# Patient Record
Sex: MALE | Race: WHITE | NOT HISPANIC OR LATINO | Employment: OTHER | URBAN - METROPOLITAN AREA
[De-identification: names, ages, dates, MRNs, and addresses within clinical notes are randomized per-mention and may not be internally consistent; named-entity substitution may affect disease eponyms.]

---

## 2017-01-11 ENCOUNTER — ALLSCRIPTS OFFICE VISIT (OUTPATIENT)
Dept: OTHER | Facility: OTHER | Age: 67
End: 2017-01-11

## 2017-01-31 ENCOUNTER — ALLSCRIPTS OFFICE VISIT (OUTPATIENT)
Dept: OTHER | Facility: OTHER | Age: 67
End: 2017-01-31

## 2017-01-31 DIAGNOSIS — R07.81 PLEURODYNIA: ICD-10-CM

## 2017-02-20 ENCOUNTER — ALLSCRIPTS OFFICE VISIT (OUTPATIENT)
Dept: OTHER | Facility: OTHER | Age: 67
End: 2017-02-20

## 2017-03-07 ENCOUNTER — ALLSCRIPTS OFFICE VISIT (OUTPATIENT)
Dept: OTHER | Facility: OTHER | Age: 67
End: 2017-03-07

## 2017-07-24 ENCOUNTER — ALLSCRIPTS OFFICE VISIT (OUTPATIENT)
Dept: OTHER | Facility: OTHER | Age: 67
End: 2017-07-24

## 2017-09-14 ENCOUNTER — ALLSCRIPTS OFFICE VISIT (OUTPATIENT)
Dept: OTHER | Facility: OTHER | Age: 67
End: 2017-09-14

## 2017-09-25 ENCOUNTER — GENERIC CONVERSION - ENCOUNTER (OUTPATIENT)
Dept: OTHER | Facility: OTHER | Age: 67
End: 2017-09-25

## 2017-12-06 ENCOUNTER — ALLSCRIPTS OFFICE VISIT (OUTPATIENT)
Dept: OTHER | Facility: OTHER | Age: 67
End: 2017-12-06

## 2017-12-19 ENCOUNTER — ALLSCRIPTS OFFICE VISIT (OUTPATIENT)
Dept: OTHER | Facility: OTHER | Age: 67
End: 2017-12-19

## 2017-12-19 ENCOUNTER — TRANSCRIBE ORDERS (OUTPATIENT)
Dept: ADMINISTRATIVE | Facility: HOSPITAL | Age: 67
End: 2017-12-19

## 2017-12-19 DIAGNOSIS — R52 PAIN: Primary | ICD-10-CM

## 2017-12-22 NOTE — PROGRESS NOTES
Assessment   1  Anterior neck pain (723 1) (M54 2)    Discussion/Summary      Patient is a 64-year-old male anterior neck pain - symptoms appear likely secondary to eustachian tube dysfunction  Continue with supportive care  He was advised that he would benefit greatly from using his steroid nasal spray daily  Start treatment with ibuprofen  Follow up if any symptoms are worsening  He was reassured today symptoms do not appear secondary to carotid artery disease  He was given pamphlet for vascular screening offered at [facility]  Chief Complaint   Pt presents for lump on side of neck/jaw x5ds  Pt c/o fo pain w/ touch to lump and L ear pain as well as dizziness  Pt states he used ice pack and nasal spray w/ little relief  History of Present Illness   HPI: Patient is a 64-year-old male presents today with a CC of fullness in his left ear for the past few days  Symptoms started gradually  He states that he has associated scratchy throat  Denies fevers chills runny nose or congestion  Review of Systems        Constitutional: not feeling poorly-- and-- not feeling tired  ENT: no nosebleeds-- and-- no nasal discharge  Cardiovascular: no chest pain-- and-- no palpitations  Respiratory: no shortness of breath,-- no cough-- and-- no shortness of breath during exertion  Gastrointestinal: no abdominal pain,-- no nausea-- and-- no diarrhea  Genitourinary: no dysuria-- and-- no nocturia  Musculoskeletal: no arthralgias-- and-- no myalgias  Integumentary: no rashes-- and-- no skin lesions  Neurological: no headache,-- no numbness-- and-- no dizziness  Active Problems   1  Acute sinusitis (461 9) (J01 90)   2  Benign essential hypertension (401 1) (I10)   3  Chronic thoracic spine pain (724 1,338 29) (M54 6,G89 29)   4  Combined hyperlipidemia (272 2) (E78 2)   5  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)   6  Greater trochanteric bursitis of left hip (726 5) (M70 62)   7   Left lateral epicondylitis (726 32) (M77 12)   8  Rotator cuff syndrome of right shoulder (726 10) (M75 101)   9  Screening for depression (V79 0) (Z13 89)   10  Thoracic back sprain, sequela (905 7) (S23 9XXS)    Past Medical History   1  History of Abdominal pain, epigastric (789 06) (R10 13)   2  History of Abdominal pain, LLQ (left lower quadrant) (789 04) (R10 32)   3  History of Acute upper respiratory infection (465 9) (J06 9)   4  History of Anxiety (300 00) (F41 9)   5  History of Bursitis of hip, unspecified laterality   6  History of Cellulitis of leg (682 6) (L03 119)   7  History of Chronic thoracic spine pain (724 1,338 29) (M54 6,G89 29)   8  History of Colonoscopy (Fiberoptic) Screening   9  History of Dysfunction of both eustachian tubes (381 81) (H69 83)   10  History of Erectile dysfunction of non-organic origin (302 72) (F52 21)   11  History of Flu vaccine need (V04 81) (Z23)   12  History of Flu vaccine need (V04 81) (Z23)   13  History of acute bacterial sinusitis (V12 69) (Z87 09)   14  History of acute bacterial sinusitis (V12 69) (Z87 09)   15  History of acute bronchitis (V12 69) (Z87 09)   16  History of acute gouty arthritis (V13 4) (Z87 39)   17  History of atopic dermatitis (V13 3) (Z87 2)   18  History of backache (V13 59) (Z87 39)   19  History of backache (V13 59) (Z87 39)   20  History of contact dermatitis (V13 3) (Z87 2)   21  History of diverticulitis of colon (V12 79) (Z87 19)   22  History of sebaceous cyst (V13 3) (Z87 2)   23  History of tinea cruris (V12 09) (Z86 19)   24  History of viral gastroenteritis (V12 09) (Z86 19)   25  History of Leg abrasion (916 0) (S80 819A)   26  History of Limb pain (729 5) (M79 609)   27  History of Need for immunization against influenza (V04 81) (Z23)   28  History of Need for pneumococcal vaccination (V03 82) (Z23)   29  History of No Recent Change In Medical History   30   History of Other acute sinusitis, recurrence not specified (461 8) (J01 80)   31  History of Post-nasal drip (784 91) (R09 82)   32  History of Rash (782 1) (R21)   33  History of Rib pain on left side (786 50) (R07 81)   34  History of Right shoulder pain (719 41) (M25 511)   35  History of Screening for cholesterol level (V77 91) (Z13 220)   36  History of Screening for diabetes mellitus (V77 1) (Z13 1)   37  History of Screening for thyroid disorder (V77 0) (Z13 29)   38  History of Skin lesion (709 9) (L98 9)   39  History of Strain of thoracic region (847 1) (S29 019A)   40  History of Thoracic back pain (724 1) (M54 6)   41  History of URI, acute (465 9) (J06 9)  Active Problems And Past Medical History Reviewed: The active problems and past medical history were reviewed and updated today  Family History   Family History    1  Family history of Diabetes Mellitus (V18 0)   2  Family history of Hypertension (V17 49)  Family History Reviewed: The family history was reviewed and updated today  Social History    · Being A Social Drinker   · Current Some Day Smoker (305 1)  The social history was reviewed and updated today  The social history was reviewed and is unchanged  Surgical History   1  History of Appendectomy   2  History of Complete Colonoscopy   3  History of Oral Surgery   4  History of Small Bowel Resection   5  History of Wrist Surgery  Surgical History Reviewed: The surgical history was reviewed and updated today  Current Meds    1  Allopurinol 300 MG Oral Tablet; take 1 tablet by mouth daily; Therapy: 72SZK9460 to (Arthea Angelucci)  Requested for: 65TGI9050; Last     Rx:24Nov2017 Ordered   2  Fluticasone Propionate 50 MCG/ACT Nasal Suspension; 2 sprays each nostril daily; Therapy: 54QOH7339 to (Last Rx:46Ydu3845)  Requested for: 06Dec2017 Ordered   3  Valsartan-Hydrochlorothiazide 160-12 5 MG Oral Tablet; take 1 tablet once daily;      Therapy: 37ZOK2315 to (Arthea Angelucci)  Requested for: 03HTP5091; Last     Rx:24Nov2017 Ordered     The medication list was reviewed and updated today  Allergies   1  No Known Drug Allergies    Vitals    Recorded: 59LJH2727 02:26PM   Temperature 97 6 F, Tympanic   Heart Rate 97   Pulse Quality Normal   Respiration Quality Normal   Respiration 19   Systolic 947, LUE, Sitting   Diastolic 84, LUE, Sitting   Height 5 ft 11 in   Weight 264 lb 5 oz   BMI Calculated 36 86   BSA Calculated 2 37   O2 Saturation 97, RA   Pain Scale 2     Physical Exam        Constitutional      General appearance: No acute distress, well appearing and well nourished  Eyes      Conjunctiva and lids: No swelling, erythema, or discharge  Pupils and irises: Equal, round and reactive to light  Ears, Nose, Mouth, and Throat      External inspection of ears and nose: Normal        Nasal mucosa, septum, and turbinates: Normal without edema or erythema  Oropharynx: Normal with no erythema, edema, exudate or lesions  Pulmonary      Respiratory effort: No increased work of breathing or signs of respiratory distress  Auscultation of lungs: Clear to auscultation, equal breath sounds bilaterally, no wheezes, no rales, no rhonci  Cardiovascular      Auscultation of heart: Normal rate and rhythm, normal S1 and S2, without murmurs  Examination of extremities for edema and/or varicosities: Normal        Abdomen      Abdomen: Non-tender, no masses  Liver and spleen: No hepatomegaly or splenomegaly  Lymphatic      Palpation of lymph nodes in neck: No lymphadenopathy  Musculoskeletal      Gait and station: Normal        Inspection/palpation of joints, bones, and muscles: Normal        Skin      Skin and subcutaneous tissue: Normal without rashes or lesions  Neurologic      Cranial nerves: Cranial nerves 2-12 intact  Sensation: No sensory loss         Psychiatric      Orientation to person, place and time: Normal        Mood and affect: Normal           Signatures Electronically signed by :  Mirta Hay DO; Dec 21 2017 12:24PM EST                       (Author)

## 2017-12-26 NOTE — PROGRESS NOTES
Assessment   1  Acute sinusitis (461 9) (J01 90)   2  Benign essential hypertension (401 1) (I10)    Plan   Acute sinusitis    · Fluticasone Propionate 50 MCG/ACT Nasal Suspension; 2 sprays each nostril daily    Discussion/Summary      80-year-old male here today for 2 weeks of upper respiratory symptoms  He will complete a 10 day course of Levaquin once daily for suspected acute sinusitis  He will start fluticasone nasal spray for nasal congestion and postnasal drip and recommended Mucinex DM OTC for cough and chest congestion and mucus  I advised that he discontinue any other over-the-counter cold medications that may raise his pressure, as his blood pressure is elevated today but he is compliant on medication  Rest, fluids, saltwater gargles all advised  Warm humidification, Mary advised  He should call or follow up in the office should symptoms persist or worsen despite treatment  Possible side effects of new medications were reviewed with the patient/guardian today  The treatment plan was reviewed with the patient/guardian  The patient/guardian understands and agrees with the treatment plan      Chief Complaint   Pt c/o sore throat, ear pain, cough, and congestion x 2 weeks  History of Present Illness   HPI: 66y/o male here today for URI sxs past 2 weeks  Reports excessive discolored mucous, wheeze, PND and chest congestion  pRODUCTVE  ear pressure and sore throat chills, fatigue  no fevers  tried mucinex  Review of Systems        Constitutional: as noted in HPI       ENT: as noted in HPI  Cardiovascular: no complaints of slow or fast heart rate, no chest pain, no palpitations, no leg claudication or lower extremity edema  Respiratory: as noted in HPI  Active Problems   1  Acute sinusitis (461 9) (J01 90)   2  Benign essential hypertension (401 1) (I10)   3  Chronic thoracic spine pain (724 1,338 29) (M54 6,G89 29)   4  Combined hyperlipidemia (272 2) (E78 2)   5   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)   6  Greater trochanteric bursitis of left hip (726 5) (M70 62)   7  Left lateral epicondylitis (726 32) (M77 12)   8  Rotator cuff syndrome of right shoulder (726 10) (M75 101)   9  Screening for depression (V79 0) (Z13 89)   10  Thoracic back sprain, sequela (905 7) (S23 9XXS)    Past Medical History   1  History of Abdominal pain, epigastric (789 06) (R10 13)   2  History of Abdominal pain, LLQ (left lower quadrant) (789 04) (R10 32)   3  History of Acute upper respiratory infection (465 9) (J06 9)   4  History of Anxiety (300 00) (F41 9)   5  History of Bursitis of hip, unspecified laterality   6  History of Cellulitis of leg (682 6) (L03 119)   7  History of Chronic thoracic spine pain (724 1,338 29) (M54 6,G89 29)   8  History of Colonoscopy (Fiberoptic) Screening   9  History of Dysfunction of both eustachian tubes (381 81) (H69 83)   10  History of Erectile dysfunction of non-organic origin (302 72) (F52 21)   11  History of Flu vaccine need (V04 81) (Z23)   12  History of Flu vaccine need (V04 81) (Z23)   13  History of acute bacterial sinusitis (V12 69) (Z87 09)   14  History of acute bacterial sinusitis (V12 69) (Z87 09)   15  History of acute bronchitis (V12 69) (Z87 09)   16  History of acute gouty arthritis (V13 4) (Z87 39)   17  History of atopic dermatitis (V13 3) (Z87 2)   18  History of backache (V13 59) (Z87 39)   19  History of backache (V13 59) (Z87 39)   20  History of contact dermatitis (V13 3) (Z87 2)   21  History of diverticulitis of colon (V12 79) (Z87 19)   22  History of sebaceous cyst (V13 3) (Z87 2)   23  History of tinea cruris (V12 09) (Z86 19)   24  History of viral gastroenteritis (V12 09) (Z86 19)   25  History of Leg abrasion (916 0) (S80 819A)   26  History of Limb pain (729 5) (M79 9)   27  History of Need for immunization against influenza (V04 81) (Z23)   28  History of Need for pneumococcal vaccination (V03 82) (Z23)   29   History of No Recent Change In Medical History   30  History of Other acute sinusitis, recurrence not specified (461 8) (J01 80)   31  History of Post-nasal drip (784 91) (R09 82)   32  History of Rash (782 1) (R21)   33  History of Rib pain on left side (786 50) (R07 81)   34  History of Right shoulder pain (719 41) (M25 511)   35  History of Screening for cholesterol level (V77 91) (Z13 220)   36  History of Screening for diabetes mellitus (V77 1) (Z13 1)   37  History of Screening for thyroid disorder (V77 0) (Z13 29)   38  History of Skin lesion (709 9) (L98 9)   39  History of Strain of thoracic region (847 1) (S29 019A)   40  History of Thoracic back pain (724 1) (M54 6)   41  History of URI, acute (465 9) (J06 9)    Family History   Family History    1  Family history of Diabetes Mellitus (V18 0)   2  Family history of Hypertension (V17 49)    Social History    · Being A Social Drinker   · Current Some Day Smoker (305 1)  The social history was reviewed and updated today  Surgical History   1  History of Appendectomy   2  History of Complete Colonoscopy   3  History of Oral Surgery   4  History of Small Bowel Resection   5  History of Wrist Surgery    Current Meds    1  Allopurinol 300 MG Oral Tablet; take 1 tablet by mouth daily; Therapy: 66DLH2896 to (Tamiko Palmer)  Requested for: 24RXL3679; Last     Rx:[id number] Ordered   2  Valsartan-Hydrochlorothiazide 160-12 5 MG Oral Tablet; take 1 tablet once daily; Therapy: 20Mar2012 to (Tamiko Palmer)  Requested for: 41QYB5165; Last     Rx:[id number] Ordered     The medication list was reviewed and updated today  Allergies   1   No Known Drug Allergies    Vitals    Recorded: 06QYM8771 01:09PM Recorded: 09YLR7615 12:49PM   Temperature  97 F, Tympanic   Heart Rate  98   Respiration Quality  Normal   Respiration  19   Systolic  127, LUE, Sitting   Diastolic  94, LUE, Sitting   Height 5 ft 11 in    Weight  268 lb    BMI Calculated 37 38 50 64   BSA Calculated 2 39 2 14   O2 Saturation  97, RA   Pain Scale  2     Physical Exam        Constitutional      General appearance: Abnormal   acutely ill,-- overweight,-- appears tired-- and-- appearance reflects stated age  Eyes      Conjunctiva and lids: No swelling, erythema, or discharge  Ears, Nose, Mouth, and Throat      External inspection of ears and nose: Normal        Otoscopic examination: Abnormal  -- B/L clear effusion, mild injection, no evidence of TM infection  Nasal mucosa, septum, and turbinates: Abnormal   There was a purulent discharge from both nares  The bilateral nasal mucosa was boggy,-- edematous-- and-- red  swelling and erythema  Oropharynx: Abnormal  -- PND, mild injection  Pulmonary      Respiratory effort: Abnormal  -- productive cough  Auscultation of lungs: Abnormal  -- mild decreased BS throughout, no wheeze, no consolidation  Cardiovascular      Auscultation of heart: Normal rate and rhythm, normal S1 and S2, without murmurs  Lymphatic      Palpation of lymph nodes in neck: No lymphadenopathy  Psychiatric      Orientation to person, place and time: Normal        Mood and affect: Normal        Additional Exam:  vitals reviewed - afebrile, BP elevated           Signatures    Electronically signed by : Maribel Stoner, [facility]; Dec  6 2017  1:12PM EST                       (Author)     Electronically signed by : Myra Vidales DO; Dec 26 2017  2:36AM EST                       (Co-author)

## 2017-12-29 ENCOUNTER — HOSPITAL ENCOUNTER (OUTPATIENT)
Dept: NON INVASIVE DIAGNOSTICS | Facility: CLINIC | Age: 67
Discharge: HOME | End: 2017-12-29
Payer: COMMERCIAL

## 2017-12-29 DIAGNOSIS — R52 PAIN: ICD-10-CM

## 2017-12-30 ENCOUNTER — GENERIC CONVERSION - ENCOUNTER (OUTPATIENT)
Dept: OTHER | Facility: OTHER | Age: 67
End: 2017-12-30

## 2018-01-02 ENCOUNTER — GENERIC CONVERSION - ENCOUNTER (OUTPATIENT)
Dept: OTHER | Facility: OTHER | Age: 68
End: 2018-01-02

## 2018-01-11 NOTE — RESULT NOTES
Message   Please call patient, recent x-ray of his thoracic spine did not show any abnormalities  Thank you     Verified Results  * XR SPINE THORACIC 3 VIEW [id number] 03:24PM Ambrose Cuadra Order Number: [id number]     Test Name Result Flag Reference   XR SPINE THORACIC 3 VW (Report)     THORACIC SPINE     INDICATION: Back pain  COMPARISON: None     VIEWS: AP, lateral and coned-down projections; 4 images     FINDINGS:     Thoracic vertebrae demonstrate normal stature and alignment  There is no fracture or pathologic bone lesion  There is no displacement of the paraspinal line  The pedicles are intact  IMPRESSION:     Unremarkable thoracic spine  Workstation performed: [id number]     Signed by:   Lenwood Crigler, MD   5/17/16     (Q) CBC (INCLUDES DIFF/PLT) (REFL) [id number] 08:04AM Hilario Walker     Test Name Result Flag Reference   WHITE BLOOD CELL COUNT 6 4 Thousand/uL  3 8-10 8   RED BLOOD CELL COUNT 5 03 Million/uL  4 20-5 80   HEMOGLOBIN 14 9 g/dL  13 2-17 1   HEMATOCRIT 45 8 %  38 5-50 0   MCV 91 2 fL  80 0-100 0   MCH 29 6 pg  27 0-33 0   MCHC 32 5 g/dL  32 0-36 0   RDW 14 1 %  11 0-15 0   PLATELET COUNT 515 Thousand/uL  140-400   MPV 9 1 fL  7 5-11 5   ABSOLUTE NEUTROPHILS 2867 cells/uL  2776-9435   ABSOLUTE LYMPHOCYTES 2394 cells/uL  850-3900   ABSOLUTE MONOCYTES 838 cells/uL  200-950   ABSOLUTE EOSINOPHILS 250 cells/uL     ABSOLUTE BASOPHILS 51 cells/uL  0-200   NEUTROPHILS 44 8 %     LYMPHOCYTES 37 4 %     MONOCYTES 13 1 %     EOSINOPHILS 3 9 %     BASOPHILS 0 8 %       (Q) COMPREHENSIVE METABOLIC PNL W/ADJUSTED CALCIUM [id number] 08:04AM Amanda Brooke     Test Name Result Flag Reference   GLUCOSE 109 mg/dL H 65-99   Fasting reference interval   UREA NITROGEN (BUN) 20 mg/dL  7-25   CREATININE 1 07 mg/dL  0 70-1 25   For patients >52years of age, the reference limit  for Creatinine is approximately 13% higher for people  identified as -American     eGFR NON-AFR  AMERICAN 72 mL/min/1 73m2  > OR = 60   eGFR AFRICAN AMERICAN 84 mL/min/1 73m2  > OR = 60   BUN/CREATININE RATIO   3-80   NOT APPLICABLE (calc)   SODIUM 140 mmol/L  135-146   POTASSIUM 4 1 mmol/L  3 5-5 3   CHLORIDE 101 mmol/L     CARBON DIOXIDE 33 mmol/L H 19-30   CALCIUM 9 1 mg/dL  8 6-10 3   CALCIUM (ADJUSTED FOR$ALBUMIN) 9 2 mg/dL (calc)  8 6-10 2   PROTEIN, TOTAL 6 8 g/dL  6 1-8 1   ALBUMIN 4 3 g/dL  3 6-5 1   GLOBULIN 2 5 g/dL (calc)  1 9-3 7   ALBUMIN/GLOBULIN RATIO 1 7 (calc)  1 0-2 5   BILIRUBIN, TOTAL 0 6 mg/dL  0 2-1 2   ALKALINE PHOSPHATASE 58 U/L     AST 14 U/L  10-35   ALT 13 U/L  9-46     (Q) LIPID PANEL WITH REFLEX TO DIRECT LDL [id number] 08:04AM Amanda Brooke     Test Name Result Flag Reference   CHOLESTEROL, TOTAL 248 mg/dL H 125-200   HDL CHOLESTEROL 54 mg/dL  > OR = 40   TRIGLICERIDES 86 mg/dL  <537   LDL-CHOLESTEROL 177 mg/dL (calc) H <130   Desirable range <100 mg/dL for patients with CHD or  diabetes and <70 mg/dL for diabetic patients with  known heart disease  CHOL/HDLC RATIO 4 6 (calc)  < OR = 5 0   NON HDL CHOLESTEROL 194 mg/dL (calc) H    Target for non-HDL cholesterol is 30 mg/dL higher than   LDL cholesterol target       (Q) TSH, 3RD GENERATION W/REFLEX TO FT4 [id number] 08:04AM Amanda Brooke   REPORT COMMENT:  FASTING:YES     Test Name Result Flag Reference   TSH W/REFLEX TO FT4 2 54 mIU/L  0 40-4 50

## 2018-01-12 VITALS
DIASTOLIC BLOOD PRESSURE: 80 MMHG | HEIGHT: 71 IN | HEART RATE: 86 BPM | RESPIRATION RATE: 16 BRPM | WEIGHT: 265.13 LBS | OXYGEN SATURATION: 95 % | SYSTOLIC BLOOD PRESSURE: 118 MMHG | BODY MASS INDEX: 37.12 KG/M2 | TEMPERATURE: 98.3 F

## 2018-01-12 VITALS
BODY MASS INDEX: 36.62 KG/M2 | SYSTOLIC BLOOD PRESSURE: 150 MMHG | OXYGEN SATURATION: 96 % | HEART RATE: 93 BPM | RESPIRATION RATE: 16 BRPM | TEMPERATURE: 97.9 F | HEIGHT: 71 IN | DIASTOLIC BLOOD PRESSURE: 86 MMHG | WEIGHT: 261.56 LBS

## 2018-01-12 VITALS
HEART RATE: 81 BPM | BODY MASS INDEX: 37 KG/M2 | RESPIRATION RATE: 16 BRPM | SYSTOLIC BLOOD PRESSURE: 138 MMHG | WEIGHT: 264.31 LBS | DIASTOLIC BLOOD PRESSURE: 88 MMHG | HEIGHT: 71 IN | OXYGEN SATURATION: 95 % | TEMPERATURE: 97.2 F

## 2018-01-12 VITALS
RESPIRATION RATE: 16 BRPM | SYSTOLIC BLOOD PRESSURE: 138 MMHG | DIASTOLIC BLOOD PRESSURE: 82 MMHG | WEIGHT: 267.31 LBS | BODY MASS INDEX: 37.42 KG/M2 | OXYGEN SATURATION: 98 % | TEMPERATURE: 96.1 F | HEART RATE: 78 BPM | HEIGHT: 71 IN

## 2018-01-13 VITALS
SYSTOLIC BLOOD PRESSURE: 158 MMHG | WEIGHT: 270.44 LBS | OXYGEN SATURATION: 96 % | HEIGHT: 71 IN | BODY MASS INDEX: 37.86 KG/M2 | HEART RATE: 91 BPM | DIASTOLIC BLOOD PRESSURE: 88 MMHG | TEMPERATURE: 98.7 F | RESPIRATION RATE: 16 BRPM

## 2018-01-14 VITALS
OXYGEN SATURATION: 95 % | SYSTOLIC BLOOD PRESSURE: 154 MMHG | HEIGHT: 71 IN | RESPIRATION RATE: 17 BRPM | BODY MASS INDEX: 36.72 KG/M2 | DIASTOLIC BLOOD PRESSURE: 86 MMHG | WEIGHT: 262.31 LBS | TEMPERATURE: 98.2 F | HEART RATE: 88 BPM

## 2018-01-22 VITALS
HEART RATE: 89 BPM | HEIGHT: 61 IN | RESPIRATION RATE: 20 BRPM | BODY MASS INDEX: 49.1 KG/M2 | OXYGEN SATURATION: 96 % | TEMPERATURE: 97.9 F | DIASTOLIC BLOOD PRESSURE: 90 MMHG | WEIGHT: 260.06 LBS | SYSTOLIC BLOOD PRESSURE: 130 MMHG

## 2018-01-22 VITALS
SYSTOLIC BLOOD PRESSURE: 156 MMHG | WEIGHT: 268 LBS | OXYGEN SATURATION: 97 % | RESPIRATION RATE: 19 BRPM | TEMPERATURE: 97 F | HEIGHT: 71 IN | DIASTOLIC BLOOD PRESSURE: 94 MMHG | BODY MASS INDEX: 37.52 KG/M2 | HEART RATE: 98 BPM

## 2018-01-23 VITALS
HEART RATE: 97 BPM | BODY MASS INDEX: 37 KG/M2 | TEMPERATURE: 97.6 F | DIASTOLIC BLOOD PRESSURE: 84 MMHG | WEIGHT: 264.31 LBS | HEIGHT: 71 IN | OXYGEN SATURATION: 97 % | SYSTOLIC BLOOD PRESSURE: 120 MMHG | RESPIRATION RATE: 19 BRPM

## 2018-01-23 NOTE — RESULT NOTES
Verified Results  VAS SCREENING [id number] 07:50AM Amanda Brooke     Test Name Result Flag Reference   VAS SCREENING (Report)     [facility] REPORT   CLINICAL:   Indications: Vascular screening for Carotid artery stenosis, AAA and PAD  Patient reports a history of Dizziness   Risk Factors   The patient has history of obesity and Hypertension  He has no history of   Hyperlipidemia, Peripheral Arterial Disease or Diabetes  Risk Factors   Clinical   Right Pressure: 162/ mm Hg, Left Pressure: 164/ mm Hg  FINDINGS:      Celiac Artery Expiration     Impression: 1  Normal Aortic diameter     AP Diam (cm): 2 0     PSV: 66   Prox CCA, Right     PSV: 63     EDV: 16   Common Femoral Artery, Right     PSV: 78     Waveform: Triphasic   DorsPedis, Right     VIVIENNE: 1 05     Pressure: 173     Waveform: Triphasic   Mid CCA, Right     PSV: 69     EDV: 27   Dist CCA, Right     PSV: 85     EDV: 27   Post Tibial, Right     VIVIENNE: 1 07     Pressure: 176     Waveform: Triphasic   Prox  ICA, Right     Impression: 1  No significant carotid disease     PSV: 85     EDV: 23     ICA/CCA: 1 24     PPS: 68 72   Dist  ICA, Right     PSV: 102     EDV: 36     ICA/CCA: 1 49     PPS: 68 72   Prox CCA, Left     PSV: 73     EDV: 24   Common Femoral Artery, Left     PSV: 68     Waveform: Triphasic   DorsPedis, Left     VIVIENNE: 1 05     Pressure: 172     Waveform: Triphasic   Mid CCA, Left     PSV: 101     EDV: 30   Dist CCA, Left     PSV: 95     EDV: 36   Post Tibial, Left     VIVIENNE: 0 99     Pressure: 163     Waveform: Triphasic   Prox  ICA, Left     Impression: 1  No significant carotid disease     PSV: 83     EDV: 21     ICA/CCA: 0 83     PPS: 101 16   Dist  ICA, Left     PSV: 61     EDV: 32     ICA/CCA: 0 61     PPS: 101 16            CONCLUSION:      Impression   CAROTID SCREENING:   Evaluation reveals a normal internal carotid artery on the right  Evaluation reveals a normal internal carotid artery on the left     AORTA SCREENING:   The abdominal aorta is patent and normal in caliber with the greatest diameter   measurement of 2 0 cms  PAD SCREENING:   The ankle/brachial index on the right is 1 07, which is within normal limits  The ankle/brachial index on the left is 1 07, which is within normal limits        SIGNATURE:   Electronically Signed by: Radha Castro on 2017-12-29 09:52:00 PM

## 2018-01-24 VITALS
HEIGHT: 71 IN | WEIGHT: 262 LBS | OXYGEN SATURATION: 98 % | DIASTOLIC BLOOD PRESSURE: 84 MMHG | BODY MASS INDEX: 36.68 KG/M2 | HEART RATE: 86 BPM | SYSTOLIC BLOOD PRESSURE: 134 MMHG | TEMPERATURE: 96.9 F

## 2018-03-12 ENCOUNTER — OFFICE VISIT (OUTPATIENT)
Dept: FAMILY MEDICINE CLINIC | Facility: CLINIC | Age: 68
End: 2018-03-12
Payer: COMMERCIAL

## 2018-03-12 VITALS
OXYGEN SATURATION: 98 % | RESPIRATION RATE: 20 BRPM | BODY MASS INDEX: 38.57 KG/M2 | SYSTOLIC BLOOD PRESSURE: 126 MMHG | WEIGHT: 269.44 LBS | HEART RATE: 80 BPM | TEMPERATURE: 97.8 F | HEIGHT: 70 IN | DIASTOLIC BLOOD PRESSURE: 80 MMHG

## 2018-03-12 DIAGNOSIS — M75.101 ROTATOR CUFF SYNDROME OF RIGHT SHOULDER: Primary | ICD-10-CM

## 2018-03-12 PROCEDURE — 99213 OFFICE O/P EST LOW 20 MIN: CPT | Performed by: FAMILY MEDICINE

## 2018-03-12 RX ORDER — LEVOFLOXACIN 500 MG/1
1 TABLET, FILM COATED ORAL DAILY
COMMUNITY
Start: 2018-01-02 | End: 2018-03-12 | Stop reason: HOSPADM

## 2018-03-12 RX ORDER — AMOXICILLIN AND CLAVULANATE POTASSIUM 875; 125 MG/1; MG/1
1 TABLET, FILM COATED ORAL EVERY 12 HOURS
COMMUNITY
Start: 2017-09-14 | End: 2018-03-12 | Stop reason: HOSPADM

## 2018-03-12 RX ORDER — FLUTICASONE PROPIONATE 50 MCG
2 SPRAY, SUSPENSION (ML) NASAL DAILY
COMMUNITY
Start: 2017-12-06 | End: 2018-04-02

## 2018-03-12 RX ORDER — VALSARTAN AND HYDROCHLOROTHIAZIDE 160; 12.5 MG/1; MG/1
1 TABLET, FILM COATED ORAL DAILY
COMMUNITY
Start: 2012-03-20 | End: 2018-07-11 | Stop reason: SDUPTHER

## 2018-03-12 RX ORDER — ALLOPURINOL 300 MG/1
1 TABLET ORAL DAILY
COMMUNITY
Start: 2011-03-21 | End: 2018-05-18 | Stop reason: SDUPTHER

## 2018-03-12 NOTE — PROGRESS NOTES
Assessment/Plan:   1  Rotator cuff syndrome of right shoulder  Symptoms appear persistent  Reviewed his previous testing including x-rays  Patient has been performing his home therapy program regularly at home  He does have periodic exacerbations of his shoulder pain  Due to the persistence of his pain, he was advised he may benefit from evaluation by Orthopedics  He may also benefit from having a MRI  Referral given today  Continue with ibuprofen for symptom relief  He was advised that he may follow up if he would like additional CS injection   - Ambulatory referral to Orthopedic Surgery; Future     There are no diagnoses linked to this encounter  Subjective:  Chief Complaint   Patient presents with    Follow-up     Shoulder Pain( right)         Patient ID: Felix Ulrich is a 80 y o  male  Shoulder Pain    The pain is present in the right shoulder  This is a recurrent problem  The current episode started in the past 7 days  There has been no history of extremity trauma  The problem occurs intermittently  The problem has been unchanged  The quality of the pain is described as aching and dull  The pain is at a severity of 3/10  The pain is mild  Pertinent negatives include no fever, itching or numbness  He has tried NSAIDS for the symptoms  The treatment provided mild relief  Review of Systems   Constitutional: Negative for activity change, chills, fatigue and fever  HENT: Negative for congestion, ear pain, sinus pressure and sore throat  Eyes: Negative for redness, itching and visual disturbance  Respiratory: Negative for cough and shortness of breath  Cardiovascular: Negative for chest pain and palpitations  Gastrointestinal: Negative for abdominal pain, diarrhea and nausea  Endocrine: Negative for cold intolerance and heat intolerance  Genitourinary: Negative for dysuria, flank pain and frequency  Musculoskeletal: Positive for arthralgias   Negative for back pain, gait problem and myalgias  Skin: Negative for color change and itching  Allergic/Immunologic: Negative for environmental allergies  Neurological: Negative for dizziness, numbness and headaches  Psychiatric/Behavioral: Negative for behavioral problems and sleep disturbance  The following portions of the patient's history were reviewed and updated as appropriate : past family history, past medical history, past social history and past surgical history  Objective:  Vitals:    03/12/18 0930   BP: 126/80   BP Location: Left arm   Patient Position: Sitting   Cuff Size: Standard   Pulse: 80   Resp: 20   Temp: 97 8 °F (36 6 °C)   TempSrc: Tympanic   SpO2: 98%   Weight: 122 kg (269 lb 7 oz)   Height: 5' 10" (1 778 m)        Physical Exam   Constitutional: He appears well-developed and well-nourished  HENT:   Head: Normocephalic and atraumatic  Musculoskeletal:        Right shoulder: He exhibits tenderness and pain  He exhibits normal range of motion, no swelling, no effusion, no deformity and no laceration

## 2018-03-22 DIAGNOSIS — G89.29 CHRONIC THORACIC SPINE PAIN: Primary | ICD-10-CM

## 2018-03-22 DIAGNOSIS — M54.6 CHRONIC THORACIC SPINE PAIN: Primary | ICD-10-CM

## 2018-03-22 RX ORDER — MELOXICAM 15 MG/1
TABLET ORAL
Qty: 30 TABLET | Refills: 1 | Status: SHIPPED | OUTPATIENT
Start: 2018-03-22 | End: 2018-04-02

## 2018-03-27 ENCOUNTER — APPOINTMENT (OUTPATIENT)
Dept: RADIOLOGY | Facility: CLINIC | Age: 68
End: 2018-03-27
Payer: COMMERCIAL

## 2018-03-27 ENCOUNTER — APPOINTMENT (OUTPATIENT)
Dept: LAB | Facility: MEDICAL CENTER | Age: 68
End: 2018-03-27
Payer: COMMERCIAL

## 2018-03-27 ENCOUNTER — TRANSCRIBE ORDERS (OUTPATIENT)
Dept: ADMINISTRATIVE | Facility: HOSPITAL | Age: 68
End: 2018-03-27

## 2018-03-27 ENCOUNTER — OFFICE VISIT (OUTPATIENT)
Dept: OBGYN CLINIC | Facility: MEDICAL CENTER | Age: 68
End: 2018-03-27
Payer: COMMERCIAL

## 2018-03-27 VITALS
BODY MASS INDEX: 37.51 KG/M2 | HEIGHT: 70 IN | SYSTOLIC BLOOD PRESSURE: 146 MMHG | DIASTOLIC BLOOD PRESSURE: 93 MMHG | WEIGHT: 262 LBS | HEART RATE: 86 BPM

## 2018-03-27 DIAGNOSIS — Z01.818 PRE-OP TESTING: Primary | ICD-10-CM

## 2018-03-27 DIAGNOSIS — M75.101 ROTATOR CUFF SYNDROME OF RIGHT SHOULDER: ICD-10-CM

## 2018-03-27 DIAGNOSIS — Z01.818 PRE-OP TESTING: ICD-10-CM

## 2018-03-27 DIAGNOSIS — M25.511 RIGHT SHOULDER PAIN, UNSPECIFIED CHRONICITY: Primary | ICD-10-CM

## 2018-03-27 DIAGNOSIS — M25.511 RIGHT SHOULDER PAIN, UNSPECIFIED CHRONICITY: ICD-10-CM

## 2018-03-27 LAB
ANION GAP SERPL CALCULATED.3IONS-SCNC: 5 MMOL/L (ref 4–13)
BUN SERPL-MCNC: 15 MG/DL (ref 5–25)
CALCIUM SERPL-MCNC: 9.4 MG/DL (ref 8.3–10.1)
CHLORIDE SERPL-SCNC: 102 MMOL/L (ref 100–108)
CO2 SERPL-SCNC: 30 MMOL/L (ref 21–32)
CREAT SERPL-MCNC: 1.09 MG/DL (ref 0.6–1.3)
GFR SERPL CREATININE-BSD FRML MDRD: 70 ML/MIN/1.73SQ M
GLUCOSE P FAST SERPL-MCNC: 114 MG/DL (ref 65–99)
POTASSIUM SERPL-SCNC: 4.2 MMOL/L (ref 3.5–5.3)
SODIUM SERPL-SCNC: 137 MMOL/L (ref 136–145)

## 2018-03-27 PROCEDURE — 73030 X-RAY EXAM OF SHOULDER: CPT

## 2018-03-27 PROCEDURE — 99204 OFFICE O/P NEW MOD 45 MIN: CPT | Performed by: ORTHOPAEDIC SURGERY

## 2018-03-27 PROCEDURE — 36415 COLL VENOUS BLD VENIPUNCTURE: CPT

## 2018-03-27 PROCEDURE — 80048 BASIC METABOLIC PNL TOTAL CA: CPT

## 2018-03-27 NOTE — PROGRESS NOTES
Orthopaedic Surgery - Office Note  Silvia Carlson (2 y o  male)   : 1950   MRN: [id number]  Encounter Date: 3/27/2018    Chief Complaint   Patient presents with    Right Shoulder - Pain       Assessment / Plan  Possible R shoulder rotator cuff tear    · MRI of right shoulder  Return for Recheck after MRI  History of Present Illness  Slivia Carlson is a 80 y o  male who presents with chronic R shoulder pain that happened as a work related injury on   Pt states that he was lifting something heavy across his body when he felt pain in his R shoulder  Pt states that he was seen in the [place] office with Xrays with a [employer] rep in  where he has xrays done and was dx with a shoulder strain  Pt states that he has done therapy and ice for weeks with relief  Pt states that he did not miss any work when this happened  Pt is since now retired as of 2016  Pt states that he has had a CSI in the past that gave him partial relief  Pt c/o pain actively elevating his arm and tucking a shirt in, closing a door behind him, night pain that creates a shooting pain that goes down to his elbow  Review of Systems  Pertinent items are noted in HPI  All other systems were reviewed and are negative  Physical Exam  /93   Pulse 86   Ht 5' 10" (1 778 m)   Wt 119 kg (262 lb)   BMI 37 59 kg/m²   Cons: Appears well  No apparent distress  Psych: Alert  Oriented x3  Mood and affect normal   Eyes: PERRLA, EOMI  Resp: Normal effort  No audible wheezing or stridor  CV: Palpable pulse  No discernable arrhythmia  No LE edema  Lymph:  No palpable cervical, axillary, or inguinal lymphadenopathy  Skin: Warm  No palpable masses  No visible lesions  Neuro: Normal muscle tone  Normal and symmetric DTR's  Right Shoulder Exam  Alignment / Posture:  Normal shoulder posture  Inspection:  No swelling  No deformity  Palpation:  moderate tenderness at subacromial bursa  No effusion    ROM:  Shoulder   Shoulder ER R 70, L 50  pain with shoulder extension and ER  patient has an obvious hitch with active forward elevation  Strength:  Supraspinatus 5-/5  Infraspinatus 4/5  Subscapularis 5/5  Stability:  No objective shoulder instability  Tests: (+) Baker  (+) Neer  (+) Painful arc  (-) Belly press  Neurovascular:  Sensation intact in Ax/R/M/U nerve distributions  2+ radial pulse  Brisk capillary refill in all fingertips  Studies Reviewed  XR of right shoulder - shows no acute fractures no degernerative changes  Procedures  No procedures today  Medical, Surgical, Family, and Social History  The patient's medical history, family history, and social history, were reviewed and updated as appropriate  Past Medical History:   Diagnosis Date    Gout     Hypertension        Past Surgical History:   Procedure Laterality Date    APPENDECTOMY      12years old       Family History   Problem Relation Age of Onset    Diabetes Sister        Social History     Occupational History    Not on file       Social History Main Topics    Smoking status: Former Smoker    Smokeless tobacco: Never Used      Comment: smoked for 20 years    Alcohol use Yes    Drug use: No    Sexual activity: Not on file       Allergies   Allergen Reactions    Levaquin [Levofloxacin] Arthralgia         Current Outpatient Prescriptions:     allopurinol (ZYLOPRIM) 300 mg tablet, Take 1 tablet by mouth daily, Disp: , Rfl:     fluticasone (FLONASE) 50 mcg/act nasal spray, 2 sprays into each nostril daily, Disp: , Rfl:     meloxicam (MOBIC) 15 mg tablet, TAKE 1 TABLET BY MOUTH DAILY WITH FOOD, Disp: 30 tablet, Rfl: 1    valsartan-hydrochlorothiazide (DIOVAN-HCT) 160-12 5 MG per tablet, Take 1 tablet by mouth daily, Disp: , Rfl:       Avani Luciano Attestation    I,:   Avani Luciano am acting as a scribe while in the presence of the attending physician :        I,:   Chhaya Majano MD personally performed the services described in this documentation    as scribed in my presence :

## 2018-04-02 RX ORDER — FLUTICASONE PROPIONATE 50 MCG
1 SPRAY, SUSPENSION (ML) NASAL DAILY PRN
COMMUNITY
End: 2019-02-26

## 2018-04-03 ENCOUNTER — ANESTHESIA EVENT (OUTPATIENT)
Dept: RADIOLOGY | Facility: HOSPITAL | Age: 68
End: 2018-04-03

## 2018-04-04 ENCOUNTER — ANESTHESIA (OUTPATIENT)
Dept: RADIOLOGY | Facility: HOSPITAL | Age: 68
End: 2018-04-04

## 2018-04-04 ENCOUNTER — HOSPITAL ENCOUNTER (OUTPATIENT)
Dept: RADIOLOGY | Facility: HOSPITAL | Age: 68
Discharge: HOME | End: 2018-04-04
Payer: COMMERCIAL

## 2018-04-04 VITALS
SYSTOLIC BLOOD PRESSURE: 168 MMHG | HEART RATE: 66 BPM | BODY MASS INDEX: 37.22 KG/M2 | WEIGHT: 260 LBS | HEIGHT: 70 IN | DIASTOLIC BLOOD PRESSURE: 96 MMHG | TEMPERATURE: 98.6 F | RESPIRATION RATE: 16 BRPM | OXYGEN SATURATION: 100 %

## 2018-04-04 DIAGNOSIS — M25.511 RIGHT SHOULDER PAIN, UNSPECIFIED CHRONICITY: ICD-10-CM

## 2018-04-04 PROCEDURE — 73221 MRI JOINT UPR EXTREM W/O DYE: CPT

## 2018-04-04 RX ORDER — LIDOCAINE HYDROCHLORIDE 10 MG/ML
INJECTION, SOLUTION INFILTRATION; PERINEURAL AS NEEDED
Status: DISCONTINUED | OUTPATIENT
Start: 2018-04-04 | End: 2018-04-04 | Stop reason: SURG

## 2018-04-04 RX ORDER — PROPOFOL 10 MG/ML
INJECTION, EMULSION INTRAVENOUS AS NEEDED
Status: DISCONTINUED | OUTPATIENT
Start: 2018-04-04 | End: 2018-04-04 | Stop reason: SURG

## 2018-04-04 RX ORDER — SODIUM CHLORIDE, SODIUM LACTATE, POTASSIUM CHLORIDE, CALCIUM CHLORIDE 600; 310; 30; 20 MG/100ML; MG/100ML; MG/100ML; MG/100ML
20 INJECTION, SOLUTION INTRAVENOUS CONTINUOUS
Status: DISCONTINUED | OUTPATIENT
Start: 2018-04-04 | End: 2018-04-05 | Stop reason: HOSPADM

## 2018-04-04 RX ADMIN — SODIUM CHLORIDE, SODIUM LACTATE, POTASSIUM CHLORIDE, AND CALCIUM CHLORIDE 20 ML/HR: .6; .31; .03; .02 INJECTION, SOLUTION INTRAVENOUS at 12:01

## 2018-04-04 RX ADMIN — LIDOCAINE HYDROCHLORIDE 50 MG: 10 INJECTION, SOLUTION INFILTRATION; PERINEURAL at 13:18

## 2018-04-04 RX ADMIN — PROPOFOL 200 MG: 10 INJECTION, EMULSION INTRAVENOUS at 13:18

## 2018-04-04 NOTE — ANESTHESIA PREPROCEDURE EVALUATION
Review of Systems/Medical History          Cardiovascular  Hyperlipidemia, Hypertension ,    Pulmonary  Smoker ex-smoker  , No recent URI ,        GI/Hepatic    No GERD ,             Endo/Other    Obesity (BMI-38)    GYN       Hematology   Musculoskeletal  Back pain , thoracic pain,        Neurology   Psychology       Comment: Claustrophobia         Lab Results   Component Value Date    ALT 13 05/16/2016    AST 14 05/16/2016    BUN 15 03/27/2018    CALCIUM 9 4 03/27/2018     03/27/2018    CHOL 248 (H) 05/16/2016    CO2 30 03/27/2018    CREATININE 1 09 03/27/2018    HDL 54 05/16/2016    HCT 45 8 05/16/2016    HGB 14 9 05/16/2016    PROT 6 8 05/16/2016     05/16/2016    K 4 2 03/27/2018     03/27/2018    TRIG 86 05/16/2016    WBC 6 4 05/16/2016     Physical Exam    Airway  Comment: Large Neck  Mallampati score: II  TM Distance: >3 FB  Neck ROM: full     Dental   Comment: Upper Front Implant, implants,     Cardiovascular      Pulmonary      Other Findings        Anesthesia Plan  ASA Score- 3     Anesthesia Type- general with ASA Monitors  Additional Monitors:   Airway Plan: LMA  Plan Factors-Patient not instructed to abstain from smoking on day of procedure  Patient did not smoke on day of surgery  Induction- intravenous  Postoperative Plan-     Informed Consent- Anesthetic plan and risks discussed with patient                Lab Results   Component Value Date    ALT 13 05/16/2016    AST 14 05/16/2016    BUN 15 03/27/2018    CALCIUM 9 4 03/27/2018     03/27/2018    CHOL 248 (H) 05/16/2016    CO2 30 03/27/2018    CREATININE 1 09 03/27/2018    HDL 54 05/16/2016    HCT 45 8 05/16/2016    HGB 14 9 05/16/2016    PROT 6 8 05/16/2016     05/16/2016    K 4 2 03/27/2018     03/27/2018    TRIG 86 05/16/2016    WBC 6 4 05/16/2016

## 2018-04-04 NOTE — PROGRESS NOTES
Pt stable and alert and oriented x4  Pt denies the need for anything when asked  Pt denies nausea  Pt states he is comfortable and is notified of plan for transfer to [facility]  I called [facility] and notified Tricia Thompson RN of pt plan for transfer

## 2018-04-04 NOTE — ANESTHESIA POSTPROCEDURE EVALUATION
Post-Op Assessment Note      CV Status:  Stable    Mental Status:  Alert    Hydration Status:  Stable    PONV Controlled:  None    Airway Patency:  Patent    Post Op Vitals Reviewed:  Yes              BP (!) 188/102 (04/04/18 1356)    Temp (!) 96 2 °F (35 7 °C) (04/04/18 1356)    Pulse 76 (04/04/18 1356)   Resp 20 (04/04/18 1356)    SpO2 96 % (04/04/18 1356)

## 2018-04-10 ENCOUNTER — OFFICE VISIT (OUTPATIENT)
Dept: OBGYN CLINIC | Facility: MEDICAL CENTER | Age: 68
End: 2018-04-10
Payer: COMMERCIAL

## 2018-04-10 VITALS
WEIGHT: 262 LBS | HEIGHT: 70 IN | HEART RATE: 82 BPM | SYSTOLIC BLOOD PRESSURE: 192 MMHG | BODY MASS INDEX: 37.51 KG/M2 | DIASTOLIC BLOOD PRESSURE: 92 MMHG

## 2018-04-10 DIAGNOSIS — M75.121 COMPLETE TEAR OF RIGHT ROTATOR CUFF: Primary | ICD-10-CM

## 2018-04-10 PROCEDURE — 99213 OFFICE O/P EST LOW 20 MIN: CPT | Performed by: ORTHOPAEDIC SURGERY

## 2018-04-10 NOTE — PROGRESS NOTES
Orthopaedic Surgery - Office Note  Earnestine Sales (40 y o  male)   : 1950   MRN: [id number]  Encounter Date: 4/10/2018    Chief Complaint   Patient presents with    Right Shoulder - Follow-up       Assessment / Plan  R shoulder rotator cuff supraspinatus tear     · due to the patients minimal discomfort today proceeding with surgery is not reccommended at this point    · Patient states that he is going to continue with his HEP in regards to managing his symptoms   Return if symptoms worsen or fail to improve  History of Present Illness  Earnestine Sales is a 80 y o  male who presents as a follow up to review his MRI of his R shoulder  Pt states that he was lifting something heavy across his body when he felt pain in his R shoulder  Pt states that he has had therapy and a CSI in the past that gave him partial relief  Pt c/o pain actively elevating his arm, closing a door behind him and tucking in a shirt  The pain in his shoulder will wake him up at night and he describes a shooting pain that goes down to his elbow  However this pain has greatly subsided since his last visit and the patient states that he can live with how he has been feeling lately  Review of Systems  Pertinent items are noted in HPI  All other systems were reviewed and are negative  Physical Exam  BP (!) 192/92   Pulse 82   Ht 5' 10" (1 778 m)   Wt 119 kg (262 lb)   BMI 37 59 kg/m²   Cons: Appears well  No apparent distress  Psych: Alert  Oriented x3  Mood and affect normal   Eyes: PERRLA, EOMI  Resp: Normal effort  No audible wheezing or stridor  CV: Palpable pulse  No discernable arrhythmia  No LE edema  Lymph:  No palpable cervical, axillary, or inguinal lymphadenopathy  Skin: Warm  No palpable masses  No visible lesions  Neuro: Normal muscle tone  Normal and symmetric DTR's  Right Shoulder Exam  Alignment / Posture:  Normal shoulder posture  Normal scapular position  Inspection:  No swelling   No deformity  Palpation:  moderate tenderness at subacromial bursa  No effusion  ROM:  Shoulder   Shoulder ER 70  pain with shoulder extension and ER  patient has an obvious hitch with active forward elevation  strength:  Supraspinatus 5/5  Infraspinatus 5/5  Subscapularis 5/5  Stability:  No objective shoulder instability  Tests: (+) Baker  (+) Neer  (+) Painful arc  (-) Belly press  Neurovascular:  Sensation intact in Ax/R/M/U nerve distributions  2+ radial pulse  Brisk capillary refill in all fingertips  Studies Reviewed  MRI of right shoulder - os acromiale, mesoacromial type iwth subacromial spur  full thickness longitudinal interstitial tear of the supraspinatus     Procedures  No procedures today  Medical, Surgical, Family, and Social History  The patient's medical history, family history, and social history, were reviewed and updated as appropriate  Past Medical History:   Diagnosis Date    Gout     Hypertension        Past Surgical History:   Procedure Laterality Date    APPENDECTOMY      12years old    BOWEL RESECTION      COLONOSCOPY         Family History   Problem Relation Age of Onset    Diabetes Sister        Social History     Occupational History    Not on file       Social History Main Topics    Smoking status: Former Smoker     Packs/day: 0 50     Years: 20 00    Smokeless tobacco: Never Used    Alcohol use Yes      Comment: social    Drug use: No    Sexual activity: Not on file       Allergies   Allergen Reactions    Levaquin [Levofloxacin] Arthralgia         Current Outpatient Prescriptions:     allopurinol (ZYLOPRIM) 300 mg tablet, Take 1 tablet by mouth daily, Disp: , Rfl:     fluticasone (FLONASE) 50 mcg/act nasal spray, 1 spray into each nostril daily as needed for rhinitis, Disp: , Rfl:     valsartan-hydrochlorothiazide (DIOVAN-HCT) 160-12 5 MG per tablet, Take 1 tablet by mouth daily, Disp: , Rfl:       Avani Florez Attestation    I,: Avani Martinez am acting as a scribe while in the presence of the attending physician :        I,:   Mayra Fairbanks MD personally performed the services described in this documentation    as scribed in my presence :

## 2018-05-18 DIAGNOSIS — Z87.39 HISTORY OF GOUT: Primary | ICD-10-CM

## 2018-05-18 RX ORDER — ALLOPURINOL 300 MG/1
TABLET ORAL DAILY
Qty: 90 TABLET | Refills: 1 | Status: SHIPPED | OUTPATIENT
Start: 2018-05-18 | End: 2019-01-27 | Stop reason: SDUPTHER

## 2018-05-21 ENCOUNTER — APPOINTMENT (OUTPATIENT)
Dept: RADIOLOGY | Facility: MEDICAL CENTER | Age: 68
End: 2018-05-21
Payer: COMMERCIAL

## 2018-05-21 ENCOUNTER — OFFICE VISIT (OUTPATIENT)
Dept: FAMILY MEDICINE CLINIC | Facility: CLINIC | Age: 68
End: 2018-05-21
Payer: COMMERCIAL

## 2018-05-21 VITALS
TEMPERATURE: 97 F | HEART RATE: 79 BPM | BODY MASS INDEX: 36.99 KG/M2 | OXYGEN SATURATION: 98 % | DIASTOLIC BLOOD PRESSURE: 70 MMHG | RESPIRATION RATE: 16 BRPM | SYSTOLIC BLOOD PRESSURE: 126 MMHG | WEIGHT: 258.38 LBS | HEIGHT: 70 IN

## 2018-05-21 DIAGNOSIS — M54.41 CHRONIC RIGHT-SIDED LOW BACK PAIN WITH RIGHT-SIDED SCIATICA: ICD-10-CM

## 2018-05-21 DIAGNOSIS — G89.29 CHRONIC RIGHT-SIDED LOW BACK PAIN WITH RIGHT-SIDED SCIATICA: ICD-10-CM

## 2018-05-21 DIAGNOSIS — G89.29 CHRONIC RIGHT-SIDED LOW BACK PAIN WITH RIGHT-SIDED SCIATICA: Primary | ICD-10-CM

## 2018-05-21 DIAGNOSIS — M54.41 CHRONIC RIGHT-SIDED LOW BACK PAIN WITH RIGHT-SIDED SCIATICA: Primary | ICD-10-CM

## 2018-05-21 PROCEDURE — 99214 OFFICE O/P EST MOD 30 MIN: CPT | Performed by: FAMILY MEDICINE

## 2018-05-21 PROCEDURE — 72110 X-RAY EXAM L-2 SPINE 4/>VWS: CPT

## 2018-05-21 PROCEDURE — 96372 THER/PROPH/DIAG INJ SC/IM: CPT | Performed by: FAMILY MEDICINE

## 2018-05-21 RX ORDER — DEXAMETHASONE SODIUM PHOSPHATE 4 MG/ML
6 INJECTION, SOLUTION INTRA-ARTICULAR; INTRALESIONAL; INTRAMUSCULAR; INTRAVENOUS; SOFT TISSUE ONCE
Status: COMPLETED | OUTPATIENT
Start: 2018-05-21 | End: 2018-05-21

## 2018-05-21 RX ORDER — CYCLOBENZAPRINE HCL 5 MG
5 TABLET ORAL
Qty: 30 TABLET | Refills: 0 | Status: SHIPPED | OUTPATIENT
Start: 2018-05-21 | End: 2018-12-01 | Stop reason: ALTCHOICE

## 2018-05-21 RX ORDER — MELOXICAM 15 MG/1
15 TABLET ORAL DAILY
Qty: 30 TABLET | Refills: 0 | Status: SHIPPED | OUTPATIENT
Start: 2018-05-21 | End: 2018-12-01 | Stop reason: ALTCHOICE

## 2018-05-21 RX ADMIN — DEXAMETHASONE SODIUM PHOSPHATE 6 MG: 4 INJECTION, SOLUTION INTRA-ARTICULAR; INTRALESIONAL; INTRAMUSCULAR; INTRAVENOUS; SOFT TISSUE at 13:22

## 2018-05-21 NOTE — PROGRESS NOTES
Assessment/Plan:   1  Chronic right-sided low back pain with right-sided sciatica  Reviewed patient's symptoms with him  At this time, he has had these symptoms for the past few years  Due to the persistence of this problem, will check x-rays to rule out gross abnormalities  He was advised on importance of stretching exercises  He may ultimately benefit from seeing physical therapy  Will start treatment with meloxicam 15 mg daily as well as Flexeril 5 mg q h s  He was given an IM injection of Decadron today  If any of his symptoms should worsen, he was advised to follow up   - meloxicam (MOBIC) 15 mg tablet; Take 1 tablet (15 mg total) by mouth daily  Dispense: 30 tablet; Refill: 0  - cyclobenzaprine (FLEXERIL) 5 mg tablet; Take 1 tablet (5 mg total) by mouth daily at bedtime  Dispense: 30 tablet; Refill: 0  - XR spine lumbar minimum 4 views non injury; Future  - dexamethasone (DECADRON) injection 6 mg; Inject 1 5 mL (6 mg total) into the shoulder, thigh, or buttocks once      Diagnoses and all orders for this visit:    Chronic right-sided low back pain with right-sided sciatica          Subjective:    Chief Complaint   Patient presents with    Back Pain     x 1 year, states not getting any better  Does not take anything for pain        Patient ID: Eva Ansari is a 80 y o  male  Back Pain   This is a chronic problem  The current episode started more than 1 year ago  The problem occurs intermittently  The pain is present in the lumbar spine and thoracic spine  The quality of the pain is described as stabbing  Radiates to: to groin  The pain is at a severity of 7/10  The pain is mild  Exacerbated by: position change  Pertinent negatives include no abdominal pain, chest pain, dysuria, fever, headaches or numbness  He has tried chiropractic manipulation, ice and heat for the symptoms  The treatment provided no relief         Review of Systems   Constitutional: Negative for activity change, chills, fatigue and fever  HENT: Negative for congestion, ear pain, sinus pressure and sore throat  Eyes: Negative for redness, itching and visual disturbance  Respiratory: Negative for cough and shortness of breath  Cardiovascular: Negative for chest pain and palpitations  Gastrointestinal: Negative for abdominal pain, diarrhea and nausea  Endocrine: Negative for cold intolerance and heat intolerance  Genitourinary: Negative for dysuria, flank pain and frequency  Musculoskeletal: Positive for back pain  Negative for arthralgias, gait problem and myalgias  Skin: Negative for color change  Allergic/Immunologic: Negative for environmental allergies  Neurological: Negative for dizziness, numbness and headaches  Psychiatric/Behavioral: Negative for behavioral problems and sleep disturbance  The following portions of the patient's history were reviewed and updated as appropriate : past family history, past medical history, past social history and past surgical history  Objective:    Vitals:    05/21/18 1045   BP: 126/70   BP Location: Left arm   Patient Position: Sitting   Cuff Size: Adult   Pulse: 79   Resp: 16   Temp: (!) 97 °F (36 1 °C)   TempSrc: Tympanic   SpO2: 98%   Weight: 117 kg (258 lb 6 oz)   Height: 5' 10" (1 778 m)        Physical Exam   Constitutional: He is oriented to person, place, and time  He appears well-developed and well-nourished  HENT:   Head: Normocephalic and atraumatic  Eyes: Pupils are equal, round, and reactive to light  Right eye exhibits no discharge  Left eye exhibits no discharge  Neck: Normal range of motion  Neck supple  Pulmonary/Chest: Effort normal  No respiratory distress  Musculoskeletal:        Lumbar back: He exhibits tenderness, pain and spasm  He exhibits normal range of motion, no bony tenderness, no swelling and no edema  Back:    Neurological: He is alert and oriented to person, place, and time  No cranial nerve deficit     Skin: Skin is warm and dry  No rash noted  No erythema  No pallor  Psychiatric: He has a normal mood and affect   His behavior is normal  Judgment and thought content normal

## 2018-05-22 ENCOUNTER — TELEPHONE (OUTPATIENT)
Dept: FAMILY MEDICINE CLINIC | Facility: CLINIC | Age: 68
End: 2018-05-22

## 2018-05-22 DIAGNOSIS — M54.41 CHRONIC LOW BACK PAIN WITH RIGHT-SIDED SCIATICA, UNSPECIFIED BACK PAIN LATERALITY: Primary | ICD-10-CM

## 2018-05-22 DIAGNOSIS — G89.29 CHRONIC LOW BACK PAIN WITH RIGHT-SIDED SCIATICA, UNSPECIFIED BACK PAIN LATERALITY: Primary | ICD-10-CM

## 2018-05-22 RX ORDER — TIZANIDINE HYDROCHLORIDE 4 MG/1
4 CAPSULE, GELATIN COATED ORAL 3 TIMES DAILY PRN
Qty: 42 CAPSULE | Refills: 0 | Status: SHIPPED | OUTPATIENT
Start: 2018-05-22 | End: 2018-12-17 | Stop reason: ALTCHOICE

## 2018-07-11 DIAGNOSIS — I10 ESSENTIAL HYPERTENSION: Primary | ICD-10-CM

## 2018-07-11 RX ORDER — VALSARTAN AND HYDROCHLOROTHIAZIDE 160; 12.5 MG/1; MG/1
TABLET, FILM COATED ORAL
Qty: 90 TABLET | Refills: 1 | Status: SHIPPED | OUTPATIENT
Start: 2018-07-11 | End: 2019-01-13 | Stop reason: SDUPTHER

## 2018-07-12 ENCOUNTER — OFFICE VISIT (OUTPATIENT)
Dept: FAMILY MEDICINE CLINIC | Facility: CLINIC | Age: 68
End: 2018-07-12
Payer: COMMERCIAL

## 2018-07-12 ENCOUNTER — EVALUATION (OUTPATIENT)
Dept: PHYSICAL THERAPY | Facility: CLINIC | Age: 68
End: 2018-07-12
Payer: COMMERCIAL

## 2018-07-12 VITALS
OXYGEN SATURATION: 96 % | DIASTOLIC BLOOD PRESSURE: 80 MMHG | HEART RATE: 96 BPM | HEIGHT: 70 IN | BODY MASS INDEX: 38.28 KG/M2 | RESPIRATION RATE: 16 BRPM | WEIGHT: 267.4 LBS | TEMPERATURE: 99.3 F | SYSTOLIC BLOOD PRESSURE: 132 MMHG

## 2018-07-12 DIAGNOSIS — M54.41 CHRONIC RIGHT-SIDED LOW BACK PAIN WITH RIGHT-SIDED SCIATICA: Primary | ICD-10-CM

## 2018-07-12 DIAGNOSIS — G89.29 CHRONIC RIGHT-SIDED LOW BACK PAIN WITH RIGHT-SIDED SCIATICA: Primary | ICD-10-CM

## 2018-07-12 DIAGNOSIS — M54.16 CHRONIC LUMBAR RADICULOPATHY: ICD-10-CM

## 2018-07-12 DIAGNOSIS — M54.16 CHRONIC LUMBAR RADICULOPATHY: Primary | ICD-10-CM

## 2018-07-12 PROCEDURE — 97162 PT EVAL MOD COMPLEX 30 MIN: CPT | Performed by: PHYSICAL THERAPIST

## 2018-07-12 PROCEDURE — G8978 MOBILITY CURRENT STATUS: HCPCS | Performed by: PHYSICAL THERAPIST

## 2018-07-12 PROCEDURE — 99214 OFFICE O/P EST MOD 30 MIN: CPT | Performed by: FAMILY MEDICINE

## 2018-07-12 PROCEDURE — G8979 MOBILITY GOAL STATUS: HCPCS | Performed by: PHYSICAL THERAPIST

## 2018-07-12 PROCEDURE — 96372 THER/PROPH/DIAG INJ SC/IM: CPT | Performed by: FAMILY MEDICINE

## 2018-07-12 PROCEDURE — 97012 MECHANICAL TRACTION THERAPY: CPT | Performed by: PHYSICAL THERAPIST

## 2018-07-12 PROCEDURE — 97140 MANUAL THERAPY 1/> REGIONS: CPT | Performed by: PHYSICAL THERAPIST

## 2018-07-12 RX ORDER — DEXAMETHASONE SODIUM PHOSPHATE 4 MG/ML
6 INJECTION, SOLUTION INTRA-ARTICULAR; INTRALESIONAL; INTRAMUSCULAR; INTRAVENOUS; SOFT TISSUE ONCE
Status: COMPLETED | OUTPATIENT
Start: 2018-07-12 | End: 2018-07-12

## 2018-07-12 RX ADMIN — DEXAMETHASONE SODIUM PHOSPHATE 6 MG: 4 INJECTION, SOLUTION INTRA-ARTICULAR; INTRALESIONAL; INTRAMUSCULAR; INTRAVENOUS; SOFT TISSUE at 12:26

## 2018-07-12 NOTE — PROGRESS NOTES
PT Evaluation     Today's date: 2018  Patient name: Richi Rucker  : 1950  MRN: [id number]  Referring provider: Paolo Alvarez DO  Dx:   Encounter Diagnosis     ICD-10-CM    1  Chronic right-sided low back pain with right-sided sciatica M54 41     G89 29    2  Chronic lumbar radiculopathy M54 16 Ambulatory referral to Physical Therapy                  Assessment/Plan    Subjective     Pt is a 80year old male who presents to PT w/ c/o right sided low back pain  He reports he has been having LBP for years now  He reports there was never a single event that caused the pain, but gradual and worsening pain as time went on  Currently, he has right sided pain that travels down the right gluteal, and the laterally down the thigh to the knee  He denies pain that goes down below the knees  He denies weakness as well  He does note that the pain waxes and wanes, and when it is really bad, he can get sharp twinges of pain that shoots down into the aforementioned pattern  He also has right side groin pain he feels is related to the back pain  Objective     1  The patient reports pain scale as 6-8/10   --Description:  Sharp and achy    --Aggravated:  Rising from sitting and prolonged walking    --Relieved:  Sitting and resting   2  Patient observation reveals accentuated lordosis; flexed trunk posture   3  Palpation for pain was positive over the L3 and L5 SP's; right Gmax, and right adductor muscles   4  Reflex testing revealed the following:    --L3/L4:     2+/2+   --L4/L5:        --L5/S1:     1+/2+  5  Manual muscle testing revealed the following:    --L3/L4:     4-/5 / 4-/5   --L4/L5:     4/5 / 4/5   --L5/S1:     4/5 / 4/5  6  Sensation was intact to light touch  7   Functional testing revealed the patient's Oswestry Low Back Pain Questionnaire to be 25/50   8  Lower Quarter Screen was intact for all myotomes tested    9   Active Range of Motion of the lumbar spine was as follows:    --Flexion: -5" fingers to floor w/ mild stretch w/ OP   --Extension:  -50% w/ mild right sided back pain w/ OP   --Right sidebending:  -50% w/ increased right sided back pain w/ OP   --Left sidebending:  -50% w/ mild stretch pain w/ OP   --Right rotation:  -50% w/ increased right sided back pain w/ OP   --Left rotation:  -50% w/ mild stretch pain w/ OP  10   Special testing revealed the following:    --Slump:  -/-   --Straight Leg Raise:  +/-    Patient's rehabilitation potential is fair-good to achieve the following functional goals by attending physical therapy for 12 visits:  1  The patient will report their pain to be 0-1/10   2  The patient will be able to sit for at least 30 minutes without complaints of increased symptoms  3   The patient will be able to perform all ADL's independently and without fear of re-injury  4   The patient will be able to rise from a seated position one time every 30 minutes without difficulty or increased pain  5   The patient will be able to perform all work requirements without restrictions  6   The patient will be able to lift at least 25 pounds from the floor to waist level one time every 30 minutes without any difficulty or fear of re-injury  7   The patient will be able to sleep at least 6 hours undisturbed each night  8   The patient will be able to return to all reasonable leisure activities without restrictions  9   The patient's will have negative palpation for tenderness  10   The patient's manual muscle test will be within normal limits for all myotomes tested  11  The patient's active range of motion will be within normal limits for all ranges tested  12  The patient's Oswestry Low Back Pain Questionnaire will be 0-5/50  13  The patient will be independent with their home exercise program     Assessment: (List of Impairments that justify the medical necessity of skilled therapy services)    Thank you for the recent referral of Pau Harris    As you know, this patient is a 80 y o  male who desires to return to a pain-free and fully functional lifestyle  Physical examination has found the patient to have Movement Impairment Diagnosis stiffness dysfunction that has resulted in pathoanatomical symptoms of LBP associated with multiple functional limitations  Etiological variables appear to be postural, age-related, and vocational in nature  Rehabilitation goals include decreasing pain, increasing range of motion, increasing strength, improving biomechanics, endurance, posture, and functional tolerances to be within normal limits and consistent with patient's activities of daily living  In addition, the patient will be instructed in proper and safe body mechanics and provided an independent home exercise program to complement their outpatient physical therapy  I sincerely enjoyed participating in the care of this patient  Plan: treatment will include, but not be limited to the following:    Cryotherapy and/or Hot packs, Electrical stimulation and Mechanical traction to control for pain, inflammation, and/or edema  Joint Mobilization Techniques, Soft Tissue Mobilization Techniques and PROM/AAROM/AROM exercises to restore joint mobility/flexibility  Stabilization/strengthening exercises to restore core stability/strength  Instruction and Progression of a home exercise program to address the above impairments  Postural education  Ergonomic/ training  I have fully discussed the above treatment plan and expected outcomes with the patient  She is aware of the diagnosis and prognosis and has voluntarily agreed to participate in physical and/or occupational therapy            Total Time: 60        Daily Treatment Diary     Manual  7/12            CPA L2-L5 10            TPR Gmax and IP 10                                                       Exercise Diary HEP no                Modalities              HP/TENS 15'            Mech Traction 15'

## 2018-07-12 NOTE — PROGRESS NOTES
Assessment/Plan:   1  Chronic lumbar radiculopathy  Reviewed the pathophysiology of this problem with patient today  Reviewed his x-ray imaging as well  He was found to have lower lumbar degenerative changes as well as facet changes  Due to the findings, this is likely causing his neuropathic pain  Will refer patient to Physical therapy for further evaluation  He was given IM Decadron today  He may take his muscle relaxer home  Will hold off on checking further imaging of MRI if symptoms are worsening, follow-up in 3-4 weeks  - Ambulatory referral to Physical Therapy; Future  - dexamethasone (DECADRON) injection 6 mg; Inject 1 5 mL (6 mg total) into a muscle once      Diagnoses and all orders for this visit:    Chronic lumbar radiculopathy  -     Ambulatory referral to Physical Therapy; Future  -     dexamethasone (DECADRON) injection 6 mg; Inject 1 5 mL (6 mg total) into a muscle once           Subjective:    Chief Complaint   Patient presents with    Back Pain     Patient presents for lower back pain in the inside of his legs  Patient C/O back pain ratiating to groin area  Patient ID: Patrizia Crisostomo is a 80 y o  male  Patient is a 62-year-old male presents today for an acute CC of worsening right-sided low back pain  His pain has been worsening  He has been noticing the radiation of the pain and numbness anteriorly as well as laterally over his lower leg  He states he notices sharp pain in his groin area  He denies numbness tingling or weakness in his feet  He has been taking meloxicam as well as his muscle relaxers with minimal relief  He would like to discuss further options today  Back Pain   Pertinent negatives include no abdominal pain, chest pain, dysuria, fever, headaches or numbness  Review of Systems   Constitutional: Negative for activity change, chills, fatigue and fever  HENT: Negative for congestion, ear pain, sinus pressure and sore throat      Eyes: Negative for redness, itching and visual disturbance  Respiratory: Negative for cough and shortness of breath  Cardiovascular: Negative for chest pain and palpitations  Gastrointestinal: Negative for abdominal pain, diarrhea and nausea  Endocrine: Negative for cold intolerance and heat intolerance  Genitourinary: Negative for dysuria, flank pain and frequency  Musculoskeletal: Positive for back pain  Negative for arthralgias, gait problem and myalgias  Skin: Negative for color change  Allergic/Immunologic: Negative for environmental allergies  Neurological: Negative for dizziness, numbness and headaches  Psychiatric/Behavioral: Negative for behavioral problems and sleep disturbance  The following portions of the patient's history were reviewed and updated as appropriate : past family history, past medical history, past social history and past surgical history  Current Outpatient Prescriptions:     allopurinol (ZYLOPRIM) 300 mg tablet, TAKE 1 TABLET BY MOUTH DAILY, Disp: 90 tablet, Rfl: 1    cyclobenzaprine (FLEXERIL) 5 mg tablet, Take 1 tablet (5 mg total) by mouth daily at bedtime, Disp: 30 tablet, Rfl: 0    fluticasone (FLONASE) 50 mcg/act nasal spray, 1 spray into each nostril daily as needed for rhinitis, Disp: , Rfl:     meloxicam (MOBIC) 15 mg tablet, Take 1 tablet (15 mg total) by mouth daily, Disp: 30 tablet, Rfl: 0    valsartan-hydrochlorothiazide (DIOVAN-HCT) 160-12 5 MG per tablet, TAKE 1 TABLET ONCE DAILY  , Disp: 90 tablet, Rfl: 1    TiZANidine (ZANAFLEX) 4 MG capsule, Take 1 capsule (4 mg total) by mouth 3 (three) times a day as needed for muscle spasms for up to 14 days, Disp: 42 capsule, Rfl: 0  No current facility-administered medications for this visit       Objective:    Vitals:    07/12/18 1147   BP: 132/80   BP Location: Right arm   Patient Position: Sitting   Cuff Size: Standard   Pulse: 96   Resp: 16   Temp: 99 3 °F (37 4 °C)   TempSrc: Tympanic   SpO2: 96%   Weight: 121 kg (267 lb 6 4 oz)   Height: 5' 10" (1 778 m)        Physical Exam   Constitutional: He is oriented to person, place, and time  Vital signs are normal  He appears well-developed and well-nourished  HENT:   Head: Normocephalic and atraumatic  Right Ear: Hearing normal    Left Ear: Hearing normal    Nose: Nose normal  No septal deviation  Mouth/Throat: Oropharynx is clear and moist and mucous membranes are normal    Eyes: EOM and lids are normal  Pupils are equal, round, and reactive to light  Neck: Trachea normal and normal range of motion  No thyroid mass and no thyromegaly present  Cardiovascular: Normal rate  Pulmonary/Chest: Effort normal  No respiratory distress  He has no decreased breath sounds  Abdominal: Normal appearance  There is no guarding  Musculoskeletal: Normal range of motion  Neurological: He is alert and oriented to person, place, and time  He has normal strength  No cranial nerve deficit or sensory deficit  Skin: Skin is warm and dry  Psychiatric: He has a normal mood and affect   His speech is normal and behavior is normal  Judgment and thought content normal  Cognition and memory are normal

## 2018-07-16 ENCOUNTER — OFFICE VISIT (OUTPATIENT)
Dept: PHYSICAL THERAPY | Facility: CLINIC | Age: 68
End: 2018-07-16
Payer: COMMERCIAL

## 2018-07-16 DIAGNOSIS — G89.29 CHRONIC RIGHT-SIDED LOW BACK PAIN WITH RIGHT-SIDED SCIATICA: ICD-10-CM

## 2018-07-16 DIAGNOSIS — M54.41 CHRONIC RIGHT-SIDED LOW BACK PAIN WITH RIGHT-SIDED SCIATICA: ICD-10-CM

## 2018-07-16 DIAGNOSIS — M54.16 CHRONIC LUMBAR RADICULOPATHY: Primary | ICD-10-CM

## 2018-07-16 PROCEDURE — 97012 MECHANICAL TRACTION THERAPY: CPT | Performed by: PHYSICAL THERAPIST

## 2018-07-16 PROCEDURE — 97140 MANUAL THERAPY 1/> REGIONS: CPT | Performed by: PHYSICAL THERAPIST

## 2018-07-16 PROCEDURE — 97110 THERAPEUTIC EXERCISES: CPT | Performed by: PHYSICAL THERAPIST

## 2018-07-16 PROCEDURE — 97014 ELECTRIC STIMULATION THERAPY: CPT | Performed by: PHYSICAL THERAPIST

## 2018-07-16 NOTE — PROGRESS NOTES
Daily Note     Today's date: 2018  Patient name: Shirlene Mullen  : 1950  MRN: [id number]  Referring provider: Mk Henry DO  Dx:   Encounter Diagnosis     ICD-10-CM    1  Chronic lumbar radiculopathy M54 16    2  Chronic right-sided low back pain with right-sided sciatica M54 41     G89 29                 Visit 2    Subjective: Pt reports he felt about the same after IE and the pain was not much different  Objective: See treatment diary below  The patient performed the following as per flow sheet:   --Therapeutic Exercises for 15 minutes  --Manual Therapy for 20 minutes  --Moist Heat and TENS for 15 minutes  --Mechanical Traction for 15 minutes  Assessment: Pt progress is good  We did some TE today to address the other issues he is having  He felt the injections did not do anything for his pain  He feels this may take some time to get better, so he is patient with regards to his results  Plan: Continue per plan of care       Manual                     CPA L2-L5 10  10                   TPR Gmax and IP 10  10                                                                                                 Exercise Diary                         Fall out    2x15                    Bridge w/ band    15x:05                    PF str    3x:30                    Rot on SB    3'                                                                                                                                                                                                                                                                                                                                                                                           HEP no                           Modalities                        HP/TENS 13' 15                   Mech Traction 15  15

## 2018-07-19 ENCOUNTER — OFFICE VISIT (OUTPATIENT)
Dept: PHYSICAL THERAPY | Facility: CLINIC | Age: 68
End: 2018-07-19
Payer: COMMERCIAL

## 2018-07-19 DIAGNOSIS — M54.16 CHRONIC LUMBAR RADICULOPATHY: Primary | ICD-10-CM

## 2018-07-19 DIAGNOSIS — G89.29 CHRONIC RIGHT-SIDED LOW BACK PAIN WITH RIGHT-SIDED SCIATICA: ICD-10-CM

## 2018-07-19 DIAGNOSIS — M54.41 CHRONIC RIGHT-SIDED LOW BACK PAIN WITH RIGHT-SIDED SCIATICA: ICD-10-CM

## 2018-07-19 PROCEDURE — 97014 ELECTRIC STIMULATION THERAPY: CPT | Performed by: PHYSICAL THERAPIST

## 2018-07-19 PROCEDURE — 97012 MECHANICAL TRACTION THERAPY: CPT | Performed by: PHYSICAL THERAPIST

## 2018-07-19 PROCEDURE — 97110 THERAPEUTIC EXERCISES: CPT | Performed by: PHYSICAL THERAPIST

## 2018-07-19 PROCEDURE — 97140 MANUAL THERAPY 1/> REGIONS: CPT | Performed by: PHYSICAL THERAPIST

## 2018-07-19 NOTE — PROGRESS NOTES
Daily Note     Today's date: 2018  Patient name: Earnestine Sales  : 1950  MRN: [id number]  Referring provider: Jason Aguirre DO  Dx:   Encounter Diagnosis     ICD-10-CM    1  Chronic lumbar radiculopathy M54 16    2  Chronic right-sided low back pain with right-sided sciatica M54 41     G89 29                 Visit 3    Subjective: Pt reports the right anterior thigh is mor problematic than the right sided LBP  Objective: See treatment diary below  The patient performed the following as per flow sheet:   --Therapeutic Exercises for 20 minutes  --Manual Therapy for 20 minutes  --Moist Heat and TENS for 15 minutes  --Mechanical Traction for 15 minutes  Assessment: Tolerated treatment well  Patient demonstrated fatigue post treatment, exhibited good technique with therapeutic exercises and would benefit from continued PT      Plan: Continue per plan of care       Manual                   CPA L2-L5 10  10  10                 TPR Gmax and IP 10  10  10                                                                                               Exercise Diary                         Fall out    2x15  30x                  Bridge w/ band    15x:05  15x                  PF str    3x:30  3x                  Rot on SB    3'  3                  Hip flex str      3x:30                                                                                                                                                                                                                                                                                                                                                                 HEP no                           Modalities                        HP/TENS 15' 15  15                 Mech Traction 15'  15  15

## 2018-07-23 ENCOUNTER — OFFICE VISIT (OUTPATIENT)
Dept: PHYSICAL THERAPY | Facility: CLINIC | Age: 68
End: 2018-07-23
Payer: COMMERCIAL

## 2018-07-23 DIAGNOSIS — M54.41 CHRONIC RIGHT-SIDED LOW BACK PAIN WITH RIGHT-SIDED SCIATICA: ICD-10-CM

## 2018-07-23 DIAGNOSIS — G89.29 CHRONIC RIGHT-SIDED LOW BACK PAIN WITH RIGHT-SIDED SCIATICA: ICD-10-CM

## 2018-07-23 DIAGNOSIS — M54.16 CHRONIC LUMBAR RADICULOPATHY: Primary | ICD-10-CM

## 2018-07-23 PROCEDURE — 97110 THERAPEUTIC EXERCISES: CPT | Performed by: PHYSICAL THERAPIST

## 2018-07-23 PROCEDURE — 97012 MECHANICAL TRACTION THERAPY: CPT | Performed by: PHYSICAL THERAPIST

## 2018-07-23 PROCEDURE — 97140 MANUAL THERAPY 1/> REGIONS: CPT | Performed by: PHYSICAL THERAPIST

## 2018-07-23 PROCEDURE — 97014 ELECTRIC STIMULATION THERAPY: CPT | Performed by: PHYSICAL THERAPIST

## 2018-07-23 NOTE — PROGRESS NOTES
Daily Note     Today's date: 2018  Patient name: Ezra Marley  : 1950  MRN: [id number]  Referring provider: Yaz Kwok DO  Dx:   Encounter Diagnosis     ICD-10-CM    1  Chronic lumbar radiculopathy M54 16    2  Chronic right-sided low back pain with right-sided sciatica M54 41     G89 29                 Visit 4    Subjective: Pt reports he is feeling better and the groin and right sided L/S pain is not as intense as when he started  Objective: See treatment diary below  The patient performed the following as per flow sheet:   --Therapeutic Exercises for 24 minutes  --Manual Therapy for 15 minutes  --Moist Heat and TENS for 15 minutes  --Mechanical Traction for 15 minutes  Assessment: Pt progress is good  It seems the OMT and traction are helping to decrease his pain  He was also given a heel lift in the right shoe to accommodate for the right LLD  Plan: Continue per plan of care       Manual                 CPA L2-L5 10  10  10  10               TPR Gmax and IP 10  10  10  10                                                                                             Exercise Diary                         Fall out    2x15  30x  30x                Bridge w/ band    15x:05  15x  15x                PF str    3x:30  3x  3x                Rot on SB    3'  3  3                Hip flex str      3x:30  3x               Hip add w/ TB        20x                                                                                                                                                                                                                                                                                                                                       HEP no                           Modalities                        HP/TENS 15' 15  15  15               Mech Traction 15'  15  15  15

## 2018-07-27 ENCOUNTER — OFFICE VISIT (OUTPATIENT)
Dept: PHYSICAL THERAPY | Facility: CLINIC | Age: 68
End: 2018-07-27
Payer: COMMERCIAL

## 2018-07-27 DIAGNOSIS — M54.16 CHRONIC LUMBAR RADICULOPATHY: Primary | ICD-10-CM

## 2018-07-27 DIAGNOSIS — M54.41 CHRONIC RIGHT-SIDED LOW BACK PAIN WITH RIGHT-SIDED SCIATICA: ICD-10-CM

## 2018-07-27 DIAGNOSIS — G89.29 CHRONIC RIGHT-SIDED LOW BACK PAIN WITH RIGHT-SIDED SCIATICA: ICD-10-CM

## 2018-07-27 PROCEDURE — 97012 MECHANICAL TRACTION THERAPY: CPT | Performed by: PHYSICAL THERAPIST

## 2018-07-27 PROCEDURE — 97014 ELECTRIC STIMULATION THERAPY: CPT | Performed by: PHYSICAL THERAPIST

## 2018-07-27 PROCEDURE — 97140 MANUAL THERAPY 1/> REGIONS: CPT | Performed by: PHYSICAL THERAPIST

## 2018-07-27 PROCEDURE — 97110 THERAPEUTIC EXERCISES: CPT | Performed by: PHYSICAL THERAPIST

## 2018-07-27 NOTE — PROGRESS NOTES
Daily Note     Today's date: 2018  Patient name: Annalee Corbett  : 1950  MRN: [id number]  Referring provider: Shanda Thayer DO  Dx:   Encounter Diagnosis     ICD-10-CM    1  Chronic lumbar radiculopathy M54 16    2  Chronic right-sided low back pain with right-sided sciatica M54 41     G89 29                 Visit 5    Subjective: Pt reports he is using the heel lift and he feels like this might be helping somewhat but he is not sure  Objective: See treatment diary below  The patient performed the following as per flow sheet:   --Therapeutic Exercises for 24 minutes  --Manual Therapy for 20 minutes  --Moist Heat and TENS for 15 minutes  --Mechanical Traction for 15 minutes  Assessment: Tolerated treatment well  Patient demonstrated fatigue post treatment, exhibited good technique with therapeutic exercises and would benefit from continued PT      Plan: Continue per plan of care       Manual               CPA L2-L5 10  10  10  10  10             TPR Gmax and IP 10  10  10  10  10                                                                                           Exercise Diary                         Fall out    2x15  30x  30x  30x              Bridge w/ band    15x:05  15x  15x  15x              PF str    3x:30  3x  3x  3x              Rot on SB    3'  3  3  3              Hip flex str      3x:30  3x  3x             Hip add w/ TB        20x  20x                                                                                                                                                                                                                                                                                                                                     HEP no                           Modalities                        HP/TENS 15' 15  15  15  15             Mech Traction 15'  15  15  15  12

## 2018-08-01 ENCOUNTER — OFFICE VISIT (OUTPATIENT)
Dept: PHYSICAL THERAPY | Facility: CLINIC | Age: 68
End: 2018-08-01
Payer: COMMERCIAL

## 2018-08-01 DIAGNOSIS — M54.16 CHRONIC LUMBAR RADICULOPATHY: Primary | ICD-10-CM

## 2018-08-01 DIAGNOSIS — G89.29 CHRONIC RIGHT-SIDED LOW BACK PAIN WITH RIGHT-SIDED SCIATICA: ICD-10-CM

## 2018-08-01 DIAGNOSIS — M54.41 CHRONIC RIGHT-SIDED LOW BACK PAIN WITH RIGHT-SIDED SCIATICA: ICD-10-CM

## 2018-08-01 PROCEDURE — 97140 MANUAL THERAPY 1/> REGIONS: CPT | Performed by: PHYSICAL THERAPIST

## 2018-08-01 PROCEDURE — 97110 THERAPEUTIC EXERCISES: CPT | Performed by: PHYSICAL THERAPIST

## 2018-08-01 PROCEDURE — 97014 ELECTRIC STIMULATION THERAPY: CPT | Performed by: PHYSICAL THERAPIST

## 2018-08-01 PROCEDURE — 97012 MECHANICAL TRACTION THERAPY: CPT | Performed by: PHYSICAL THERAPIST

## 2018-08-01 NOTE — PROGRESS NOTES
Daily Note     Today's date: 2018  Patient name: Reba Fletcher  : 1950  MRN: [id number]  Referring provider: Marie Khan DO  Dx:   Encounter Diagnosis     ICD-10-CM    1  Chronic lumbar radiculopathy M54 16    2  Chronic right-sided low back pain with right-sided sciatica M54 41     G89 29                 Visit 6    Subjective: Pt reports he feels much better  The pain along the front of the thigh is nearly absent, and the right sided back pain is much different  Objective: See treatment diary below  The patient performed the following as per flow sheet:   --Therapeutic Exercises for 27 minutes  --Manual Therapy for 20 minutes  --Moist Heat and TENS for 15 minutes  --Mechanical Traction for 15 minutes  Assessment: Pt progress is good  It is quite amazing he is doing this well because he espinal snot do the exercises sat home, and he is not wearing the heel lift as suggested, and he only comes in once per week  But, we will continue as much as he participates  Plan: Continue per plan of care       Manual             CPA L2-L5 10  10  10  10  10  10           TPR Gmax and IP 10  10  10  10  10  10                                                                                         Exercise Diary                         Fall out    2x15  30x  30x  30x  30x            Bridge w/ band    15x:05  15x  15x  15x  15x            PF str    3x:30  3x  3x  3x  3x            Rot on SB    3'  3  3  3  3            Hip flex str      3x:30  3x  3x  3x           Hip add w/ TB        20x  20x  30x           Ball squeeze            20x                                                                                                                                                                                                                                                                                                           HEP no                         Modalities                        HP/TENS 15' 15  15  15  15  15          Mech Traction 15'  15  15  15  15  15

## 2018-08-09 ENCOUNTER — OFFICE VISIT (OUTPATIENT)
Dept: PHYSICAL THERAPY | Facility: CLINIC | Age: 68
End: 2018-08-09
Payer: COMMERCIAL

## 2018-08-09 DIAGNOSIS — M54.41 CHRONIC RIGHT-SIDED LOW BACK PAIN WITH RIGHT-SIDED SCIATICA: ICD-10-CM

## 2018-08-09 DIAGNOSIS — G89.29 CHRONIC RIGHT-SIDED LOW BACK PAIN WITH RIGHT-SIDED SCIATICA: ICD-10-CM

## 2018-08-09 DIAGNOSIS — M54.16 CHRONIC LUMBAR RADICULOPATHY: Primary | ICD-10-CM

## 2018-08-09 PROCEDURE — 97012 MECHANICAL TRACTION THERAPY: CPT | Performed by: PHYSICAL THERAPIST

## 2018-08-09 PROCEDURE — 97110 THERAPEUTIC EXERCISES: CPT | Performed by: PHYSICAL THERAPIST

## 2018-08-09 PROCEDURE — 97140 MANUAL THERAPY 1/> REGIONS: CPT | Performed by: PHYSICAL THERAPIST

## 2018-08-09 PROCEDURE — 97014 ELECTRIC STIMULATION THERAPY: CPT | Performed by: PHYSICAL THERAPIST

## 2018-08-13 ENCOUNTER — OFFICE VISIT (OUTPATIENT)
Dept: PHYSICAL THERAPY | Facility: CLINIC | Age: 68
End: 2018-08-13
Payer: COMMERCIAL

## 2018-08-13 DIAGNOSIS — M54.41 CHRONIC RIGHT-SIDED LOW BACK PAIN WITH RIGHT-SIDED SCIATICA: Primary | ICD-10-CM

## 2018-08-13 DIAGNOSIS — G89.29 CHRONIC RIGHT-SIDED LOW BACK PAIN WITH RIGHT-SIDED SCIATICA: Primary | ICD-10-CM

## 2018-08-13 PROCEDURE — G8978 MOBILITY CURRENT STATUS: HCPCS | Performed by: PHYSICAL THERAPIST

## 2018-08-13 PROCEDURE — 97012 MECHANICAL TRACTION THERAPY: CPT | Performed by: PHYSICAL THERAPIST

## 2018-08-13 PROCEDURE — G8979 MOBILITY GOAL STATUS: HCPCS | Performed by: PHYSICAL THERAPIST

## 2018-08-13 PROCEDURE — 97110 THERAPEUTIC EXERCISES: CPT | Performed by: PHYSICAL THERAPIST

## 2018-08-13 PROCEDURE — 97140 MANUAL THERAPY 1/> REGIONS: CPT | Performed by: PHYSICAL THERAPIST

## 2018-08-13 PROCEDURE — 97014 ELECTRIC STIMULATION THERAPY: CPT | Performed by: PHYSICAL THERAPIST

## 2018-08-13 NOTE — PROGRESS NOTES
Daily Note     Today's date: 2018  Patient name: Shefali Melendez  : 1950  MRN: [id number]  Referring provider: Randee Cockayne, DO  Dx:   Encounter Diagnosis     ICD-10-CM    1  Chronic lumbar radiculopathy M54 16    2  Chronic right-sided low back pain with right-sided sciatica M54 41     G89 29                 Visit 8    Subjective: Pt reports he is feeling much better and his back is much less painful along with the right groin  Objective: See treatment diary below  The patient performed the following as per flow sheet:   --Therapeutic Exercises for 30 minutes  --Manual Therapy for 20 minutes  --Moist Heat and TENS for 15 minutes  --Mechanical Traction for 15 minutes  1   The patient reports pain scale as 1-3/10              --Description:  Sharp and achy               --Aggravated:  Rising from sitting and prolonged walking               --Relieved:  Sitting and resting   2  Patient observation reveals accentuated lordosis; flexed trunk posture   3  Palpation for pain was positive over the L3 and L5 SP's; right Gmax, and right adductor muscles   4  Reflex testing revealed the following:               --L3/L4:     2+/2+              --L4/L5:                   --L5/S1:     1+/2+  5  Manual muscle testing revealed the following:               --L3/L4:     4/5 / 4/5              --L4/L5:     4/5 / 4/5              --L5/S1:     4/5 / 4/5  6  Sensation was intact to light touch  7   Functional testing revealed the patient's Oswestry Low Back Pain Questionnaire to be 3/50  8  Lower Quarter Screen was intact for all myotomes tested    9   Active Range of Motion of the lumbar spine was as follows:               --Flexion:  -5" fingers to floor w/ mild stretch w/ OP              --Extension:  -25% w/ mild right sided back pain w/ OP              --Right sidebending:  -50% w/ increased right sided back pain w/ OP              --Left sidebending:  -50% w/ mild stretch pain w/ OP --Right rotation:  -25% w/ increased right sided back pain w/ OP              --Left rotation:  -25% w/ mild stretch pain w/ OP  10   Special testing revealed the following:               --Slump:  -/-              --Straight Leg Raise:  +/-     Patient's rehabilitation potential is fair-good to achieve the following functional goals by attending physical therapy for 4-6 visits:  1  The patient will report their pain to be 0-1/10   2  The patient will be able to sit for at least 30 minutes without complaints of increased symptoms  3   The patient will be able to perform all ADL's independently and without fear of re-injury  4   The patient will be able to rise from a seated position one time every 30 minutes without difficulty or increased pain  5   The patient will be able to perform all work requirements without restrictions  6   The patient will be able to lift at least 25 pounds from the floor to waist level one time every 30 minutes without any difficulty or fear of re-injury  7   The patient will be able to sleep at least 6 hours undisturbed each night  8   The patient will be able to return to all reasonable leisure activities without restrictions  9   The patient's will have negative palpation for tenderness  10   The patient's manual muscle test will be within normal limits for all myotomes tested  11  The patient's active range of motion will be within normal limits for all ranges tested  12  The patient's Oswestry Low Back Pain Questionnaire will be 0-5/50  13  The patient will be independent with their home exercise program        Assessment: Pt progress is good  He feels he is much better, and his FOTO score today was markedly better than his intake score    He is still having some mild right sided back pain but this is getting better and less frequent as time goes on ,    Plan: Continue per plan of care         Manual  7/12 7/16 7/19 7/23 7/27 8/1 8/8 8/13       CPA L2-L5 10  10  10  10  10  10  10  10       TPR Gmax and IP 10  10  10  10  10  10  10  10                                                                                     Exercise Diary                         Fall out    2x15  30x  30x  30x  30x  30x  30x        Bridge w/ band    15x:05  15x  15x  15x  15x  15x  15x        PF str    3x:30  3x  3x  3x  3x  3x  3x        Rot on SB    3'  3  3  3  3  3  3        Hip flex str      3x:30  3x  3x  3x  3x  3x       Hip add w/ TB        20x  20x  30x  30x  30x       Ball squeeze            20x  20x  20x                                                                                                                                                                                                                                                                                                       HEP no                           Modalities                        HP/TENS 15' 15  15  15  15  15  15  12       Mech Traction 15'  15  15  15  15  12  12  12

## 2018-08-16 ENCOUNTER — OFFICE VISIT (OUTPATIENT)
Dept: PHYSICAL THERAPY | Facility: CLINIC | Age: 68
End: 2018-08-16
Payer: COMMERCIAL

## 2018-08-16 DIAGNOSIS — M54.41 CHRONIC RIGHT-SIDED LOW BACK PAIN WITH RIGHT-SIDED SCIATICA: Primary | ICD-10-CM

## 2018-08-16 DIAGNOSIS — G89.29 CHRONIC RIGHT-SIDED LOW BACK PAIN WITH RIGHT-SIDED SCIATICA: Primary | ICD-10-CM

## 2018-08-16 PROCEDURE — 97012 MECHANICAL TRACTION THERAPY: CPT | Performed by: PHYSICAL THERAPIST

## 2018-08-16 PROCEDURE — 97140 MANUAL THERAPY 1/> REGIONS: CPT | Performed by: PHYSICAL THERAPIST

## 2018-08-16 PROCEDURE — 97014 ELECTRIC STIMULATION THERAPY: CPT | Performed by: PHYSICAL THERAPIST

## 2018-08-16 PROCEDURE — 97110 THERAPEUTIC EXERCISES: CPT | Performed by: PHYSICAL THERAPIST

## 2018-08-16 NOTE — PROGRESS NOTES
Daily Note     Today's date: 2018  Patient name: Shirlene Mullen  : 1950  MRN: [id number]  Referring provider: Mk Henry DO  Dx:   Encounter Diagnosis     ICD-10-CM    1  Chronic right-sided low back pain with right-sided sciatica M54 41     G89 29                 Visit 9    Subjective: Pt reports he feels pretty good today and he espinal snot have any increased pain  He notes very mild right lateral thigh pain today  Objective: See treatment diary below  The patient performed the following as per flow sheet:   --Therapeutic Exercises for 30 minutes  --Manual Therapy for 20 minutes  --Moist Heat and TENS for 15 minutes  --Mechanical Traction for 15 minutes  Assessment: Pt progress is good  He is doing much better now that he is able to make it twice a week, and he even noted minimal pain during his golf game yesterday as well  Plan: Continue per plan of care       Manual       CPA L2-L5 10  10  10  10  10  10  10  10  10     TPR Gmax and IP 10  10  10  10  10  10  10  10  10                                                                                   Exercise Diary                         Fall out    2x15  30x  30x  30x  30x  30x  30x  30x      Bridge w/ band    15x:05  15x  15x  15x  15x  15x  15x  15x      PF str    3x:30  3x  3x  3x  3x  3x  3x  3x      Rot on SB    3'  3  3  3  3  3  3  3      Hip flex str      3x:30  3x  3x  3x  3x  3x  3x     Hip add w/ TB        20x  20x  30x  30x  30x  30x     Ball squeeze            20x  20x  20x  20x                                                                                                                                                                                                                                                                                                     HEP no                           Modalities                        HP/TENS 15' 15  15  15  15  15  15  15  15     [facility] Traction 15'  15  15  15  15  15  15  15  15

## 2018-08-17 ENCOUNTER — APPOINTMENT (OUTPATIENT)
Dept: PHYSICAL THERAPY | Facility: CLINIC | Age: 68
End: 2018-08-17
Payer: COMMERCIAL

## 2018-08-20 ENCOUNTER — APPOINTMENT (OUTPATIENT)
Dept: PHYSICAL THERAPY | Facility: CLINIC | Age: 68
End: 2018-08-20
Payer: COMMERCIAL

## 2018-08-23 ENCOUNTER — OFFICE VISIT (OUTPATIENT)
Dept: PHYSICAL THERAPY | Facility: CLINIC | Age: 68
End: 2018-08-23
Payer: COMMERCIAL

## 2018-08-23 DIAGNOSIS — M54.41 CHRONIC RIGHT-SIDED LOW BACK PAIN WITH RIGHT-SIDED SCIATICA: Primary | ICD-10-CM

## 2018-08-23 DIAGNOSIS — G89.29 CHRONIC RIGHT-SIDED LOW BACK PAIN WITH RIGHT-SIDED SCIATICA: Primary | ICD-10-CM

## 2018-08-23 PROCEDURE — 97014 ELECTRIC STIMULATION THERAPY: CPT | Performed by: PHYSICAL THERAPIST

## 2018-08-23 PROCEDURE — 97110 THERAPEUTIC EXERCISES: CPT | Performed by: PHYSICAL THERAPIST

## 2018-08-23 PROCEDURE — 97140 MANUAL THERAPY 1/> REGIONS: CPT | Performed by: PHYSICAL THERAPIST

## 2018-08-23 NOTE — PROGRESS NOTES
Daily Note     Today's date: 2018  Patient name: Shorty Shaw  : 1950  MRN: [id number]  Referring provider: Atif Neri DO  Dx:   Encounter Diagnosis     ICD-10-CM    1  Chronic right-sided low back pain with right-sided sciatica M54 41     G89 29                 Visit 10    Subjective: Pt reports he had a lot of pain over this past week  He feels the groin pain is much better  Objective: See treatment diary below  The patient performed the following as per flow sheet:   --Therapeutic Exercises for 30 minutes  --Manual Therapy for 20 minutes  --Moist Heat and TENS for 15 minutes  --Mechanical Traction for refused minutes  Assessment: Pt progress is not good  He complains about everything and how everything aggravates his pain, and yet, he only comes in once a week, does not wear the heel lift, and espinal snot do any HEP  Very frustrating to treat  Today, he thinks the traction is causing all of his pain  He even c/o neck pain today from the traction  Plan: Continue per plan of care       Manual     CPA L2-L5 10  10  10  10  10  10  10  10  10  10   TPR Gmax and IP 10  10  10  10  10  10  10  10  10  10                                                                                 Exercise Diary                         Fall out    2x15  30x  30x  30x  30x  30x  30x  30x  30x    Bridge w/ band    15x:05  15x  15x  15x  15x  15x  15x  15x  15x    PF str    3x:30  3x  3x  3x  3x  3x  3x  3x  3x    Rot on SB    3'  3  3  3  3  3  3  3  3    Hip flex str      3x:30  3x  3x  3x  3x  3x  3x  3x   Hip add w/ TB        20x  20x  30x  30x  30x  30x  30x   Ball squeeze            20x  20x  20x  20x  20x                                                                                                                                                                                                                                                                                                   HEP no                           Modalities                        HP/TENS 15' 15  15  15  15  15 Jeramy High  15  15  15   Mech Traction 15'  15  15  15  15  15  15 Jeramy High  15

## 2018-11-06 ENCOUNTER — OFFICE VISIT (OUTPATIENT)
Dept: FAMILY MEDICINE CLINIC | Facility: CLINIC | Age: 68
End: 2018-11-06
Payer: COMMERCIAL

## 2018-11-06 VITALS
TEMPERATURE: 97.4 F | RESPIRATION RATE: 17 BRPM | HEIGHT: 70 IN | WEIGHT: 272.5 LBS | DIASTOLIC BLOOD PRESSURE: 90 MMHG | HEART RATE: 81 BPM | BODY MASS INDEX: 39.01 KG/M2 | OXYGEN SATURATION: 97 % | SYSTOLIC BLOOD PRESSURE: 158 MMHG

## 2018-11-06 DIAGNOSIS — B35.6 TINEA CRURIS: Primary | ICD-10-CM

## 2018-11-06 PROCEDURE — 3008F BODY MASS INDEX DOCD: CPT | Performed by: FAMILY MEDICINE

## 2018-11-06 PROCEDURE — 99214 OFFICE O/P EST MOD 30 MIN: CPT | Performed by: FAMILY MEDICINE

## 2018-11-06 NOTE — PROGRESS NOTES
Assessment/Plan:   1  Tinea cruris  Symptoms appear likely secondary to tinea corporis at this time, will start treatment with nystatin/triamcinolone cream b i d  for the next 3-4 weeks  He was advised he may benefit from using a barrier cream such as triple paste  He was advised to call in 3-4 weeks if any symptoms are not improving  Will consider further evaluation with Dermatology  - nystatin-triamcinolone (MYCOLOG-II) cream; Apply topically 2 (two) times a day  Dispense: 60 g; Refill: 1     There are no diagnoses linked to this encounter  Subjective:    No chief complaint on file  Patient ID: Akiko Covarrubias is a 80 y o  male  Patient is a 32-year-old male presents today with a CC of dot erythematous pruritic rash located over scrotum  He states he has had this for the past few months  Symptoms started gradually  He states that he has been applying over-the-counter remedies which have not been effective for him at all  Review of Systems   Constitutional: Negative for activity change, chills, fatigue and fever  HENT: Negative for congestion, ear pain, sinus pressure and sore throat  Eyes: Negative for redness, itching and visual disturbance  Respiratory: Negative for cough and shortness of breath  Cardiovascular: Negative for chest pain and palpitations  Gastrointestinal: Negative for abdominal pain, diarrhea and nausea  Endocrine: Negative for cold intolerance and heat intolerance  Genitourinary: Negative for dysuria, flank pain and frequency  Musculoskeletal: Negative for arthralgias, back pain, gait problem and myalgias  Skin: Negative for color change  Allergic/Immunologic: Negative for environmental allergies  Neurological: Negative for dizziness, numbness and headaches  Psychiatric/Behavioral: Negative for behavioral problems and sleep disturbance           The following portions of the patient's history were reviewed and updated as appropriate : past family history, past medical history, past social history and past surgical history  Current Outpatient Prescriptions:     allopurinol (ZYLOPRIM) 300 mg tablet, TAKE 1 TABLET BY MOUTH DAILY, Disp: 90 tablet, Rfl: 1    cyclobenzaprine (FLEXERIL) 5 mg tablet, Take 1 tablet (5 mg total) by mouth daily at bedtime, Disp: 30 tablet, Rfl: 0    fluticasone (FLONASE) 50 mcg/act nasal spray, 1 spray into each nostril daily as needed for rhinitis, Disp: , Rfl:     meloxicam (MOBIC) 15 mg tablet, Take 1 tablet (15 mg total) by mouth daily, Disp: 30 tablet, Rfl: 0    valsartan-hydrochlorothiazide (DIOVAN-HCT) 160-12 5 MG per tablet, TAKE 1 TABLET ONCE DAILY  , Disp: 90 tablet, Rfl: 1    TiZANidine (ZANAFLEX) 4 MG capsule, Take 1 capsule (4 mg total) by mouth 3 (three) times a day as needed for muscle spasms for up to 14 days, Disp: 42 capsule, Rfl: 0    Objective:    Vitals:    11/06/18 1546   BP: 158/90   BP Location: Left arm   Patient Position: Sitting   Cuff Size: Adult   Pulse: 81   Resp: 17   Temp: (!) 97 4 °F (36 3 °C)   TempSrc: Tympanic   SpO2: 97%   Weight: 124 kg (272 lb 8 oz)   Height: 5' 10" (1 778 m)        Physical Exam   Constitutional: He is oriented to person, place, and time  Vital signs are normal  He appears well-developed and well-nourished  HENT:   Head: Normocephalic and atraumatic  Right Ear: Hearing normal    Left Ear: Hearing normal    Nose: Nose normal  No septal deviation  Mouth/Throat: Oropharynx is clear and moist and mucous membranes are normal    Eyes: Pupils are equal, round, and reactive to light  EOM and lids are normal    Neck: Trachea normal and normal range of motion  No thyroid mass and no thyromegaly present  Cardiovascular: Normal rate  Pulmonary/Chest: Effort normal  No respiratory distress  He has no decreased breath sounds  Abdominal: Normal appearance  There is no guarding  Musculoskeletal: Normal range of motion     Neurological: He is alert and oriented to person, place, and time  He has normal strength  No cranial nerve deficit or sensory deficit  Skin: Skin is warm and dry  Psychiatric: He has a normal mood and affect   His speech is normal and behavior is normal  Judgment and thought content normal  Cognition and memory are normal

## 2018-11-30 ENCOUNTER — OFFICE VISIT (OUTPATIENT)
Dept: FAMILY MEDICINE CLINIC | Facility: CLINIC | Age: 68
End: 2018-11-30
Payer: COMMERCIAL

## 2018-11-30 VITALS
DIASTOLIC BLOOD PRESSURE: 82 MMHG | RESPIRATION RATE: 16 BRPM | WEIGHT: 271.3 LBS | SYSTOLIC BLOOD PRESSURE: 138 MMHG | HEART RATE: 91 BPM | TEMPERATURE: 98 F | OXYGEN SATURATION: 96 % | BODY MASS INDEX: 38.84 KG/M2 | HEIGHT: 70 IN

## 2018-11-30 DIAGNOSIS — I10 HYPERTENSION, UNSPECIFIED TYPE: ICD-10-CM

## 2018-11-30 DIAGNOSIS — Z13.29 SCREENING FOR THYROID DISORDER: ICD-10-CM

## 2018-11-30 DIAGNOSIS — J01.90 ACUTE NON-RECURRENT SINUSITIS, UNSPECIFIED LOCATION: Primary | ICD-10-CM

## 2018-11-30 DIAGNOSIS — J40 BRONCHITIS: ICD-10-CM

## 2018-11-30 DIAGNOSIS — E78.5 HYPERLIPIDEMIA, UNSPECIFIED HYPERLIPIDEMIA TYPE: ICD-10-CM

## 2018-11-30 DIAGNOSIS — Z12.5 SCREENING FOR PROSTATE CANCER: ICD-10-CM

## 2018-11-30 PROCEDURE — 3008F BODY MASS INDEX DOCD: CPT | Performed by: NURSE PRACTITIONER

## 2018-11-30 PROCEDURE — 99213 OFFICE O/P EST LOW 20 MIN: CPT | Performed by: NURSE PRACTITIONER

## 2018-11-30 RX ORDER — AMOXICILLIN AND CLAVULANATE POTASSIUM 875; 125 MG/1; MG/1
1 TABLET, FILM COATED ORAL EVERY 12 HOURS SCHEDULED
Qty: 14 TABLET | Refills: 0 | Status: SHIPPED | OUTPATIENT
Start: 2018-11-30 | End: 2018-12-07

## 2018-11-30 RX ORDER — ALBUTEROL SULFATE 90 UG/1
2 AEROSOL, METERED RESPIRATORY (INHALATION) EVERY 6 HOURS PRN
Qty: 6.7 G | Refills: 0 | Status: SHIPPED | OUTPATIENT
Start: 2018-11-30 | End: 2019-02-26

## 2018-12-02 NOTE — PROGRESS NOTES
[facility]    ASSESSMENT AND PLAN     1  Acute non-recurrent sinusitis, unspecified location  79 yo male presents today with s/s suggestive of evolving acute sinusitis/Bronchitis  RX given for 7 day course of Amoxicillin  Symptom management reviewed: increase fluids, increase rest, may take OTC antitussive - avoiding those containing pseudoephedrine  He is to return to the office if his symptoms persist or worsen  - amoxicillin-clavulanate (AUGMENTIN) 875-125 mg per tablet; Take 1 tablet by mouth every 12 (twelve) hours for 7 days  Dispense: 14 tablet; Refill: 0    2  Bronchitis  - albuterol (PROVENTIL HFA) 90 mcg/act inhaler; Inhale 2 puffs every 6 (six) hours as needed for wheezing  Dispense: 6 7 g; Refill: 0    3  Hypertension, unspecified type  Appears  managed on Diovan-HCT as BP today was 138/82, however, he has not had blood work since 5/2016  Discussed this with patient and encouraged he get fasting BW completed and return for a wellness physical  He states he will do so after returning from an upcoming hunting trip  - CBC and differential; Future  - Comprehensive metabolic panel; Future    4  Screening for thyroid disorder  - TSH, 3rd generation with Free T4 reflex; Future    5  Screening for prostate cancer  - PSA, total and free; Future    6  Hyperlipidemia, unspecified hyperlipidemia type  - Lipid panel; Future        SUBJECTIVE       Patient ID: David Perera is a 80 y o  male  Chief Complaint   Patient presents with    Cold Like Symptoms     pt complains of cough/congestion/ears hurt/sore throat/post nasal drip started on Wednesday       HISTORY OF PRESENT ILLNESS    Patient presents today with sinus pain/pressure, sore throat with postnasal drip, ear discomfort, and congestion with a productive cough  He denies fever and/or GI symptoms  He states he was away on a hunting trip last week and several of the men he was hunting with were sick    He is leaving for a another hunting trip in a day or two and is hoping to [facility] this before it gets bad  The following portions of the patient's history were reviewed and updated as appropriate: allergies, current medications, past medical history, past social history and problem list     REVIEW OF SYSTEMS  Review of Systems   Constitutional: Negative  HENT: Positive for congestion, ear pain, postnasal drip, rhinorrhea, sinus pain, sinus pressure and sore throat  Negative for ear discharge and sneezing  Eyes: Negative  Respiratory: Positive for cough (Productive)  Negative for chest tightness, shortness of breath and wheezing  Cardiovascular: Negative  Gastrointestinal: Negative  Genitourinary: Negative  Musculoskeletal: Negative  Neurological: Negative  Psychiatric/Behavioral: Negative  OBJECTIVE      VITAL SIGNS  /82 (BP Location: Left arm, Patient Position: Sitting, Cuff Size: Adult)   Pulse 91   Temp 98 °F (36 7 °C) (Tympanic)   Resp 16   Ht 5' 10" (1 778 m)   Wt 123 kg (271 lb 4 8 oz)   SpO2 96%   BMI 38 93 kg/m²       PHYSICAL EXAMINATION   Physical Exam   Constitutional: He is oriented to person, place, and time  He appears well-developed and well-nourished  HENT:   Head: Normocephalic  Right Ear: Hearing, tympanic membrane, external ear and ear canal normal  No middle ear effusion  Left Ear: Hearing, tympanic membrane, external ear and ear canal normal   No middle ear effusion  Nose: Mucosal edema and sinus tenderness present  No epistaxis  Right sinus exhibits maxillary sinus tenderness  Right sinus exhibits no frontal sinus tenderness  Left sinus exhibits maxillary sinus tenderness  Left sinus exhibits no frontal sinus tenderness  Mouth/Throat: Mucous membranes are normal  Oropharyngeal exudate and posterior oropharyngeal erythema present  Eyes: Conjunctivae are normal  Right eye exhibits no discharge  Left eye exhibits no discharge     Cardiovascular: Normal rate and regular rhythm  Pulmonary/Chest: Effort normal and breath sounds normal  No respiratory distress  He has no wheezes  Musculoskeletal: Normal range of motion  Lymphadenopathy:        Head (right side): No submental and no submandibular adenopathy present  Head (left side): No submental and no submandibular adenopathy present  He has no cervical adenopathy  Neurological: He is alert and oriented to person, place, and time  Skin: Skin is warm, dry and intact  Psychiatric: He has a normal mood and affect  Judgment and thought content normal  Cognition and memory are normal    Nursing note and vitals reviewed

## 2018-12-17 ENCOUNTER — OFFICE VISIT (OUTPATIENT)
Dept: FAMILY MEDICINE CLINIC | Facility: CLINIC | Age: 68
End: 2018-12-17
Payer: COMMERCIAL

## 2018-12-17 VITALS
WEIGHT: 272.1 LBS | HEART RATE: 63 BPM | RESPIRATION RATE: 15 BRPM | HEIGHT: 70 IN | OXYGEN SATURATION: 97 % | TEMPERATURE: 97.4 F | BODY MASS INDEX: 38.95 KG/M2 | DIASTOLIC BLOOD PRESSURE: 84 MMHG | SYSTOLIC BLOOD PRESSURE: 128 MMHG

## 2018-12-17 DIAGNOSIS — M54.2 CERVICALGIA: Primary | ICD-10-CM

## 2018-12-17 PROCEDURE — 99214 OFFICE O/P EST MOD 30 MIN: CPT | Performed by: FAMILY MEDICINE

## 2018-12-17 PROCEDURE — 1036F TOBACCO NON-USER: CPT | Performed by: FAMILY MEDICINE

## 2018-12-17 PROCEDURE — 1160F RVW MEDS BY RX/DR IN RCRD: CPT | Performed by: FAMILY MEDICINE

## 2018-12-17 PROCEDURE — 3008F BODY MASS INDEX DOCD: CPT | Performed by: FAMILY MEDICINE

## 2018-12-17 PROCEDURE — 96372 THER/PROPH/DIAG INJ SC/IM: CPT | Performed by: FAMILY MEDICINE

## 2018-12-17 RX ORDER — NAPROXEN 500 MG/1
500 TABLET ORAL 2 TIMES DAILY WITH MEALS
Qty: 28 TABLET | Refills: 0 | Status: SHIPPED | OUTPATIENT
Start: 2018-12-17 | End: 2019-07-15 | Stop reason: ALTCHOICE

## 2018-12-17 RX ORDER — DEXAMETHASONE SODIUM PHOSPHATE 4 MG/ML
8 INJECTION, SOLUTION INTRA-ARTICULAR; INTRALESIONAL; INTRAMUSCULAR; INTRAVENOUS; SOFT TISSUE ONCE
Status: COMPLETED | OUTPATIENT
Start: 2018-12-17 | End: 2018-12-17

## 2018-12-17 RX ORDER — LATANOPROST 50 UG/ML
SOLUTION/ DROPS OPHTHALMIC
Refills: 6 | COMMUNITY
Start: 2018-12-12 | End: 2019-07-15 | Stop reason: ALTCHOICE

## 2018-12-17 RX ADMIN — DEXAMETHASONE SODIUM PHOSPHATE 8 MG: 4 INJECTION, SOLUTION INTRA-ARTICULAR; INTRALESIONAL; INTRAMUSCULAR; INTRAVENOUS; SOFT TISSUE at 10:43

## 2018-12-17 NOTE — PROGRESS NOTES
Assessment/Plan:   1  Cervicalgia  Reviewed patient's symptoms today  At this time, he was given IM Decadron injection  May take naproxen for further symptom relief  He states that he does have muscle relaxers at home  He may take this b i d  For symptom relief  Follow-up if any symptoms are persisting or worsening   - naproxen (EC NAPROSYN) 500 MG EC tablet; Take 1 tablet (500 mg total) by mouth 2 (two) times a day with meals for 14 days  Dispense: 28 tablet; Refill: 0  - dexamethasone (DECADRON) injection 8 mg; Inject 2 mL (8 mg total) into a muscle once      There are no diagnoses linked to this encounter  Subjective:    Chief Complaint   Patient presents with    Back Pain     feels like pinched nerve for the past week pain getting worse     Neck Pain        Patient ID: Nohemy Almeida is a 80 y o  male  Patient is a 50-year-old male presents today with CC of left neck/upper back pain  He has had this pain for the past few weeks  Symptoms started gradually  Denies any trauma to this area  He states that he has been having limitations in his range of motion dot denies any headaches numbness tingling in his upper extremity dot he has been taking Advil with minimal relief  Review of Systems   Constitutional: Negative for activity change, chills, fatigue and fever  HENT: Negative for congestion, ear pain, sinus pressure and sore throat  Eyes: Negative for redness, itching and visual disturbance  Respiratory: Negative for cough and shortness of breath  Cardiovascular: Negative for chest pain and palpitations  Gastrointestinal: Negative for abdominal pain, diarrhea and nausea  Endocrine: Negative for cold intolerance and heat intolerance  Genitourinary: Negative for dysuria, flank pain and frequency  Musculoskeletal: Positive for arthralgias, back pain and neck pain  Negative for gait problem and myalgias  Skin: Negative for color change     Allergic/Immunologic: Negative for environmental allergies  Neurological: Negative for dizziness, numbness and headaches  Psychiatric/Behavioral: Negative for behavioral problems and sleep disturbance  The following portions of the patient's history were reviewed and updated as appropriate : past family history, past medical history, past social history and past surgical history  Current Outpatient Prescriptions:     allopurinol (ZYLOPRIM) 300 mg tablet, TAKE 1 TABLET BY MOUTH DAILY, Disp: 90 tablet, Rfl: 1    valsartan-hydrochlorothiazide (DIOVAN-HCT) 160-12 5 MG per tablet, TAKE 1 TABLET ONCE DAILY  , Disp: 90 tablet, Rfl: 1    albuterol (PROVENTIL HFA) 90 mcg/act inhaler, Inhale 2 puffs every 6 (six) hours as needed for wheezing (Patient not taking: Reported on 12/17/2018 ), Disp: 6 7 g, Rfl: 0    fluticasone (FLONASE) 50 mcg/act nasal spray, 1 spray into each nostril daily as needed for rhinitis, Disp: , Rfl:     latanoprost (XALATAN) 0 005 % ophthalmic solution, INSTILL 1 DROP INTO BOTH EYES IN THE EVENING, Disp: , Rfl: 6    nystatin-triamcinolone (MYCOLOG-II) cream, Apply topically 2 (two) times a day (Patient not taking: Reported on 12/17/2018 ), Disp: 60 g, Rfl: 1    Objective:    Vitals:    12/17/18 1008   BP: 128/84   BP Location: Right arm   Patient Position: Sitting   Cuff Size: Large   Pulse: 63   Resp: 15   Temp: (!) 97 4 °F (36 3 °C)   TempSrc: Tympanic   SpO2: 97%   Weight: 123 kg (272 lb 1 6 oz)   Height: 5' 10" (1 778 m)        Physical Exam   Constitutional: He is oriented to person, place, and time  Vital signs are normal  He appears well-developed and well-nourished  HENT:   Head: Normocephalic and atraumatic  Right Ear: Hearing normal    Left Ear: Hearing normal    Nose: Nose normal  No septal deviation  Mouth/Throat: Oropharynx is clear and moist and mucous membranes are normal    Eyes: Pupils are equal, round, and reactive to light   EOM and lids are normal    Neck: Trachea normal and normal range of motion  No thyroid mass and no thyromegaly present  Cardiovascular: Normal rate  Pulmonary/Chest: Effort normal  No respiratory distress  He has no decreased breath sounds  Abdominal: Normal appearance  There is no guarding  Musculoskeletal:        Cervical back: He exhibits decreased range of motion, tenderness, pain and spasm  Back:    Neurological: He is alert and oriented to person, place, and time  He has normal strength  No cranial nerve deficit or sensory deficit  Skin: Skin is warm and dry  Psychiatric: He has a normal mood and affect   His speech is normal and behavior is normal  Judgment and thought content normal  Cognition and memory are normal

## 2018-12-20 ENCOUNTER — APPOINTMENT (OUTPATIENT)
Dept: LAB | Facility: MEDICAL CENTER | Age: 68
End: 2018-12-20
Payer: COMMERCIAL

## 2018-12-20 ENCOUNTER — TELEPHONE (OUTPATIENT)
Dept: FAMILY MEDICINE CLINIC | Facility: CLINIC | Age: 68
End: 2018-12-20

## 2018-12-20 DIAGNOSIS — Z13.29 SCREENING FOR THYROID DISORDER: ICD-10-CM

## 2018-12-20 DIAGNOSIS — I10 HYPERTENSION, UNSPECIFIED TYPE: ICD-10-CM

## 2018-12-20 DIAGNOSIS — E78.5 HYPERLIPIDEMIA, UNSPECIFIED HYPERLIPIDEMIA TYPE: ICD-10-CM

## 2018-12-20 DIAGNOSIS — Z12.5 SCREENING FOR PROSTATE CANCER: ICD-10-CM

## 2018-12-20 LAB
ALBUMIN SERPL BCP-MCNC: 3.6 G/DL (ref 3.5–5)
ALP SERPL-CCNC: 67 U/L (ref 46–116)
ALT SERPL W P-5'-P-CCNC: 27 U/L (ref 12–78)
ANION GAP SERPL CALCULATED.3IONS-SCNC: 6 MMOL/L (ref 4–13)
AST SERPL W P-5'-P-CCNC: 15 U/L (ref 5–45)
BASOPHILS # BLD AUTO: 0.07 THOUSANDS/ΜL (ref 0–0.1)
BASOPHILS NFR BLD AUTO: 1 % (ref 0–1)
BILIRUB SERPL-MCNC: 0.69 MG/DL (ref 0.2–1)
BUN SERPL-MCNC: 25 MG/DL (ref 5–25)
CALCIUM SERPL-MCNC: 8.5 MG/DL (ref 8.3–10.1)
CHLORIDE SERPL-SCNC: 104 MMOL/L (ref 100–108)
CHOLEST SERPL-MCNC: 241 MG/DL (ref 50–200)
CO2 SERPL-SCNC: 29 MMOL/L (ref 21–32)
CREAT SERPL-MCNC: 1.15 MG/DL (ref 0.6–1.3)
EOSINOPHIL # BLD AUTO: 0.36 THOUSAND/ΜL (ref 0–0.61)
EOSINOPHIL NFR BLD AUTO: 3 % (ref 0–6)
ERYTHROCYTE [DISTWIDTH] IN BLOOD BY AUTOMATED COUNT: 13.2 % (ref 11.6–15.1)
GFR SERPL CREATININE-BSD FRML MDRD: 65 ML/MIN/1.73SQ M
GLUCOSE P FAST SERPL-MCNC: 115 MG/DL (ref 65–99)
HCT VFR BLD AUTO: 47.9 % (ref 36.5–49.3)
HDLC SERPL-MCNC: 48 MG/DL (ref 40–60)
HGB BLD-MCNC: 15.9 G/DL (ref 12–17)
IMM GRANULOCYTES # BLD AUTO: 0.03 THOUSAND/UL (ref 0–0.2)
IMM GRANULOCYTES NFR BLD AUTO: 0 % (ref 0–2)
LDLC SERPL CALC-MCNC: 160 MG/DL (ref 0–100)
LYMPHOCYTES # BLD AUTO: 3.71 THOUSANDS/ΜL (ref 0.6–4.47)
LYMPHOCYTES NFR BLD AUTO: 34 % (ref 14–44)
MCH RBC QN AUTO: 30.3 PG (ref 26.8–34.3)
MCHC RBC AUTO-ENTMCNC: 33.2 G/DL (ref 31.4–37.4)
MCV RBC AUTO: 91 FL (ref 82–98)
MONOCYTES # BLD AUTO: 0.94 THOUSAND/ΜL (ref 0.17–1.22)
MONOCYTES NFR BLD AUTO: 9 % (ref 4–12)
NEUTROPHILS # BLD AUTO: 5.95 THOUSANDS/ΜL (ref 1.85–7.62)
NEUTS SEG NFR BLD AUTO: 53 % (ref 43–75)
NONHDLC SERPL-MCNC: 193 MG/DL
NRBC BLD AUTO-RTO: 0 /100 WBCS
PLATELET # BLD AUTO: 272 THOUSANDS/UL (ref 149–390)
PMV BLD AUTO: 10.1 FL (ref 8.9–12.7)
POTASSIUM SERPL-SCNC: 3.6 MMOL/L (ref 3.5–5.3)
PROT SERPL-MCNC: 7.2 G/DL (ref 6.4–8.2)
RBC # BLD AUTO: 5.25 MILLION/UL (ref 3.88–5.62)
SODIUM SERPL-SCNC: 139 MMOL/L (ref 136–145)
T4 FREE SERPL-MCNC: 1.19 NG/DL (ref 0.76–1.46)
TRIGL SERPL-MCNC: 167 MG/DL
TSH SERPL DL<=0.05 MIU/L-ACNC: 5.08 UIU/ML (ref 0.36–3.74)
WBC # BLD AUTO: 11.06 THOUSAND/UL (ref 4.31–10.16)

## 2018-12-20 PROCEDURE — 84154 ASSAY OF PSA FREE: CPT

## 2018-12-20 PROCEDURE — 84443 ASSAY THYROID STIM HORMONE: CPT

## 2018-12-20 PROCEDURE — 80053 COMPREHEN METABOLIC PANEL: CPT

## 2018-12-20 PROCEDURE — 84439 ASSAY OF FREE THYROXINE: CPT

## 2018-12-20 PROCEDURE — 80061 LIPID PANEL: CPT

## 2018-12-20 PROCEDURE — 84153 ASSAY OF PSA TOTAL: CPT

## 2018-12-20 PROCEDURE — 36415 COLL VENOUS BLD VENIPUNCTURE: CPT

## 2018-12-20 PROCEDURE — 85025 COMPLETE CBC W/AUTO DIFF WBC: CPT

## 2018-12-21 LAB
PSA FREE MFR SERPL: 18.5 %
PSA FREE SERPL-MCNC: 0.48 NG/ML
PSA SERPL-MCNC: 2.6 NG/ML (ref 0–4)

## 2018-12-24 ENCOUNTER — EVALUATION (OUTPATIENT)
Dept: PHYSICAL THERAPY | Facility: CLINIC | Age: 68
End: 2018-12-24
Payer: COMMERCIAL

## 2018-12-24 DIAGNOSIS — M25.562 CHRONIC PAIN OF LEFT KNEE: Primary | ICD-10-CM

## 2018-12-24 DIAGNOSIS — M54.12 CERVICAL RADICULOPATHY: ICD-10-CM

## 2018-12-24 DIAGNOSIS — G89.29 CHRONIC PAIN OF LEFT KNEE: Primary | ICD-10-CM

## 2018-12-24 PROCEDURE — 97110 THERAPEUTIC EXERCISES: CPT | Performed by: PHYSICAL THERAPIST

## 2018-12-24 PROCEDURE — G8982 BODY POS GOAL STATUS: HCPCS | Performed by: PHYSICAL THERAPIST

## 2018-12-24 PROCEDURE — G8981 BODY POS CURRENT STATUS: HCPCS | Performed by: PHYSICAL THERAPIST

## 2018-12-24 PROCEDURE — 97012 MECHANICAL TRACTION THERAPY: CPT | Performed by: PHYSICAL THERAPIST

## 2018-12-24 PROCEDURE — 97162 PT EVAL MOD COMPLEX 30 MIN: CPT | Performed by: PHYSICAL THERAPIST

## 2018-12-24 NOTE — PROGRESS NOTES
PT Evaluation     Today's date: 2018  Patient name: Susi العلي  : 1950  MRN: [id number]  Referring provider: Charlee Zaragoza DO  Dx:   Encounter Diagnosis     ICD-10-CM    1  Chronic pain of left knee M25 562     G89 29    2  Cervical radiculopathy M54 12                   Assessment/Plan    Subjective     Pt is a 80year old male who presents to PT w/ c/o of left sided neck pain and left arm pain  He reports he has been having this pain for about 2 weeks after a hunting trip  He feels this sharp pain along the left lateral neck and down into the left arm  He also feels some scapular pain as well  This pain is worse with using the left UE as well as laying on the affected side  Certain head motions lie turning left will increase his arm symptoms as well  He was giving antiinflammatories that did not help relieve his pain at all  Objective     1  The patient reports pain scale as 6-8/10   --Description:  Sharp and tingling    --Aggravated:  Turning left and sleeping   --Relieved:  Resting   2  Patient observation reveals decreased lordosis and difficulty turning his head right or left  3  Palpation for pain was positive over the left LC and MS muscles   4  Reflex testing revealed the following:    --C5:  2+/1+   --C6:  2+/1+   --C7:  2+/2+  5  Manual muscle testing revealed the following:    --C5:  4/5 / 4/5   --C6:  4/5 / 4/5   --C7:  4-/5 / 4-/5  6  Sensation was intact to light touch  7   Functional testing revealed the patient's Neck Disability Index to be 35/50   8  Upper Quarter Screen was intact for all myotomes tested    9   Active Range of Motion of the cervical spine was as follows:    --Flexion:  -2" chin to chest w/ mild stretch pain w/ OP   --Extension:  -75% w/ increased neck and left arm pain w/ OP   --Right sidebending:  -75% w/ stiffness w/ OP   --Left sidebending:  -75% w/ increased neck and left arm pain w/ OP   --Right rotation:  -50% w/ increased stiffness w/ OP   --Left rotation:  -75% w/ increased neck and left arm pain w/ OP  10   Special testing revealed the following:    --Acute Radiculopathy Screen:    1  Spurling's:  +/+    2  Distraction:  +/+    3  ULTT:  -/-   --Shoulder Screen:  -/-    Patient's rehabilitation potential is fair to achieve the following functional goals by attending physical therapy for 4-6 visits:  1  The patient will report their pain to be 0-1/10   2  The patient will be able to sit for at least 30 minutes without complaints of increased symptoms  3   The patient will be able to perform all ADL's independently and without fear of re-injury  4   The patient will be able to rise from a seated position one time every 30 minutes without difficulty or increased pain  5   The patient will be able to perform all work requirements without restrictions  6   The patient will be able to lift at least 25 pounds from the floor to waist level one time every 30 minutes without any difficulty or fear of re-injury  7   The patient will be able to sleep at least 6 hours undisturbed each night  8   The patient will be able to return to all reasonable leisure activities without restrictions  9   The patient's will have negative palpation for tenderness  10   The patient's manual muscle test will be within normal limits for all myotomes tested  11  The patient's active range of motion will be within normal limits for all ranges tested  12  The patient's Neck Disability Index will be 0-5/50  13  The patient will be independent with their home exercise program     Assessment:     Thank you for the recent referral of Aziza Izaguirre  As you know, this patient is a 80 y o  male who desires to return to a pain-free and fully functional lifestyle    Physical examination has found the patient to have Movement Impairment Diagnosis stiffness  dysfunction that has resulted in pathoanatomical symptoms of neck pain associated with multiple functional limitations  Etiological variables appear to be postural, age-related, and vocational in nature  Rehabilitation goals include decreasing pain, increasing range of motion, increasing strength, improving biomechanics, endurance, posture, and functional tolerances to be within normal limits and consistent with patient's activities of daily living  In addition, the patient will be instructed in proper and safe body mechanics and provided an independent home exercise program to complement their outpatient physical therapy  I sincerely enjoyed participating in the care of this patient  Plan: treatment will include, but not be limited to the following:    Cryotherapy and/or Hot packs, Electrical stimulation and Mechanical traction to control for pain, inflammation, and/or edema  Joint Mobilization Techniques, Soft Tissue Mobilization Techniques and PROM/AAROM/AROM exercises to restore joint mobility/flexibility  Stabilization/strengthening exercises to restore core stability/strength  Instruction and Progression of a home exercise program to address the above impairments  Postural education  Ergonomic/ training  I have fully discussed the above treatment plan and expected outcomes with the patient  She is aware of the diagnosis and prognosis and has voluntarily agreed to participate in physical and/or occupational therapy            Total Time: 60      Treatment Diary     Manual  12/24            Neck OMT 30'                                                                    Exercise Diary              SOC 15x:10            DNF 10x:05            R NG 30x            Rot R and L 10x:05                                                                                                                                                                                                               HEP yes                Modalities              MH/TENS             Mech traction  15'

## 2018-12-27 ENCOUNTER — OFFICE VISIT (OUTPATIENT)
Dept: PHYSICAL THERAPY | Facility: CLINIC | Age: 68
End: 2018-12-27
Payer: COMMERCIAL

## 2018-12-27 DIAGNOSIS — M54.12 CERVICAL RADICULOPATHY: Primary | ICD-10-CM

## 2018-12-27 PROCEDURE — 97110 THERAPEUTIC EXERCISES: CPT | Performed by: PHYSICAL THERAPIST

## 2018-12-27 PROCEDURE — 97140 MANUAL THERAPY 1/> REGIONS: CPT | Performed by: PHYSICAL THERAPIST

## 2018-12-27 PROCEDURE — 97012 MECHANICAL TRACTION THERAPY: CPT | Performed by: PHYSICAL THERAPIST

## 2018-12-27 NOTE — PROGRESS NOTES
Daily Note     Today's date: 2018  Patient name: Deyanira Dias  : 1950  MRN: [id number]  Referring provider: Lazarus Em, DO  Dx:   Encounter Diagnosis     ICD-10-CM    1  Cervical radiculopathy M54 12                 Visit 2    Subjective: Pt reports he is in a lot of pain today but he was digging holes before he came to PT  Objective: See treatment diary below  The patient performed the following as per flow sheet:   --Therapeutic Exercises for 12 minutes  --Manual Therapy for 30 minutes  --Moist Heat and TENS for 15 minutes  --Mechanical Traction for 15 minutes  Assessment: Tolerated treatment poor  Patient demonstrated fatigue post treatment, exhibited good technique with therapeutic exercises and would benefit from continued PT      Plan: Continue per plan of care         Manual                     Neck OMT 30'  30                                                                                                                         Exercise Diary                        SOC 15x:10  15x                   DNF 10x:05  10x                   R NG 30x  30x                   Rot R and L 10x:05  10x                                                                                                                                                                                                                                                                                                                                                                                           HEP yes                           Modalities                        MH/TENS                       Mech traction  15'  15

## 2019-01-03 ENCOUNTER — OFFICE VISIT (OUTPATIENT)
Dept: PHYSICAL THERAPY | Facility: CLINIC | Age: 69
End: 2019-01-03
Payer: COMMERCIAL

## 2019-01-03 DIAGNOSIS — M54.12 CERVICAL RADICULOPATHY: Primary | ICD-10-CM

## 2019-01-03 PROCEDURE — 97140 MANUAL THERAPY 1/> REGIONS: CPT | Performed by: PHYSICAL THERAPIST

## 2019-01-03 PROCEDURE — 97012 MECHANICAL TRACTION THERAPY: CPT | Performed by: PHYSICAL THERAPIST

## 2019-01-03 PROCEDURE — 97110 THERAPEUTIC EXERCISES: CPT | Performed by: PHYSICAL THERAPIST

## 2019-01-03 NOTE — PROGRESS NOTES
Daily Note     Today's date: 1/3/2019  Patient name: Jennifer Lee  : 1950  MRN: [id number]  Referring provider: Martínez Davis DO  Dx:   Encounter Diagnosis     ICD-10-CM    1  Cervical radiculopathy M54 12                 Visit 3    Subjective: Pt reports he is feeling better but not much better  He had a very painful day yesterday  Objective: See treatment diary below  The patient performed the following as per flow sheet:   --Therapeutic Exercises for 12 minutes  --Manual Therapy for 30 minutes  --Mechanical Traction for 15 minutes  Assessment: Tolerated treatment well  Patient demonstrated fatigue post treatment, exhibited good technique with therapeutic exercises and would benefit from continued PT      Plan: Continue per plan of care         Manual  12/24  12/27  1/3                 Neck OMT 30'  30  30                                                                                                                       Exercise Diary                        SOC 15x:10  15x  15x                 DNF 10x:05  10x  10x                 R NG 30x  30x  30x                 Rot R and L 10x:05  10x  10x                                                                                                                                                                                                                                                                                                                                                                                         HEP yes                           Modalities                        MH/TENS                       Mech traction  15'  15  15

## 2019-01-07 ENCOUNTER — OFFICE VISIT (OUTPATIENT)
Dept: FAMILY MEDICINE CLINIC | Facility: CLINIC | Age: 69
End: 2019-01-07
Payer: COMMERCIAL

## 2019-01-07 ENCOUNTER — APPOINTMENT (OUTPATIENT)
Dept: RADIOLOGY | Facility: MEDICAL CENTER | Age: 69
End: 2019-01-07
Payer: COMMERCIAL

## 2019-01-07 VITALS
RESPIRATION RATE: 16 BRPM | HEART RATE: 97 BPM | OXYGEN SATURATION: 96 % | SYSTOLIC BLOOD PRESSURE: 170 MMHG | BODY MASS INDEX: 37.98 KG/M2 | WEIGHT: 265.3 LBS | HEIGHT: 70 IN | TEMPERATURE: 97.8 F | DIASTOLIC BLOOD PRESSURE: 90 MMHG

## 2019-01-07 DIAGNOSIS — M54.12 CERVICAL RADICULOPATHY: Primary | ICD-10-CM

## 2019-01-07 DIAGNOSIS — M54.12 CERVICAL RADICULOPATHY: ICD-10-CM

## 2019-01-07 PROCEDURE — 99214 OFFICE O/P EST MOD 30 MIN: CPT | Performed by: FAMILY MEDICINE

## 2019-01-07 PROCEDURE — 72050 X-RAY EXAM NECK SPINE 4/5VWS: CPT

## 2019-01-07 RX ORDER — TIZANIDINE HYDROCHLORIDE 6 MG/1
6 CAPSULE, GELATIN COATED ORAL
Qty: 30 CAPSULE | Refills: 0 | Status: SHIPPED | OUTPATIENT
Start: 2019-01-07 | End: 2019-03-25 | Stop reason: ALTCHOICE

## 2019-01-07 RX ORDER — PREDNISONE 20 MG/1
60 TABLET ORAL DAILY
Qty: 15 TABLET | Refills: 0 | Status: SHIPPED | OUTPATIENT
Start: 2019-01-07 | End: 2019-01-24

## 2019-01-07 NOTE — PROGRESS NOTES
Assessment/Plan:   1  Cervical radiculopathy  Reviewed patient's symptoms today  At this time, will check x-ray of cervical plans to rule out gross abnormalities  Will start a home therapy program however he may benefit ultimately from seeing formal physical therapy dot he was given a prescription for dot take p r n  For symptom relief  Will start prednisone burst   Follow up if any symptoms worsen  - XR spine cervical complete 4 or 5 vw non injury; Future  - predniSONE 20 mg tablet; Take 3 tablets (60 mg total) by mouth daily  Dispense: 15 tablet; Refill: 0  - TiZANidine (ZANAFLEX) 6 MG capsule; Take 1 capsule (6 mg total) by mouth daily at bedtime as needed for muscle spasms  Dispense: 30 capsule; Refill: 0     Diagnoses and all orders for this visit:    Cervical radiculopathy          Subjective:    Chief Complaint   Patient presents with    Follow-up     pt here for a f/u to his pinched nerve        Patient ID: Yoana Tatum is a 80 y o  male  Patient is a 64-year-old male presents today for follow-up on his chronic cervical neck pain  He has been having this pain for the past few months  He denies trauma however has been having limitations in his range of motion  He has been taking anti-inflammatories which have been providing only mild relief dot        Review of Systems   Constitutional: Negative for activity change, chills, fatigue and fever  HENT: Negative for congestion, ear pain, sinus pressure and sore throat  Eyes: Negative for redness, itching and visual disturbance  Respiratory: Negative for cough and shortness of breath  Cardiovascular: Negative for chest pain and palpitations  Gastrointestinal: Negative for abdominal pain, diarrhea and nausea  Endocrine: Negative for cold intolerance and heat intolerance  Genitourinary: Negative for dysuria, flank pain and frequency  Musculoskeletal: Negative for arthralgias, back pain, gait problem and myalgias     Skin: Negative for color change  Allergic/Immunologic: Negative for environmental allergies  Neurological: Negative for dizziness, numbness and headaches  Psychiatric/Behavioral: Negative for behavioral problems and sleep disturbance  The following portions of the patient's history were reviewed and updated as appropriate : past family history, past medical history, past social history and past surgical history  Current Outpatient Prescriptions:     allopurinol (ZYLOPRIM) 300 mg tablet, TAKE 1 TABLET BY MOUTH DAILY, Disp: 90 tablet, Rfl: 1    fluticasone (FLONASE) 50 mcg/act nasal spray, 1 spray into each nostril daily as needed for rhinitis, Disp: , Rfl:     latanoprost (XALATAN) 0 005 % ophthalmic solution, INSTILL 1 DROP INTO BOTH EYES IN THE EVENING, Disp: , Rfl: 6    valsartan-hydrochlorothiazide (DIOVAN-HCT) 160-12 5 MG per tablet, TAKE 1 TABLET ONCE DAILY  , Disp: 90 tablet, Rfl: 1    albuterol (PROVENTIL HFA) 90 mcg/act inhaler, Inhale 2 puffs every 6 (six) hours as needed for wheezing (Patient not taking: Reported on 12/17/2018 ), Disp: 6 7 g, Rfl: 0    naproxen (EC NAPROSYN) 500 MG EC tablet, Take 1 tablet (500 mg total) by mouth 2 (two) times a day with meals for 14 days, Disp: 28 tablet, Rfl: 0    nystatin-triamcinolone (MYCOLOG-II) cream, Apply topically 2 (two) times a day (Patient not taking: Reported on 12/17/2018 ), Disp: 60 g, Rfl: 1    Objective:    Vitals:    01/07/19 1306   BP: 170/90   BP Location: Left arm   Patient Position: Sitting   Cuff Size: Adult   Pulse: 97   Resp: 16   Temp: 97 8 °F (36 6 °C)   TempSrc: Tympanic   SpO2: 96%   Weight: 120 kg (265 lb 4 8 oz)   Height: 5' 10" (1 778 m)        Physical Exam   Constitutional: He is oriented to person, place, and time  He appears well-developed and well-nourished  HENT:   Head: Normocephalic and atraumatic  Nose: Nose normal    Mouth/Throat: No oropharyngeal exudate  Eyes: Pupils are equal, round, and reactive to light   Right eye exhibits no discharge  Left eye exhibits no discharge  Neck: Normal range of motion  Neck supple  No tracheal deviation present  Cardiovascular: Normal rate, regular rhythm and intact distal pulses  Exam reveals no gallop and no friction rub  No murmur heard  Pulses:       Dorsalis pedis pulses are 2+ on the right side, and 2+ on the left side  Posterior tibial pulses are 2+ on the right side, and 2+ on the left side  Pulmonary/Chest: Effort normal and breath sounds normal  No respiratory distress  He has no wheezes  He has no rales  Abdominal: Soft  Bowel sounds are normal  He exhibits no distension  There is no tenderness  There is no rebound and no guarding  Musculoskeletal: He exhibits no edema  Cervical back: He exhibits decreased range of motion, tenderness, pain and spasm  Lymphadenopathy:        Head (right side): No submental and no submandibular adenopathy present  Head (left side): No submental and no submandibular adenopathy present  He has no cervical adenopathy  Right cervical: No superficial cervical, no deep cervical and no posterior cervical adenopathy present  Left cervical: No superficial cervical, no deep cervical and no posterior cervical adenopathy present  Neurological: He is alert and oriented to person, place, and time  No cranial nerve deficit or sensory deficit  Skin: Skin is warm, dry and intact  Psychiatric: His speech is normal and behavior is normal  Judgment normal  His mood appears not anxious  Cognition and memory are normal  He does not exhibit a depressed mood  Vitals reviewed

## 2019-01-10 ENCOUNTER — OFFICE VISIT (OUTPATIENT)
Dept: PHYSICAL THERAPY | Facility: CLINIC | Age: 69
End: 2019-01-10
Payer: COMMERCIAL

## 2019-01-10 DIAGNOSIS — M54.12 CERVICAL RADICULOPATHY: Primary | ICD-10-CM

## 2019-01-10 PROCEDURE — 97012 MECHANICAL TRACTION THERAPY: CPT | Performed by: PHYSICAL THERAPIST

## 2019-01-10 PROCEDURE — 97140 MANUAL THERAPY 1/> REGIONS: CPT | Performed by: PHYSICAL THERAPIST

## 2019-01-10 PROCEDURE — 97110 THERAPEUTIC EXERCISES: CPT | Performed by: PHYSICAL THERAPIST

## 2019-01-10 NOTE — PROGRESS NOTES
Daily Note     Today's date: 1/10/2019  Patient name: Felix Ulrich  : 1950  MRN: [id number]  Referring provider: Sheela Ferrer DO  Dx:   Encounter Diagnosis     ICD-10-CM    1  Cervical radiculopathy M54 12                 Visit 4    Subjective: Pt reports he does have some decreased pain, specifically when sleeping on the affected side  Objective: See treatment diary below  The patient performed the following as per flow sheet:              --Therapeutic Exercises for 12 minutes  --Manual Therapy for 30 minutes  --Mechanical Traction for 15 minutes                    Assessment: Tolerated treatment well   Patient demonstrated fatigue post treatment, exhibited good technique with therapeutic exercises and would benefit from continued PT        Plan: Continue per plan of care          Manual  12/24  12/27  1/3  1/10               Neck OMT 30'  30  30  30                                                                                                                     Exercise Diary                        SOC 15x:10  15x  15x  15x               DNF 10x:05  10x  10x  10x               R NG 30x  30x  30x  30x               Rot R and L 10x:05  10x  10x  30x                                                                                                                                                                                                                                                                                                                                                                                       HEP yes                           Modalities                        MH/TENS                       Mech traction  15'  15  15  15

## 2019-01-13 DIAGNOSIS — I10 ESSENTIAL HYPERTENSION: ICD-10-CM

## 2019-01-14 ENCOUNTER — OFFICE VISIT (OUTPATIENT)
Dept: PHYSICAL THERAPY | Facility: CLINIC | Age: 69
End: 2019-01-14
Payer: COMMERCIAL

## 2019-01-14 DIAGNOSIS — M54.12 CERVICAL RADICULOPATHY: Primary | ICD-10-CM

## 2019-01-14 PROCEDURE — 97110 THERAPEUTIC EXERCISES: CPT | Performed by: PHYSICAL THERAPIST

## 2019-01-14 PROCEDURE — 97012 MECHANICAL TRACTION THERAPY: CPT | Performed by: PHYSICAL THERAPIST

## 2019-01-14 PROCEDURE — 97140 MANUAL THERAPY 1/> REGIONS: CPT | Performed by: PHYSICAL THERAPIST

## 2019-01-14 RX ORDER — VALSARTAN AND HYDROCHLOROTHIAZIDE 160; 12.5 MG/1; MG/1
TABLET, FILM COATED ORAL
Qty: 90 TABLET | Refills: 1 | Status: SHIPPED | OUTPATIENT
Start: 2019-01-14 | End: 2019-08-09 | Stop reason: SDUPTHER

## 2019-01-14 NOTE — PROGRESS NOTES
Daily Note     Today's date: 2019  Patient name: Susi العلي  : 1950  MRN: [id number]  Referring provider: Charlee Zaragoza DO  Dx:   Encounter Diagnosis     ICD-10-CM    1  Cervical radiculopathy M54 12                 Visit 5    Subjective: Pt reports he is feeling a little better every week  Objective: See treatment diary below  The patient performed the following as per flow sheet:              --Therapeutic Exercises for 12 minutes               --Manual Therapy for 30 minutes               --Mechanical Traction for 15 minutes         Assessment: Tolerated treatment well   Patient demonstrated fatigue post treatment, exhibited good technique with therapeutic exercises and would benefit from continued PT        Plan: Continue per plan of care          Manual  12/24  12/27  1/3  1/14               Neck OMT 30'  30  30  30                                                                                                                     Exercise Diary                        SOC 15x:10  15x  15x  15x               DNF 10x:05  10x  10x  10x               R NG 30x  30x  30x  30x               Rot R and L 10x:05  10x  10x  30x                                                                                                                                                                                                                                                                                                                                                                                       HEP yes                           Modalities                        MH/TENS                       Mech traction  15'  15  15  15

## 2019-01-17 ENCOUNTER — OFFICE VISIT (OUTPATIENT)
Dept: PHYSICAL THERAPY | Facility: CLINIC | Age: 69
End: 2019-01-17
Payer: COMMERCIAL

## 2019-01-17 DIAGNOSIS — M54.12 CERVICAL RADICULOPATHY: Primary | ICD-10-CM

## 2019-01-17 PROCEDURE — 97012 MECHANICAL TRACTION THERAPY: CPT | Performed by: PHYSICAL THERAPIST

## 2019-01-17 PROCEDURE — 97140 MANUAL THERAPY 1/> REGIONS: CPT | Performed by: PHYSICAL THERAPIST

## 2019-01-17 PROCEDURE — 97110 THERAPEUTIC EXERCISES: CPT | Performed by: PHYSICAL THERAPIST

## 2019-01-17 NOTE — PROGRESS NOTES
Daily Note     Today's date: 2019  Patient name: Leobardo Muniz  : 1950  MRN: [id number]  Referring provider: Stefany Baltazar DO  Dx:   Encounter Diagnosis     ICD-10-CM    1  Cervical radiculopathy M54 12                 Visit 6    Subjective: Pt reports getting up in the AM is better but everything else is the same  Objective: See treatment diary below  The patient performed the following as per flow sheet:              --Therapeutic Exercises for 12 minutes               --Manual Therapy for 30 minutes               --Mechanical Traction for 15 minutes         Assessment: Tolerated treatment well   Patient demonstrated fatigue post treatment, exhibited good technique with therapeutic exercises and would benefit from continued PT        Plan: Continue per plan of care          Manual  12/24  12/27  1/3  1/17               Neck OMT 30'  30  30  30                                                                                                                     Exercise Diary                        SOC 15x:10  15x  15x  15x               DNF 10x:05  10x  10x  10x               R NG 30x  30x  30x  30x               Rot R and L 10x:05  10x  10x  30x                                                                                                                                                                                                                                                                                                                                                                                       HEP yes                           Modalities                        MH/TENS                       Mech traction  15'  15  15  15

## 2019-01-21 ENCOUNTER — APPOINTMENT (OUTPATIENT)
Dept: PHYSICAL THERAPY | Facility: CLINIC | Age: 69
End: 2019-01-21
Payer: COMMERCIAL

## 2019-01-23 ENCOUNTER — APPOINTMENT (OUTPATIENT)
Dept: PHYSICAL THERAPY | Facility: CLINIC | Age: 69
End: 2019-01-23
Payer: COMMERCIAL

## 2019-01-24 ENCOUNTER — OFFICE VISIT (OUTPATIENT)
Dept: FAMILY MEDICINE CLINIC | Facility: CLINIC | Age: 69
End: 2019-01-24
Payer: COMMERCIAL

## 2019-01-24 VITALS
OXYGEN SATURATION: 96 % | TEMPERATURE: 97.9 F | RESPIRATION RATE: 16 BRPM | HEIGHT: 70 IN | HEART RATE: 81 BPM | DIASTOLIC BLOOD PRESSURE: 76 MMHG | SYSTOLIC BLOOD PRESSURE: 134 MMHG | BODY MASS INDEX: 38.48 KG/M2 | WEIGHT: 268.8 LBS

## 2019-01-24 DIAGNOSIS — H25.9 AGE-RELATED CATARACT OF BOTH EYES, UNSPECIFIED AGE-RELATED CATARACT TYPE: ICD-10-CM

## 2019-01-24 DIAGNOSIS — Z01.810 PREOP CARDIOVASCULAR EXAM: Primary | ICD-10-CM

## 2019-01-24 DIAGNOSIS — M54.12 CERVICAL RADICULOPATHY: ICD-10-CM

## 2019-01-24 PROCEDURE — 99214 OFFICE O/P EST MOD 30 MIN: CPT | Performed by: FAMILY MEDICINE

## 2019-01-24 NOTE — PROGRESS NOTES
Assessment/Plan:   1  Preop cardiovascular exam/Age-related cataract of both eyes, unspecified age-related cataract type  Patient appears well today  He has a good functional capacity and no active cardiac problems  At this time, this type of procedure is considered low risk  Patient is cleared with low perioperative cardiovascular risk  No further testing is needed today  2  Cervical radiculopathy  Patient has been having persistent cervical radiculopathy  He has been noticing more progressive paresthesias in his left upper extremity with flexion as well as rotation of his cervical spine  He still denies any weakness  He has been following up with PT regularly  He has not noticed any benefit from this treatment modality  Will check MRI of cervical spine to further rule out gross abnormalities  There are no diagnoses linked to this encounter  Subjective:    Chief Complaint   Patient presents with    Pre-op Exam     Pt presents for a Pre-Op Physical  Pt is having cataract surgery on left eye 2/07 and the right eye 2/27  Patient ID: Marlene Arana is a 77 y o  male  Marlene Arana  77 y o   male    SURGEON:  Dr Suma Guerrero: B/L Cataract    DATE OF SURGERY: 2/2 and 2/27    PRIOR ANESTHESIA:yes    COMPLICATION: no    BLEEDING PROBLEM: no    PERTINENT PMH: no    EXERCISE CAPACITY:   CAN WALK 4 BLOCKS AND OR CLIMB 2 FLIGHTS: Yes    HOME LIVING SITUATION SAFE AND SECURE: Yes      TOBACCO: no     ETOH: yes     ILLEGAL DRUGS: no          Review of Systems   Constitutional: Negative for activity change, chills, fatigue and fever  HENT: Negative for congestion, ear pain, sinus pressure and sore throat  Eyes: Negative for redness, itching and visual disturbance  Respiratory: Negative for cough and shortness of breath  Cardiovascular: Negative for chest pain and palpitations  Gastrointestinal: Negative for abdominal pain, diarrhea and nausea     Endocrine: Negative for cold intolerance and heat intolerance  Genitourinary: Negative for dysuria, flank pain and frequency  Musculoskeletal: Negative for arthralgias, back pain, gait problem and myalgias  Skin: Negative for color change  Allergic/Immunologic: Negative for environmental allergies  Neurological: Negative for dizziness, numbness and headaches  Psychiatric/Behavioral: Negative for behavioral problems and sleep disturbance  The following portions of the patient's history were reviewed and updated as appropriate : past family history, past medical history, past social history and past surgical history  Current Outpatient Prescriptions:     albuterol (PROVENTIL HFA) 90 mcg/act inhaler, Inhale 2 puffs every 6 (six) hours as needed for wheezing, Disp: 6 7 g, Rfl: 0    allopurinol (ZYLOPRIM) 300 mg tablet, TAKE 1 TABLET BY MOUTH DAILY, Disp: 90 tablet, Rfl: 1    fluticasone (FLONASE) 50 mcg/act nasal spray, 1 spray into each nostril daily as needed for rhinitis, Disp: , Rfl:     latanoprost (XALATAN) 0 005 % ophthalmic solution, INSTILL 1 DROP INTO BOTH EYES IN THE EVENING, Disp: , Rfl: 6    nystatin-triamcinolone (MYCOLOG-II) cream, Apply topically 2 (two) times a day, Disp: 60 g, Rfl: 1    TiZANidine (ZANAFLEX) 6 MG capsule, Take 1 capsule (6 mg total) by mouth daily at bedtime as needed for muscle spasms, Disp: 30 capsule, Rfl: 0    valsartan-hydrochlorothiazide (DIOVAN-HCT) 160-12 5 MG per tablet, TAKE 1 TABLET ONCE DAILY  , Disp: 90 tablet, Rfl: 1    naproxen (EC NAPROSYN) 500 MG EC tablet, Take 1 tablet (500 mg total) by mouth 2 (two) times a day with meals for 14 days, Disp: 28 tablet, Rfl: 0    predniSONE 20 mg tablet, Take 3 tablets (60 mg total) by mouth daily (Patient not taking: Reported on 1/24/2019 ), Disp: 15 tablet, Rfl: 0    Objective:    Vitals:    01/24/19 1234   BP: 134/76   BP Location: Left arm   Patient Position: Sitting   Cuff Size: Large   Pulse: 81   Resp: 16   Temp: 97 9 °F (36 6 °C)   TempSrc: Tympanic   SpO2: 96%   Weight: 122 kg (268 lb 12 8 oz)   Height: 5' 10" (1 778 m)        Physical Exam   Constitutional: He is oriented to person, place, and time  He appears well-developed and well-nourished  HENT:   Head: Normocephalic and atraumatic  Nose: Nose normal    Mouth/Throat: No oropharyngeal exudate  Eyes: Pupils are equal, round, and reactive to light  Right eye exhibits no discharge  Left eye exhibits no discharge  Neck: Normal range of motion  Neck supple  No tracheal deviation present  Cardiovascular: Normal rate, regular rhythm and intact distal pulses  Exam reveals no gallop and no friction rub  No murmur heard  Pulses:       Dorsalis pedis pulses are 2+ on the right side, and 2+ on the left side  Posterior tibial pulses are 2+ on the right side, and 2+ on the left side  Pulmonary/Chest: Effort normal and breath sounds normal  No respiratory distress  He has no wheezes  He has no rales  Abdominal: Soft  Bowel sounds are normal  He exhibits no distension  There is no tenderness  There is no rebound and no guarding  Musculoskeletal: He exhibits no edema  Cervical back: He exhibits decreased range of motion, tenderness, pain and spasm  Left arm paresthesias with flexion and rotation of cervical spine   Lymphadenopathy:        Head (right side): No submental and no submandibular adenopathy present  Head (left side): No submental and no submandibular adenopathy present  He has no cervical adenopathy  Right cervical: No superficial cervical, no deep cervical and no posterior cervical adenopathy present  Left cervical: No superficial cervical, no deep cervical and no posterior cervical adenopathy present  Neurological: He is alert and oriented to person, place, and time  No cranial nerve deficit or sensory deficit  Skin: Skin is warm, dry and intact     Psychiatric: His speech is normal and behavior is normal  Judgment normal  His mood appears not anxious  Cognition and memory are normal  He does not exhibit a depressed mood  Vitals reviewed

## 2019-01-27 DIAGNOSIS — Z87.39 HISTORY OF GOUT: ICD-10-CM

## 2019-01-28 RX ORDER — ALLOPURINOL 300 MG/1
TABLET ORAL DAILY
Qty: 90 TABLET | Refills: 1 | Status: SHIPPED | OUTPATIENT
Start: 2019-01-28 | End: 2019-02-26

## 2019-01-29 ENCOUNTER — DOCUMENTATION (OUTPATIENT)
Dept: FAMILY MEDICINE CLINIC | Facility: CLINIC | Age: 69
End: 2019-01-29

## 2019-01-30 ENCOUNTER — OFFICE VISIT (OUTPATIENT)
Dept: PHYSICAL THERAPY | Facility: CLINIC | Age: 69
End: 2019-01-30
Payer: COMMERCIAL

## 2019-01-30 DIAGNOSIS — M54.12 CERVICAL RADICULOPATHY: Primary | ICD-10-CM

## 2019-01-30 PROCEDURE — 97140 MANUAL THERAPY 1/> REGIONS: CPT

## 2019-01-30 PROCEDURE — 97110 THERAPEUTIC EXERCISES: CPT

## 2019-01-30 NOTE — PROGRESS NOTES
Daily Note     Today's date: 2019  Patient name: Nohemy Almeida  : 1950  MRN: [id number]  Referring provider: Cira Earl DO  Dx:   Encounter Diagnosis     ICD-10-CM    1  Cervical radiculopathy M54 12        Start Time:   Stop Time: 1800  Total time in clinic (min): 45 minutes    Subjective: Patient wants to see a spine specialist prior to getting the MRI  Patient is a bit claustrophobic  Patient demonstrated high levels of pain and irritations (numbness and tingling) during today's session  Objective: See treatment diary below  Arm, thumb, index and middle finger numbness/tingling        Assessment: Tolerated treatment well  Patient demonstrated fatigue post treatment, exhibited good technique with therapeutic exercises and would benefit from continued PT  Patient was assigned a new exercise program as per PT POC   Patient had an increase in numbness and tingling secondary to positional movements        Plan: Continue per plan of care       Manual  12/24  12/27  1/3  1/17  1/30             Neck OMT 30'  30  30  30               TPR rhombood          MW              PA T mob          MW                                                                 Exercise Diary                        SOC 15x:10  15x  15x  15x               DNF 10x:05  10x  10x  10x               R NG 30x  30x  30x  30x               Rot R and L 10x:05  10x  10x  30x               Supine cervical flexion         5"x10              Pec stretch         15"x3  1/2 foam             Scap Retrac          3"x15             Serratus punch          x10             Median n  glide          10x                                                                                                                                                                                                                                                             HEP yes                         Modalities                        MH/TENS                       Mech traction  15'  15  15  15 dc

## 2019-02-01 ENCOUNTER — TELEPHONE (OUTPATIENT)
Dept: FAMILY MEDICINE CLINIC | Facility: CLINIC | Age: 69
End: 2019-02-01

## 2019-02-01 DIAGNOSIS — M54.12 CERVICAL RADICULOPATHY: Primary | ICD-10-CM

## 2019-02-01 DIAGNOSIS — M54.16 CHRONIC LUMBAR RADICULOPATHY: ICD-10-CM

## 2019-02-01 NOTE — TELEPHONE ENCOUNTER
PT CALLED AND SAID HE HAS HIS MRI SCHEDULED AT [facility] ON 2/6/19  HE IS HAVING A LOT OF BACK PAIN AND HE WAS WONDERING IF HE CAN BE REFERRED TO A SPINE SPECIALIST

## 2019-02-06 ENCOUNTER — HOSPITAL ENCOUNTER (OUTPATIENT)
Dept: RADIOLOGY | Facility: HOSPITAL | Age: 69
Discharge: HOME | End: 2019-02-06
Payer: COMMERCIAL

## 2019-02-06 DIAGNOSIS — M54.12 CERVICAL RADICULOPATHY: ICD-10-CM

## 2019-02-06 PROCEDURE — 72141 MRI NECK SPINE W/O DYE: CPT

## 2019-02-26 ENCOUNTER — TELEPHONE (OUTPATIENT)
Dept: PAIN MEDICINE | Facility: MEDICAL CENTER | Age: 69
End: 2019-02-26

## 2019-02-26 ENCOUNTER — OFFICE VISIT (OUTPATIENT)
Dept: PAIN MEDICINE | Facility: MEDICAL CENTER | Age: 69
End: 2019-02-26
Payer: COMMERCIAL

## 2019-02-26 VITALS
SYSTOLIC BLOOD PRESSURE: 123 MMHG | HEART RATE: 97 BPM | DIASTOLIC BLOOD PRESSURE: 77 MMHG | BODY MASS INDEX: 38.74 KG/M2 | WEIGHT: 270.6 LBS | HEIGHT: 70 IN

## 2019-02-26 DIAGNOSIS — M54.12 CERVICAL RADICULOPATHY: ICD-10-CM

## 2019-02-26 PROCEDURE — 99204 OFFICE O/P NEW MOD 45 MIN: CPT | Performed by: PHYSICAL MEDICINE & REHABILITATION

## 2019-02-26 RX ORDER — KETOROLAC TROMETHAMINE 4 MG/ML
1 SOLUTION/ DROPS OPHTHALMIC 4 TIMES DAILY
COMMUNITY
End: 2019-07-15 | Stop reason: ALTCHOICE

## 2019-02-26 RX ORDER — OFLOXACIN 3 MG/ML
1 SOLUTION/ DROPS OPHTHALMIC 4 TIMES DAILY
COMMUNITY
End: 2019-05-10

## 2019-02-26 RX ORDER — ALBUTEROL SULFATE 90 UG/1
2 AEROSOL, METERED RESPIRATORY (INHALATION) AS NEEDED
COMMUNITY
End: 2019-03-25 | Stop reason: ALTCHOICE

## 2019-02-26 RX ORDER — FLUTICASONE PROPIONATE 50 MCG
1 SPRAY, SUSPENSION (ML) NASAL AS NEEDED
COMMUNITY
End: 2019-03-25 | Stop reason: ALTCHOICE

## 2019-02-26 RX ORDER — GABAPENTIN 300 MG/1
300 CAPSULE ORAL 3 TIMES DAILY
Qty: 90 CAPSULE | Refills: 1 | Status: SHIPPED | OUTPATIENT
Start: 2019-02-26 | End: 2019-04-20 | Stop reason: SDUPTHER

## 2019-02-26 RX ORDER — ALLOPURINOL 300 MG/1
300 TABLET ORAL
COMMUNITY
End: 2019-08-12 | Stop reason: SDUPTHER

## 2019-02-26 NOTE — PROGRESS NOTES
Assessment:  1  Cervical radiculopathy - Left        Plan:  1  Initiate gabapentin 300 mg q h s  And titrate to t i d  Dosing, titration calendar given to patient  2  We will schedule the patient for a left C7-T1 interlaminar epidural steroid injection to help with the inflammatory component of his pain  Complete risks and benefits including bleeding, infection, tissue reaction, allergic reaction were discussed  Verbal consent obtained  3   Continue with physical therapy home exercise program, he was experiencing worsening of symptoms with therapy visits  My impressions and treatment recommendations were discussed in detail with the patient who verbalized understanding and had no further questions  Discharge instructions were provided  I personally saw and examined the patient and I agree with the above discussed plan of care  Orders Placed This Encounter   Procedures    FL spine and pain procedure     Standing Status:   Future     Standing Expiration Date:   2020     Order Specific Question:   Reason for Exam:     Answer:   (L) COLLEEN     Order Specific Question:   Anticoagulant hold needed?      Answer:   no     New Medications Ordered This Visit   Medications    allopurinol (ZYLOPRIM) 300 mg tablet     Sig: Take 300 mg by mouth daily    fluticasone (FLONASE) 50 mcg/act nasal spray     Si spray into each nostril as needed for rhinitis    ofloxacin (OCUFLOX) 0 3 % ophthalmic solution     Si drop 4 (four) times a day    ketorolac (ACULAR) 0 4 % SOLN     Si drop 4 (four) times a day    albuterol (PROVENTIL HFA,VENTOLIN HFA) 90 mcg/act inhaler     Sig: Inhale 2 puffs as needed for wheezing    dextromethorphan 15 MG/5ML syrup     Sig: Take 10 mL by mouth 4 (four) times a day as needed for cough    Dextromethorphan-Guaifenesin (GUAIFENESIN-DM PO)     Sig: Take by mouth as needed    gabapentin (NEURONTIN) 300 mg capsule     Sig: Take 1 capsule (300 mg total) by mouth 3 (three) times a day for 30 days     Dispense:  90 capsule     Refill:  1       History of Present Illness:    Felix Ulrich is a 77 y o  male sent for consultation from Dr Airam Luevano regarding neck and radiating left arm pain consistent with cervical radiculitis  He has been experiencing symptoms for about 4 months without any inciting event or trauma and describes the pain as moderate to severe intensity  He states the pain is nearly constant and worse in the afternoon characterized as sharp, shooting, numbness, pins and needle sensation  He localizes the pain to the left side of his neck with radiation down the entire arm into the 1st 2 digits  He notes significant pain and left triceps region  Aggravating factors include bending, sitting, coughing, sneezing, and bowel movements  Alleviating factors include relaxation  He has participated appropriately with physical therapy but noted worsening of symptoms  This prompted a MRI  This demonstrates C5-6 left neural foraminal disc protrusion with moderate left neural foraminal narrowing  He has also tried heat or ice application without relief  He did have intramuscular steroids and NSAID trials without relief  He has not tried gabapentin or Lyrica  Currently using ibuprofen 200 mg 2 tablets every 6 hours with only mild improvement  I have personally reviewed and/or updated the patient's past medical history, past surgical history, family history, social history, current medications, allergies, and vital signs today  Review of Systems:    Review of Systems   All other systems reviewed and are negative        Patient Active Problem List   Diagnosis    Rotator cuff syndrome of right shoulder    Exogenous obesity    Combined hyperlipidemia    Benign essential hypertension    Chronic thoracic spine pain    History of gout    Chronic lumbar radiculopathy    Cervical radiculopathy       Past Medical History:   Diagnosis Date    Acute gouty arthritis last assessed 6/1/13     Anxiety     last assessed 7/11/14     Chronic thoracic spine pain     last assessed 5/13/16     Dysfunction of both eustachian tubes     last assessed 8/16/13    Erectile dysfunction of non-organic origin     last assessed 10/8/13     Family history reviewed with no changes     medical history     Gout     Hypertension     Sebaceous cyst     last assessed 12/16/13     Skin lesion     last assessed 1/2/14        Past Surgical History:   Procedure Laterality Date    APPENDECTOMY      12years old    BOWEL RESECTION      COLONOSCOPY      MOUTH SURGERY      WRIST SURGERY         Family History   Problem Relation Age of Onset    Diabetes Sister     Diabetes Family     Hypertension Family     No Known Problems Mother     No Known Problems Father        Social History     Occupational History    Not on file   Tobacco Use    Smoking status: Former Smoker     Packs/day: 0 50     Years: 20 00     Pack years: 10 00    Smokeless tobacco: Never Used    Tobacco comment: current some day smoker per allscript    Substance and Sexual Activity    Alcohol use: Yes     Comment: social    Drug use: No    Sexual activity: Not on file       Current Outpatient Medications on File Prior to Visit   Medication Sig    albuterol (PROVENTIL HFA,VENTOLIN HFA) 90 mcg/act inhaler Inhale 2 puffs as needed for wheezing    allopurinol (ZYLOPRIM) 300 mg tablet Take 300 mg by mouth daily    dextromethorphan 15 MG/5ML syrup Take 10 mL by mouth 4 (four) times a day as needed for cough    Dextromethorphan-Guaifenesin (GUAIFENESIN-DM PO) Take by mouth as needed    fluticasone (FLONASE) 50 mcg/act nasal spray 1 spray into each nostril as needed for rhinitis    ketorolac (ACULAR) 0 4 % SOLN 1 drop 4 (four) times a day    latanoprost (XALATAN) 0 005 % ophthalmic solution INSTILL 1 DROP INTO BOTH EYES IN THE EVENING    ofloxacin (OCUFLOX) 0 3 % ophthalmic solution 1 drop 4 (four) times a day    valsartan-hydrochlorothiazide (DIOVAN-HCT) 160-12 5 MG per tablet TAKE 1 TABLET ONCE DAILY   naproxen (EC NAPROSYN) 500 MG EC tablet Take 1 tablet (500 mg total) by mouth 2 (two) times a day with meals for 14 days    nystatin-triamcinolone (MYCOLOG-II) cream Apply topically 2 (two) times a day (Patient not taking: Reported on 2/26/2019)    TiZANidine (ZANAFLEX) 6 MG capsule Take 1 capsule (6 mg total) by mouth daily at bedtime as needed for muscle spasms (Patient not taking: Reported on 2/26/2019)    [DISCONTINUED] albuterol (PROVENTIL HFA) 90 mcg/act inhaler Inhale 2 puffs every 6 (six) hours as needed for wheezing (Patient not taking: Reported on 2/26/2019)    [DISCONTINUED] allopurinol (ZYLOPRIM) 300 mg tablet TAKE 1 TABLET BY MOUTH DAILY (Patient not taking: Reported on 2/26/2019)    [DISCONTINUED] fluticasone (FLONASE) 50 mcg/act nasal spray 1 spray into each nostril daily as needed for rhinitis     No current facility-administered medications on file prior to visit  Allergies   Allergen Reactions    Levaquin [Levofloxacin] Arthralgia       Physical Exam:    /77   Pulse 97   Ht 5' 10" (1 778 m)   Wt 123 kg (270 lb 9 6 oz)   BMI 38 83 kg/m²     CERVICAL  General: Well-developed, well-nourished individual in no acute distress  Mental: Appropriate mood and affect  Grossly oriented with coherent speech and thought processing   Neuro:  Cranial nerves: Cranial nerve function is grossly intact bilaterally   Strength: Bilateral upper extremity strength is normal and symmetric   No atrophy or tone abnormalities noted   Reflexes: Bilateral upper extremity muscle stretch reflexes are physiologic and symmetric   No Martin sign   Sensation: No loss of sensation is noted   Foraminal Compression Maneuvers:  Spurling sign is equivocal on the left        Gait:  Gait/gross motor: Gait is normal  Station is normal      Musculoskeletal:  Palpation: Inspection and palpation of the spine and extremities are unremarkable   Spine: Normal pain-free range of motion except for end range cervical extension which is limited and reproduces some of his pain  No gross axial skeletal deformities   Skin: Skin inspection grossly negative for erythema, breakdown, or concerning lesions in affected area   Lymph: No lymphadenopathy is appreciated in the involved extremity   Vessels: No lower extremity edema   Lungs: Breathing is comfortable and regular  No dyspnea noted during examination   Eyes: Visual field grossly intact to confrontation  No redness appreciated  ENT: No craniofacial deformities or asymmetry  No neck masses appreciated  Imaging  MRI CERVICAL SPINE WITHOUT CONTRAST     INDICATION: M54 12: Radiculopathy, cervical region      COMPARISON:  1/7/2019     TECHNIQUE:  Sagittal T1, sagittal T2, sagittal inversion recovery, axial T2, axial  2D merge     IMAGE QUALITY:  Diagnostic     FINDINGS:     ALIGNMENT:  There is nonspecific straightening of the cervical lordosis without subluxation      MARROW SIGNAL:  Scattered degenerative endplate changes  No focally suspicious marrow lesions  No bone marrow edema or compression abnormality      CERVICAL AND VISUALIZED THORACIC CORD:  Normal signal within the visualized cord      PREVERTEBRAL AND PARASPINAL SOFT TISSUES:  Scattered sphenoidal and bilateral maxillary sinus mucosal thickening is mild to moderate in severity, incompletely imaged      VISUALIZED POSTERIOR FOSSA:  The visualized posterior fossa demonstrates no abnormal signal      CERVICAL DISC SPACES:     C2-C3:  Normal      C3-C4:  There is a small left paracentral disc protrusion  No significant central canal or neural foraminal narrowing      C4-C5:  Normal      C5-C6:  There is a disc osteophyte complex with a superimposed left neural foraminal disc protrusion  Moderate left neural foraminal narrowing    Mild central canal and right neural foraminal narrowing      C6-C7: There is a disc osteophyte complex with a superimposed right neural foraminal disc protrusion  Moderate right neural foraminal narrowing  Mild central canal and left neural foraminal narrowing      C7-T1:  There is a diffuse disk bulge  No significant central canal or neural foraminal narrowing      UPPER THORACIC DISC SPACES:  Normal      IMPRESSION:     1  There is nonspecific straightening of the cervical lordosis without subluxation  2   Scattered spondylotic changes without cord compression or cord signal abnormality  3  Mild to moderate sphenoidal and bilateral maxillary sinus disease incompletely imaged

## 2019-02-26 NOTE — LETTER
2019     Zuhair Webb DO  [address]  [address] [facility]    Patient: Kamlesh Mahoney   YOB: 1950   Date of Visit: 2019       Dear Dr Arash Lorenzo:    Thank you for referring Oval Mixer to me for evaluation  Below are my notes for this consultation  If you have questions, please do not hesitate to call me  I look forward to following your patient along with you  Sincerely,        Lilliana Avalos DO        CC: No Recipients  Lilliana Avalos DO  2019  7:41 AM  Sign at close encounter  Assessment:  1  Cervical radiculopathy - Left        Plan:  1  Initiate gabapentin 300 mg q h s  And titrate to t i d  Dosing, titration calendar given to patient  2  We will schedule the patient for a left C7-T1 interlaminar epidural steroid injection to help with the inflammatory component of his pain  Complete risks and benefits including bleeding, infection, tissue reaction, allergic reaction were discussed  Verbal consent obtained  3   Continue with physical therapy home exercise program, he was experiencing worsening of symptoms with therapy visits  My impressions and treatment recommendations were discussed in detail with the patient who verbalized understanding and had no further questions  Discharge instructions were provided  I personally saw and examined the patient and I agree with the above discussed plan of care  Orders Placed This Encounter   Procedures    FL spine and pain procedure     Standing Status:   Future     Standing Expiration Date:   2020     Order Specific Question:   Reason for Exam:     Answer:   (L) COLLEEN     Order Specific Question:   Anticoagulant hold needed?      Answer:   no     New Medications Ordered This Visit   Medications    allopurinol (ZYLOPRIM) 300 mg tablet     Sig: Take 300 mg by mouth daily    fluticasone (FLONASE) 50 mcg/act nasal spray     Si spray into each nostril as needed for rhinitis    ofloxacin (OCUFLOX) 0 3 % ophthalmic solution     Si drop 4 (four) times a day    ketorolac (ACULAR) 0 4 % SOLN     Si drop 4 (four) times a day    albuterol (PROVENTIL HFA,VENTOLIN HFA) 90 mcg/act inhaler     Sig: Inhale 2 puffs as needed for wheezing    dextromethorphan 15 MG/5ML syrup     Sig: Take 10 mL by mouth 4 (four) times a day as needed for cough    Dextromethorphan-Guaifenesin (GUAIFENESIN-DM PO)     Sig: Take by mouth as needed    gabapentin (NEURONTIN) 300 mg capsule     Sig: Take 1 capsule (300 mg total) by mouth 3 (three) times a day for 30 days     Dispense:  90 capsule     Refill:  1       History of Present Illness:    David Perera is a 77 y o  male sent for consultation from Dr Nacho Robison regarding neck and radiating left arm pain consistent with cervical radiculitis  He has been experiencing symptoms for about 4 months without any inciting event or trauma and describes the pain as moderate to severe intensity  He states the pain is nearly constant and worse in the afternoon characterized as sharp, shooting, numbness, pins and needle sensation  He localizes the pain to the left side of his neck with radiation down the entire arm into the 1st 2 digits  He notes significant pain and left triceps region  Aggravating factors include bending, sitting, coughing, sneezing, and bowel movements  Alleviating factors include relaxation  He has participated appropriately with physical therapy but noted worsening of symptoms  This prompted a MRI  This demonstrates C5-6 left neural foraminal disc protrusion with moderate left neural foraminal narrowing  He has also tried heat or ice application without relief  He did have intramuscular steroids and NSAID trials without relief  He has not tried gabapentin or Lyrica  Currently using ibuprofen 200 mg 2 tablets every 6 hours with only mild improvement          I have personally reviewed and/or updated the patient's past medical history, past surgical history, family history, social history, current medications, allergies, and vital signs today  Review of Systems:    Review of Systems   All other systems reviewed and are negative        Patient Active Problem List   Diagnosis    Rotator cuff syndrome of right shoulder    Exogenous obesity    Combined hyperlipidemia    Benign essential hypertension    Chronic thoracic spine pain    History of gout    Chronic lumbar radiculopathy    Cervical radiculopathy       Past Medical History:   Diagnosis Date    Acute gouty arthritis     last assessed 6/1/13     Anxiety     last assessed 7/11/14     Chronic thoracic spine pain     last assessed 5/13/16     Dysfunction of both eustachian tubes     last assessed 8/16/13    Erectile dysfunction of non-organic origin     last assessed 10/8/13     Family history reviewed with no changes     medical history     Gout     Hypertension     Sebaceous cyst     last assessed 12/16/13     Skin lesion     last assessed 1/2/14        Past Surgical History:   Procedure Laterality Date    APPENDECTOMY      12years old    BOWEL RESECTION      COLONOSCOPY      MOUTH SURGERY      WRIST SURGERY         Family History   Problem Relation Age of Onset    Diabetes Sister     Diabetes Family     Hypertension Family     No Known Problems Mother     No Known Problems Father        Social History     Occupational History    Not on file   Tobacco Use    Smoking status: Former Smoker     Packs/day: 0 50     Years: 20 00     Pack years: 10 00    Smokeless tobacco: Never Used    Tobacco comment: current some day smoker per allscript    Substance and Sexual Activity    Alcohol use: Yes     Comment: social    Drug use: No    Sexual activity: Not on file       Current Outpatient Medications on File Prior to Visit   Medication Sig    albuterol (PROVENTIL HFA,VENTOLIN HFA) 90 mcg/act inhaler Inhale 2 puffs as needed for wheezing    allopurinol (ZYLOPRIM) 300 mg tablet Take 300 mg by mouth daily    dextromethorphan 15 MG/5ML syrup Take 10 mL by mouth 4 (four) times a day as needed for cough    Dextromethorphan-Guaifenesin (GUAIFENESIN-DM PO) Take by mouth as needed    fluticasone (FLONASE) 50 mcg/act nasal spray 1 spray into each nostril as needed for rhinitis    ketorolac (ACULAR) 0 4 % SOLN 1 drop 4 (four) times a day    latanoprost (XALATAN) 0 005 % ophthalmic solution INSTILL 1 DROP INTO BOTH EYES IN THE EVENING    ofloxacin (OCUFLOX) 0 3 % ophthalmic solution 1 drop 4 (four) times a day    valsartan-hydrochlorothiazide (DIOVAN-HCT) 160-12 5 MG per tablet TAKE 1 TABLET ONCE DAILY   naproxen (EC NAPROSYN) 500 MG EC tablet Take 1 tablet (500 mg total) by mouth 2 (two) times a day with meals for 14 days    nystatin-triamcinolone (MYCOLOG-II) cream Apply topically 2 (two) times a day (Patient not taking: Reported on 2/26/2019)    TiZANidine (ZANAFLEX) 6 MG capsule Take 1 capsule (6 mg total) by mouth daily at bedtime as needed for muscle spasms (Patient not taking: Reported on 2/26/2019)    [DISCONTINUED] albuterol (PROVENTIL HFA) 90 mcg/act inhaler Inhale 2 puffs every 6 (six) hours as needed for wheezing (Patient not taking: Reported on 2/26/2019)    [DISCONTINUED] allopurinol (ZYLOPRIM) 300 mg tablet TAKE 1 TABLET BY MOUTH DAILY (Patient not taking: Reported on 2/26/2019)    [DISCONTINUED] fluticasone (FLONASE) 50 mcg/act nasal spray 1 spray into each nostril daily as needed for rhinitis     No current facility-administered medications on file prior to visit  Allergies   Allergen Reactions    Levaquin [Levofloxacin] Arthralgia       Physical Exam:    /77   Pulse 97   Ht 5' 10" (1 778 m)   Wt 123 kg (270 lb 9 6 oz)   BMI 38 83 kg/m²      CERVICAL  General: Well-developed, well-nourished individual in no acute distress  Mental: Appropriate mood and affect  Grossly oriented with coherent speech and thought processing       Neuro:  Cranial nerves: Cranial nerve function is grossly intact bilaterally   Strength: Bilateral upper extremity strength is normal and symmetric   No atrophy or tone abnormalities noted   Reflexes: Bilateral upper extremity muscle stretch reflexes are physiologic and symmetric   No Martin sign   Sensation: No loss of sensation is noted   Foraminal Compression Maneuvers:  Spurling sign is equivocal on the left    Gait:  Gait/gross motor: Gait is normal  Station is normal      Musculoskeletal:  Palpation: Inspection and palpation of the spine and extremities are unremarkable   Spine: Normal pain-free range of motion except for end range cervical extension which is limited and reproduces some of his pain  No gross axial skeletal deformities   Skin: Skin inspection grossly negative for erythema, breakdown, or concerning lesions in affected area   Lymph: No lymphadenopathy is appreciated in the involved extremity   Vessels: No lower extremity edema   Lungs: Breathing is comfortable and regular  No dyspnea noted during examination   Eyes: Visual field grossly intact to confrontation  No redness appreciated  ENT: No craniofacial deformities or asymmetry  No neck masses appreciated  Imaging  MRI CERVICAL SPINE WITHOUT CONTRAST     INDICATION: M54 12: Radiculopathy, cervical region      COMPARISON:  1/7/2019     TECHNIQUE:  Sagittal T1, sagittal T2, sagittal inversion recovery, axial T2, axial  2D merge     IMAGE QUALITY:  Diagnostic     FINDINGS:     ALIGNMENT:  There is nonspecific straightening of the cervical lordosis without subluxation      MARROW SIGNAL:  Scattered degenerative endplate changes  No focally suspicious marrow lesions    No bone marrow edema or compression abnormality      CERVICAL AND VISUALIZED THORACIC CORD:  Normal signal within the visualized cord      PREVERTEBRAL AND PARASPINAL SOFT TISSUES:  Scattered sphenoidal and bilateral maxillary sinus mucosal thickening is mild to moderate in severity, incompletely imaged      VISUALIZED POSTERIOR FOSSA:  The visualized posterior fossa demonstrates no abnormal signal      CERVICAL DISC SPACES:     C2-C3:  Normal      C3-C4:  There is a small left paracentral disc protrusion  No significant central canal or neural foraminal narrowing      C4-C5:  Normal      C5-C6:  There is a disc osteophyte complex with a superimposed left neural foraminal disc protrusion  Moderate left neural foraminal narrowing  Mild central canal and right neural foraminal narrowing      C6-C7:  There is a disc osteophyte complex with a superimposed right neural foraminal disc protrusion  Moderate right neural foraminal narrowing  Mild central canal and left neural foraminal narrowing      C7-T1:  There is a diffuse disk bulge  No significant central canal or neural foraminal narrowing      UPPER THORACIC DISC SPACES:  Normal      IMPRESSION:     1  There is nonspecific straightening of the cervical lordosis without subluxation  2   Scattered spondylotic changes without cord compression or cord signal abnormality  3  Mild to moderate sphenoidal and bilateral maxillary sinus disease incompletely imaged

## 2019-02-26 NOTE — TELEPHONE ENCOUNTER
Patient  [phone]  Dr Chito Vega    Patient is requesting a call back asap  He is having eye sx tomorrow 2/27/19  Dr Chito Vega told him to take gabapentin (NEURONTIN) 300 mg capsule  He thinks that it may affect something's  He thinks that he should hold off on taking this medication due to him reading the side effects

## 2019-02-26 NOTE — TELEPHONE ENCOUNTER
RN s/w pt regarding previous  Pt was nervous about starting gabapentin prior to having eye surgery tomorrow after reading the side effects  RN advised that he can wait to start the gabapentin if that would make him feel better, and he can discuss it with his eye surgeon as well  Then pt stated, that he thinks he would like to hold off on the gabapentin all together  Per pt the pain he has is not constant and he can wait until he has the epidural steroid injection to help with the pain that he does have  RN advised that it does take up to two weeks before the pt may notice a difference in his pain with the gabapentin, and that Erin Ybarra would not want him to take something that he feels that he does not need  RN advised that if the pt does change his mind that he should call us back to discuss same  RN advised that she would send this to Erin Ybarra and c/b with any further suggestions or recs if needed  Pt appreciative of same  --please advise thank you-  Agree with RN advice?

## 2019-03-21 ENCOUNTER — OFFICE VISIT (OUTPATIENT)
Dept: FAMILY MEDICINE CLINIC | Facility: CLINIC | Age: 69
End: 2019-03-21
Payer: COMMERCIAL

## 2019-03-21 VITALS
DIASTOLIC BLOOD PRESSURE: 80 MMHG | BODY MASS INDEX: 38.27 KG/M2 | HEIGHT: 70 IN | OXYGEN SATURATION: 98 % | RESPIRATION RATE: 18 BRPM | WEIGHT: 267.3 LBS | TEMPERATURE: 98.2 F | SYSTOLIC BLOOD PRESSURE: 128 MMHG | HEART RATE: 88 BPM

## 2019-03-21 DIAGNOSIS — L30.9 ECZEMA, UNSPECIFIED TYPE: ICD-10-CM

## 2019-03-21 DIAGNOSIS — E78.2 COMBINED HYPERLIPIDEMIA: ICD-10-CM

## 2019-03-21 DIAGNOSIS — Z87.39 HISTORY OF GOUT: ICD-10-CM

## 2019-03-21 DIAGNOSIS — M72.2 PLANTAR FASCIITIS OF RIGHT FOOT: Primary | ICD-10-CM

## 2019-03-21 DIAGNOSIS — R73.9 ELEVATED BLOOD SUGAR: ICD-10-CM

## 2019-03-21 DIAGNOSIS — E03.8 SUBCLINICAL HYPOTHYROIDISM: ICD-10-CM

## 2019-03-21 LAB — SL AMB POCT HEMOGLOBIN AIC: 6.2 (ref ?–6.5)

## 2019-03-21 PROCEDURE — 1036F TOBACCO NON-USER: CPT | Performed by: FAMILY MEDICINE

## 2019-03-21 PROCEDURE — 99214 OFFICE O/P EST MOD 30 MIN: CPT | Performed by: FAMILY MEDICINE

## 2019-03-21 PROCEDURE — 3008F BODY MASS INDEX DOCD: CPT | Performed by: FAMILY MEDICINE

## 2019-03-21 PROCEDURE — 83036 HEMOGLOBIN GLYCOSYLATED A1C: CPT | Performed by: FAMILY MEDICINE

## 2019-03-21 PROCEDURE — 1160F RVW MEDS BY RX/DR IN RCRD: CPT | Performed by: FAMILY MEDICINE

## 2019-03-21 NOTE — ASSESSMENT & PLAN NOTE
Cholesterol from December 20th was 241/160  Will repeat after April 20th    In start statin if needed

## 2019-03-21 NOTE — PROGRESS NOTES
[facility]      NAME: Huber Payne  AGE: 77 y o  SEX: male  : 1950   MRN: [id number]    DATE: 3/21/2019  TIME: 9:57 AM    Assessment and Plan     Problem List Items Addressed This Visit     Combined hyperlipidemia     Cholesterol from  was 241/160  Will repeat after   In start statin if needed         Relevant Orders    Lipid Panel with Direct LDL reflex    History of gout     Doing well on allopurinol 300 mg daily  Will repeat uric acid level next month         Relevant Orders    Uric acid    Plantar fasciitis of right foot - Primary     Most likely plantar fasciitis right heel  Will sent for x-rays  Possible referral to physical therapy         Relevant Orders    XR foot 3+ vw right    Subclinical hypothyroidism     TSH from  was 5 08  Will repeat labs in near future         Relevant Orders    TSH, 3rd generation with Free T4 reflex    Elevated blood sugar     Rapid A1c 6 2% today  Discussed reduced carb diet and exercise  Will continue to monitor         Relevant Orders    Comprehensive metabolic panel    Eczema     Most likely due to dry weather  Encourage patient to use moisturizing cream or lotion                   Return to office in:  Will call with x-ray results    Fasting blood work at [facility] in 1 month followed by appointment    Chief Complaint     Chief Complaint   Patient presents with    Foot Pain     R heel x1 month     Itching     wide spread        History of Present Illness     Patient presents with right heel pain for the past few weeks  Pain is worse 1st thing in the morning and eases up as the day goes on  He also would like to get checked for diabetes    He is concerned because he has been having itchy skin      The following portions of the patient's history were reviewed and updated as appropriate: allergies, current medications, past family history, past medical history, past social history, past surgical history and problem list     Review of Systems   Review of Systems   Respiratory: Negative  Cardiovascular: Negative  Gastrointestinal: Negative  Genitourinary: Negative  Musculoskeletal:        Right heel pain   Skin: Positive for rash  Active Problem List     Patient Active Problem List   Diagnosis    Rotator cuff syndrome of right shoulder    Exogenous obesity    Combined hyperlipidemia    Benign essential hypertension    Chronic thoracic spine pain    History of gout    Chronic lumbar radiculopathy    Cervical radiculopathy    Plantar fasciitis of right foot    Subclinical hypothyroidism    Elevated blood sugar    Eczema       Objective   /80 (BP Location: Left arm, Patient Position: Sitting, Cuff Size: Adult)   Pulse 88   Temp 98 2 °F (36 8 °C) (Tympanic)   Resp 18   Ht 5' 10" (1 778 m)   Wt 121 kg (267 lb 4 8 oz)   SpO2 98%   BMI 38 35 kg/m²     Physical Exam   Cardiovascular: Normal rate, regular rhythm, normal heart sounds and intact distal pulses  Carotids: no bruits  Ext: no edema   Pulmonary/Chest: Effort normal  No respiratory distress  He has no wheezes  He has no rales  Musculoskeletal:   Point tenderness right plantar fascia   Psychiatric: He has a normal mood and affect   His behavior is normal  Thought content normal        Pertinent Laboratory/Diagnostic Studies:  Lab results from December 20th    Current Medications     Current Outpatient Medications:     albuterol (PROVENTIL HFA,VENTOLIN HFA) 90 mcg/act inhaler, Inhale 2 puffs as needed for wheezing, Disp: , Rfl:     allopurinol (ZYLOPRIM) 300 mg tablet, Take 300 mg by mouth daily, Disp: , Rfl:     aspirin 81 MG tablet, , Disp: , Rfl:     fluticasone (FLONASE) 50 mcg/act nasal spray, 1 spray into each nostril as needed for rhinitis, Disp: , Rfl:     ketorolac (ACULAR) 0 4 % SOLN, 1 drop 4 (four) times a day, Disp: , Rfl:     latanoprost (XALATAN) 0 005 % ophthalmic solution, INSTILL 1 DROP INTO BOTH EYES IN THE EVENING, Disp: , Rfl: 6    nystatin-triamcinolone (MYCOLOG-II) cream, Apply topically 2 (two) times a day, Disp: 60 g, Rfl: 1    ofloxacin (OCUFLOX) 0 3 % ophthalmic solution, 1 drop 4 (four) times a day, Disp: , Rfl:     TiZANidine (ZANAFLEX) 6 MG capsule, Take 1 capsule (6 mg total) by mouth daily at bedtime as needed for muscle spasms, Disp: 30 capsule, Rfl: 0    valsartan-hydrochlorothiazide (DIOVAN-HCT) 160-12 5 MG per tablet, TAKE 1 TABLET ONCE DAILY  , Disp: 90 tablet, Rfl: 1    dextromethorphan 15 MG/5ML syrup, Take 10 mL by mouth 4 (four) times a day as needed for cough, Disp: , Rfl:     Dextromethorphan-Guaifenesin (GUAIFENESIN-DM PO), Take by mouth as needed, Disp: , Rfl:     gabapentin (NEURONTIN) 300 mg capsule, Take 1 capsule (300 mg total) by mouth 3 (three) times a day for 30 days, Disp: 90 capsule, Rfl: 1    naproxen (EC NAPROSYN) 500 MG EC tablet, Take 1 tablet (500 mg total) by mouth 2 (two) times a day with meals for 14 days, Disp: 28 tablet, Rfl: 0    Health Maintenance     Health Maintenance   Topic Date Due    Hepatitis C Screening  1950    Medicare Annual Wellness Visit (AWV)  1950    BMI: Followup Plan  01/11/1969    DTaP,Tdap,and Td Vaccines (1 - Tdap) 01/11/1972    Pneumococcal PPSV23/PCV13 65+ Years / Low and Medium Risk (1 of 2 - PCV13) 01/11/2016    PT PLAN OF CARE  01/23/2019    Fall Risk  07/12/2019    Depression Screening PHQ  07/12/2019    BMI: Adult  02/26/2020    CRC Screening: Colonoscopy  02/14/2028    INFLUENZA VACCINE  Completed    HEPATITIS B VACCINES  Aged Out     Immunization History   Administered Date(s) Administered    INFLUENZA 11/01/2015, 11/02/2017, 09/23/2018    Influenza Quadrivalent Preservative Free 3 years and older IM 11/02/2017    Influenza Quadrivalent, 6-35 Months IM 11/01/2015       Shaquille Desai DO  [address] [place] [address]

## 2019-03-21 NOTE — ASSESSMENT & PLAN NOTE
Most likely plantar fasciitis right heel  Will sent for x-rays    Possible referral to physical therapy

## 2019-03-25 ENCOUNTER — HOSPITAL ENCOUNTER (OUTPATIENT)
Dept: RADIOLOGY | Facility: MEDICAL CENTER | Age: 69
Discharge: HOME | End: 2019-03-25
Attending: PHYSICAL MEDICINE & REHABILITATION | Admitting: PHYSICAL MEDICINE & REHABILITATION
Payer: COMMERCIAL

## 2019-03-25 VITALS
TEMPERATURE: 97.7 F | RESPIRATION RATE: 20 BRPM | OXYGEN SATURATION: 97 % | HEART RATE: 78 BPM | DIASTOLIC BLOOD PRESSURE: 82 MMHG | SYSTOLIC BLOOD PRESSURE: 132 MMHG

## 2019-03-25 DIAGNOSIS — M54.12 CERVICAL RADICULOPATHY: ICD-10-CM

## 2019-03-25 PROCEDURE — 62321 NJX INTERLAMINAR CRV/THRC: CPT | Performed by: PHYSICAL MEDICINE & REHABILITATION

## 2019-03-25 RX ORDER — PAPAVERINE HCL 150 MG
10 CAPSULE, EXTENDED RELEASE ORAL ONCE
Status: COMPLETED | OUTPATIENT
Start: 2019-03-25 | End: 2019-03-25

## 2019-03-25 RX ORDER — LIDOCAINE HYDROCHLORIDE 10 MG/ML
5 INJECTION, SOLUTION EPIDURAL; INFILTRATION; INTRACAUDAL; PERINEURAL ONCE
Status: COMPLETED | OUTPATIENT
Start: 2019-03-25 | End: 2019-03-25

## 2019-03-25 RX ADMIN — Medication 10 MG: at 09:49

## 2019-03-25 RX ADMIN — IOHEXOL 1 ML: 300 INJECTION, SOLUTION INTRAVENOUS at 09:49

## 2019-03-25 RX ADMIN — LIDOCAINE HYDROCHLORIDE 2 ML: 10 INJECTION, SOLUTION EPIDURAL; INFILTRATION; INTRACAUDAL; PERINEURAL at 09:47

## 2019-03-25 NOTE — DISCHARGE INSTRUCTIONS
Epidural Steroid Injection   WHAT YOU NEED TO KNOW:   An epidural steroid injection (ASIF) is a procedure to inject steroid medicine into the epidural space  The epidural space is between your spinal cord and vertebrae  Steroids reduce inflammation and fluid buildup in your spine that may be causing pain  You may be given pain medicine along with the steroids  ACTIVITY  · Do not drive or operate machinery today  · No strenuous activity today - bending, lifting, etc   · You may resume normal activites starting tomorrow - start slowly and as tolerated  · You may shower today, but no tub baths or hot tubs  · You may have numbness for several hours from the local anesthetic  Please use caution and common sense, especially with weight-bearing activities  CARE OF THE INJECTION SITE  · If you have soreness or pain, apply ice to the area today (20 minutes on/20 minutes off)  · Starting tomorrow, you may use warm, moist heat or ice if needed  · You may have an increase or change in your discomfort for 36-48 hours after your treatment  · Apply ice and continue with any pain medication you have been prescribed  · [facility]ify [facility] if you have any of the following: redness, drainage, swelling, headache, stiff neck or fever above 100°F     SPECIAL INSTRUCTIONS  · Our office will contact you in approximately 7 days for a progress report  MEDICATIONS  · Continue to take all routine medications  You may restart your aspirin and naproxyn Theron Watts) tomorrow 3/26  · Our office may have instructed you to hold some medications  If you have a problem specifically related to your procedure, please call our office at [phone]  Problems not related to your procedure should be directed to your primary care physician

## 2019-03-25 NOTE — H&P
History of Present Illness:  The patient is a 77 y o  male who presents with complaints of neck and arm pain    Patient Active Problem List   Diagnosis    Rotator cuff syndrome of right shoulder    Exogenous obesity    Combined hyperlipidemia    Benign essential hypertension    Chronic thoracic spine pain    History of gout    Chronic lumbar radiculopathy    Cervical radiculopathy    Plantar fasciitis of right foot    Subclinical hypothyroidism    Elevated blood sugar    Eczema       Past Medical History:   Diagnosis Date    Acute gouty arthritis     last assessed 6/1/13     Anxiety     last assessed 7/11/14     Chronic thoracic spine pain     last assessed 5/13/16     Dysfunction of both eustachian tubes     last assessed 8/16/13    Erectile dysfunction of non-organic origin     last assessed 10/8/13     Family history reviewed with no changes     medical history     Gout     Hypertension     Sebaceous cyst     last assessed 12/16/13     Skin lesion     last assessed 1/2/14        Past Surgical History:   Procedure Laterality Date    APPENDECTOMY      12years old    BOWEL RESECTION      COLONOSCOPY      MOUTH SURGERY      WRIST SURGERY           Current Outpatient Medications:     allopurinol (ZYLOPRIM) 300 mg tablet, Take 300 mg by mouth daily, Disp: , Rfl:     aspirin 81 MG tablet, , Disp: , Rfl:     gabapentin (NEURONTIN) 300 mg capsule, Take 1 capsule (300 mg total) by mouth 3 (three) times a day for 30 days, Disp: 90 capsule, Rfl: 1    ketorolac (ACULAR) 0 4 % SOLN, 1 drop 4 (four) times a day, Disp: , Rfl:     latanoprost (XALATAN) 0 005 % ophthalmic solution, INSTILL 1 DROP INTO BOTH EYES IN THE EVENING, Disp: , Rfl: 6    nystatin-triamcinolone (MYCOLOG-II) cream, Apply topically 2 (two) times a day, Disp: 60 g, Rfl: 1    ofloxacin (OCUFLOX) 0 3 % ophthalmic solution, 1 drop 4 (four) times a day, Disp: , Rfl:     valsartan-hydrochlorothiazide (DIOVAN-HCT) 160-12 5 MG per tablet, TAKE 1 TABLET ONCE DAILY  , Disp: 90 tablet, Rfl: 1    naproxen (EC NAPROSYN) 500 MG EC tablet, Take 1 tablet (500 mg total) by mouth 2 (two) times a day with meals for 14 days, Disp: 28 tablet, Rfl: 0    Current Facility-Administered Medications:     dexamethasone (PF) (DECADRON) injection 10 mg, 10 mg, Epidural, Once, Bonnita Pass, DO    iohexol (OMNIPAQUE) 300 mg/mL injection 50 mL, 50 mL, Epidural, Once, Bonnita Pass, DO    lidocaine (PF) (XYLOCAINE-MPF) 1 % injection 5 mL, 5 mL, Infiltration, Once, Bonnita Pass, DO    Allergies   Allergen Reactions    Levaquin [Levofloxacin] Arthralgia       Physical Exam:   Vitals:    03/25/19 0935   BP: 147/88   Pulse: 74   Resp: 20   Temp: 97 7 °F (36 5 °C)   SpO2: 97%     General: Awake, Alert, Oriented x 3, Mood and affect appropriate  Respiratory: Respirations even and unlabored  Cardiovascular: Peripheral pulses intact; no edema  Musculoskeletal Exam:  Neck and arm pain    ASA Score:  3  Patient/Chart Verification  Patient ID Verified: Verbal  ID Band Applied: No  Consents Confirmed: Procedural  H&P( within 30 days) Verified: To be obtained in the Pre-Procedure area  Interval H&P(within 24 hr) Complete (required for Outpatients and Surgery Admit only): To be obtained in the Pre-Procedure area  Allergies Reviewed: Yes  Anticoag/NSAID held?: Yes(3/18LD of ASA and 2/20 LD of Naproxyn)  Currently on antibiotics?: No    Assessment:   1   Cervical radiculopathy - Left        Plan: (L) COLLEEN

## 2019-03-26 NOTE — PROGRESS NOTES
PT Discharge    Patient is D/C from PT 2* further testing and other treatments for pain control  Patient will continue HEP

## 2019-03-27 DIAGNOSIS — R73.9 ELEVATED BLOOD SUGAR: ICD-10-CM

## 2019-03-27 DIAGNOSIS — Z87.39 HISTORY OF GOUT: ICD-10-CM

## 2019-03-27 DIAGNOSIS — E03.8 SUBCLINICAL HYPOTHYROIDISM: ICD-10-CM

## 2019-03-27 DIAGNOSIS — E78.2 COMBINED HYPERLIPIDEMIA: ICD-10-CM

## 2019-04-01 ENCOUNTER — TELEPHONE (OUTPATIENT)
Dept: PAIN MEDICINE | Facility: CLINIC | Age: 69
End: 2019-04-01

## 2019-04-02 ENCOUNTER — TELEPHONE (OUTPATIENT)
Dept: FAMILY MEDICINE CLINIC | Facility: CLINIC | Age: 69
End: 2019-04-02

## 2019-04-20 DIAGNOSIS — M54.12 CERVICAL RADICULOPATHY: ICD-10-CM

## 2019-04-23 RX ORDER — GABAPENTIN 300 MG/1
300 CAPSULE ORAL 3 TIMES DAILY
Qty: 90 CAPSULE | Refills: 0 | Status: SHIPPED | OUTPATIENT
Start: 2019-04-23 | End: 2019-05-10

## 2019-04-26 ENCOUNTER — OFFICE VISIT (OUTPATIENT)
Dept: PAIN MEDICINE | Facility: MEDICAL CENTER | Age: 69
End: 2019-04-26
Payer: COMMERCIAL

## 2019-04-26 VITALS
BODY MASS INDEX: 38.94 KG/M2 | WEIGHT: 272 LBS | SYSTOLIC BLOOD PRESSURE: 151 MMHG | HEART RATE: 80 BPM | HEIGHT: 70 IN | DIASTOLIC BLOOD PRESSURE: 93 MMHG

## 2019-04-26 DIAGNOSIS — M54.12 CERVICAL RADICULOPATHY: Primary | ICD-10-CM

## 2019-04-26 PROCEDURE — 99213 OFFICE O/P EST LOW 20 MIN: CPT | Performed by: NURSE PRACTITIONER

## 2019-04-26 RX ORDER — METHYLPREDNISOLONE 4 MG/1
TABLET ORAL
Qty: 1 EACH | Refills: 0 | Status: SHIPPED | OUTPATIENT
Start: 2019-04-26 | End: 2019-05-10

## 2019-05-07 ENCOUNTER — APPOINTMENT (OUTPATIENT)
Dept: RADIOLOGY | Facility: MEDICAL CENTER | Age: 69
End: 2019-05-07
Payer: COMMERCIAL

## 2019-05-07 DIAGNOSIS — M72.2 PLANTAR FASCIITIS OF RIGHT FOOT: ICD-10-CM

## 2019-05-07 PROCEDURE — 73630 X-RAY EXAM OF FOOT: CPT

## 2019-05-09 DIAGNOSIS — M79.671 RIGHT FOOT PAIN: Primary | ICD-10-CM

## 2019-05-10 ENCOUNTER — DOCUMENTATION (OUTPATIENT)
Dept: FAMILY MEDICINE CLINIC | Facility: CLINIC | Age: 69
End: 2019-05-10

## 2019-05-10 ENCOUNTER — OFFICE VISIT (OUTPATIENT)
Dept: FAMILY MEDICINE CLINIC | Facility: CLINIC | Age: 69
End: 2019-05-10
Payer: COMMERCIAL

## 2019-05-10 VITALS
RESPIRATION RATE: 17 BRPM | SYSTOLIC BLOOD PRESSURE: 134 MMHG | HEIGHT: 71 IN | OXYGEN SATURATION: 96 % | DIASTOLIC BLOOD PRESSURE: 76 MMHG | HEART RATE: 90 BPM | WEIGHT: 265 LBS | TEMPERATURE: 96.8 F | BODY MASS INDEX: 37.1 KG/M2

## 2019-05-10 DIAGNOSIS — M72.2 PLANTAR FASCIITIS OF RIGHT FOOT: ICD-10-CM

## 2019-05-10 DIAGNOSIS — R53.83 FATIGUE, UNSPECIFIED TYPE: ICD-10-CM

## 2019-05-10 DIAGNOSIS — E78.2 COMBINED HYPERLIPIDEMIA: Primary | ICD-10-CM

## 2019-05-10 DIAGNOSIS — E03.8 SUBCLINICAL HYPOTHYROIDISM: ICD-10-CM

## 2019-05-10 DIAGNOSIS — R06.83 SNORING: ICD-10-CM

## 2019-05-10 DIAGNOSIS — Z12.5 SCREENING FOR PROSTATE CANCER: ICD-10-CM

## 2019-05-10 DIAGNOSIS — Z87.39 HISTORY OF GOUT: ICD-10-CM

## 2019-05-10 DIAGNOSIS — R73.9 ELEVATED BLOOD SUGAR: ICD-10-CM

## 2019-05-10 PROCEDURE — 99214 OFFICE O/P EST MOD 30 MIN: CPT | Performed by: FAMILY MEDICINE

## 2019-05-14 ENCOUNTER — TELEPHONE (OUTPATIENT)
Dept: SLEEP CENTER | Facility: CLINIC | Age: 69
End: 2019-05-14

## 2019-05-20 ENCOUNTER — OFFICE VISIT (OUTPATIENT)
Dept: FAMILY MEDICINE CLINIC | Facility: CLINIC | Age: 69
End: 2019-05-20
Payer: COMMERCIAL

## 2019-05-20 VITALS
WEIGHT: 273.8 LBS | BODY MASS INDEX: 38.33 KG/M2 | RESPIRATION RATE: 18 BRPM | OXYGEN SATURATION: 97 % | HEIGHT: 71 IN | HEART RATE: 85 BPM | DIASTOLIC BLOOD PRESSURE: 90 MMHG | SYSTOLIC BLOOD PRESSURE: 150 MMHG | TEMPERATURE: 97.5 F

## 2019-05-20 DIAGNOSIS — M76.62 ACHILLES TENDINITIS OF LEFT LOWER EXTREMITY: Primary | ICD-10-CM
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
## 2019-05-20 PROCEDURE — 99214 OFFICE O/P EST MOD 30 MIN: CPT | Performed by: FAMILY MEDICINE

## 2019-06-13 ENCOUNTER — HOSPITAL ENCOUNTER (OUTPATIENT)
Dept: SLEEP CENTER | Facility: CLINIC | Age: 69
Discharge: HOME | End: 2019-06-13
Payer: COMMERCIAL

## 2019-06-13 ENCOUNTER — OFFICE VISIT (OUTPATIENT)
Dept: FAMILY MEDICINE CLINIC | Facility: CLINIC | Age: 69
End: 2019-06-13
Payer: COMMERCIAL

## 2019-06-13 VITALS
SYSTOLIC BLOOD PRESSURE: 138 MMHG | BODY MASS INDEX: 37.41 KG/M2 | RESPIRATION RATE: 17 BRPM | HEART RATE: 87 BPM | TEMPERATURE: 96.5 F | OXYGEN SATURATION: 98 % | HEIGHT: 71 IN | WEIGHT: 267.2 LBS | DIASTOLIC BLOOD PRESSURE: 80 MMHG

## 2019-06-13 DIAGNOSIS — M76.62 ACHILLES TENDINITIS OF LEFT LOWER EXTREMITY: Primary | ICD-10-CM

## 2019-06-13 DIAGNOSIS — R53.83 FATIGUE, UNSPECIFIED TYPE: ICD-10-CM

## 2019-06-13 DIAGNOSIS — R06.83 SNORING: ICD-10-CM

## 2019-06-13 PROCEDURE — G0399 HOME SLEEP TEST/TYPE 3 PORTA: HCPCS

## 2019-06-13 PROCEDURE — 99213 OFFICE O/P EST LOW 20 MIN: CPT | Performed by: FAMILY MEDICINE

## 2019-06-17 ENCOUNTER — TELEPHONE (OUTPATIENT)
Dept: SLEEP CENTER | Facility: CLINIC | Age: 69
End: 2019-06-17

## 2019-06-24 ENCOUNTER — TELEPHONE (OUTPATIENT)
Dept: FAMILY MEDICINE CLINIC | Facility: CLINIC | Age: 69
End: 2019-06-24

## 2019-06-24 ENCOUNTER — TELEPHONE (OUTPATIENT)
Dept: PAIN MEDICINE | Facility: MEDICAL CENTER | Age: 69
End: 2019-06-24

## 2019-06-24 DIAGNOSIS — M54.12 CERVICAL RADICULOPATHY: Primary | ICD-10-CM

## 2019-06-24 DIAGNOSIS — M54.2 NECK PAIN: Primary | ICD-10-CM

## 2019-06-24 RX ORDER — METHYLPREDNISOLONE 4 MG/1
TABLET ORAL
Qty: 1 EACH | Refills: 0 | Status: SHIPPED | OUTPATIENT
Start: 2019-06-24 | End: 2019-07-15 | Stop reason: ALTCHOICE

## 2019-06-27 ENCOUNTER — TELEPHONE (OUTPATIENT)
Dept: FAMILY MEDICINE CLINIC | Facility: CLINIC | Age: 69
End: 2019-06-27

## 2019-06-28 ENCOUNTER — OFFICE VISIT (OUTPATIENT)
Dept: SLEEP CENTER | Facility: CLINIC | Age: 69
End: 2019-06-28
Payer: COMMERCIAL

## 2019-06-28 VITALS
HEIGHT: 71 IN | DIASTOLIC BLOOD PRESSURE: 74 MMHG | SYSTOLIC BLOOD PRESSURE: 122 MMHG | WEIGHT: 266 LBS | BODY MASS INDEX: 37.24 KG/M2 | HEART RATE: 80 BPM

## 2019-06-28 DIAGNOSIS — I10 BENIGN ESSENTIAL HYPERTENSION: ICD-10-CM

## 2019-06-28 DIAGNOSIS — R53.83 OTHER FATIGUE: ICD-10-CM

## 2019-06-28 DIAGNOSIS — G47.00 INSOMNIA, UNSPECIFIED TYPE: ICD-10-CM

## 2019-06-28 DIAGNOSIS — G47.33 OBSTRUCTIVE SLEEP APNEA: Primary | ICD-10-CM

## 2019-06-28 DIAGNOSIS — M54.12 CERVICAL RADICULOPATHY: ICD-10-CM

## 2019-06-28 DIAGNOSIS — G47.19 EXCESSIVE DAYTIME SLEEPINESS: ICD-10-CM

## 2019-06-28 PROCEDURE — 99204 OFFICE O/P NEW MOD 45 MIN: CPT | Performed by: INTERNAL MEDICINE

## 2019-06-28 RX ORDER — DULOXETIN HYDROCHLORIDE 30 MG/1
30 CAPSULE, DELAYED RELEASE ORAL DAILY
Qty: 30 CAPSULE | Refills: 1 | Status: SHIPPED | OUTPATIENT
Start: 2019-06-28 | End: 2019-07-30

## 2019-07-15 ENCOUNTER — HOSPITAL ENCOUNTER (OUTPATIENT)
Dept: RADIOLOGY | Facility: MEDICAL CENTER | Age: 69
Discharge: HOME | End: 2019-07-15
Admitting: PHYSICAL MEDICINE & REHABILITATION
Payer: COMMERCIAL

## 2019-07-15 VITALS
RESPIRATION RATE: 20 BRPM | DIASTOLIC BLOOD PRESSURE: 88 MMHG | TEMPERATURE: 98.3 F | SYSTOLIC BLOOD PRESSURE: 146 MMHG | HEART RATE: 76 BPM | OXYGEN SATURATION: 96 %

## 2019-07-15 DIAGNOSIS — M54.12 CERVICAL RADICULITIS: ICD-10-CM

## 2019-07-15 PROCEDURE — 62321 NJX INTERLAMINAR CRV/THRC: CPT | Performed by: PHYSICAL MEDICINE & REHABILITATION

## 2019-07-15 RX ORDER — LIDOCAINE HYDROCHLORIDE 10 MG/ML
5 INJECTION, SOLUTION EPIDURAL; INFILTRATION; INTRACAUDAL; PERINEURAL ONCE
Status: COMPLETED | OUTPATIENT
Start: 2019-07-15 | End: 2019-07-15

## 2019-07-15 RX ORDER — METHYLPREDNISOLONE ACETATE 80 MG/ML
80 INJECTION, SUSPENSION INTRA-ARTICULAR; INTRALESIONAL; INTRAMUSCULAR; PARENTERAL; SOFT TISSUE ONCE
Status: COMPLETED | OUTPATIENT
Start: 2019-07-15 | End: 2019-07-15

## 2019-07-15 RX ADMIN — LIDOCAINE HYDROCHLORIDE 2 ML: 10 INJECTION, SOLUTION EPIDURAL; INFILTRATION; INTRACAUDAL; PERINEURAL at 15:40

## 2019-07-15 RX ADMIN — METHYLPREDNISOLONE ACETATE 80 MG: 80 INJECTION, SUSPENSION INTRA-ARTICULAR; INTRALESIONAL; INTRAMUSCULAR; PARENTERAL; SOFT TISSUE at 15:42

## 2019-07-15 RX ADMIN — IOHEXOL 1 ML: 300 INJECTION, SOLUTION INTRAVENOUS at 15:42

## 2019-07-15 NOTE — H&P
History of Present Illness: The patient is a 77 y o  male who presents with complaints of left neck and arm pain    Patient Active Problem List   Diagnosis    Rotator cuff syndrome of right shoulder    Exogenous obesity    Combined hyperlipidemia    Benign essential hypertension    Chronic thoracic spine pain    History of gout    Chronic lumbar radiculopathy    Cervical radiculopathy    Plantar fasciitis of right foot    Subclinical hypothyroidism    Elevated blood sugar    Eczema    Fatigue    Obstructive sleep apnea    Snoring       Past Medical History:   Diagnosis Date    Acute gouty arthritis     last assessed 6/1/13     Anxiety     last assessed 7/11/14     Chronic thoracic spine pain     last assessed 5/13/16     Dysfunction of both eustachian tubes     last assessed 8/16/13    Erectile dysfunction of non-organic origin     last assessed 10/8/13     Family history reviewed with no changes     medical history     Gout     Hypertension     Sebaceous cyst     last assessed 12/16/13     Skin lesion     last assessed 1/2/14        Past Surgical History:   Procedure Laterality Date    APPENDECTOMY      12years old    BOWEL RESECTION      COLONOSCOPY      MOUTH SURGERY      WRIST SURGERY           Current Outpatient Medications:     allopurinol (ZYLOPRIM) 300 mg tablet, Take 300 mg by mouth daily, Disp: , Rfl:     DULoxetine (CYMBALTA) 30 mg delayed release capsule, Take 1 capsule (30 mg total) by mouth daily, Disp: 30 capsule, Rfl: 1    valsartan-hydrochlorothiazide (DIOVAN-HCT) 160-12 5 MG per tablet, TAKE 1 TABLET ONCE DAILY  , Disp: 90 tablet, Rfl: 1    Current Facility-Administered Medications:     iohexol (OMNIPAQUE) 300 mg/mL injection 50 mL, 50 mL, Epidural, Once, Duran Lerma, DO    lidocaine (PF) (XYLOCAINE-MPF) 1 % injection 5 mL, 5 mL, Infiltration, Once, Duran Lerma, DO    methylPREDNISolone acetate (DEPO-MEDROL) injection 80 mg, 80 mg, Epidural, Once, Jessica Wu Magi Warner, DO    Allergies   Allergen Reactions    Levaquin [Levofloxacin] Arthralgia       Physical Exam:   Vitals:    07/15/19 1524   BP: 144/91   Pulse: 78   Resp: 20   Temp: 98 3 °F (36 8 °C)   SpO2: 95%     General: Awake, Alert, Oriented x 3, Mood and affect appropriate  Respiratory: Respirations even and unlabored  Cardiovascular: Peripheral pulses intact; no edema  Musculoskeletal Exam:  Left neck and arm pain    ASA Score:  2  Patient/Chart Verification  Patient ID Verified: Verbal  ID Band Applied: No  Consents Confirmed: Procedural  H&P( within 30 days) Verified: To be obtained in the Pre-Procedure area  Interval H&P(within 24 hr) Complete (required for Outpatients and Surgery Admit only): To be obtained in the Pre-Procedure area  Allergies Reviewed: Yes  Anticoag/NSAID held?: NA(Per pt he does not take any nsaids or aspirin )  Currently on antibiotics?: No    Assessment:   1   Cervical radiculitis        Plan: REPEAT LT COLLEEN

## 2019-07-15 NOTE — DISCHARGE INSTRUCTIONS
Epidural Steroid Injection   WHAT YOU NEED TO KNOW:   An epidural steroid injection (ASIF) is a procedure to inject steroid medicine into the epidural space  The epidural space is between your spinal cord and vertebrae  Steroids reduce inflammation and fluid buildup in your spine that may be causing pain  You may be given pain medicine along with the steroids  ACTIVITY  · Do not drive or operate machinery today  · No strenuous activity today - bending, lifting, etc   · You may resume normal activites starting tomorrow - start slowly and as tolerated  · You may shower today, but no tub baths or hot tubs  · You may have numbness for several hours from the local anesthetic  Please use caution and common sense, especially with weight-bearing activities  CARE OF THE INJECTION SITE  · If you have soreness or pain, apply ice to the area today (20 minutes on/20 minutes off)  · Starting tomorrow, you may use warm, moist heat or ice if needed  · You may have an increase or change in your discomfort for 36-48 hours after your treatment  · Apply ice and continue with any pain medication you have been prescribed  · [facility]ify [facility] if you have any of the following: redness, drainage, swelling, headache, stiff neck or fever above 100°F     SPECIAL INSTRUCTIONS  · Our office will contact you in approximately 7 days for a progress report  MEDICATIONS  · Continue to take all routine medications  · Our office may have instructed you to hold some medications  If you have a problem specifically related to your procedure, please call our office at [phone]  Problems not related to your procedure should be directed to your primary care physician

## 2019-07-22 ENCOUNTER — TELEPHONE (OUTPATIENT)
Dept: PAIN MEDICINE | Facility: CLINIC | Age: 69
End: 2019-07-22

## 2019-07-22 ENCOUNTER — OFFICE VISIT (OUTPATIENT)
Dept: OBGYN CLINIC | Facility: HOSPITAL | Age: 69
End: 2019-07-22
Payer: COMMERCIAL

## 2019-07-22 ENCOUNTER — APPOINTMENT (OUTPATIENT)
Dept: RADIOLOGY | Facility: MEDICAL CENTER | Age: 69
End: 2019-07-22
Payer: COMMERCIAL

## 2019-07-22 ENCOUNTER — PREP FOR PROCEDURE (OUTPATIENT)
Dept: OBGYN CLINIC | Facility: HOSPITAL | Age: 69
End: 2019-07-22

## 2019-07-22 ENCOUNTER — APPOINTMENT (OUTPATIENT)
Dept: LAB | Facility: MEDICAL CENTER | Age: 69
End: 2019-07-22
Payer: COMMERCIAL

## 2019-07-22 VITALS
HEART RATE: 76 BPM | BODY MASS INDEX: 36.4 KG/M2 | DIASTOLIC BLOOD PRESSURE: 78 MMHG | SYSTOLIC BLOOD PRESSURE: 141 MMHG | HEIGHT: 71 IN | WEIGHT: 260 LBS

## 2019-07-22 DIAGNOSIS — M54.12 CERVICAL RADICULITIS: ICD-10-CM

## 2019-07-22 DIAGNOSIS — M54.12 CERVICAL RADICULITIS: Primary | ICD-10-CM

## 2019-07-22 DIAGNOSIS — Z12.5 SCREENING FOR PROSTATE CANCER: ICD-10-CM

## 2019-07-22 LAB
ABO GROUP BLD: NORMAL
ALBUMIN SERPL BCP-MCNC: 4 G/DL (ref 3.5–5)
ALP SERPL-CCNC: 80 U/L (ref 46–116)
ALT SERPL W P-5'-P-CCNC: 51 U/L (ref 12–78)
ANION GAP SERPL CALCULATED.3IONS-SCNC: 4 MMOL/L (ref 4–13)
APTT PPP: 36 SECONDS (ref 23–37)
AST SERPL W P-5'-P-CCNC: 21 U/L (ref 5–45)
BASOPHILS # BLD AUTO: 0.04 THOUSANDS/ΜL (ref 0–0.1)
BASOPHILS NFR BLD AUTO: 0 % (ref 0–1)
BILIRUB SERPL-MCNC: 0.52 MG/DL (ref 0.2–1)
BILIRUB UR QL STRIP: NEGATIVE
BLD GP AB SCN SERPL QL: NEGATIVE
BUN SERPL-MCNC: 22 MG/DL (ref 5–25)
CALCIUM SERPL-MCNC: 9.2 MG/DL (ref 8.3–10.1)
CHLORIDE SERPL-SCNC: 104 MMOL/L (ref 100–108)
CLARITY UR: CLEAR
CO2 SERPL-SCNC: 33 MMOL/L (ref 21–32)
COLOR UR: YELLOW
CREAT SERPL-MCNC: 0.98 MG/DL (ref 0.6–1.3)
EOSINOPHIL # BLD AUTO: 0.15 THOUSAND/ΜL (ref 0–0.61)
EOSINOPHIL NFR BLD AUTO: 2 % (ref 0–6)
ERYTHROCYTE [DISTWIDTH] IN BLOOD BY AUTOMATED COUNT: 13.2 % (ref 11.6–15.1)
EST. AVERAGE GLUCOSE BLD GHB EST-MCNC: 137 MG/DL
GFR SERPL CREATININE-BSD FRML MDRD: 79 ML/MIN/1.73SQ M
GLUCOSE P FAST SERPL-MCNC: 90 MG/DL (ref 65–99)
GLUCOSE UR STRIP-MCNC: NEGATIVE MG/DL
HBA1C MFR BLD: 6.4 % (ref 4.2–6.3)
HCT VFR BLD AUTO: 47.6 % (ref 36.5–49.3)
HGB BLD-MCNC: 15.6 G/DL (ref 12–17)
HGB UR QL STRIP.AUTO: NEGATIVE
IMM GRANULOCYTES # BLD AUTO: 0.04 THOUSAND/UL (ref 0–0.2)
IMM GRANULOCYTES NFR BLD AUTO: 0 % (ref 0–2)
INR PPP: 0.89 (ref 0.84–1.19)
KETONES UR STRIP-MCNC: NEGATIVE MG/DL
LEUKOCYTE ESTERASE UR QL STRIP: NEGATIVE
LYMPHOCYTES # BLD AUTO: 2.56 THOUSANDS/ΜL (ref 0.6–4.47)
LYMPHOCYTES NFR BLD AUTO: 25 % (ref 14–44)
MCH RBC QN AUTO: 29.8 PG (ref 26.8–34.3)
MCHC RBC AUTO-ENTMCNC: 32.8 G/DL (ref 31.4–37.4)
MCV RBC AUTO: 91 FL (ref 82–98)
MONOCYTES # BLD AUTO: 1.16 THOUSAND/ΜL (ref 0.17–1.22)
MONOCYTES NFR BLD AUTO: 12 % (ref 4–12)
NEUTROPHILS # BLD AUTO: 6.11 THOUSANDS/ΜL (ref 1.85–7.62)
NEUTS SEG NFR BLD AUTO: 61 % (ref 43–75)
NITRITE UR QL STRIP: NEGATIVE
NRBC BLD AUTO-RTO: 0 /100 WBCS
PH UR STRIP.AUTO: 6.5 [PH]
PLATELET # BLD AUTO: 295 THOUSANDS/UL (ref 149–390)
PMV BLD AUTO: 10.3 FL (ref 8.9–12.7)
POTASSIUM SERPL-SCNC: 4.2 MMOL/L (ref 3.5–5.3)
PROT SERPL-MCNC: 7.6 G/DL (ref 6.4–8.2)
PROT UR STRIP-MCNC: NEGATIVE MG/DL
PROTHROMBIN TIME: 11.7 SECONDS (ref 11.6–14.5)
PSA SERPL-MCNC: 3.8 NG/ML (ref 0–4)
RBC # BLD AUTO: 5.23 MILLION/UL (ref 3.88–5.62)
RH BLD: POSITIVE
SODIUM SERPL-SCNC: 141 MMOL/L (ref 136–145)
SP GR UR STRIP.AUTO: 1.02 (ref 1–1.03)
SPECIMEN EXPIRATION DATE: NORMAL
UROBILINOGEN UR QL STRIP.AUTO: 0.2 E.U./DL
WBC # BLD AUTO: 10.06 THOUSAND/UL (ref 4.31–10.16)

## 2019-07-22 PROCEDURE — 71046 X-RAY EXAM CHEST 2 VIEWS: CPT

## 2019-07-22 PROCEDURE — 80053 COMPREHEN METABOLIC PANEL: CPT

## 2019-07-22 PROCEDURE — 81003 URINALYSIS AUTO W/O SCOPE: CPT | Performed by: ORTHOPAEDIC SURGERY

## 2019-07-22 PROCEDURE — 86900 BLOOD TYPING SEROLOGIC ABO: CPT

## 2019-07-22 PROCEDURE — 85730 THROMBOPLASTIN TIME PARTIAL: CPT

## 2019-07-22 PROCEDURE — 86901 BLOOD TYPING SEROLOGIC RH(D): CPT

## 2019-07-22 PROCEDURE — 86850 RBC ANTIBODY SCREEN: CPT

## 2019-07-22 PROCEDURE — 36415 COLL VENOUS BLD VENIPUNCTURE: CPT

## 2019-07-22 PROCEDURE — G0103 PSA SCREENING: HCPCS

## 2019-07-22 PROCEDURE — 85610 PROTHROMBIN TIME: CPT

## 2019-07-22 PROCEDURE — 83036 HEMOGLOBIN GLYCOSYLATED A1C: CPT

## 2019-07-22 PROCEDURE — 99214 OFFICE O/P EST MOD 30 MIN: CPT | Performed by: ORTHOPAEDIC SURGERY

## 2019-07-22 PROCEDURE — 85025 COMPLETE CBC W/AUTO DIFF WBC: CPT

## 2019-07-22 RX ORDER — CEFAZOLIN SODIUM 2 G/50ML
2000 SOLUTION INTRAVENOUS ONCE
Status: CANCELLED | OUTPATIENT
Start: 2019-07-22 | End: 2019-07-22

## 2019-07-22 RX ORDER — GABAPENTIN 300 MG/1
300 CAPSULE ORAL ONCE
Status: CANCELLED | OUTPATIENT
Start: 2019-07-22 | End: 2019-07-22

## 2019-07-22 RX ORDER — ACETAMINOPHEN 325 MG/1
975 TABLET ORAL ONCE
Status: CANCELLED | OUTPATIENT
Start: 2019-07-22 | End: 2019-07-22

## 2019-07-22 RX ORDER — CHLORHEXIDINE GLUCONATE 0.12 MG/ML
15 RINSE ORAL ONCE
Status: CANCELLED | OUTPATIENT
Start: 2019-07-22 | End: 2019-07-22

## 2019-07-22 NOTE — PROGRESS NOTES
69 y o male presents for evaluation of neck pain  Patient states that his neck pain has been so for years in duration and his sought multiple modalities of treatment including physical therapy and repeat cervical injections  Patient states that his pain primarily radiates down the left arm, is worse with certain motions about the neck and improves only somewhat with injections  Patient states there are times where he is debilitated from neck pain and he is unable to participate and activities he formally enjoys such as golf  He denies any difficulty with ambulation or loss of bowel/bladder function  Patient has no other complaints this time  Review of Systems  Review of systems negative unless otherwise specified in HPI    Past Medical History  Past Medical History:   Diagnosis Date    Acute gouty arthritis     last assessed 6/1/13     Anxiety     last assessed 7/11/14     Chronic thoracic spine pain     last assessed 5/13/16     Dysfunction of both eustachian tubes     last assessed 8/16/13    Erectile dysfunction of non-organic origin     last assessed 10/8/13     Family history reviewed with no changes     medical history     Gout     Hypertension     Sebaceous cyst     last assessed 12/16/13     Skin lesion     last assessed 1/2/14        Past Surgical History  Past Surgical History:   Procedure Laterality Date    APPENDECTOMY      12years old    BOWEL RESECTION      COLONOSCOPY      MOUTH SURGERY      WRIST SURGERY         Current Medications  Current Outpatient Medications on File Prior to Visit   Medication Sig Dispense Refill    allopurinol (ZYLOPRIM) 300 mg tablet Take 300 mg by mouth daily      DULoxetine (CYMBALTA) 30 mg delayed release capsule Take 1 capsule (30 mg total) by mouth daily 30 capsule 1    valsartan-hydrochlorothiazide (DIOVAN-HCT) 160-12 5 MG per tablet TAKE 1 TABLET ONCE DAILY  90 tablet 1     No current facility-administered medications on file prior to visit  Recent Labs [facility] GOMEZ)  0   Lab Value Date/Time    HCT 47 9 12/20/2018 0947    HCT 45 8 05/16/2016 0804    HGB 15 9 12/20/2018 0947    HGB 14 9 05/16/2016 0804    WBC 11 06 (H) 12/20/2018 0947    WBC 6 4 05/16/2016 0804    HGBA1C 6 2 03/21/2019 1135         Physical exam  · General: Awake, Alert, Oriented  · Eyes: Pupils equal, round and reactive to light  · Heart: regular rate and rhythm  · Lungs: No audible wheezing  · Abdomen: soft  Cervical spine  · Skin intact  · Limited range of motion secondary to pain  · Positive Spurling's to the left  · Negative Evans  · 2+ brachioradialis and biceps tendon reflexes  · 5/5 strength C5-T1  · Blaine C5-T1  · Pulse equal bilaterally    Imaging  The MRI of the cervical spine was reviewed by myself and Dr Heber Zaragoza  Reveals significant stenosis at the level of C5-6 on the left with compression of the nerve root was some compression of the right nerve root as well  Loss of cervical lordosis is appreciated  67-year-old male with cervical radiculopathy which has failed conservative treatment  · Patient is indicated for ACDF at C5-6 for persistent cervical radiculopathy which has failed conservative treatment  Patient was educated on the risks and benefits of the procedure and patient wished to proceed with surgical intervention  Surgery will be scheduled as per above    Consent was obtained during clinic today  · Preoperative laboratory analysis and workup by his primary care physician will be obtained prior to surgery  · The patient understands and agrees with plan above

## 2019-07-24 ENCOUNTER — PREP FOR PROCEDURE (OUTPATIENT)
Dept: OBGYN CLINIC | Facility: HOSPITAL | Age: 69
End: 2019-07-24

## 2019-07-30 ENCOUNTER — ANESTHESIA EVENT (OUTPATIENT)
Dept: PERIOP | Facility: HOSPITAL | Age: 69
DRG: 473 | End: 2019-07-30
Payer: COMMERCIAL

## 2019-07-30 RX ORDER — LANOLIN ALCOHOL/MO/W.PET/CERES
CREAM (GRAM) TOPICAL DAILY
COMMUNITY
End: 2019-12-30

## 2019-07-30 NOTE — PRE-PROCEDURE INSTRUCTIONS
Pre-Surgery Instructions:   Medication Instructions    allopurinol (ZYLOPRIM) 300 mg tablet Instructed patient per Anesthesia Guidelines   cyanocobalamin (VITAMIN B-12) 1,000 mcg tablet Instructed patient per Anesthesia Guidelines   valsartan-hydrochlorothiazide (DIOVAN-HCT) 160-12 5 MG per tablet Instructed patient per Anesthesia Guidelines  Continue this medication up to the evening before surgery/procedure, but do not take the morning of the day of surgery  Anti-gout Med Class     Continue to take this medication on your normal schedule  If this is an oral medication and you take it in the morning, then you may take this medicine with a sip of water

## 2019-07-30 NOTE — PRE-PROCEDURE INSTRUCTIONS
Pre-Surgery Instructions:   Medication Instructions    allopurinol (ZYLOPRIM) 300 mg tablet Instructed patient per Anesthesia Guidelines   cyanocobalamin (VITAMIN B-12) 1,000 mcg tablet Instructed patient per Anesthesia Guidelines   valsartan-hydrochlorothiazide (DIOVAN-HCT) 160-12 5 MG per tablet Instructed patient per Anesthesia Guidelines

## 2019-07-31 ENCOUNTER — OFFICE VISIT (OUTPATIENT)
Dept: FAMILY MEDICINE CLINIC | Facility: CLINIC | Age: 69
End: 2019-07-31
Payer: COMMERCIAL

## 2019-07-31 VITALS
RESPIRATION RATE: 17 BRPM | BODY MASS INDEX: 36.46 KG/M2 | DIASTOLIC BLOOD PRESSURE: 84 MMHG | WEIGHT: 260.4 LBS | TEMPERATURE: 97.3 F | HEART RATE: 82 BPM | OXYGEN SATURATION: 93 % | HEIGHT: 71 IN | SYSTOLIC BLOOD PRESSURE: 122 MMHG

## 2019-07-31 DIAGNOSIS — I10 BENIGN ESSENTIAL HYPERTENSION: ICD-10-CM

## 2019-07-31 DIAGNOSIS — R97.20 ELEVATED PSA: ICD-10-CM

## 2019-07-31 DIAGNOSIS — E78.2 COMBINED HYPERLIPIDEMIA: ICD-10-CM

## 2019-07-31 DIAGNOSIS — M54.12 CERVICAL RADICULITIS: ICD-10-CM

## 2019-07-31 DIAGNOSIS — Z01.810 PREOP CARDIOVASCULAR EXAM: Primary | ICD-10-CM

## 2019-07-31 DIAGNOSIS — M76.62 ACHILLES TENDINITIS OF LEFT LOWER EXTREMITY: ICD-10-CM

## 2019-07-31 DIAGNOSIS — R73.03 PREDIABETES: ICD-10-CM

## 2019-07-31 PROCEDURE — 3008F BODY MASS INDEX DOCD: CPT | Performed by: FAMILY MEDICINE

## 2019-07-31 PROCEDURE — 3074F SYST BP LT 130 MM HG: CPT | Performed by: FAMILY MEDICINE

## 2019-07-31 PROCEDURE — 99213 OFFICE O/P EST LOW 20 MIN: CPT | Performed by: FAMILY MEDICINE

## 2019-07-31 NOTE — LETTER
July 31, 2019     Greta Orozco, [address] [facility]    Patient: Cal Núñez   YOB: 1950   Date of Visit: 7/31/2019       Dear Dr Sia Dinero: Thank you for referring Brittney Aldrich to me for evaluation  Below are my notes for this consultation  If you have questions, please do not hesitate to call me  I look forward to following your patient along with you  Sincerely,        Junior Rader DO        CC: No Recipients  Amanda Brooke DO  7/31/2019 10:16 PM  Sign at close encounter  Assessment/Plan:   1  Preop cardiovascular exam/Cervical radiculitis  Patient appears clinically and hemodynamically stable today  He has a stable functional capacity and no active cardiac problems  Reviewed his preop testing including his blood work as well as his EKG  All this testing appeared stable  This type of procedure is considered intermediate risk  Patient is cleared with intermediate perioperative cardiovascular risk  No further testing is needed today  Diagnoses and all orders for this visit:    Cervical radiculitis  -     Ambulatory referral to Internal Medicine          Subjective:    Chief Complaint   Patient presents with    Pre-op Exam     8/15 Anterior cervical discectomy w fusion C5-6, with allograft and neuromonitoring (N/A Spine Cervical)        Patient ID: Cal Núñez is a 77 y o  male  Cal Núñez  77 y o   male    SURGEON:Dr Amanuel Infante: Ant Cervical Discectomy of C5-C6 with fusion    DATE OF SURGERY: 8/15    PRIOR ANESTHESIA:yes    COMPLICATION: no    BLEEDING PROBLEM: no    PERTINENT PMH: no    EXERCISE CAPACITY:   CAN WALK 4 BLOCKS AND OR CLIMB 2 FLIGHTS: Yes    HOME LIVING SITUATION SAFE AND SECURE: Yes      TOBACCO: no     ETOH: no     ILLEGAL DRUGS: no        Review of Systems   Constitutional: Negative for activity change, chills, fatigue and fever     HENT: Negative for congestion, ear pain, sinus pressure and sore throat  Eyes: Negative for redness, itching and visual disturbance  Respiratory: Negative for cough and shortness of breath  Cardiovascular: Negative for chest pain and palpitations  Gastrointestinal: Negative for abdominal pain, diarrhea and nausea  Endocrine: Negative for cold intolerance and heat intolerance  Genitourinary: Negative for dysuria, flank pain and frequency  Musculoskeletal: Negative for arthralgias, back pain, gait problem and myalgias  Skin: Negative for color change  Allergic/Immunologic: Negative for environmental allergies  Neurological: Negative for dizziness, numbness and headaches  Psychiatric/Behavioral: Negative for behavioral problems and sleep disturbance  The following portions of the patient's history were reviewed and updated as appropriate : past family history, past medical history, past social history and past surgical history  Current Outpatient Medications:     allopurinol (ZYLOPRIM) 300 mg tablet, Take 300 mg by mouth daily at bedtime , Disp: , Rfl:     cyanocobalamin (VITAMIN B-12) 1,000 mcg tablet, Take by mouth daily, Disp: , Rfl:     valsartan-hydrochlorothiazide (DIOVAN-HCT) 160-12 5 MG per tablet, TAKE 1 TABLET ONCE DAILY  (Patient taking differently: daily at bedtime ), Disp: 90 tablet, Rfl: 1    Objective:    Vitals:    07/31/19 0943   BP: 122/84   BP Location: Left arm   Patient Position: Sitting   Cuff Size: Large   Pulse: 82   Resp: 17   Temp: (!) 97 3 °F (36 3 °C)   TempSrc: Tympanic   SpO2: 93%   Weight: 118 kg (260 lb 6 4 oz)   Height: 5' 11" (1 803 m)        Physical Exam   Constitutional: He is oriented to person, place, and time  He appears well-developed and well-nourished  HENT:   Head: Normocephalic and atraumatic  Nose: Nose normal    Mouth/Throat: No oropharyngeal exudate  Eyes: Pupils are equal, round, and reactive to light  Right eye exhibits no discharge  Left eye exhibits no discharge     Neck: Normal range of motion  Neck supple  No tracheal deviation present  Cardiovascular: Normal rate, regular rhythm and intact distal pulses  Exam reveals no gallop and no friction rub  No murmur heard  Pulses:       Dorsalis pedis pulses are 2+ on the right side, and 2+ on the left side  Posterior tibial pulses are 2+ on the right side, and 2+ on the left side  Pulmonary/Chest: Effort normal and breath sounds normal  No respiratory distress  He has no wheezes  He has no rales  Abdominal: Soft  Bowel sounds are normal  He exhibits no distension  There is no tenderness  There is no rebound and no guarding  Musculoskeletal: Normal range of motion  He exhibits no edema  Lymphadenopathy:        Head (right side): No submental and no submandibular adenopathy present  Head (left side): No submental and no submandibular adenopathy present  He has no cervical adenopathy  Right cervical: No superficial cervical, no deep cervical and no posterior cervical adenopathy present  Left cervical: No superficial cervical, no deep cervical and no posterior cervical adenopathy present  Neurological: He is alert and oriented to person, place, and time  No cranial nerve deficit or sensory deficit  Skin: Skin is warm, dry and intact  Psychiatric: His speech is normal and behavior is normal  Judgment normal  His mood appears not anxious  Cognition and memory are normal  He does not exhibit a depressed mood  Vitals reviewed

## 2019-07-31 NOTE — LETTER
July 31, 2019     Katharine Velasquez, [address] [facility]    Patient: Doroteo Riddle   YOB: 1950   Date of Visit: 7/31/2019       Dear Dr Gisela Sims Recipients: Thank you for referring Virginie Ramires to me for evaluation  Below are my notes for this consultation  If you have questions, please do not hesitate to call me  I look forward to following your patient along with you  Sincerely,        Amanda Brooke DO        CC: No Recipients  Amanda Brooke DO  7/31/2019 10:15 PM  Incomplete  Assessment/Plan:   1  Preop cardiovascular exam/Cervical radiculitis  Patient appears clinically and hemodynamically stable today  He has a stable functional capacity and no active cardiac problems  Reviewed his preop testing including his blood work as well as his EKG  All this testing appeared stable  This type of procedure is considered intermediate risk  Patient is cleared with intermediate perioperative cardiovascular risk  No further testing is needed today  Diagnoses and all orders for this visit:    Cervical radiculitis  -     Ambulatory referral to Internal Medicine          Subjective:    Chief Complaint   Patient presents with    Pre-op Exam     8/15 Anterior cervical discectomy w fusion C5-6, with allograft and neuromonitoring (N/A Spine Cervical)        Patient ID: Doroteo Riddle is a 77 y o  male  Doroteo Riddle  77 y o   male    SURGEON:Dr Eder Lowry: Ant Cervical Discectomy of C5-C6 with fusion    DATE OF SURGERY: 8/15    PRIOR ANESTHESIA:yes    COMPLICATION: no    BLEEDING PROBLEM: no    PERTINENT PMH: no    EXERCISE CAPACITY:   CAN WALK 4 BLOCKS AND OR CLIMB 2 FLIGHTS: Yes    HOME LIVING SITUATION SAFE AND SECURE: Yes      TOBACCO: no     ETOH: no     ILLEGAL DRUGS: no        Review of Systems   Constitutional: Negative for activity change, chills, fatigue and fever  HENT: Negative for congestion, ear pain, sinus pressure and sore throat  Eyes: Negative for redness, itching and visual disturbance  Respiratory: Negative for cough and shortness of breath  Cardiovascular: Negative for chest pain and palpitations  Gastrointestinal: Negative for abdominal pain, diarrhea and nausea  Endocrine: Negative for cold intolerance and heat intolerance  Genitourinary: Negative for dysuria, flank pain and frequency  Musculoskeletal: Negative for arthralgias, back pain, gait problem and myalgias  Skin: Negative for color change  Allergic/Immunologic: Negative for environmental allergies  Neurological: Negative for dizziness, numbness and headaches  Psychiatric/Behavioral: Negative for behavioral problems and sleep disturbance  The following portions of the patient's history were reviewed and updated as appropriate : past family history, past medical history, past social history and past surgical history  Current Outpatient Medications:     allopurinol (ZYLOPRIM) 300 mg tablet, Take 300 mg by mouth daily at bedtime , Disp: , Rfl:     cyanocobalamin (VITAMIN B-12) 1,000 mcg tablet, Take by mouth daily, Disp: , Rfl:     valsartan-hydrochlorothiazide (DIOVAN-HCT) 160-12 5 MG per tablet, TAKE 1 TABLET ONCE DAILY  (Patient taking differently: daily at bedtime ), Disp: 90 tablet, Rfl: 1    Objective:    Vitals:    07/31/19 0943   BP: 122/84   BP Location: Left arm   Patient Position: Sitting   Cuff Size: Large   Pulse: 82   Resp: 17   Temp: (!) 97 3 °F (36 3 °C)   TempSrc: Tympanic   SpO2: 93%   Weight: 118 kg (260 lb 6 4 oz)   Height: 5' 11" (1 803 m)        Physical Exam   Constitutional: He is oriented to person, place, and time  He appears well-developed and well-nourished  HENT:   Head: Normocephalic and atraumatic  Nose: Nose normal    Mouth/Throat: No oropharyngeal exudate  Eyes: Pupils are equal, round, and reactive to light  Right eye exhibits no discharge  Left eye exhibits no discharge  Neck: Normal range of motion  Neck supple  No tracheal deviation present  Cardiovascular: Normal rate, regular rhythm and intact distal pulses  Exam reveals no gallop and no friction rub  No murmur heard  Pulses:       Dorsalis pedis pulses are 2+ on the right side, and 2+ on the left side  Posterior tibial pulses are 2+ on the right side, and 2+ on the left side  Pulmonary/Chest: Effort normal and breath sounds normal  No respiratory distress  He has no wheezes  He has no rales  Abdominal: Soft  Bowel sounds are normal  He exhibits no distension  There is no tenderness  There is no rebound and no guarding  Musculoskeletal: Normal range of motion  He exhibits no edema  Lymphadenopathy:        Head (right side): No submental and no submandibular adenopathy present  Head (left side): No submental and no submandibular adenopathy present  He has no cervical adenopathy  Right cervical: No superficial cervical, no deep cervical and no posterior cervical adenopathy present  Left cervical: No superficial cervical, no deep cervical and no posterior cervical adenopathy present  Neurological: He is alert and oriented to person, place, and time  No cranial nerve deficit or sensory deficit  Skin: Skin is warm, dry and intact  Psychiatric: His speech is normal and behavior is normal  Judgment normal  His mood appears not anxious  Cognition and memory are normal  He does not exhibit a depressed mood  Vitals reviewed  Amanda Brooke DO  7/31/2019 10:19 AM  Incomplete  Assessment/Plan:   1  Preop cardiovascular exam/Cervical radiculitis  ***       Diagnoses and all orders for this visit:    Cervical radiculitis  -     Ambulatory referral to Internal Medicine          Subjective:    Chief Complaint   Patient presents with    Pre-op Exam     8/15 Anterior cervical discectomy w fusion C5-6, with allograft and neuromonitoring (N/A Spine Cervical)        Patient ID: Dave Mccall is a 77 y o  male     Fadia Robbins  77 y o   male    SURGEON:Dr Annmarie Martinez: Ant Cervical Discectomy of C5-C6 with fusion    DATE OF SURGERY: 8/15    PRIOR ANESTHESIA:yes    COMPLICATION: no    BLEEDING PROBLEM: no    PERTINENT PMH: no    EXERCISE CAPACITY:   CAN WALK 4 BLOCKS AND OR CLIMB 2 FLIGHTS: Yes    HOME LIVING SITUATION SAFE AND SECURE: Yes      TOBACCO: no     ETOH: no     ILLEGAL DRUGS: no        Review of Systems   Constitutional: Negative for activity change, chills, fatigue and fever  HENT: Negative for congestion, ear pain, sinus pressure and sore throat  Eyes: Negative for redness, itching and visual disturbance  Respiratory: Negative for cough and shortness of breath  Cardiovascular: Negative for chest pain and palpitations  Gastrointestinal: Negative for abdominal pain, diarrhea and nausea  Endocrine: Negative for cold intolerance and heat intolerance  Genitourinary: Negative for dysuria, flank pain and frequency  Musculoskeletal: Negative for arthralgias, back pain, gait problem and myalgias  Skin: Negative for color change  Allergic/Immunologic: Negative for environmental allergies  Neurological: Negative for dizziness, numbness and headaches  Psychiatric/Behavioral: Negative for behavioral problems and sleep disturbance  The following portions of the patient's history were reviewed and updated as appropriate : past family history, past medical history, past social history and past surgical history  Current Outpatient Medications:     allopurinol (ZYLOPRIM) 300 mg tablet, Take 300 mg by mouth daily at bedtime , Disp: , Rfl:     cyanocobalamin (VITAMIN B-12) 1,000 mcg tablet, Take by mouth daily, Disp: , Rfl:     valsartan-hydrochlorothiazide (DIOVAN-HCT) 160-12 5 MG per tablet, TAKE 1 TABLET ONCE DAILY   (Patient taking differently: daily at bedtime ), Disp: 90 tablet, Rfl: 1    Objective:    Vitals:    07/31/19 0943   BP: 122/84   BP Location: Left arm   Patient Position: Sitting   Cuff Size: Large   Pulse: 82   Resp: 17   Temp: (!) 97 3 °F (36 3 °C)   TempSrc: Tympanic   SpO2: 93%   Weight: 118 kg (260 lb 6 4 oz)   Height: 5' 11" (1 803 m)        Physical Exam   Constitutional: He is oriented to person, place, and time  He appears well-developed and well-nourished  HENT:   Head: Normocephalic and atraumatic  Nose: Nose normal    Mouth/Throat: No oropharyngeal exudate  Eyes: Pupils are equal, round, and reactive to light  Right eye exhibits no discharge  Left eye exhibits no discharge  Neck: Normal range of motion  Neck supple  No tracheal deviation present  Cardiovascular: Normal rate, regular rhythm and intact distal pulses  Exam reveals no gallop and no friction rub  No murmur heard  Pulses:       Dorsalis pedis pulses are 2+ on the right side, and 2+ on the left side  Posterior tibial pulses are 2+ on the right side, and 2+ on the left side  Pulmonary/Chest: Effort normal and breath sounds normal  No respiratory distress  He has no wheezes  He has no rales  Abdominal: Soft  Bowel sounds are normal  He exhibits no distension  There is no tenderness  There is no rebound and no guarding  Musculoskeletal: Normal range of motion  He exhibits no edema  Lymphadenopathy:        Head (right side): No submental and no submandibular adenopathy present  Head (left side): No submental and no submandibular adenopathy present  He has no cervical adenopathy  Right cervical: No superficial cervical, no deep cervical and no posterior cervical adenopathy present  Left cervical: No superficial cervical, no deep cervical and no posterior cervical adenopathy present  Neurological: He is alert and oriented to person, place, and time  No cranial nerve deficit or sensory deficit  Skin: Skin is warm, dry and intact  Psychiatric: His speech is normal and behavior is normal  Judgment normal  His mood appears not anxious  Cognition and memory are normal  He does not exhibit a depressed mood  Vitals reviewed

## 2019-07-31 NOTE — H&P (VIEW-ONLY)
Assessment/Plan:   1  Preop cardiovascular exam/Cervical radiculitis  Patient appears clinically and hemodynamically stable today  He has a stable functional capacity and no active cardiac problems  Reviewed his preop testing including his blood work as well as his EKG  All this testing appeared stable  This type of procedure is considered intermediate risk  Patient is cleared with intermediate perioperative cardiovascular risk  No further testing is needed today  Diagnoses and all orders for this visit:    Cervical radiculitis  -     Ambulatory referral to Internal Medicine          Subjective:    Chief Complaint   Patient presents with    Pre-op Exam     8/15 Anterior cervical discectomy w fusion C5-6, with allograft and neuromonitoring (N/A Spine Cervical)        Patient ID: Karla Gonzalez is a 77 y o  male  Karla Gonzalez  77 y o   male    SURGEON:Dr Sudheer Blanco: Ant Cervical Discectomy of C5-C6 with fusion    DATE OF SURGERY: 8/15    PRIOR ANESTHESIA:yes    COMPLICATION: no    BLEEDING PROBLEM: no    PERTINENT PMH: no    EXERCISE CAPACITY:   CAN WALK 4 BLOCKS AND OR CLIMB 2 FLIGHTS: Yes    HOME LIVING SITUATION SAFE AND SECURE: Yes      TOBACCO: no     ETOH: no     ILLEGAL DRUGS: no        Review of Systems   Constitutional: Negative for activity change, chills, fatigue and fever  HENT: Negative for congestion, ear pain, sinus pressure and sore throat  Eyes: Negative for redness, itching and visual disturbance  Respiratory: Negative for cough and shortness of breath  Cardiovascular: Negative for chest pain and palpitations  Gastrointestinal: Negative for abdominal pain, diarrhea and nausea  Endocrine: Negative for cold intolerance and heat intolerance  Genitourinary: Negative for dysuria, flank pain and frequency  Musculoskeletal: Negative for arthralgias, back pain, gait problem and myalgias  Skin: Negative for color change     Allergic/Immunologic: Negative for environmental allergies  Neurological: Negative for dizziness, numbness and headaches  Psychiatric/Behavioral: Negative for behavioral problems and sleep disturbance  The following portions of the patient's history were reviewed and updated as appropriate : past family history, past medical history, past social history and past surgical history  Current Outpatient Medications:     allopurinol (ZYLOPRIM) 300 mg tablet, Take 300 mg by mouth daily at bedtime , Disp: , Rfl:     cyanocobalamin (VITAMIN B-12) 1,000 mcg tablet, Take by mouth daily, Disp: , Rfl:     valsartan-hydrochlorothiazide (DIOVAN-HCT) 160-12 5 MG per tablet, TAKE 1 TABLET ONCE DAILY  (Patient taking differently: daily at bedtime ), Disp: 90 tablet, Rfl: 1    Objective:    Vitals:    07/31/19 0943   BP: 122/84   BP Location: Left arm   Patient Position: Sitting   Cuff Size: Large   Pulse: 82   Resp: 17   Temp: (!) 97 3 °F (36 3 °C)   TempSrc: Tympanic   SpO2: 93%   Weight: 118 kg (260 lb 6 4 oz)   Height: 5' 11" (1 803 m)        Physical Exam   Constitutional: He is oriented to person, place, and time  He appears well-developed and well-nourished  HENT:   Head: Normocephalic and atraumatic  Nose: Nose normal    Mouth/Throat: No oropharyngeal exudate  Eyes: Pupils are equal, round, and reactive to light  Right eye exhibits no discharge  Left eye exhibits no discharge  Neck: Normal range of motion  Neck supple  No tracheal deviation present  Cardiovascular: Normal rate, regular rhythm and intact distal pulses  Exam reveals no gallop and no friction rub  No murmur heard  Pulses:       Dorsalis pedis pulses are 2+ on the right side, and 2+ on the left side  Posterior tibial pulses are 2+ on the right side, and 2+ on the left side  Pulmonary/Chest: Effort normal and breath sounds normal  No respiratory distress  He has no wheezes  He has no rales  Abdominal: Soft   Bowel sounds are normal  He exhibits no distension  There is no tenderness  There is no rebound and no guarding  Musculoskeletal: Normal range of motion  He exhibits no edema  Lymphadenopathy:        Head (right side): No submental and no submandibular adenopathy present  Head (left side): No submental and no submandibular adenopathy present  He has no cervical adenopathy  Right cervical: No superficial cervical, no deep cervical and no posterior cervical adenopathy present  Left cervical: No superficial cervical, no deep cervical and no posterior cervical adenopathy present  Neurological: He is alert and oriented to person, place, and time  No cranial nerve deficit or sensory deficit  Skin: Skin is warm, dry and intact  Psychiatric: His speech is normal and behavior is normal  Judgment normal  His mood appears not anxious  Cognition and memory are normal  He does not exhibit a depressed mood  Vitals reviewed

## 2019-07-31 NOTE — PROGRESS NOTES
Assessment/Plan:   1  Preop cardiovascular exam/Cervical radiculitis  Patient appears clinically and hemodynamically stable today  He has a stable functional capacity and no active cardiac problems  Reviewed his preop testing including his blood work as well as his EKG  All this testing appeared stable  This type of procedure is considered intermediate risk  Patient is cleared with intermediate perioperative cardiovascular risk  No further testing is needed today  Diagnoses and all orders for this visit:    Cervical radiculitis  -     Ambulatory referral to Internal Medicine          Subjective:    Chief Complaint   Patient presents with    Pre-op Exam     8/15 Anterior cervical discectomy w fusion C5-6, with allograft and neuromonitoring (N/A Spine Cervical)        Patient ID: Shahram Barraza is a 77 y o  male  Shahram Barraza  77 y o   male    SURGEON:Dr Mcdowell Left: Ant Cervical Discectomy of C5-C6 with fusion    DATE OF SURGERY: 8/15    PRIOR ANESTHESIA:yes    COMPLICATION: no    BLEEDING PROBLEM: no    PERTINENT PMH: no    EXERCISE CAPACITY:   CAN WALK 4 BLOCKS AND OR CLIMB 2 FLIGHTS: Yes    HOME LIVING SITUATION SAFE AND SECURE: Yes      TOBACCO: no     ETOH: no     ILLEGAL DRUGS: no        Review of Systems   Constitutional: Negative for activity change, chills, fatigue and fever  HENT: Negative for congestion, ear pain, sinus pressure and sore throat  Eyes: Negative for redness, itching and visual disturbance  Respiratory: Negative for cough and shortness of breath  Cardiovascular: Negative for chest pain and palpitations  Gastrointestinal: Negative for abdominal pain, diarrhea and nausea  Endocrine: Negative for cold intolerance and heat intolerance  Genitourinary: Negative for dysuria, flank pain and frequency  Musculoskeletal: Negative for arthralgias, back pain, gait problem and myalgias  Skin: Negative for color change     Allergic/Immunologic: Negative for environmental allergies  Neurological: Negative for dizziness, numbness and headaches  Psychiatric/Behavioral: Negative for behavioral problems and sleep disturbance  The following portions of the patient's history were reviewed and updated as appropriate : past family history, past medical history, past social history and past surgical history  Current Outpatient Medications:     allopurinol (ZYLOPRIM) 300 mg tablet, Take 300 mg by mouth daily at bedtime , Disp: , Rfl:     cyanocobalamin (VITAMIN B-12) 1,000 mcg tablet, Take by mouth daily, Disp: , Rfl:     valsartan-hydrochlorothiazide (DIOVAN-HCT) 160-12 5 MG per tablet, TAKE 1 TABLET ONCE DAILY  (Patient taking differently: daily at bedtime ), Disp: 90 tablet, Rfl: 1    Objective:    Vitals:    07/31/19 0943   BP: 122/84   BP Location: Left arm   Patient Position: Sitting   Cuff Size: Large   Pulse: 82   Resp: 17   Temp: (!) 97 3 °F (36 3 °C)   TempSrc: Tympanic   SpO2: 93%   Weight: 118 kg (260 lb 6 4 oz)   Height: 5' 11" (1 803 m)        Physical Exam   Constitutional: He is oriented to person, place, and time  He appears well-developed and well-nourished  HENT:   Head: Normocephalic and atraumatic  Nose: Nose normal    Mouth/Throat: No oropharyngeal exudate  Eyes: Pupils are equal, round, and reactive to light  Right eye exhibits no discharge  Left eye exhibits no discharge  Neck: Normal range of motion  Neck supple  No tracheal deviation present  Cardiovascular: Normal rate, regular rhythm and intact distal pulses  Exam reveals no gallop and no friction rub  No murmur heard  Pulses:       Dorsalis pedis pulses are 2+ on the right side, and 2+ on the left side  Posterior tibial pulses are 2+ on the right side, and 2+ on the left side  Pulmonary/Chest: Effort normal and breath sounds normal  No respiratory distress  He has no wheezes  He has no rales  Abdominal: Soft   Bowel sounds are normal  He exhibits no distension  There is no tenderness  There is no rebound and no guarding  Musculoskeletal: Normal range of motion  He exhibits no edema  Lymphadenopathy:        Head (right side): No submental and no submandibular adenopathy present  Head (left side): No submental and no submandibular adenopathy present  He has no cervical adenopathy  Right cervical: No superficial cervical, no deep cervical and no posterior cervical adenopathy present  Left cervical: No superficial cervical, no deep cervical and no posterior cervical adenopathy present  Neurological: He is alert and oriented to person, place, and time  No cranial nerve deficit or sensory deficit  Skin: Skin is warm, dry and intact  Psychiatric: His speech is normal and behavior is normal  Judgment normal  His mood appears not anxious  Cognition and memory are normal  He does not exhibit a depressed mood  Vitals reviewed

## 2019-08-02 NOTE — ANESTHESIA PREPROCEDURE EVALUATION
Review of Systems/Medical History  Patient summary reviewed  Chart reviewed  No history of anesthetic complications     Cardiovascular  Exercise tolerance (METS): >4,  Hyperlipidemia, Hypertension ,    Pulmonary  Smoker ex-smoker  , Sleep apnea ,        GI/Hepatic            Endo/Other  History of thyroid disease , hypothyroidism,   Obesity    GYN       Hematology   Musculoskeletal    Arthritis     Neurology      Comment: Cervical radiculopathy Psychology   Anxiety,            Lab Results   Component Value Date    WBC 10 06 07/22/2019    HGB 15 6 07/22/2019    HCT 47 6 07/22/2019    MCV 91 07/22/2019     07/22/2019     Lab Results   Component Value Date     05/16/2016    K 4 2 07/22/2019    CO2 33 (H) 07/22/2019     07/22/2019    BUN 22 07/22/2019    CREATININE 0 98 07/22/2019     Lab Results   Component Value Date    INR 0 89 07/22/2019    PROTIME 11 7 07/22/2019     Lab Results   Component Value Date    PTT 36 07/22/2019       No results found for: GLUCOSE    Lab Results   Component Value Date    HGBA1C 6 4 (H) 07/22/2019       Type and Screen:  O           Anesthesia Plan  ASA Score- 2     Anesthesia Type- general with ASA Monitors  Additional Monitors: arterial line  Airway Plan: ETT  Plan Factors-    Induction- intravenous  Postoperative Plan- Plan for postoperative opioid use  Informed Consent- Anesthetic plan and risks discussed with patient  I personally reviewed this patient with the CRNA  Discussed and agreed on the Anesthesia Plan with the CRNA  Janessa Yoon

## 2019-08-06 ENCOUNTER — EVALUATION (OUTPATIENT)
Dept: PHYSICAL THERAPY | Facility: CLINIC | Age: 69
End: 2019-08-06
Payer: COMMERCIAL

## 2019-08-06 DIAGNOSIS — M76.62 ACHILLES TENDINITIS OF LEFT LOWER EXTREMITY: Primary | ICD-10-CM

## 2019-08-06 PROCEDURE — 97110 THERAPEUTIC EXERCISES: CPT | Performed by: PHYSICAL THERAPIST

## 2019-08-06 PROCEDURE — 97162 PT EVAL MOD COMPLEX 30 MIN: CPT | Performed by: PHYSICAL THERAPIST

## 2019-08-06 NOTE — PROGRESS NOTES
PT Evaluation     Today's date: 2019  Patient name: Eva Ansari  : 1950  MRN: [id number]  Referring provider: Beverlie Olszewski, DO  Dx:   Encounter Diagnosis     ICD-10-CM    1  Achilles tendinitis of left lower extremity M[age] Ambulatory referral to Physical Therapy                  Assessment  Assessment details: Pt is a 77 y o  Male presenting to PT after a 3 5 month history of left ankle pain  Pt signs and symptoms and clinical exam findings are most consistent with those of complete or partial [place]'s tendon rupture  Pt demonstrates no AROM against gravity and minimal ROM in a gravity minimized position  A palpable mass was detected approximately 3-4 above [place]'s tendon insertion with a drop off and no palpable presence of distinguishable heel cord  Moderate left gastrocnemius atrophy noted  A lengthy discussion was had with the patient regarding possible courses of action given the suspected dx, including pros and cons of operative and non-operative management  Pt impairments include reduced ROM, strength, and pain  Pt functional limitations include difficulty walking, completing transfers, and navigating stairs  Pt would benefit from skilled PT services to address the above listed impairments and functional limitations to encourage return to PLOF  Impairments: abnormal gait, abnormal muscle firing, abnormal or restricted ROM, abnormal movement, impaired physical strength and pain with function  Functional limitations: Difficulty walking, navigating stairs, rising on toes, completing transfers, standing for long periods of time  Symptom irritability: lowUnderstanding of Dx/Px/POC: good   Prognosis: good    Goals  STG: Pt will increase L ankle eversion ROM from 10 to 20 degrees in 2 weeks  STG: Pt will increase L ankle PF from 20 degrees to 30 degrees in 2 weeks  STG: Pt will be I with HEP in 2 weeks     LTG: Pt will be able to walk with NGP in clinic in 12 weeks   LTG: Pt will be able to able to complete 10 seated heel raises in 12 weeks  LTG: Pt will be I with HEP at discharge  Plan  Patient would benefit from: PT eval  Planned modality interventions: cryotherapy, H-Wave, manual electrical stimulation, microcurrent electrical stimulation, TENS, thermotherapy: hydrocollator packs, ultrasound and unattended electrical stimulation  Planned therapy interventions: balance/weight bearing training, balance, flexibility, functional ROM exercises, graded activity, gait training, graded exercise, home exercise program, transfer training, therapeutic training, therapeutic exercise, therapeutic activities, stretching, strengthening, neuromuscular re-education, massage, manual therapy, joint mobilization and motor coordination training  Frequency: 1x week  Duration in visits: 16  Duration in weeks: 16  Treatment plan discussed with: patient        Subjective Evaluation    History of Present Illness  Date of onset: 2019  Mechanism of injury: Pt reports taking a step off of a 7" decline while at a party  He immediately reported right calf and plantar foot numbness and weakness  States that numbness has subsided, but still has significant weakness  He states that 4 weeks after the injury he continued ice and anti-inflammatories per recommendation of his physician  Uses a cane to ambulate at home and in the community  States that his goals are to return to normal function     Pain  Current pain ratin  At best pain ratin  At worst pain ratin  Quality: dull ache and discomfort  Aggravating factors: walking and stair climbing  Progression: no change    Patient Goals  Patient goals for therapy: decreased edema, increased strength, decreased pain, increased motion and return to sport/leisure activities          Objective     Observations     Additional Observation Details  Moderate left gastrocnemius atrophy    Palpation     Additional Palpation Details  Palpable mass 3-4cm above left calcaneus Active Range of Motion   Left Ankle/Foot   Dorsiflexion (ke): 0 degrees   Dorsiflexion (kf): 10 degrees   Plantar flexion: 20 degrees   Inversion: 10 degrees   Eversion: 10 degrees     Right Ankle/Foot   Normal active range of motion    Strength/Myotome Testing     Left Ankle/Foot   Dorsiflexion: 5  Plantar flexion: 2-  Inversion: 5  Eversion: 5    Right Ankle/Foot   Normal strength    Tests   Left Ankle/Foot   Positive for Patel  Right Ankle/Foot   Negative for Patel  Additional Tests Details  Left patel displayed extremely small degree of motion, significantly less than right patel    Swelling   Left Ankle/Foot   Figure 8: 55 5 cm  Malleoli: 28 cm    Right Ankle/Foot   Figure 8: 54 5 cm  Malleoli: 27 cm    Ambulation     Observational Gait   Decreased left step length       Additional Observational Gait Details  Lacks push off of left foot due to PF weakness, demonstrates circumduction and right lateral trunk lean to clear left foot             Precautions: HTN      Manual  8/6                                                                                 Exercise Diary  8/6            Ankle ABC 1 UC/LC            Toe curls/ext x30            Towel scrunch  x30                                                                                                                                                                                                                                             Modalities

## 2019-08-09 DIAGNOSIS — I10 ESSENTIAL HYPERTENSION: ICD-10-CM

## 2019-08-12 ENCOUNTER — TELEPHONE (OUTPATIENT)
Dept: FAMILY MEDICINE CLINIC | Facility: CLINIC | Age: 69
End: 2019-08-12

## 2019-08-12 ENCOUNTER — OFFICE VISIT (OUTPATIENT)
Dept: PHYSICAL THERAPY | Facility: CLINIC | Age: 69
End: 2019-08-12
Payer: COMMERCIAL

## 2019-08-12 DIAGNOSIS — Z87.39 HISTORY OF GOUT: Primary | ICD-10-CM

## 2019-08-12 DIAGNOSIS — M76.62 ACHILLES TENDINITIS OF LEFT LOWER EXTREMITY: Primary | ICD-10-CM

## 2019-08-12 DIAGNOSIS — M72.2 PLANTAR FASCIITIS OF RIGHT FOOT: ICD-10-CM

## 2019-08-12 PROCEDURE — 97110 THERAPEUTIC EXERCISES: CPT | Performed by: PHYSICAL THERAPIST

## 2019-08-12 RX ORDER — VALSARTAN AND HYDROCHLOROTHIAZIDE 160; 12.5 MG/1; MG/1
TABLET, FILM COATED ORAL
Qty: 90 TABLET | Refills: 1 | Status: SHIPPED | OUTPATIENT
Start: 2019-08-12 | End: 2020-02-05

## 2019-08-12 NOTE — PROGRESS NOTES
Daily Note     Today's date: 2019  Patient name: Fernandez Levi  : 1950  MRN: [id number]  Referring provider: Jerson Burton DO  Dx:   Encounter Diagnosis     ICD-10-CM    1  Achilles tendinitis of left lower extremity M[age]                   Subjective: Pt reports feeling about the same  Scheduled ortho appointment for 19  Objective: See treatment diary below      Assessment: Exercises added to challenge proximal musculature  Pt educated on not moving ankle past 0 degrees of DF to allow for healing of tendon  Pt demonstrated moderate fatigue in proximal muscles during exercise  Pt would benefit from continued therapy to reduce pain and increase level of function  Plan: Continue per plan of care        Precautions: HTN      Manual             TC post glide  Grades I-III                                                                   Exercise Diary             Ankle ABC 1 UC/LC 1 UC/LC           Toe curls/ext x30 x30           Towel scrunch  x30 x30           SLR flex/abd  3x15 flex, 3x8 abd           Ankle TB Inv/EV, not exceeding 0 DF  2x10           Knee flexion AROM  supine, 2x10                                                                                                                                                                                                     Modalities

## 2019-08-13 ENCOUNTER — TELEPHONE (OUTPATIENT)
Dept: OBGYN CLINIC | Facility: HOSPITAL | Age: 69
End: 2019-08-13

## 2019-08-13 RX ORDER — ALLOPURINOL 300 MG/1
300 TABLET ORAL
Qty: 30 TABLET | Refills: 5 | Status: SHIPPED | OUTPATIENT
Start: 2019-08-13 | End: 2019-10-09 | Stop reason: SDUPTHER

## 2019-08-13 NOTE — TELEPHONE ENCOUNTER
Unlikey to continue with surgery this week to be on the safe side  Likely do it next week  Thanks,  GS

## 2019-08-13 NOTE — TELEPHONE ENCOUNTER
Patient schedule on 8/15/19 for an ACDF C5-6  He called stating he has a "slight cough" and post nasal drip  Using Flonase to relieve symptoms  No fever or other issue  Please advise

## 2019-08-15 ENCOUNTER — ANESTHESIA (OUTPATIENT)
Dept: PERIOP | Facility: HOSPITAL | Age: 69
DRG: 473 | End: 2019-08-15
Payer: COMMERCIAL

## 2019-08-16 ENCOUNTER — TELEPHONE (OUTPATIENT)
Dept: OBGYN CLINIC | Facility: HOSPITAL | Age: 69
End: 2019-08-16

## 2019-08-16 NOTE — TELEPHONE ENCOUNTER
I left a msg on patient's voicemail that Dr Alicea Pole him to observe his cervical pain for now  Surgery is still on the schedule for 8/27  I advised he should call on Monday with how he is doing and we will report to Dr Marino Berry

## 2019-08-16 NOTE — TELEPHONE ENCOUNTER
Patient is calling wondering since he is not having pain since having the shots with pain management if he should still have the surgery  He got a couple shots he says with Dr Namrata Eddy and he is wondering if he should still get the surgery since right now he's not in pain  He will get the surgery if you think it will be an issue down the line he doesn't want more pain

## 2019-08-16 NOTE — TELEPHONE ENCOUNTER
Yes please have patient call us next week to let us know how he is doing and further discussion about the surgery with Dr Marino Berry  Thank you

## 2019-08-16 NOTE — TELEPHONE ENCOUNTER
Relayed the message to the patient  He would prefer to wait and see  He will be sure to call back if there are further issues

## 2019-08-19 ENCOUNTER — APPOINTMENT (OUTPATIENT)
Dept: RADIOLOGY | Facility: MEDICAL CENTER | Age: 69
End: 2019-08-19
Payer: COMMERCIAL

## 2019-08-19 ENCOUNTER — TELEPHONE (OUTPATIENT)
Dept: UROLOGY | Facility: MEDICAL CENTER | Age: 69
End: 2019-08-19

## 2019-08-19 ENCOUNTER — CONSULT (OUTPATIENT)
Dept: OBGYN CLINIC | Facility: MEDICAL CENTER | Age: 69
End: 2019-08-19
Payer: COMMERCIAL

## 2019-08-19 VITALS
DIASTOLIC BLOOD PRESSURE: 82 MMHG | BODY MASS INDEX: 36.79 KG/M2 | WEIGHT: 257 LBS | HEIGHT: 70 IN | SYSTOLIC BLOOD PRESSURE: 138 MMHG | HEART RATE: 84 BPM

## 2019-08-19 DIAGNOSIS — S86.012A ACHILLES RUPTURE, LEFT, INITIAL ENCOUNTER: Primary | ICD-10-CM

## 2019-08-19 DIAGNOSIS — S86.012A ACHILLES RUPTURE, LEFT, INITIAL ENCOUNTER: ICD-10-CM

## 2019-08-19 DIAGNOSIS — M76.62 ACHILLES TENDINITIS OF LEFT LOWER EXTREMITY: ICD-10-CM

## 2019-08-19 PROCEDURE — 73610 X-RAY EXAM OF ANKLE: CPT

## 2019-08-19 PROCEDURE — 99213 OFFICE O/P EST LOW 20 MIN: CPT | Performed by: ORTHOPAEDIC SURGERY

## 2019-08-19 NOTE — TELEPHONE ENCOUNTER
Spoke to daughter ,informed her there are many reasons for a jump in the PSA  Evaluation by a Urologist would be reccommended to follow pt  and determine course of treatment

## 2019-08-19 NOTE — PROGRESS NOTES
Ortho Sports Medicine New Patient Ankle Visit    Assesment:  77year old Male possible achilles rupture, DOI May 18, 2019    Plan:    1  MRI of the Left ankle ordered to r/o achilles rupture  2  Depending on results of MRI may consider referral to [facility] given >3 months since injury  3  Ice and analgesics as needed    Follow up: Return for 1 week to go over MRI results   No chief complaint on file  History of Present Illness: The patient is a 77 y o  male whose occupation is retired referred to me by their primary care physician, seen in clinic for consultation of left ankle pain  Pain is located posterior  The patient rates the pain as a 8/10  The pain has been present for 3 months  The patient sustained an injury on May 18, 2019  The mechanism of injury was a fall  He was going up the stairs, missed a step, and all his weight came down onto the left forefoot  He immediately had sharp stabbing pain from the posterior aspect of the knee all the way down to the toes  Then he experienced numbness along this same distribution  He stated he went home iced, elevated, and took ibuprofen  He tried at home exercise stretches with thera-bands that created more pain  He went to his PCP who advised formal PT, he attended 2 sessions and the therapist advised him to stop PT  At the visit today, he complains of a posterior lump and defect over the distal achilles  He is unable to plantar flex the ankle  He walks with a limp stating he can't push off with the toes  The pain is characterized as sharp, stabbing  The pain is present at all times  Pain is improved by rest   Pain is aggravated by stairs, weight bearing and walking  Symptoms include swelling  The patient has tried rest, ice, NSAIDS and physical therapy            Ankle Surgical History:  None      Past Medical, Social and Family History:  Past Medical History:   Diagnosis Date    Acute gouty arthritis     last assessed 6/1/13     Anxiety     last assessed 7/11/14     Chronic thoracic spine pain     last assessed 5/13/16     Dysfunction of both eustachian tubes     last assessed 8/16/13    Erectile dysfunction of non-organic origin     last assessed 10/8/13     Family history reviewed with no changes     medical history     Gout     Hypertension     Sebaceous cyst     last assessed 12/16/13     Skin lesion     last assessed 1/2/14      Past Surgical History:   Procedure Laterality Date    APPENDECTOMY      12years old    BOWEL RESECTION      CATARACT EXTRACTION      COLONOSCOPY      MOUTH SURGERY      WRIST SURGERY       Allergies   Allergen Reactions    Levaquin [Levofloxacin] Arthralgia     Current Outpatient Medications on File Prior to Visit   Medication Sig Dispense Refill    allopurinol (ZYLOPRIM) 300 mg tablet Take 1 tablet (300 mg total) by mouth daily at bedtime 30 tablet 5    cyanocobalamin (VITAMIN B-12) 1,000 mcg tablet Take by mouth daily      valsartan-hydrochlorothiazide (DIOVAN-HCT) 160-12 5 MG per tablet TAKE 1 TABLET ONCE DAILY  90 tablet 1     No current facility-administered medications on file prior to visit        Social History     Socioeconomic History    Marital status: /Civil Union     Spouse name: Not on file    Number of children: Not on file    Years of education: Not on file    Highest education level: Not on file   Occupational History    Not on file   Social Needs    Financial resource strain: Not on file    Food insecurity:     Worry: Not on file     Inability: Not on file    Transportation needs:     Medical: Not on file     Non-medical: Not on file   Tobacco Use    Smoking status: Former Smoker     Packs/day: 0 50     Years: 20 00     Pack years: 10 00    Smokeless tobacco: Former User   Substance and Sexual Activity    Alcohol use: Yes     Frequency: Monthly or less     Drinks per session: 1 or 2     Binge frequency: Never    Drug use: No    Sexual activity: Not on file   Lifestyle    Physical activity:     Days per week: Not on file     Minutes per session: Not on file    Stress: Not on file   Relationships    Social connections:     Talks on phone: Not on file     Gets together: Not on file     Attends Mosque service: Not on file     Active member of club or organization: Not on file     Attends meetings of clubs or organizations: Not on file     Relationship status: Not on file    Intimate partner violence:     Fear of current or ex partner: Not on file     Emotionally abused: Not on file     Physically abused: Not on file     Forced sexual activity: Not on file   Other Topics Concern    Not on file   Social History Narrative    Not on file         I have reviewed the past medical, surgical, social and family history, medications and allergies as documented in the EMR  Review of systems: ROS is negative other than that noted in the HPI  Constitutional: Negative for fatigue and fever  HENT: Negative for sore throat  Respiratory: Negative for shortness of breath  Cardiovascular: Negative for chest pain  Gastrointestinal: Negative for abdominal pain  Endocrine: Negative for cold intolerance and heat intolerance  Genitourinary: Negative for flank pain  Musculoskeletal: Negative for back pain  Skin: Negative for rash  Allergic/Immunologic: Negative for immunocompromised state  Neurological: Negative for dizziness  Psychiatric/Behavioral: Negative for agitation  Physical Exam:    There were no vitals taken for this visit      General/Constitutional: NAD, well developed, well nourished  HENT: Normocephalic, atraumatic  CV: Intact distal pulses, regular rate  Resp: No respiratory distress or labored breathing  Lymphatic: No lymphadenopathy palpated  Neuro: Alert and Oriented x 3, no focal deficits  Psych: Normal mood, normal affect, normal judgement, normal behavior  Skin: Warm, dry, no rashes, no erythema       Ankle Examination (focused): RIGHT LEFT   ROM:  Full Unable to actively plantar flex   Palpation:  Non tender TTP distal achilles, with palpable defect    Anterior Drawer negative negative   Calcaneal inversion negative negative   Patel Test negative positive     Limp upon gait exam    No subluxation of the peroneal tendons or tenderness to palpation along the peroneal tendons     No pain with palpation or range of motion of midfoot and forefoot bilaterally    Calf  Soft and compressible  bilaterally    LE NV Exam: +2 DP/PT pulses bilaterally  Sensation intact to light touch L2-S1 bilaterally        Ankle Imaging:    X-rays of the left foot/ankle were reviewed, which demonstrate no bony abnormalities or significant degenerative changes  I have reviewed the radiology report and do not currently have a radiology reading from [facility], but will check the result once the reading is performed

## 2019-08-19 NOTE — TELEPHONE ENCOUNTER
Patient not seen by our office  Daughter asking to speak with a clinical staff member for more information regarding urology and elevated PSA  Would be seen in [place] office  Patient PSA jen from 2 6 to 3 8 in 7 months      She can be reached at [phone]

## 2019-08-20 NOTE — TELEPHONE ENCOUNTER
Spoke to patient, he stated she left a message with [facility] to confirm surgery for next Tuesday as scheduled  Stated he is still feeling okay but is worried with will come back if he does not have the procedure

## 2019-08-27 ENCOUNTER — HOSPITAL ENCOUNTER (INPATIENT)
Facility: HOSPITAL | Age: 69
LOS: 1 days | Discharge: HOME | DRG: 473 | End: 2019-08-28
Attending: ORTHOPAEDIC SURGERY | Admitting: ORTHOPAEDIC SURGERY
Payer: COMMERCIAL

## 2019-08-27 ENCOUNTER — APPOINTMENT (OUTPATIENT)
Dept: RADIOLOGY | Facility: HOSPITAL | Age: 69
DRG: 473 | End: 2019-08-27
Payer: COMMERCIAL

## 2019-08-27 DIAGNOSIS — Z98.1 S/P CERVICAL SPINAL FUSION: Primary | ICD-10-CM

## 2019-08-27 LAB
ABO GROUP BLD: NORMAL
BLD GP AB SCN SERPL QL: NEGATIVE
RH BLD: POSITIVE
SPECIMEN EXPIRATION DATE: NORMAL

## 2019-08-27 PROCEDURE — 72040 X-RAY EXAM NECK SPINE 2-3 VW: CPT

## 2019-08-27 PROCEDURE — 20931 SP BONE ALGRFT STRUCT ADD-ON: CPT | Performed by: ORTHOPAEDIC SURGERY

## 2019-08-27 PROCEDURE — 01N10ZZ RELEASE CERVICAL NERVE, OPEN APPROACH: ICD-10-PCS | Performed by: ORTHOPAEDIC SURGERY

## 2019-08-27 PROCEDURE — C1713 ANCHOR/SCREW BN/BN,TIS/BN: HCPCS | Performed by: ORTHOPAEDIC SURGERY

## 2019-08-27 PROCEDURE — 22845 INSERT SPINE FIXATION DEVICE: CPT | Performed by: ORTHOPAEDIC SURGERY

## 2019-08-27 PROCEDURE — G0379 DIRECT REFER HOSPITAL OBSERV: HCPCS

## 2019-08-27 PROCEDURE — 22551 ARTHRD ANT NTRBDY CERVICAL: CPT | Performed by: ORTHOPAEDIC SURGERY

## 2019-08-27 PROCEDURE — 4A11X4G MONITORING OF PERIPHERAL NERVOUS ELECTRICAL ACTIVITY, INTRAOPERATIVE, EXTERNAL APPROACH: ICD-10-PCS | Performed by: ORTHOPAEDIC SURGERY

## 2019-08-27 PROCEDURE — 0RG10K0 FUSION OF CERVICAL VERTEBRAL JOINT WITH NONAUTOLOGOUS TISSUE SUBSTITUTE, ANTERIOR APPROACH, ANTERIOR COLUMN, OPEN APPROACH: ICD-10-PCS | Performed by: ORTHOPAEDIC SURGERY

## 2019-08-27 PROCEDURE — 86900 BLOOD TYPING SEROLOGIC ABO: CPT | Performed by: ORTHOPAEDIC SURGERY

## 2019-08-27 PROCEDURE — 86901 BLOOD TYPING SEROLOGIC RH(D): CPT | Performed by: ORTHOPAEDIC SURGERY

## 2019-08-27 PROCEDURE — 86850 RBC ANTIBODY SCREEN: CPT | Performed by: ORTHOPAEDIC SURGERY

## 2019-08-27 PROCEDURE — 0RT30ZZ RESECTION OF CERVICAL VERTEBRAL DISC, OPEN APPROACH: ICD-10-PCS | Performed by: ORTHOPAEDIC SURGERY

## 2019-08-27 DEVICE — VARIABLE, SELF-DRILLING SCREW
Type: IMPLANTABLE DEVICE | Site: SPINE CERVICAL | Status: FUNCTIONAL
Brand: REFLEX HYBRID

## 2019-08-27 DEVICE — ONE-LEVEL PLATE
Type: IMPLANTABLE DEVICE | Site: SPINE CERVICAL | Status: FUNCTIONAL
Brand: REFLEX HYBRID

## 2019-08-27 DEVICE — IMPLANTABLE DEVICE: Type: IMPLANTABLE DEVICE | Site: SPINE CERVICAL | Status: FUNCTIONAL

## 2019-08-27 RX ORDER — ONDANSETRON 2 MG/ML
4 INJECTION INTRAMUSCULAR; INTRAVENOUS EVERY 8 HOURS PRN
Status: DISCONTINUED | OUTPATIENT
Start: 2019-08-27 | End: 2019-08-27 | Stop reason: HOSPADM

## 2019-08-27 RX ORDER — METHOCARBAMOL 750 MG/1
750 TABLET, FILM COATED ORAL EVERY 6 HOURS SCHEDULED
Status: DISCONTINUED | OUTPATIENT
Start: 2019-08-27 | End: 2019-08-28 | Stop reason: HOSPADM

## 2019-08-27 RX ORDER — SUCCINYLCHOLINE/SOD CL,ISO/PF 100 MG/5ML
SYRINGE (ML) INTRAVENOUS AS NEEDED
Status: DISCONTINUED | OUTPATIENT
Start: 2019-08-27 | End: 2019-08-27 | Stop reason: SURG

## 2019-08-27 RX ORDER — PROPOFOL 10 MG/ML
INJECTION, EMULSION INTRAVENOUS AS NEEDED
Status: DISCONTINUED | OUTPATIENT
Start: 2019-08-27 | End: 2019-08-27 | Stop reason: SURG

## 2019-08-27 RX ORDER — MIDAZOLAM HYDROCHLORIDE 1 MG/ML
INJECTION INTRAMUSCULAR; INTRAVENOUS AS NEEDED
Status: DISCONTINUED | OUTPATIENT
Start: 2019-08-27 | End: 2019-08-27 | Stop reason: SURG

## 2019-08-27 RX ORDER — METOCLOPRAMIDE HYDROCHLORIDE 5 MG/ML
10 INJECTION INTRAMUSCULAR; INTRAVENOUS EVERY 4 HOURS PRN
Status: DISCONTINUED | OUTPATIENT
Start: 2019-08-27 | End: 2019-08-27

## 2019-08-27 RX ORDER — OXYCODONE HYDROCHLORIDE 10 MG/1
10 TABLET ORAL EVERY 4 HOURS PRN
Status: DISCONTINUED | OUTPATIENT
Start: 2019-08-27 | End: 2019-08-28 | Stop reason: HOSPADM

## 2019-08-27 RX ORDER — DEXAMETHASONE SODIUM PHOSPHATE 10 MG/ML
10 INJECTION, SOLUTION INTRAMUSCULAR; INTRAVENOUS EVERY 12 HOURS SCHEDULED
Status: COMPLETED | OUTPATIENT
Start: 2019-08-27 | End: 2019-08-28

## 2019-08-27 RX ORDER — GABAPENTIN 300 MG/1
300 CAPSULE ORAL ONCE
Status: COMPLETED | OUTPATIENT
Start: 2019-08-27 | End: 2019-08-27

## 2019-08-27 RX ORDER — FENTANYL CITRATE/PF 50 MCG/ML
25 SYRINGE (ML) INJECTION
Status: DISCONTINUED | OUTPATIENT
Start: 2019-08-27 | End: 2019-08-27

## 2019-08-27 RX ORDER — CEFAZOLIN SODIUM 2 G/50ML
2000 SOLUTION INTRAVENOUS ONCE
Status: COMPLETED | OUTPATIENT
Start: 2019-08-27 | End: 2019-08-27

## 2019-08-27 RX ORDER — VALSARTAN AND HYDROCHLOROTHIAZIDE 160; 12.5 MG/1; MG/1
1 TABLET, FILM COATED ORAL DAILY
Status: DISCONTINUED | OUTPATIENT
Start: 2019-08-28 | End: 2019-08-27 | Stop reason: CLARIF

## 2019-08-27 RX ORDER — SODIUM CHLORIDE 9 MG/ML
INJECTION, SOLUTION INTRAVENOUS CONTINUOUS PRN
Status: DISCONTINUED | OUTPATIENT
Start: 2019-08-27 | End: 2019-08-27 | Stop reason: SURG

## 2019-08-27 RX ORDER — SODIUM CHLORIDE, SODIUM LACTATE, POTASSIUM CHLORIDE, CALCIUM CHLORIDE 600; 310; 30; 20 MG/100ML; MG/100ML; MG/100ML; MG/100ML
100 INJECTION, SOLUTION INTRAVENOUS CONTINUOUS
Status: DISPENSED | OUTPATIENT
Start: 2019-08-27 | End: 2019-08-28

## 2019-08-27 RX ORDER — SODIUM CHLORIDE, SODIUM LACTATE, POTASSIUM CHLORIDE, CALCIUM CHLORIDE 600; 310; 30; 20 MG/100ML; MG/100ML; MG/100ML; MG/100ML
100 INJECTION, SOLUTION INTRAVENOUS CONTINUOUS
Status: DISCONTINUED | OUTPATIENT
Start: 2019-08-27 | End: 2019-08-28 | Stop reason: HOSPADM

## 2019-08-27 RX ORDER — ACETAMINOPHEN 325 MG/1
975 TABLET ORAL EVERY 6 HOURS PRN
Status: DISCONTINUED | OUTPATIENT
Start: 2019-08-27 | End: 2019-08-28 | Stop reason: HOSPADM

## 2019-08-27 RX ORDER — TRAMADOL HYDROCHLORIDE 50 MG/1
50 TABLET ORAL EVERY 6 HOURS PRN
Status: DISCONTINUED | OUTPATIENT
Start: 2019-08-27 | End: 2019-08-28 | Stop reason: HOSPADM

## 2019-08-27 RX ORDER — SULFAMETHOXAZOLE AND TRIMETHOPRIM 800; 160 MG/1; MG/1
1 TABLET ORAL 2 TIMES DAILY
Qty: 10 TABLET | Refills: 0 | Status: SHIPPED | OUTPATIENT
Start: 2019-08-27 | End: 2019-09-01

## 2019-08-27 RX ORDER — OXYCODONE HYDROCHLORIDE 5 MG/1
5 TABLET ORAL EVERY 6 HOURS PRN
Qty: 20 TABLET | Refills: 0 | Status: SHIPPED | OUTPATIENT
Start: 2019-08-27 | End: 2019-11-11

## 2019-08-27 RX ORDER — CHLORHEXIDINE GLUCONATE 0.12 MG/ML
15 RINSE ORAL ONCE
Status: COMPLETED | OUTPATIENT
Start: 2019-08-27 | End: 2019-08-27

## 2019-08-27 RX ORDER — CEFAZOLIN SODIUM 2 G/50ML
2000 SOLUTION INTRAVENOUS EVERY 8 HOURS
Status: COMPLETED | OUTPATIENT
Start: 2019-08-27 | End: 2019-08-28

## 2019-08-27 RX ORDER — ONDANSETRON 2 MG/ML
4 INJECTION INTRAMUSCULAR; INTRAVENOUS EVERY 4 HOURS PRN
Status: DISCONTINUED | OUTPATIENT
Start: 2019-08-27 | End: 2019-08-27

## 2019-08-27 RX ORDER — KETAMINE HYDROCHLORIDE 50 MG/ML
INJECTION, SOLUTION, CONCENTRATE INTRAMUSCULAR; INTRAVENOUS AS NEEDED
Status: DISCONTINUED | OUTPATIENT
Start: 2019-08-27 | End: 2019-08-27 | Stop reason: SURG

## 2019-08-27 RX ORDER — SENNOSIDES 8.6 MG
1 TABLET ORAL DAILY
Status: DISCONTINUED | OUTPATIENT
Start: 2019-08-28 | End: 2019-08-28 | Stop reason: HOSPADM

## 2019-08-27 RX ORDER — HYDROMORPHONE HCL/PF 1 MG/ML
0.2 SYRINGE (ML) INJECTION
Status: DISCONTINUED | OUTPATIENT
Start: 2019-08-27 | End: 2019-08-27

## 2019-08-27 RX ORDER — HYDROMORPHONE HCL/PF 1 MG/ML
0.4 SYRINGE (ML) INJECTION
Status: DISCONTINUED | OUTPATIENT
Start: 2019-08-27 | End: 2019-08-27

## 2019-08-27 RX ORDER — FENTANYL CITRATE 50 UG/ML
INJECTION, SOLUTION INTRAMUSCULAR; INTRAVENOUS AS NEEDED
Status: DISCONTINUED | OUTPATIENT
Start: 2019-08-27 | End: 2019-08-27 | Stop reason: SURG

## 2019-08-27 RX ORDER — LOSARTAN POTASSIUM 50 MG/1
100 TABLET ORAL DAILY
Status: DISCONTINUED | OUTPATIENT
Start: 2019-08-28 | End: 2019-08-28 | Stop reason: HOSPADM

## 2019-08-27 RX ORDER — PROPOFOL 10 MG/ML
INJECTION, EMULSION INTRAVENOUS CONTINUOUS PRN
Status: DISCONTINUED | OUTPATIENT
Start: 2019-08-27 | End: 2019-08-27 | Stop reason: SURG

## 2019-08-27 RX ORDER — OXYCODONE HYDROCHLORIDE 5 MG/1
5 TABLET ORAL EVERY 4 HOURS PRN
Status: DISCONTINUED | OUTPATIENT
Start: 2019-08-27 | End: 2019-08-27

## 2019-08-27 RX ORDER — ALLOPURINOL 300 MG/1
300 TABLET ORAL
Status: DISCONTINUED | OUTPATIENT
Start: 2019-08-27 | End: 2019-08-28 | Stop reason: HOSPADM

## 2019-08-27 RX ORDER — MORPHINE SULFATE 10 MG/ML
2 INJECTION, SOLUTION INTRAMUSCULAR; INTRAVENOUS EVERY 2 HOUR PRN
Status: DISCONTINUED | OUTPATIENT
Start: 2019-08-27 | End: 2019-08-27 | Stop reason: CLARIF

## 2019-08-27 RX ORDER — HYDROCHLOROTHIAZIDE 12.5 MG/1
12.5 TABLET ORAL DAILY
Status: DISCONTINUED | OUTPATIENT
Start: 2019-08-28 | End: 2019-08-28 | Stop reason: HOSPADM

## 2019-08-27 RX ORDER — METHOCARBAMOL 500 MG/1
500 TABLET, FILM COATED ORAL 4 TIMES DAILY PRN
Qty: 20 TABLET | Refills: 0 | Status: SHIPPED | OUTPATIENT
Start: 2019-08-27 | End: 2019-11-11

## 2019-08-27 RX ORDER — ACETAMINOPHEN 325 MG/1
975 TABLET ORAL ONCE
Status: COMPLETED | OUTPATIENT
Start: 2019-08-27 | End: 2019-08-27

## 2019-08-27 RX ORDER — SODIUM CHLORIDE, SODIUM LACTATE, POTASSIUM CHLORIDE, CALCIUM CHLORIDE 600; 310; 30; 20 MG/100ML; MG/100ML; MG/100ML; MG/100ML
INJECTION, SOLUTION INTRAVENOUS CONTINUOUS PRN
Status: DISCONTINUED | OUTPATIENT
Start: 2019-08-27 | End: 2019-08-27 | Stop reason: SURG

## 2019-08-27 RX ORDER — DOCUSATE SODIUM 100 MG/1
100 CAPSULE, LIQUID FILLED ORAL 2 TIMES DAILY
Status: DISCONTINUED | OUTPATIENT
Start: 2019-08-27 | End: 2019-08-28 | Stop reason: HOSPADM

## 2019-08-27 RX ORDER — ONDANSETRON 2 MG/ML
4 INJECTION INTRAMUSCULAR; INTRAVENOUS EVERY 6 HOURS PRN
Status: DISCONTINUED | OUTPATIENT
Start: 2019-08-27 | End: 2019-08-28 | Stop reason: HOSPADM

## 2019-08-27 RX ORDER — GABAPENTIN 300 MG/1
300 CAPSULE ORAL 2 TIMES DAILY
Qty: 60 CAPSULE | Refills: 0 | Status: SHIPPED | OUTPATIENT
Start: 2019-08-27 | End: 2019-11-11

## 2019-08-27 RX ORDER — ONDANSETRON 2 MG/ML
INJECTION INTRAMUSCULAR; INTRAVENOUS AS NEEDED
Status: DISCONTINUED | OUTPATIENT
Start: 2019-08-27 | End: 2019-08-27 | Stop reason: SURG

## 2019-08-27 RX ADMIN — SODIUM CHLORIDE: 0.9 INJECTION, SOLUTION INTRAVENOUS at 07:55

## 2019-08-27 RX ADMIN — KETAMINE HYDROCHLORIDE 10 MG: 50 INJECTION, SOLUTION INTRAMUSCULAR; INTRAVENOUS at 10:14

## 2019-08-27 RX ADMIN — SODIUM CHLORIDE, SODIUM LACTATE, POTASSIUM CHLORIDE, AND CALCIUM CHLORIDE 100 ML/HR: .6; .31; .03; .02 INJECTION, SOLUTION INTRAVENOUS at 11:02

## 2019-08-27 RX ADMIN — ALLOPURINOL 300 MG: 300 TABLET ORAL at 21:58

## 2019-08-27 RX ADMIN — ONDANSETRON 4 MG: 2 INJECTION INTRAMUSCULAR; INTRAVENOUS at 18:41

## 2019-08-27 RX ADMIN — CEFAZOLIN SODIUM 2000 MG: 2 SOLUTION INTRAVENOUS at 08:06

## 2019-08-27 RX ADMIN — PHENYLEPHRINE HYDROCHLORIDE 200 MCG: 10 INJECTION INTRAVENOUS at 07:58

## 2019-08-27 RX ADMIN — PROPOFOL 50 MG: 10 INJECTION, EMULSION INTRAVENOUS at 08:14

## 2019-08-27 RX ADMIN — TRAMADOL HYDROCHLORIDE 50 MG: 50 TABLET, FILM COATED ORAL at 13:45

## 2019-08-27 RX ADMIN — PHENYLEPHRINE HYDROCHLORIDE 100 MCG: 10 INJECTION INTRAVENOUS at 07:55

## 2019-08-27 RX ADMIN — CYANOCOBALAMIN TAB 500 MCG 1000 MCG: 500 TAB at 16:53

## 2019-08-27 RX ADMIN — KETAMINE HYDROCHLORIDE 20 MG: 50 INJECTION, SOLUTION INTRAMUSCULAR; INTRAVENOUS at 08:05

## 2019-08-27 RX ADMIN — FENTANYL CITRATE 50 MCG: 50 INJECTION, SOLUTION INTRAMUSCULAR; INTRAVENOUS at 08:53

## 2019-08-27 RX ADMIN — PROPOFOL 50 MG: 10 INJECTION, EMULSION INTRAVENOUS at 07:49

## 2019-08-27 RX ADMIN — MORPHINE SULFATE 2 MG: 10 INJECTION, SOLUTION INTRAMUSCULAR; INTRAVENOUS at 14:50

## 2019-08-27 RX ADMIN — PHENYLEPHRINE HYDROCHLORIDE 80 MCG/MIN: 10 INJECTION INTRAVENOUS at 08:00

## 2019-08-27 RX ADMIN — HYDROMORPHONE HYDROCHLORIDE 0.2 MG: 1 INJECTION, SOLUTION INTRAMUSCULAR; INTRAVENOUS; SUBCUTANEOUS at 11:25

## 2019-08-27 RX ADMIN — MIDAZOLAM 2 MG: 1 INJECTION INTRAMUSCULAR; INTRAVENOUS at 07:33

## 2019-08-27 RX ADMIN — METHOCARBAMOL 750 MG: 750 TABLET, FILM COATED ORAL at 15:24

## 2019-08-27 RX ADMIN — FENTANYL CITRATE 25 MCG: 50 INJECTION, SOLUTION INTRAMUSCULAR; INTRAVENOUS at 11:10

## 2019-08-27 RX ADMIN — ACETAMINOPHEN 975 MG: 325 TABLET ORAL at 06:34

## 2019-08-27 RX ADMIN — DEXAMETHASONE SODIUM PHOSPHATE 10 MG: 10 INJECTION, SOLUTION INTRAMUSCULAR; INTRAVENOUS at 21:58

## 2019-08-27 RX ADMIN — SODIUM CHLORIDE, SODIUM LACTATE, POTASSIUM CHLORIDE, AND CALCIUM CHLORIDE: .6; .31; .03; .02 INJECTION, SOLUTION INTRAVENOUS at 07:33

## 2019-08-27 RX ADMIN — SODIUM CHLORIDE, SODIUM LACTATE, POTASSIUM CHLORIDE, AND CALCIUM CHLORIDE 100 ML/HR: .6; .31; .03; .02 INJECTION, SOLUTION INTRAVENOUS at 23:31

## 2019-08-27 RX ADMIN — FENTANYL CITRATE 50 MCG: 50 INJECTION, SOLUTION INTRAMUSCULAR; INTRAVENOUS at 07:40

## 2019-08-27 RX ADMIN — PHENYLEPHRINE HYDROCHLORIDE 100 MCG: 10 INJECTION INTRAVENOUS at 09:02

## 2019-08-27 RX ADMIN — PHENYLEPHRINE HYDROCHLORIDE 50 MCG: 10 INJECTION INTRAVENOUS at 10:08

## 2019-08-27 RX ADMIN — OXYCODONE HYDROCHLORIDE 10 MG: 10 TABLET ORAL at 16:53

## 2019-08-27 RX ADMIN — PROPOFOL 100 MCG/KG/MIN: 10 INJECTION, EMULSION INTRAVENOUS at 08:04

## 2019-08-27 RX ADMIN — PROPOFOL 180 MG: 10 INJECTION, EMULSION INTRAVENOUS at 07:40

## 2019-08-27 RX ADMIN — ONDANSETRON 4 MG: 2 INJECTION INTRAMUSCULAR; INTRAVENOUS at 10:05

## 2019-08-27 RX ADMIN — METHOCARBAMOL 750 MG: 750 TABLET, FILM COATED ORAL at 23:31

## 2019-08-27 RX ADMIN — CEFAZOLIN SODIUM 2000 MG: 2 SOLUTION INTRAVENOUS at 17:30

## 2019-08-27 RX ADMIN — SODIUM CHLORIDE, SODIUM LACTATE, POTASSIUM CHLORIDE, AND CALCIUM CHLORIDE 100 ML/HR: .6; .31; .03; .02 INJECTION, SOLUTION INTRAVENOUS at 12:28

## 2019-08-27 RX ADMIN — DEXMEDETOMIDINE HYDROCHLORIDE 0.1 MCG/KG/HR: 100 INJECTION, SOLUTION INTRAVENOUS at 08:25

## 2019-08-27 RX ADMIN — CHLORHEXIDINE GLUCONATE 0.12% ORAL RINSE 15 ML: 1.2 LIQUID ORAL at 06:34

## 2019-08-27 RX ADMIN — FENTANYL CITRATE 25 MCG: 50 INJECTION, SOLUTION INTRAMUSCULAR; INTRAVENOUS at 11:00

## 2019-08-27 RX ADMIN — Medication 150 MG: at 07:40

## 2019-08-27 RX ADMIN — KETAMINE HYDROCHLORIDE 10 MG: 50 INJECTION, SOLUTION INTRAMUSCULAR; INTRAVENOUS at 09:25

## 2019-08-27 RX ADMIN — SODIUM CHLORIDE, SODIUM LACTATE, POTASSIUM CHLORIDE, AND CALCIUM CHLORIDE: .6; .31; .03; .02 INJECTION, SOLUTION INTRAVENOUS at 09:09

## 2019-08-27 RX ADMIN — GABAPENTIN 300 MG: 300 CAPSULE ORAL at 06:35

## 2019-08-27 RX ADMIN — REMIFENTANIL HYDROCHLORIDE 0.05 MCG/KG/MIN: 1 INJECTION, POWDER, LYOPHILIZED, FOR SOLUTION INTRAVENOUS at 08:24

## 2019-08-27 RX ADMIN — KETAMINE HYDROCHLORIDE 10 MG: 50 INJECTION, SOLUTION INTRAMUSCULAR; INTRAVENOUS at 09:57

## 2019-08-27 NOTE — INTERVAL H&P NOTE
H&P reviewed  After examining the patient I find no changes in the patients condition since the H&P had been written  Vitals:    08/27/19 0628   BP: 134/83   Pulse: 77   Resp: 16   Temp: 98 1 °F (36 7 °C)   SpO2: 95%     Assessment and Plan:  Patient is seen and examined  I agree with the above noted plan of care by resident  Patient reports debilitating right upper extremity pain with associated numbness and tingling  He has been diagnosed multilevel cervical DDD as well as significant leftt-sided stenosis at C5-6  Symptoms are refractory to conservative management including physical therapy and epidural steroid injections  He is interested in more definitive intervention      Thorough discussion is had with the patient regarding the risk and benefits of anterior cervical diskectomy and fusion at C5-6 possible additional levels    He understands risks to include but not limited to dysphagia, bleeding, infection, hardware complications, possible persistent symptoms, possible need for reoperation  He will need medical clearance

## 2019-08-27 NOTE — PLAN OF CARE
Problem: Potential for Falls  Goal: Patient will remain free of falls  Description  INTERVENTIONS:  - Assess patient frequently for physical needs  -  Identify cognitive and physical deficits and behaviors that affect risk of falls    -  [place] fall precautions as indicated by assessment   - Educate patient/family on patient safety including physical limitations  - Instruct patient to call for assistance with activity based on assessment  - Modify environment to reduce risk of injury  - Consider OT/PT consult to assist with strengthening/mobility  Outcome: Progressing     Problem: PAIN - ADULT  Goal: Verbalizes/displays adequate comfort level or baseline comfort level  Description  Interventions:  - Encourage patient to monitor pain and request assistance  - Assess pain using appropriate pain scale  - Administer analgesics based on type and severity of pain and evaluate response  - Implement non-pharmacological measures as appropriate and evaluate response  - Consider cultural and social influences on pain and pain management  - Notify physician/advanced practitioner if interventions unsuccessful or patient reports new pain  Outcome: Progressing     Problem: INFECTION - ADULT  Goal: Absence or prevention of progression during hospitalization  Description  INTERVENTIONS:  - Assess and monitor for signs and symptoms of infection  - Monitor lab/diagnostic results  - Monitor all insertion sites, i e  indwelling lines, tubes, and drains  - Monitor endotracheal if appropriate and nasal secretions for changes in amount and color  - [place] appropriate cooling/warming therapies per order  - Administer medications as ordered  - Instruct and encourage patient and family to use good hand hygiene technique  - Identify and instruct in appropriate isolation precautions for identified infection/condition  Outcome: Progressing  Goal: Absence of fever/infection during neutropenic period  Description  INTERVENTIONS:  - Monitor WBC    Outcome: Progressing     Problem: SAFETY ADULT  Goal: Maintain or return to baseline ADL function  Description  INTERVENTIONS:  -  Assess patient's ability to carry out ADLs; assess patient's baseline for ADL function and identify physical deficits which impact ability to perform ADLs (bathing, care of mouth/teeth, toileting, grooming, dressing, etc )  - Assess/evaluate cause of self-care deficits   - Assess range of motion  - Assess patient's mobility; develop plan if impaired  - Assess patient's need for assistive devices and provide as appropriate  - Encourage maximum independence but intervene and supervise when necessary  - Involve family in performance of ADLs  - Assess for home care needs following discharge   - Consider OT consult to assist with ADL evaluation and planning for discharge  - Provide patient education as appropriate  Outcome: Progressing  Goal: Maintain or return mobility status to optimal level  Description  INTERVENTIONS:  - Assess patient's baseline mobility status (ambulation, transfers, stairs, etc )    - Identify cognitive and physical deficits and behaviors that affect mobility  - Identify mobility aids required to assist with transfers and/or ambulation (gait belt, sit-to-stand, lift, walker, cane, etc )  - [place] fall precautions as indicated by assessment  - Record patient progress and toleration of activity level on Mobility SBAR; progress patient to next Phase/Stage  - Instruct patient to call for assistance with activity based on assessment  - Consider rehabilitation consult to assist with strengthening/weightbearing, etc   Outcome: Progressing

## 2019-08-27 NOTE — DISCHARGE INSTRUCTIONS
Discharge Instructions - Orthopedics  Tian Saunders 77 y o  male MRN: [id number]  Unit/Bed#: [facility] 16    Weight Bearing Status:                                           Full weight bearing to both legs  Light use of arms  Cervical Collar:  Must wear cervical collar at all times  OK to remove cervical collar at night and replace with soft collar, ONLY if single level fusion (Ex  ACDF C5-6)  DVT prophylaxis:  DO NOT take blood thinners until cleared by surgeon    Pain:  Continue analgesics as directed    Showering Instructions:   Do not shower until 2-3 days after the procedure  Dressing Instructions:   Keep surgical incision clean and dry at all times  A dry atmosphere helps the incision heal   No soaking or scrubbing of wound at any time  No ointment or soap on the incision  May change dressing in 2-3 days, apply dry bandage  OK to change the dressing if saturated  Driving Instructions:  No driving until cleared by Orthopaedic Surgery  PT/OT:  No PT/OT required until directed by surgeon at followup appointment    Appt Instructions: If you do not have your appointment, please call the clinic at [phone]  Otherwise followup as scheduled below: Follow-up with Dr Yamilka Lim in the office in 2 weeks  Contact the office sooner if you experience any increased numbness/tingling in the extremities  Miscellaneous:  No lifting greater than 5 lbs  No strenuous exercise  No repetitive lifting or bending    Maintain a soft diet (i e  Pudding, jello, soup, etc )

## 2019-08-27 NOTE — OP NOTE
OPERATIVE REPORT  PATIENT NAME: Mi Healy    :  1950  MRN: [id number]  Pt Location: [facility] OR ROOM 18    SURGERY DATE: 2019    Surgeon(s) and Role:     * Karen Bowling MD - Primary     * Demetrius Wallace MD - [address] [place] [address] - Assisting    Preop Diagnosis:  Cervical radiculitis [M54 12]  Cervical foraminal stenosis left C5-6    Post-Op Diagnosis Codes:     * Cervical radiculitis [M54 12]  Cervical foraminal stenosis left C5-6    Procedure(s) (LRB): Anterior cervical discectomy w fusion C5-6, with allograft and neuromonitoring (N/A)    Specimen(s):  * No specimens in log *    Estimated Blood Loss:   20 mL    Drains:  NG/OG/Enteral Tube Orogastric 18 Fr Center mouth (Active)   Number of days: 0       Anesthesia Type:   General    Operative Indications:  Cervical radiculitis [M54 12]  Cervical foraminal stenosis left C5-6    Operative Findings:  Cervical foraminal stenosis left C5-6  Large body habitus    Complications:   None    Procedure and Technique:    Anterior cervical diskectomy with instrumented fusion C5-6 with allograft and neural monitoring  Patient was correctly identified by both the anesthesia department and myself  The left neck was marked  He was taken to the operating room where general anesthesia was induced in the supine position  SCDs were attached was legs  Neuro monitoring leads had been placed  IV antibiotics were administered preoperatively  He was positioned supine onto radiolucent table with the neck in a horseshoe in minimal extension  The shoulders were taped down to facilitate radiographic exposure  AP and lateral imaging demonstrated with the incision would be made overlying C5-6  The lower cervical segments are poorly visualized secondary to body habitus  The neck was prepped and draped in the usual sterile fashion  Time-out was performed  A 2 cm curvilinear incision was made over the left neck with a 15 blade    Dissection was carried through the subcutaneous layer and platysma LEs  Flaps were raised  The anterior border of the sternocleidomastoid muscle was identified  The interval between this and the medial strap muscles was developed  The omohyoid muscle was identified and divided within its mid belly  The anterior bony spine was palpated  Eloy Glass retractors used to tease the fascial off the vertebral bodies anteriorly  Disc space was identified  A marker was placed lateral image confirmed the appropriate level of C5-6  While protecting the carotid sheath laterally and esophagus and larynx medially subperiosteal dissection was performed elevating the longus colli muscles off the vertebral bodies at C5 and C6 respectively  The disc space was clearly delineated  Self-retaining Shadow Line retractors were placed underneath the longus colli muscles  Annulotomy was created  Disc material was removed with pituitary rongeur  Endplates were prepared with the use of a high-speed bur  The posterior annulus was teased off with a pituitary  The posterior longitudinal ligament identified and divided with a micro hook  Complete central decompression was performed with 1  Followed by 2  Kerrison rongeur  Foraminotomy was performed on the left taking down the posterior aspect of the ankles with the use of a 2  Kerrison  Following adequate central and foraminal decompression irrigation was performed to the wound  Hemostasis was achieved  Trial sizing ensued  We trial size up to a 10  A size 10 cortical cancellous bone plug was then selected and malleted into place  A 16 mm Jeanerette reflex hybrid plate was then selected and secured to the C5 and C6 vertebral bodies with 4 0x16 mm screws  AP and lateral imaging confirmed appropriate location of the instrumentation  Final tightening was performed  The wound was once again copiously irrigated  The wound bed was found to be dry    The strap muscle fascial layer was reapproximated with 3 O Vicryl suture  The subcutaneous layer was closed with 2 Vicryl suture  The skin layer was closed in a running subcuticular fashion with 3 0 Monocryl  Suture  Sterile dressings were applied to the wound  Cervical collar was applied  Patient was extubated taking out the operating room in stable condition           I was present for the entire procedure    Patient Disposition:  PACU      Implants: [place] Reflex Hybrid plate [id number]    SIGNATURE: Marguerite Curtis MD  DATE: August 27, 2019  TIME: 10:12 AM

## 2019-08-27 NOTE — ANESTHESIA PROCEDURE NOTES
Arterial Line Insertion  Performed by: Vicente Glasgow MD  Authorized by: Vicente Glasgow MD   Consent: Verbal consent obtained  Written consent obtained  Patient consent: the patient's understanding of the procedure matches consent given  Procedure consent: procedure consent matches procedure scheduled  Relevant documents: relevant documents present and verified  Test results: test results available and properly labeled  Site marked: the operative site was marked  Radiology Images: Radiology Images displayed and confirmed  If images not available, report reviewed  Required items: required blood products, implants, devices, and special equipment available  Patient identity confirmed: verbally with patient  Time out: Immediately prior to procedure a "time out" was called to verify the correct patient, procedure, equipment, support staff and site/side marked as required  Preparation: Patient was prepped and draped in the usual sterile fashion    Indications: hemodynamic monitoring  Orientation:  Right  Location: radial artery  Sedation:  Patient sedated: no    Procedure Details:  Brock's test normal: yes  Needle gauge: 20  Number of attempts: 1    Post-procedure:  Post-procedure: dressing applied  Waveform: good waveform  Post-procedure CNS: unchanged  Patient tolerance: Patient tolerated the procedure well with no immediate complications

## 2019-08-27 NOTE — ANESTHESIA POSTPROCEDURE EVALUATION
Post-Op Assessment Note    CV Status:  Stable    Pain management: adequate     Mental Status:  Alert and awake   Hydration Status:  Euvolemic   PONV Controlled:  Controlled   Airway Patency:  Patent   Post Op Vitals Reviewed: Yes      Staff: Anesthesiologist

## 2019-08-28 VITALS
HEIGHT: 70 IN | BODY MASS INDEX: 36.79 KG/M2 | DIASTOLIC BLOOD PRESSURE: 77 MMHG | WEIGHT: 257 LBS | TEMPERATURE: 98.2 F | RESPIRATION RATE: 18 BRPM | OXYGEN SATURATION: 93 % | HEART RATE: 94 BPM | SYSTOLIC BLOOD PRESSURE: 132 MMHG

## 2019-08-28 PROBLEM — Z98.1 STATUS POST CERVICAL SPINAL FUSION: Status: ACTIVE | Noted: 2019-08-28

## 2019-08-28 LAB
ANION GAP SERPL CALCULATED.3IONS-SCNC: 6 MMOL/L (ref 4–13)
BASOPHILS # BLD AUTO: 0.02 THOUSANDS/ΜL (ref 0–0.1)
BASOPHILS NFR BLD AUTO: 0 % (ref 0–1)
BUN SERPL-MCNC: 11 MG/DL (ref 5–25)
CALCIUM SERPL-MCNC: 9.5 MG/DL (ref 8.3–10.1)
CHLORIDE SERPL-SCNC: 104 MMOL/L (ref 100–108)
CO2 SERPL-SCNC: 29 MMOL/L (ref 21–32)
CREAT SERPL-MCNC: 0.89 MG/DL (ref 0.6–1.3)
EOSINOPHIL # BLD AUTO: 0 THOUSAND/ΜL (ref 0–0.61)
EOSINOPHIL NFR BLD AUTO: 0 % (ref 0–6)
ERYTHROCYTE [DISTWIDTH] IN BLOOD BY AUTOMATED COUNT: 13.2 % (ref 11.6–15.1)
GFR SERPL CREATININE-BSD FRML MDRD: 88 ML/MIN/1.73SQ M
GLUCOSE SERPL-MCNC: 170 MG/DL (ref 65–140)
HCT VFR BLD AUTO: 42.1 % (ref 36.5–49.3)
HGB BLD-MCNC: 14.1 G/DL (ref 12–17)
IMM GRANULOCYTES # BLD AUTO: 0.05 THOUSAND/UL (ref 0–0.2)
IMM GRANULOCYTES NFR BLD AUTO: 1 % (ref 0–2)
LYMPHOCYTES # BLD AUTO: 0.6 THOUSANDS/ΜL (ref 0.6–4.47)
LYMPHOCYTES NFR BLD AUTO: 6 % (ref 14–44)
MCH RBC QN AUTO: 30.3 PG (ref 26.8–34.3)
MCHC RBC AUTO-ENTMCNC: 33.5 G/DL (ref 31.4–37.4)
MCV RBC AUTO: 91 FL (ref 82–98)
MONOCYTES # BLD AUTO: 0.24 THOUSAND/ΜL (ref 0.17–1.22)
MONOCYTES NFR BLD AUTO: 2 % (ref 4–12)
NEUTROPHILS # BLD AUTO: 9.62 THOUSANDS/ΜL (ref 1.85–7.62)
NEUTS SEG NFR BLD AUTO: 91 % (ref 43–75)
NRBC BLD AUTO-RTO: 0 /100 WBCS
PLATELET # BLD AUTO: 257 THOUSANDS/UL (ref 149–390)
PMV BLD AUTO: 10.1 FL (ref 8.9–12.7)
POTASSIUM SERPL-SCNC: 4 MMOL/L (ref 3.5–5.3)
RBC # BLD AUTO: 4.65 MILLION/UL (ref 3.88–5.62)
SODIUM SERPL-SCNC: 139 MMOL/L (ref 136–145)
WBC # BLD AUTO: 10.53 THOUSAND/UL (ref 4.31–10.16)

## 2019-08-28 PROCEDURE — G8987 SELF CARE CURRENT STATUS: HCPCS

## 2019-08-28 PROCEDURE — G8978 MOBILITY CURRENT STATUS: HCPCS

## 2019-08-28 PROCEDURE — G8989 SELF CARE D/C STATUS: HCPCS

## 2019-08-28 PROCEDURE — 97166 OT EVAL MOD COMPLEX 45 MIN: CPT

## 2019-08-28 PROCEDURE — G8979 MOBILITY GOAL STATUS: HCPCS

## 2019-08-28 PROCEDURE — 85025 COMPLETE CBC W/AUTO DIFF WBC: CPT | Performed by: PHYSICIAN ASSISTANT

## 2019-08-28 PROCEDURE — 97163 PT EVAL HIGH COMPLEX 45 MIN: CPT

## 2019-08-28 PROCEDURE — NC001 PR NO CHARGE: Performed by: ORTHOPAEDIC SURGERY

## 2019-08-28 PROCEDURE — NS001 PR NO SIGNATURE OR ATTESTATION: Performed by: ORTHOPAEDIC SURGERY

## 2019-08-28 PROCEDURE — 80048 BASIC METABOLIC PNL TOTAL CA: CPT | Performed by: PHYSICIAN ASSISTANT

## 2019-08-28 PROCEDURE — G8988 SELF CARE GOAL STATUS: HCPCS

## 2019-08-28 RX ADMIN — LOSARTAN POTASSIUM 100 MG: 50 TABLET ORAL at 08:29

## 2019-08-28 RX ADMIN — METHOCARBAMOL 750 MG: 750 TABLET, FILM COATED ORAL at 05:21

## 2019-08-28 RX ADMIN — HYDROCHLOROTHIAZIDE 12.5 MG: 12.5 TABLET ORAL at 08:29

## 2019-08-28 RX ADMIN — SENNOSIDES 8.6 MG: 8.6 TABLET, FILM COATED ORAL at 08:29

## 2019-08-28 RX ADMIN — DEXAMETHASONE SODIUM PHOSPHATE 10 MG: 10 INJECTION, SOLUTION INTRAMUSCULAR; INTRAVENOUS at 08:30

## 2019-08-28 RX ADMIN — CEFAZOLIN SODIUM 2000 MG: 2 SOLUTION INTRAVENOUS at 01:50

## 2019-08-28 RX ADMIN — CYANOCOBALAMIN TAB 500 MCG 1000 MCG: 500 TAB at 08:29

## 2019-08-28 RX ADMIN — DOCUSATE SODIUM 100 MG: 100 CAPSULE, LIQUID FILLED ORAL at 08:29

## 2019-08-28 RX ADMIN — METHOCARBAMOL 750 MG: 750 TABLET, FILM COATED ORAL at 11:23

## 2019-08-28 NOTE — SOCIAL WORK
CM met with pt to discuss CM role in D/C planning  Pt's wife at bedside  Pt reported the following:    Emergency Contact: Wife, Kira Palmer ([phone])  POA/LW: None  Level of assist with ADL's PTA: IND  House or Apt: House  PAULA to enter: 1 PAULA to enter; 15 PAULA to 2nd floor  BATH on 1st floor: 1/2 Bath  DME: Straight cane, shower seat and access to RW  VNA: [employer] per patient  SNF/Rehab: None    Transportation: Drives self  Help at home: Spouse    Pharmacy/Rx Coverage: [facility]  Name of PCP: Dr Fox Comes tx for MH/SA: None    Employment/Income: Retired    Anticipated D/C Plan: Pt anticipates return home with spouse to transport  Pt plans to attend  [facility] for OP therapy after follow up ortho appointment  CM reviewed d/c planning process including the following: identifying help at home, patient preference for d/c planning needs, Discharge Lounge, [facility] Meds to Bed program, availability of treatment team to discuss questions or concerns patient and/or family may have regarding understanding medications and recognizing signs and symptoms once discharged  CM also encouraged patient to follow up with all recommended appointments after discharge  Patient advised of importance for patient and family to participate in managing patients medical well being

## 2019-08-28 NOTE — PHYSICAL THERAPY NOTE
PHYSICAL THERAPY EVALUATION  NAME:  Negrita Smith  DATE: 08/28/19    AGE:   77 y o  Mrn:   [id number]  ADMIT DX:  Cervical radiculitis [M54 12]    Past Medical History:   Diagnosis Date    Acute gouty arthritis     last assessed 6/1/13     Anxiety     last assessed 7/11/14     Chronic thoracic spine pain     last assessed 5/13/16     Dysfunction of both eustachian tubes     last assessed 8/16/13    Erectile dysfunction of non-organic origin     last assessed 10/8/13     Family history reviewed with no changes     medical history     Gout     Hypertension     Sebaceous cyst     last assessed 12/16/13     Skin lesion     last assessed 1/2/14        Past Surgical History:   Procedure Laterality Date    APPENDECTOMY      12years old    BOWEL RESECTION      CATARACT EXTRACTION      CERVICAL FUSION N/A 8/27/2019    Procedure: Anterior cervical discectomy w fusion C5-6, with allograft and neuromonitoring;  Surgeon: Carlos Boothe MD;  [facility]: [facility];  Service: Orthopedics    COLONOSCOPY      MOUTH SURGERY      WRIST SURGERY         Length Of Stay: 1    PHYSICAL THERAPY EVALUATION:        08/28/19 1036   Note Type   Note type Eval only   Pain Assessment   Pain Assessment No/denies pain   Home Living   Type of [facility] Two level; Able to live on main level with bedroom/bathroom   Home Equipment Cane   Additional Comments Pt reports living with spouse who is able to assist pt if needed    Prior Function   Level of [place] Independent with ADLs and functional mobility   Lives With Spouse   Receives Help From Family   ADL Assistance Independent   Falls in the last 6 months 1 to 4  (1 as per pt )   Comments Pt reports the use of a SPC for ambulation PTA    Restrictions/Precautions   Weight Bearing Precautions Per Order No   Braces or Orthoses C/S Collar   Other Precautions Spinal precautions; Fall Risk   General   Additional Pertinent History Pt with hx of L achillies tendon injury however reports ambulating with a SPC since injury    Cognition   Overall Cognitive Status WFL   Arousal/Participation Alert   Attention Within functional limits   Orientation Level Oriented X4   Memory Within functional limits   Following Commands Follows one step commands without difficulty   RUE Assessment   RUE Assessment WFL   LUE Assessment   LUE Assessment WFL   RLE Assessment   RLE Assessment WFL   Strength RLE   RLE Overall Strength 4/5   LLE Assessment   LLE Assessment X  (decreased ankle PF due to achillies injury )   Strength LLE   LLE Overall Strength 4-/5  (at hip and knee )   Bed Mobility   Additional Comments NA, Pt seated OOB at time of PT eval    Transfers   Sit to Stand 5  Supervision   Additional items Increased time required   Stand to Sit 5  Supervision   Additional items Increased time required   Additional Comments VC and TC needed for hand placement and safety    Ambulation/Elevation   Gait pattern Excessively slow; Short stride; Foward flexed; Inconsistent martha   Gait Assistance 5  Supervision   Assistive Device Rolling walker   Distance 75ft x 2    Stair Management Assistance 4  Minimal assist   Additional items Assist x 1   Stair Management Technique Two rails   Number of Stairs 5   Balance   Static Sitting Good   Static Standing Fair   Ambulatory Fair   Endurance Deficit   Endurance Deficit No   Activity Tolerance   Activity Tolerance Patient tolerated treatment well   Medical Staff Made Aware OT [facility]   Nurse Made Aware Pt appropriate to be seen and mobilize per nsg    Assessment   Prognosis Good   Problem List Decreased strength;Decreased range of motion;Decreased endurance; Impaired balance;Decreased mobility;Pain;Orthopedic restrictions   Assessment Pt is 77 y o  male seen for PT evaluation s/p admit to [facility] on 8/27/2019  Two pt identifiers were used to confirm   Pt presented s/p Anterior cervical discectomy w fusion C5-6, with allograft and neuromonitoring (N/A) which was performed on 8/27/2019   Pt was admitted with a primary dx of: s/p cervical spine fusion  PT now consulted for assessment of mobility and d/c needs  Pt with Activity as tolerated orders  Pts current co morbidities effecting treatment include: anxiety, HTN, and personal factors including PAULA home and steps ot manage at home  Pts current clinical presentation is Unstable/ Unpredictable (high complexity) due to Ongoing medical management for primary dx, Increased reliance on more restrictive AD compared to baseline, Decreased activity tolerance compared to baseline, Fall risk, Increased assistance needed from caregiver at current time, Spinal precautions at current time, Continuous pulse oximetry monitoring     Prior to admission, pt was I with ambulation with the use of a SPC as per pt  Upon evaluation, pt currently is requiring ; S for transfers and S for ambulation w/ RW   Pt denies any lightheadedness or dizziness with ambulation  Pt presents at PT eval functioning below baseline and currently w/ overall mobility deficits 2* to: BLE weakness, decreased ROM, impaired balance, decreased endurance, gait deviations, decreased activity tolerance compared to baseline, fall risk, orthopedic restrictions  Pt currently at a fall risk 2* to impairments listed above  Based on the aforementioned PT evaluation, pt will continue to benefit from skilled Acute PT interventions to address stated impairments; to maximize functional mobility; for ongoing pt/ family training; and DME needs  At conclusion of PT session pt returned back in chair with phone and call bell within reach  Pt denies any further questions at this time  PT is currently recommending home with family support, OPPT when appropriate  Pt/ family agreeable to plan and goals as stated on evaluation  PT will continue to follow during hospital stay     Barriers to Discharge None   Barriers to Discharge Comments Pt denies any mobility or safety concerns about returning home    Goals   Patient Goals " to go home"   [facility] Expiration Date 09/07/19   Short Term Goal #1 In 10 days pt will complete: 1) Bed mobility skills with mod I to increase safety and independence as well as decrease caregiver burden  2) Functional transfers with mod I to promote increased independence, safety, and QOL in the home environment  3) Ambulate 200' using least restrictive AD with mod I without LOB and stable vitals so that pt can negotiate home environment safely and promote independence with functional mobility and return to PLOF  4) Stair training up/ down 10 step/s using rail/s with mod I so that pt can enter/negotiate home environment safely and decrease fall risk  5) Improve balance grades to Good to increase safety with all mobility and decrease fall risk  6) Improve BLE strength by 1/2 grade to help increase overall functional mobility and decrease fall risk  7) PT for ongoing pt and family education; DME needs and D/C planning to promote highest level of function in least restrictive environment  Plan   Treatment/Interventions Functional transfer training;LE strengthening/ROM; Elevations; Therapeutic exercise;Cognitive reorientation;Patient/family training;Equipment eval/education; Bed mobility;Gait training;Spoke to nursing;OT;Spoke to case management   PT Frequency Other (Comment)  (3-6x a week )   Recommendation   Recommendation Home with family support; Outpatient PT  (OPPT when appropriate )   PT - OK to Discharge Yes  (when medically cleared )   Additional Comments Pt denies any mobility or safety concerns about returning home    Modified Victoria Scale   Modified Victoria Scale 3   Barthel Index   Feeding 10   Bathing 0   Grooming Score 5   Dressing Score 5   Bladder Score 10   Bowels Score 10   Toilet Use Score 10   Transfers (Bed/Chair) Score 10   Mobility (Level Surface) Score 10   Stairs Score 5   Barthel Index Score 75   Katherin Caruso PT

## 2019-08-28 NOTE — OCCUPATIONAL THERAPY NOTE
[address]  Evaluation     Patient Name: Richie Jimenez  [id number] Date: 8/28/2019  Problem List  Patient Active Problem List   Diagnosis    Rotator cuff syndrome of right shoulder    Exogenous obesity    Combined hyperlipidemia    Benign essential hypertension    Chronic thoracic spine pain    History of gout    Chronic lumbar radiculopathy    Cervical radiculitis    Plantar fasciitis of right foot    Subclinical hypothyroidism    Elevated blood sugar    Eczema    Fatigue    Obstructive sleep apnea    Snoring     Past Medical History  Past Medical History:   Diagnosis Date    Acute gouty arthritis     last assessed 6/1/13     Anxiety     last assessed 7/11/14     Chronic thoracic spine pain     last assessed 5/13/16     Dysfunction of both eustachian tubes     last assessed 8/16/13    Erectile dysfunction of non-organic origin     last assessed 10/8/13     Family history reviewed with no changes     medical history     Gout     Hypertension     Sebaceous cyst     last assessed 12/16/13     Skin lesion     last assessed 1/2/14      Past Surgical History  Past Surgical History:   Procedure Laterality Date    APPENDECTOMY      12years old    BOWEL RESECTION      CATARACT EXTRACTION      COLONOSCOPY      MOUTH SURGERY      WRIST SURGERY           08/28/19 1035   Note Type   Note type Eval only   Restrictions/Precautions   Weight Bearing Precautions Per Order No   Braces or Orthoses C/S Collar   Other Precautions Spinal precautions   Pain Assessment   Pain Assessment No/denies pain   Pain Score No Pain   Home Living   Type of Home House   Home Layout Two level; Able to live on main level with bedroom/bathroom   Bathroom Shower/Tub Tub/shower unit   [address] [place] [address] Dr palma   [address]   Prior Function   Level of [place] Independent with ADLs and functional mobility   Lives With Yasmani Help From Family   ADL Assistance Independent   IADLs Independent   Falls in the last 6 months 1 to 4  (1 recent fall in May-->rupture achilles tendon)   Vocational Retired   Lifestyle   Autonomy pta pt reports I in ADLs/IADLs/functional mobility   Reciprocal Relationships supportive spouse   Service to Others retired   Intrinsic Gratification enjoys watching tv   Psychosocial   Psychosocial (WDL) WDL   Subjective   Subjective "My collar feels a little loose"   ADL   Where Assessed Chair   Eating Assistance 5  Supervision/Setup   Grooming Assistance 5  Supervision/Setup   UB Bathing Assistance 5  Supervision/Setup   LB Bathing Assistance 4  Minimal Assistance   UB Dressing Assistance 5  Supervision/Setup   LB Dressing Assistance 4  [address]   Additional items Increased time required   Stand to Sit 5  Supervision   Additional items Increased time required   Functional Mobility   Functional Mobility 5  Supervision   Additional items Rolling walker   Balance   Static Sitting Good   Dynamic Sitting Fair +   Static Standing Fair   Dynamic Standing [id number]   Activity Tolerance   Activity Tolerance Patient tolerated treatment well   Medical Staff Made Aware PT alexa   Nurse Made Aware RN updated   RUE Assessment   RUE Assessment WFL   LUE Assessment   LUE Assessment WFL   Hand Function   Gross Motor Coordination Functional   Fine Motor Coordination Functional   Cognition   Overall Cognitive Status WFL   Arousal/Participation Arousable; Cooperative   Attention Within functional limits   Orientation Level Oriented X4   Memory Within functional limits   Following Commands Follows all commands and directions without difficulty   Comments pt pleasant and cooperative, educated on spinal precautions w/ G carryover t/o eval   Assessment   Limitation Decreased ADL status; Decreased endurance;Decreased high-level ADLs   Prognosis Good   Assessment Pt is a 77 y o  YO  male admitted to [facility] on 8/27/2019 w/ cervical radiculitis "S/P Anterior cervical discectomy w fusion C5-6, with allograft and neuromonitoring" on 8/27/19  Comorbidities include a h/o recent L achilles tendon tear per patient, gout, HTN, obesity, MANDY, and lumbar radiculopathy   Pt with active OT orders and OOB w/ brace orders  WBAT w/ cervical brace and cervical precautions (reviewed w/ patient & educated on shower collar)   Pt resides in a [facility] with first floor setup  Pt was I w/  ADLS and IADLS, (+) drove, & required use of SPC for tendon tear  Currently pt is supervision for UB ADLs and functional mobility and Min A for LB ADLs  Pt reports support at home from spouse  Pt is limited at this time 2*: endurance, activity tolerance, functional mobility, decreased I w/ ADLS/IADLS and decreased safety awareness  The following Occupational Performance Areas to address include: bathing/shower, toilet hygiene, dressing, functional mobility and household maintenance  Pt scored overall  75/100 on the Barthel Index  Based on the aforementioned OT evaluation, functional performance deficits, and assessments, pt has been identified as a moderate complexity evaluation  From OT standpoint, anticipate d/c home with family support  Recommend continued participation in ADLs and functional mobility w/ staff  No further acute OT needs, d/c OT      Goals   Patient Goals go home   Recommendation   OT Discharge Recommendation Home with family support   OT - OK to Discharge Yes   Barthel Index   Feeding 10   Bathing 0   Grooming Score 5   Dressing Score 5   Bladder Score 10   Bowels Score 10   Toilet Use Score 10   Transfers (Bed/Chair) Score 10   Mobility (Level Surface) Score 10   Stairs Score 5   Barthel Index Score 75   Modified [place] Scale   Modified [place] Scale 3     Enedina Parker MS, OTR/L

## 2019-08-28 NOTE — PLAN OF CARE
Problem: Potential for Falls  Goal: Patient will remain free of falls  Description  INTERVENTIONS:  - Assess patient frequently for physical needs  -  Identify cognitive and physical deficits and behaviors that affect risk of falls    -  [place] fall precautions as indicated by assessment   - Educate patient/family on patient safety including physical limitations  - Instruct patient to call for assistance with activity based on assessment  - Modify environment to reduce risk of injury  - Consider OT/PT consult to assist with strengthening/mobility  Outcome: Adequate for Discharge     Problem: PAIN - ADULT  Goal: Verbalizes/displays adequate comfort level or baseline comfort level  Description  Interventions:  - Encourage patient to monitor pain and request assistance  - Assess pain using appropriate pain scale  - Administer analgesics based on type and severity of pain and evaluate response  - Implement non-pharmacological measures as appropriate and evaluate response  - Consider cultural and social influences on pain and pain management  - Notify physician/advanced practitioner if interventions unsuccessful or patient reports new pain  Outcome: Adequate for Discharge     Problem: INFECTION - ADULT  Goal: Absence or prevention of progression during hospitalization  Description  INTERVENTIONS:  - Assess and monitor for signs and symptoms of infection  - Monitor lab/diagnostic results  - Monitor all insertion sites, i e  indwelling lines, tubes, and drains  - Monitor endotracheal if appropriate and nasal secretions for changes in amount and color  - [place] appropriate cooling/warming therapies per order  - Administer medications as ordered  - Instruct and encourage patient and family to use good hand hygiene technique  - Identify and instruct in appropriate isolation precautions for identified infection/condition  Outcome: Adequate for Discharge  Goal: Absence of fever/infection during neutropenic period  Description  INTERVENTIONS:  - Monitor WBC    Outcome: Adequate for Discharge     Problem: SAFETY ADULT  Goal: Maintain or return to baseline ADL function  Description  INTERVENTIONS:  -  Assess patient's ability to carry out ADLs; assess patient's baseline for ADL function and identify physical deficits which impact ability to perform ADLs (bathing, care of mouth/teeth, toileting, grooming, dressing, etc )  - Assess/evaluate cause of self-care deficits   - Assess range of motion  - Assess patient's mobility; develop plan if impaired  - Assess patient's need for assistive devices and provide as appropriate  - Encourage maximum independence but intervene and supervise when necessary  - Involve family in performance of ADLs  - Assess for home care needs following discharge   - Consider OT consult to assist with ADL evaluation and planning for discharge  - Provide patient education as appropriate  Outcome: Adequate for Discharge  Goal: Maintain or return mobility status to optimal level  Description  INTERVENTIONS:  - Assess patient's baseline mobility status (ambulation, transfers, stairs, etc )    - Identify cognitive and physical deficits and behaviors that affect mobility  - Identify mobility aids required to assist with transfers and/or ambulation (gait belt, sit-to-stand, lift, walker, cane, etc )  - [place] fall precautions as indicated by assessment  - Record patient progress and toleration of activity level on Mobility SBAR; progress patient to next Phase/Stage  - Instruct patient to call for assistance with activity based on assessment  - Consider rehabilitation consult to assist with strengthening/weightbearing, etc   Outcome: Adequate for Discharge

## 2019-08-28 NOTE — PROGRESS NOTES
Subjective: No acute events overnight  No acute distress  Objective:  A 10 point ROS was performed; negative except as noted above       Lab Results   Component Value Date/Time    WBC 10 53 (H) 08/28/2019 05:43 AM    WBC 6 4 05/16/2016 08:04 AM    HGB 14 1 08/28/2019 05:43 AM    HGB 14 9 05/16/2016 08:04 AM       Vitals:    08/28/19 0720   BP: 132/75   Pulse: 95   Resp: 18   Temp: 99 1 °F (37 3 °C)   SpO2: 93%     Cervical spine exam  C-collar in place  Dressing C/D/I   5/5 motor to C5-T1 nerve distributions bilaterally  SILT to C5-T1 nerve distributions bilaterally  negative Martin bilateral  Digits warm and well perfused with brisk capillary refill     Assessment: 77 y o  male post op day #1   from C6-C7 anterior cervical decompression and fusion    Plan:  WBAT B/L UE in c-collar at all times  Pain control  DVT ppx: mechanical   PT/OT  Patient noted to have acute blood loss anemia due to a drop in Hbg of > 2 0g from preop levels, will monitor vital signs and resuscitate with IV fluids as needed  Dispo: Laura Holt for discharge from ortho perspective

## 2019-08-28 NOTE — DISCHARGE SUMMARY
ORTHOPEDICS DISCHARGE SUMMARY  Angelita Wilkinson 77 y o  male MRN: [id number]  Unit/Bed#: MS 23484    Attending Physician: Marquis Engle    Admitting diagnosis: Cervical radiculitis [M54 12]    Discharge diagnosis: Cervical radiculitis [M54 12]    Date of admission: 8/27/2019    Date of discharge: 08/28/19    Procedure: Anterior cervical discectomy w fusion C5-6, with allograft and neuromonitoring (N/A)    HPI:  This is a 77y o  year old male that presented to the office with signs and symptoms of persistent cervical radiculopathy and stenosis   They tried and failed conservative treatment measures and wished to proceed with surgical intervention  The risks, benefits, and complications of the procedure were discussed with the patient and informed consent was obtained  Hospital Course: The patient was admitted to the hospital on 8/27/2019 and underwent an uncomplicated Anterior cervical discectomy w fusion C5-6, with allograft and neuromonitoring (N/A)  They were transferred to the floor after a brief stay in the post-anesthesia care unit  Their pain was well managed with IV and oral pain medications  They began therapy on post operative day #1  Daily discussion was had with the patient, nursing staff, orthopaedic team, and family members if present  All questions were answered to the patients satisfaction  0   Lab Value Date/Time    HGB 14 1 08/28/2019 0543    HGB 15 6 07/22/2019 1356    HGB 15 9 12/20/2018 0947    HGB 14 9 05/16/2016 0804       Vital signs remained stable and pt was resuscitated with IVF as needed   Body mass index is 37 41 kg/m²  moderately obese  Recommend behavior modifications, nutrition and physical activity  Discharge Instructions: The patient was discharged weight bearing as tolerated to all extremities  Take pain medications as instructed  Patient must keep Cervical brace at all times  Discharge Medications:   For the complete list of discharge medications, please refer to the patient's medication reconciliation

## 2019-08-28 NOTE — PLAN OF CARE
Problem: PHYSICAL THERAPY ADULT  Goal: Performs mobility at highest level of function for planned discharge setting  See evaluation for individualized goals  Description  Treatment/Interventions: Functional transfer training, LE strengthening/ROM, Elevations, Therapeutic exercise, Cognitive reorientation, Patient/family training, Equipment eval/education, Bed mobility, Gait training, Spoke to nursing, OT, Spoke to case management          See flowsheet documentation for full assessment, interventions and recommendations  Note:   Prognosis: Good  Problem List: Decreased strength, Decreased range of motion, Decreased endurance, Impaired balance, Decreased mobility, Pain, Orthopedic restrictions  Assessment: Pt is 77 y o  male seen for PT evaluation s/p admit to [facility] on 8/27/2019  Two pt identifiers were used to confirm  Pt presented s/p Anterior cervical discectomy w fusion C5-6, with allograft and neuromonitoring (N/A) which was performed on 8/27/2019   Pt was admitted with a primary dx of: s/p cervical spine fusion  PT now consulted for assessment of mobility and d/c needs  Pt with Activity as tolerated orders  Pts current co morbidities effecting treatment include: anxiety, HTN, and personal factors including PAULA home and steps ot manage at home  Pts current clinical presentation is Unstable/ Unpredictable (high complexity) due to Ongoing medical management for primary dx, Increased reliance on more restrictive AD compared to baseline, Decreased activity tolerance compared to baseline, Fall risk, Increased assistance needed from caregiver at current time, Spinal precautions at current time, Continuous pulse oximetry monitoring     Prior to admission, pt was I with ambulation with the use of a SPC as per pt  Upon evaluation, pt currently is requiring ; S for transfers and S for ambulation w/ RW   Pt denies any lightheadedness or dizziness with ambulation   Pt presents at PT eval functioning below baseline and currently w/ overall mobility deficits 2* to: BLE weakness, decreased ROM, impaired balance, decreased endurance, gait deviations, decreased activity tolerance compared to baseline, fall risk, orthopedic restrictions  Pt currently at a fall risk 2* to impairments listed above  Based on the aforementioned PT evaluation, pt will continue to benefit from skilled Acute PT interventions to address stated impairments; to maximize functional mobility; for ongoing pt/ family training; and DME needs  At conclusion of PT session pt returned back in chair with phone and call bell within reach  Pt denies any further questions at this time  PT is currently recommending home with family support, OPPT when appropriate  Pt/ family agreeable to plan and goals as stated on evaluation  PT will continue to follow during hospital stay  Barriers to Discharge: None  Barriers to Discharge Comments: Pt denies any mobility or safety concerns about returning home   Recommendation: Home with family support, Outpatient PT(OPPT when appropriate )     PT - OK to Discharge: Yes(when medically cleared )    See flowsheet documentation for full assessment

## 2019-08-28 NOTE — UTILIZATION REVIEW
Initial Clinical Review    Elective Inpatient surgical procedure    Age/Sex: 77 y o  male     Surgery Date: 8/27     Procedure: S/P Anterior cervical discectomy w fusion C5-6, with allograft and neuromonitoring (N/A)    Anesthesia: General    Admission Orders: Date/Time/Statement: Inpatient Admission Orders (From admission, onward)     Ordered        08/27/19 1316  Inpatient Admission  Once                    Inpatient Admission     Standing Status:   Standing     Number of Occurrences:   1     Order Specific Question:   Admitting Physician     Answer:   David Reynaldo [[id number]]     Order Specific Question:   Level of Care     Answer:   Med Surg [[id number]]     Order Specific Question:   Estimated length of stay     Answer:   More than 2 Midnights     Order Specific Question:   Certification     Answer:   I certify that inpatient services are medically necessary for this patient for a duration of greater than two midnights  See H&P and MD Progress Notes for additional information about the patient's course of treatment       Vital Signs: /75   Pulse 95   Temp 99 1 °F (37 3 °C)   Resp 18   Ht 5' 9 5" (1 765 m)   Wt 117 kg (257 lb)   SpO2 93%   BMI 37 41 kg/m²      Diet: Regular  Mobility: OOB with Brace  DVT Prophylaxis: Sequential compression device    Medications/Pain Control:   Current Facility-Administered Medications:  acetaminophen 975 mg Oral Q6H PRN    allopurinol 300 mg Oral HS    vitamin B-12 1,000 mcg Oral Daily    docusate sodium 100 mg Oral BID    losartan 100 mg Oral Daily    And       hydrochlorothiazide 12 5 mg Oral Daily    lactated ringers 100 mL/hr Intravenous Continuous Last Rate: Stopped (08/28/19 0525)   methocarbamol 750 mg Oral Q6H [facility]    morphine injection 2 mg Intravenous Q2H PRN    ondansetron 4 mg Intravenous Q6H PRN 8/27 x1   oxyCODONE 10 mg Oral Q4H PRN 8/27 x1   senna 1 tablet Oral Daily    traMADol 50 mg Oral Q6H PRN      Network Utilization Review Department  Phone: [phone]; Fax [phone]  [email or web address]  ATTENTION: Please call with any questions or concerns to [phone]  and carefully listen to the prompts so that you are directed to the right person  Send all requests for admission clinical reviews, approved or denied determinations and any other requests to fax [phone]   All voicemails are confidential

## 2019-08-29 ENCOUNTER — TELEPHONE (OUTPATIENT)
Dept: OBGYN CLINIC | Facility: HOSPITAL | Age: 69
End: 2019-08-29

## 2019-08-29 NOTE — TELEPHONE ENCOUNTER
I spoke to patient for discharge follow up phone call for [facility]   He was sent home with gabapentin RX and he did not realize that it was part of the meds his family member picked up  He cannot tolerate the side-effects of sleepiness  He asks that this not be prescribed for him again  Thanks

## 2019-08-30 ENCOUNTER — HOSPITAL ENCOUNTER (OUTPATIENT)
Dept: RADIOLOGY | Facility: HOSPITAL | Age: 69
Discharge: HOME | End: 2019-08-30
Payer: COMMERCIAL

## 2019-08-30 DIAGNOSIS — S86.012A ACHILLES RUPTURE, LEFT, INITIAL ENCOUNTER: ICD-10-CM

## 2019-08-30 PROCEDURE — 73721 MRI JNT OF LWR EXTRE W/O DYE: CPT

## 2019-08-30 NOTE — UTILIZATION REVIEW
Notification of Discharge  This is a Notification of Discharge from our facility [address]  Please be advised that this patient has been discharge from our facility  Below you will find the admission and discharge date and time including the patients disposition  PRESENTATION DATE: 8/27/2019  5:38 AM  OBS ADMISSION DATE:   IP ADMISSION DATE: 8/27/19 1316   DISCHARGE DATE: 8/28/2019  1:05 PM  DISPOSITION: Home/Self Care Home/Self Care   Admission Orders listed below:  Admission Orders (From admission, onward)     Ordered        08/27/19 1316  Inpatient Admission  Once         08/27/19 1109  Place in Observation  Once                   Please contact the UR Department if additional information is required to close this patient's authorization/case  [address]  Utilization Review Department  Phone: [phone]; Fax [phone]  [email or web address]  ATTENTION: Please call with any questions or concerns to [phone]  and carefully listen to the prompts so that you are directed to the right person  Send all requests for admission clinical reviews, approved or denied determinations and any other requests to fax [phone]   All voicemails are confidential

## 2019-09-05 ENCOUNTER — OFFICE VISIT (OUTPATIENT)
Dept: OBGYN CLINIC | Facility: MEDICAL CENTER | Age: 69
End: 2019-09-05
Payer: COMMERCIAL

## 2019-09-05 VITALS — BODY MASS INDEX: 36.88 KG/M2 | SYSTOLIC BLOOD PRESSURE: 142 MMHG | DIASTOLIC BLOOD PRESSURE: 70 MMHG | HEIGHT: 70 IN

## 2019-09-05 DIAGNOSIS — M76.62 ACHILLES TENDINITIS OF LEFT LOWER EXTREMITY: Primary | ICD-10-CM

## 2019-09-05 PROCEDURE — 99213 OFFICE O/P EST LOW 20 MIN: CPT | Performed by: ORTHOPAEDIC SURGERY

## 2019-09-05 NOTE — PROGRESS NOTES
Ortho Sports Medicine New Patient Ankle Visit    Assesment:  77year old Male with chronic left achilles rupture, DOI May 18, 2019    Plan:    1  Would recommend referral to [facility] given >3 months since injury to consider any possible primary repair versus tendon transfer  2  Offered a Cam boot today but the patient declined  3  Ice and analgesics as needed    Follow up: No follow-ups on file  No chief complaint on file  History of Present Illness: The patient is a 77 y o  male who is here for follow-up of his left ankle  He still notes the same amount of pain and has not had any change in symptoms  He is here for his MRI review of the left ankle which shows an Achilles tendon rupture  He does not have any numbness or tingling of this ankle at this time  His pain is only located in the Achilles tendon and he does no weakness with plantar flexion and walking  Ankle Surgical History:  None    I have reviewed the past medical, surgical, social and family history, medications and allergies as documented in the EMR  Review of systems: ROS is negative other than that noted in the HPI  Physical Exam:    Blood pressure 142/70, height 5' 10" (1 778 m)  General/Constitutional: NAD, well developed, well nourished       Ankle Examination (focused):     RIGHT LEFT   ROM:  Full Unable to actively plantar flex   Palpation:  Non tender TTP distal achilles, with palpable defect    Anterior Drawer negative negative   Calcaneal inversion negative negative   Patel Test negative positive     Limp upon gait exam    No subluxation of the peroneal tendons or tenderness to palpation along the peroneal tendons     No pain with palpation or range of motion of midfoot and forefoot bilaterally    Calf  Soft and compressible  bilaterally    LE NV Exam: +2 DP/PT pulses bilaterally  Sensation intact to light touch L2-S1 bilaterally        Ankle Imaging:    X-rays of the left foot/ankle were reviewed, which demonstrate no bony abnormalities or significant degenerative changes  I have reviewed the radiology report and do not currently have a radiology reading from [facility], but will check the result once the reading is performed  MRI of the left ankle demonstrates a full-thickness tear of the Achilles tendon with gapping of the tendon    I reviewed the radiology report agree with the impression

## 2019-09-06 ENCOUNTER — OFFICE VISIT (OUTPATIENT)
Dept: OBGYN CLINIC | Facility: CLINIC | Age: 69
End: 2019-09-06
Payer: COMMERCIAL

## 2019-09-06 VITALS
HEIGHT: 70 IN | BODY MASS INDEX: 36.88 KG/M2 | SYSTOLIC BLOOD PRESSURE: 144 MMHG | DIASTOLIC BLOOD PRESSURE: 83 MMHG | HEART RATE: 88 BPM

## 2019-09-06 DIAGNOSIS — S86.012A ACHILLES RUPTURE, LEFT, INITIAL ENCOUNTER: Primary | ICD-10-CM

## 2019-09-06 PROCEDURE — 99213 OFFICE O/P EST LOW 20 MIN: CPT | Performed by: ORTHOPAEDIC SURGERY

## 2019-09-06 NOTE — PROGRESS NOTES
CHRISTOPH Myles  Attending, Orthopaedic Surgery  Foot and [address] [facility] [address] [facility]      ORTHOPAEDIC FOOT AND ANKLE CLINIC VISIT     Assessment:     Encounter Diagnosis   Name Primary?  Achilles rupture, left, initial encounter Yes            Plan:   · The patient verbalized understanding of exam findings and treatment plan  We engaged in the shared decision-making process and treatment options were discussed at length with the patient  Surgical and conservative management discussed today along with risks and benefits  · MRI of the left heel/ankle demonstrates a high grade partial thickness tear of the intermediate portion of the achilles tendon  His injury occurred in May and he has not had any treatment  · Discussed non-operative management vs surgical intervention for this injury  · We recommend trial of non-operative management and if he fails non-operative treatment, surgical repair of the tendon is still an option  · Given non-operative achilles tendon protocol  He will start at week 8 of the protocol with 2 heel lifts in his shoe for 3 weeks, then remove one heel lift for 1 more week  Then remove all heel lifts  · PT referral given with detailed PT protocol  Return in about 6 weeks (around 10/18/2019)  for recheck of left partial achilles tendon tear             History of Present Illness:   Chief Complaint:   Chief Complaint   Patient presents with    Left Ankle - Pain     Rody Dunne is a 77 y o  male who is being seen for left achilles tendon injury  Patient reports in May, he missed a step and fell  After the fall, he states his entire lower leg felt like it was "asleep " Then he developed pain at the achilles tendon  He denies feeling a snap or pop  Pain is localized at achilles tendon with minimal radiating and described as sharp and severe at times  He is mostly concerned about weakness and gait disturbance   He was seen by his PCP initially who told him he did not have an achilles rupture and to ice, elevate, and take NSAIDs  He was then referred to Dr Sandra adamson who ordered an MRI which showed a partial achilles rupture and he was sent to our clinic for surgical evaluation  Patient denies numbness, tingling or radicular pain  Denies history of neuropathy  Patient does not smoke, does not have diabetes and does not take blood thinners  Patient denies family history of anesthesia complications and has not had any complications with anesthesia  Pain/symptom timing:  Worse during the day when active  Pain/symptom context:  Worse with activites and work  Pain/symptom modifying factors:  Rest makes better, activities make worse  Pain/symptom associated signs/symptoms: none    Prior treatment   · NSAIDsYes   · Injections No   · Bracing/Orthotics No    · Physical Therapy No     Orthopedic Surgical History:   See below     Past Medical, Surgical and Social History:  Past Medical History:  has a past medical history of Acute gouty arthritis, Anxiety, Chronic thoracic spine pain, Dysfunction of both eustachian tubes, Erectile dysfunction of non-organic origin, Family history reviewed with no changes, Gout, Hypertension, Sebaceous cyst, and Skin lesion  Problem List: does not have any pertinent problems on file  Past Surgical History:  has a past surgical history that includes Appendectomy; Colonoscopy; Bowel resection; Mouth surgery; Wrist surgery; Cataract extraction; and Cervical fusion (N/A, 8/27/2019)  Family History: family history includes Diabetes in his family and sister; Hypertension in his family; No Known Problems in his father and mother  Social History:  reports that he has quit smoking  He has a 10 00 pack-year smoking history  He has quit using smokeless tobacco  He reports that he drinks alcohol  He reports that he does not use drugs    Current Medications: has a current medication list which includes the following prescription(s): allopurinol, vitamin b-12, gabapentin, methocarbamol, oxycodone, and valsartan-hydrochlorothiazide  Allergies: is allergic to levaquin [levofloxacin]  Review of Systems:  General- denies fever/chills  HEENT- denies hearing loss or sore throat  Eyes- denies eye pain or visual disturbances, denies red eyes  Respiratory- denies cough or SOB  Cardio- denies chest pain or palpitations  GI- denies abdominal pain  Endocrine- denies urinary frequency  Urinary- denies pain with urination  Musculoskeletal- Negative except noted above  Skin- denies rashes or wounds  Neurological- denies dizziness or headache  Psychiatric- denies anxiety or difficulty concentrating    Physical Exam:   /83 (BP Location: Left arm, Patient Position: Sitting, Cuff Size: Adult)   Pulse 88   Ht 5' 10" (1 778 m)   BMI 36 88 kg/m²   General/Constitutional: No apparent distress: well-nourished and well developed  Eyes: normal ocular motion  Lymphatic: No appreciable lymphadenopathy  Respiratory: Non-labored breathing  Vascular: No edema, swelling or tenderness, except as noted in detailed exam   Integumentary: No impressive skin lesions present, except as noted in detailed exam   Neuro: No ataxia or tremors noted  Psych: Normal mood and affect, oriented to person, place and time  Appropriate affect  Musculoskeletal: Normal, except as noted in detailed exam and in HPI  Examination    Left    Gait Normal   Musculoskeletal Tender to palpation at midsubstance of achilles tendon    Skin Normal       Nails Normal    Range of Motion  10 degrees dorsiflexion, 40 degrees plantarflexion  Subtalar motion: normal    Stability Stable    Muscle Strength 5/5 tibialis anterior  4/5 gastrocnemius-soleus  5/5 posterior tibialis  5/5 peroneal/eversion strength  5/5 EHL  5/5 FHL    Neurologic Normal    Sensation Intact to light touch throughout sural, saphenous, superficial peroneal, deep peroneal and medial/lateral plantar nerve distributions    Winfield-Anai 5 07 filament (10g) testing deferred  Cardiovascular Brisk capillary refill < 2 seconds,intact DP and PT pulses    Special Tests Plantar flexion response elicited with Patel Test  Slight loss of resting equinus compared to right ankle  Imaging Studies:   MRI of the left ankle demonstrates partial thickness achilles tear with intact medial and lateral fibers  Reviewed by me personally  Scribe Attestation    I,:   Jannette Jalloh PA-C am acting as a scribe while in the presence of the attending physician :        I,:   Henna Thompson MD personally performed the services described in this documentation    as scribed in my presence :              Melony Locke Lachman, MD  Foot & Ankle Surgery   Department of [address] [place] [address]      I personally performed the service  Melony Locke Lachman, MD

## 2019-09-06 NOTE — PROGRESS NOTES
Hayde Acuna attended physical therapy from 8/6/2019 until 08/12/2019 for 2 treatment sessions regarding left achilles tendinitis  Patient made improvement throughout treatment but am unable to achieve discharge information secondary to patient not returning for follow up appointments  Patient will be discharged from PT at this time  Thank you

## 2019-09-06 NOTE — PATIENT INSTRUCTIONS
CHRISTOPH Machuca    Attending, Orthopaedic Surgery  [facility]    Achilles Rupture - Nonoperative Treatment Protocol    OVERVIEW:   Week 0: cast, touchdown weightbearing with crutches / walker  o Avoid prolonged dependent position (keep foot elevated)   Week 2: CAM walker boot with 3 heel wedges, weightbearing as tolerated  o Wear boot AT ALL TIMES   Week 6: CAM walker boot, remove one wedge each week (so down to 2 wedges at week 6, 1 wedge at week 7, 0 wedges at week 8)  o Start gentle active ankle range of motion (trace alphabet with toes, circles in both directions)   Week 8: start PT, transition to shoe with 2 heel lifts for 3 weeks and then 1 heel lift for 1 week then no heels lift at week 12, wean off crutches-This is where you start  o PT 2-3 times/week and home exercises daily   Progress with PT over ~4 months  o Week 12: wean off of shoe lift  o Week 20 / 5 months: gentle jog   Week 24 / 6 months:  o Gradual return to sports as tolerated    DETAILED PHYSICAL THERAPY PROTOCOL:  Phase I: weeks 8 - 12   Goals:  o Normalize gait  o Progress range of motion  o Normalize dorsiflexion, inversion, and eversion ankle strength 5/5   Treatment Recommendations:  o Gait training, wean off crutches/cane when gait non-antalgic  o Heel lift in shoe to assist non-apprehensive and normalized gait  o AROM dorsiflexion/plantarflexion/inversion/eversion  o Proprioception training  o Isometrics/isotonics: inversion/eversion  o Sitting heel rise  o PREs plantarflexion/dorsiflexion with knee flexed to 90; after 2 weeks, progress to PREs with knee extended to 0  o Leg press  o Bike  o Alphabet drawing  o Retro treadmill  o Forward step up program  o Underwater treadmill system for gait training   Precautions:  o Avoid passive heel cord stretching   Minimum Criteria for Advancement:  o Normal gait pattern  o Manual muscle grade test of 5/5 for dorsiflexion, inversion, and eversion    Phase II: weeks 12 - 20   Goals:  o Restore full functional range of motion  o Normalize plantarflexion strength 5/5  o Normalize balance  o Return to functional activities without pain  o Ability to descend stairs   Treatment Recommendations:  o Submaximal sport-specific skill development  o Proprioception training: BAPS, prop board, foam rollers, trampoline, [employer]  o Isometrics/isotonics: inversion/eversion  o Isokinetic plantarflexion/dorsiflexion  o Standing heel rise  o Aggressive PREs plantarflexion  o Progress proximal strengthening (PREs)  o Desiree Castro Versaclimber  o Forward step down program  o Running in underwater treadmill system   Precautions:  o Avoid pain with therapeutic exercise and functional activities  o Avoid high loading the Achilles tendon (i e  aggressive stretching in dorsiflexion with body weight or jumping)   Minimum Criteria for Advancement:  o No apprehension with activities of daily living  o Normal flexibility  o Adequate strength base shown by ability to perform ten unilateral heel raises  o Ability to descend stairs reciprocally  o Symmetrical lower extremity balance    Phase III: weeks 20 - 28   Goals:  o Demonstrate ability to run forward on a treadmill symptom-free  o Average peak torque of 75% with isokinetic testing  o Maximize strength and flexibility as to meet all demands of ADLs  o Return to functional activity without limitation  o Higher level of dynamic activity with lack of apprehension with sport-specific movements   Treatment Recommendations:  o Start forward treadmill running  o Isokinetic testing and training  o Continue lower extremity strengthening and flexibility program  o Advance proprioception training with perturbation  o Light plyometric training (bilateral jumping activities)  o Continue aggressive plantarflexion PREs (emphasize eccentric activity)  o Submaximal sport specific skill development drills  o Progress Desiree castro Versaclimber  o Continue to progress proximal strengthening of lower extremities (PREs)   Precautions:  o No apprehension or pain with dynamic activity  o Avoid running or sport activity until adequate strength and flexibility is achieved   Minimum Criteria for Advancement:  o Pain free running  o Average peak torque of isokinetic test = 75% of non-involved  o Normal strength (5/5 throughout ankle)  o Sports-specific drills with zero apprehension    Phase IV: Return to Sport (weeks 28 - one year)   Goals:  o Lack of apprehension with sports activity  o Maximize strength and flexibility as to meet demands of individual's sport activity   Treatment Recommendations:  o Advanced functional exercises and agility exercises  o Plyometrics  o Sport-specific exercises  o Isokinetic testing  o Functional test assessment (such as vertical jump test)   Precautions:  o Avoid pain with therapeutic, functional, and sport activity  o Avoid full sport activity until adequate strength and flexibility   Criteria for Discharge:  o Flexibility and strength to accepted levels for sports performance  o Lack of apprehension with sport-specific movements  o 85% limb symmetry with vertical jump test  o 85% limb symmetry for average peak isokinetic torque (PF/DF/inv/ev)  o Independent performance of gym/home exercise program

## 2019-09-09 ENCOUNTER — OFFICE VISIT (OUTPATIENT)
Dept: OBGYN CLINIC | Facility: HOSPITAL | Age: 69
End: 2019-09-09

## 2019-09-09 ENCOUNTER — HOSPITAL ENCOUNTER (OUTPATIENT)
Dept: RADIOLOGY | Facility: HOSPITAL | Age: 69
Discharge: HOME | End: 2019-09-09
Payer: COMMERCIAL

## 2019-09-09 VITALS — SYSTOLIC BLOOD PRESSURE: 148 MMHG | HEART RATE: 86 BPM | DIASTOLIC BLOOD PRESSURE: 82 MMHG

## 2019-09-09 DIAGNOSIS — Z98.1 STATUS POST CERVICAL SPINAL FUSION: ICD-10-CM

## 2019-09-09 DIAGNOSIS — Z98.1 S/P CERVICAL SPINAL FUSION: Primary | ICD-10-CM

## 2019-09-09 DIAGNOSIS — Z98.1 S/P CERVICAL SPINAL FUSION: ICD-10-CM

## 2019-09-09 PROCEDURE — 72040 X-RAY EXAM NECK SPINE 2-3 VW: CPT

## 2019-09-09 PROCEDURE — 99024 POSTOP FOLLOW-UP VISIT: CPT | Performed by: PHYSICIAN ASSISTANT

## 2019-09-09 NOTE — PROGRESS NOTES
Assessment/Plan:    Patient seen and examined with Dr Deonte Yeboah  He is roughly 10 days status post ACDF C5-6 done on 8/27/2019  Overall he is doing very well and notes resolution of preoperative left upper extremity radicular symptoms  He is happy with the results of the procedure  X-rays today demonstrate stable plate and screw fixation in good alignment  His anterior cervical incision is clean dry intact, without signs of infection  He can now wean out of cervical collar  He can start gentle range of motion exercises cervical spine while avoiding deep flexion and extension  Follow-up in four weeks for re-evaluation and new x-rays of the cervical spine  Problem List Items Addressed This Visit        Other    S/P cervical spinal fusion - Primary    Relevant Orders    XR spine cervical 2 or 3 vw injury            Subjective:      Patient ID: Jill Carroll is a 77 y o  male  HPI     The patient is a 28-year-old male presents for a postop visit  He is roughly 10 days status post ACDF C5-6 done on 8/27/2019  Overall he is doing very well and notes complete resolution of left upper extremity preoperative radicular symptoms  He is very happy with results of the procedure  Initially, he had trouble swallowing however this is resolved  He has good speech quality  No fever chills or constitutional symptoms  He denies any issues with his anterior cervical incision  The following portions of the patient's history were reviewed and updated as appropriate: allergies, current medications, past family history, past medical history, past social history, past surgical history and problem list     Review of Systems      Objective:      /82   Pulse 86          Physical Exam      No acute distress  Gait is normal without assistance  Anterior cervical incision is clean dry intact, no erythema drainage or wound dehiscence  Strength is 5/5 C5-T1 bilaterally  Sensation is equal intact    Palpable radial pulse bilaterally

## 2019-09-11 ENCOUNTER — EVALUATION (OUTPATIENT)
Dept: PHYSICAL THERAPY | Facility: CLINIC | Age: 69
End: 2019-09-11
Payer: COMMERCIAL

## 2019-09-11 DIAGNOSIS — S86.012A ACHILLES RUPTURE, LEFT, INITIAL ENCOUNTER: Primary | ICD-10-CM

## 2019-09-11 PROCEDURE — 97163 PT EVAL HIGH COMPLEX 45 MIN: CPT

## 2019-09-11 NOTE — PROGRESS NOTES
PT Evaluation     Today's date: 2019  Patient name: Santiago Diallo  : 1950  MRN: [id number]  Referring provider: Paulette Garcia  Dx:   Encounter Diagnosis     ICD-10-CM    1  Achilles rupture, left, initial encounter S86 012A Ambulatory referral to Physical Therapy       Start Time: 730  Stop Time: 830  Total time in clinic (min): 60 minutes    Assessment  Assessment details: Patient is a 78 yo male presenting to physical therapy with a L partial achilles rupture that occurred on 19  Patient presents with decreaed L ankle AROM & PROM as well as strength, gait deviations and activiity tolerance  Patient is limited with amb, stairs, transfers and ADLs due to injury and amb with a SPC for balance  PT educated the patient on the protocol and the patient is compliant  PT will address the noted impairments by performing hip, knee and ankle strengthening, stretching, balance, gait training, functional activities and manual techniques all accoring to the protocol to allow the patient to return to his PLOF  PT recommended 2x/week for 6-8 weeks c a guarded prognosis 2* PLOF  Impairments: abnormal gait, abnormal muscle tone, abnormal or restricted ROM, activity intolerance, impaired balance, impaired physical strength, lacks appropriate home exercise program, pain with function, safety issue, weight-bearing intolerance and poor body mechanics    Symptom irritability: highUnderstanding of Dx/Px/POC: good   Prognosis: good    Goals  STG: In four weeks the patient will:    1  Be (I) with his HEP  2  Increase DF, inversion and eversion strength to 5/5 MMT score to assist c amb  3  Increase L ankle ROM by 5-10 degrees to assist c amb and ADLs  4  amb 200ft (I) with proper mechanics and without pain in the L ankle  LTG: In eight weeks, the patient will:    1  Increase FOTO score to 53 to demonstrate improvements in symptoms and function  2  Demonstrate full L ankle AROM without pain    3  Perform 30 mins of ADLs without pain  4  Increase plantarflexion strength to 5/5 MMT score to assist c ADLs and stairs  5  Descend the stairs without pain  6  Perform FTEO for 30 seconds on a non-compliant surface without pain and no LOB  7  Perform FTEC for 30 seconds on a non-compliant surface without pain and no LOB  Plan  Patient would benefit from: skilled physical therapy  Referral necessary: No  Planned modality interventions: cryotherapy and thermotherapy: hydrocollator packs  Planned therapy interventions: abdominal trunk stabilization, joint mobilization, manual therapy, massage, Suarez taping, neuromuscular re-education, patient education, postural training, balance, balance/weight bearing training, body mechanics training, strengthening, stretching, therapeutic activities, therapeutic exercise, therapeutic training, home exercise program and gait training  Frequency: 2x week  Duration in visits: 16  Duration in weeks: 8  Plan of Care beginning date: 2019  Plan of Care expiration date: 2019  Treatment plan discussed with: patient        Subjective Evaluation    History of Present Illness  Mechanism of injury: Patient noted on 19 he stepped down the step and fell forward and felt pain and numbness from the L knee to the toe  Patient followed up with his PCP and performed RICE and ibuprophen for a month  Patient noted no changes and returned to PCP, where he was then referred to PT  Patient participated in 2 PT appointments which was then referred to ortho  Patient was not placed in a boot and has two wedges in his shoe and amb with a SPC  Patient has a L partial achillies tear  Patient notes difficulty with amb, stairs, standing, balance and transfers             Recurrent probem    Quality of life: good    Pain  Current pain ratin  At best pain ratin  At worst pain ratin  Location: L achillies  Quality: sharp  Relieving factors: ice and rest  Aggravating factors: standing, walking, stair climbing and lifting  Progression: improved    Social Support  Steps to enter house: yes (2 PAULA)  Lives in: multiple-level home  Lives with: spouse    Employment status: not working (retired)  Hand dominance: right      Diagnostic Tests  MRI studies: abnormal  Patient Goals  Patient goals for therapy: decreased edema, decreased pain, improved balance, increased motion, increased strength, independence with ADLs/IADLs and return to sport/leisure activities  Patient goal: "to get back to golf " "to walk with a normal gait and no pain " "to improve balance when showering "        Objective     Palpation   Left   Tenderness of the anterior tibialis, lateral gastrocnemius, medial gastrocnemius, posterior tibialis and soleus  Tenderness   Left Ankle/Foot   Tenderness in the Achilles insertion       Neurological Testing     Sensation     Ankle/Foot   Left Ankle/Foot   Intact: light touch    Right Ankle/Foot   Intact: light touch     Active Range of Motion   Left Ankle/Foot   Dorsiflexion (ke): 5 degrees   Plantar flexion: 20 degrees   Inversion: 20 degrees   Eversion: 20 degrees     Right Ankle/Foot   Normal active range of motion    Passive Range of Motion   Left Ankle/Foot    Dorsiflexion (ke): 8 degrees   Plantar flexion: 20 degrees   Inversion: 20 degrees   Eversion: 20 degrees     Right Ankle/Foot  Normal passive range of motion    Strength/Myotome Testing     Left Ankle/Foot   Dorsiflexion: 4+  Plantar flexion: 3+  Inversion: 4  Eversion: 4  Great toe flexion: 4-  Great toe extension: 4-    Right Ankle/Foot   Dorsiflexion: 5  Plantar flexion: 5  Inversion: 5  Eversion: 5  Great toe flexion: 4+  Great toe extension: 4+    Swelling   Left Ankle/Foot   Figure 8: 55 cm    Right Ankle/Foot   Figure 8: 54 25 cm             Precautions: L partial achilles tear on 5/18/19   2 Wedges until 9/27/19  1 Wedge until 10/4/19, no wedge in shoe after 10/4/19  See protocol attached to chart and in other orders  AVOID PASSIVE HEEL CORD STRETCHING      Manual  9/11            L ankle PROM nv            L gastroc and soleus STM nv                                                       Exercise Diary  9/11            bike nv            Seated heel rases nv            AROM: DF, PF, inversion and eversion nv            Ankle alphabet nv            Gait training nv            Leg press nv            FTEO/ FTEC nv            BAPS board nv                                                                                                                                                                            Modalities  9/11            CP/ HP PRN np

## 2019-09-16 ENCOUNTER — APPOINTMENT (OUTPATIENT)
Dept: PHYSICAL THERAPY | Facility: CLINIC | Age: 69
End: 2019-09-16
Payer: COMMERCIAL

## 2019-09-23 ENCOUNTER — OFFICE VISIT (OUTPATIENT)
Dept: PHYSICAL THERAPY | Facility: CLINIC | Age: 69
End: 2019-09-23
Payer: COMMERCIAL

## 2019-09-23 DIAGNOSIS — S86.012A ACHILLES RUPTURE, LEFT, INITIAL ENCOUNTER: Primary | ICD-10-CM

## 2019-09-23 PROCEDURE — 97140 MANUAL THERAPY 1/> REGIONS: CPT

## 2019-09-23 PROCEDURE — 97110 THERAPEUTIC EXERCISES: CPT

## 2019-09-23 NOTE — PROGRESS NOTES
Daily Note     Today's date: 2019  Patient name: Shirlene Mullen  : 1950  MRN: [id number]  Referring provider: Isaias Chavez  Dx:   Encounter Diagnosis     ICD-10-CM    1  Achilles rupture, left, initial encounter S86 012A        Start Time: 1400  Stop Time: 1500  Total time in clinic (min): 60 minutes    Subjective: Patient had misunderstanding with insurance and attending appointments  Patient noted since he last visit he has fallen 3 times with one injury  All falls were due to balance and at times not walking with the [facility] [place]  Patient noted that he has performed his HEP at least once a day  Objective: See treatment diary below      Assessment: PT applied tubigrip to the patient's L ankle to assist with swelling  PT and patient noted improvements at the end of the session  PT educated the patient to use his SPC at all times to prevent falls or at other times use two canes for added balance  Patient agreed  Patient performed exercises min VCs and noted no pain during the session  PT noted improvements in tissue mobility post manuals  Patient would benefit from continued PT to allow the patient to return to his PLOF  Plan: Continue per plan of care        Precautions: L partial achilles tear on 19   2 Wedges until 19  1 Wedge until 10/4/19, no wedge in shoe after 10/4/19  See protocol attached to chart and in other orders  [address] [facility] [address]      Manual             L ankle PROM nv            L gastroc and soleus STM nv MW                                                      Exercise Diary             bike nv X-ride  10'           Seated heel rases nv 2x10           AROM: DF, PF, inversion and eversion nv 2x10 ea           Ankle alphabet nv x2           Gait training nv            Leg press nv            FTEO/ FTEC nv            BAPS board nv            Toe raises seated  2x10           Inversion ball squeeze (seated on high table)  2x10  5" hold                                                                                                                                                 Modalities  9/11            CP/ HP PRN np

## 2019-09-26 ENCOUNTER — OFFICE VISIT (OUTPATIENT)
Dept: PHYSICAL THERAPY | Facility: CLINIC | Age: 69
End: 2019-09-26
Payer: COMMERCIAL

## 2019-09-26 DIAGNOSIS — S86.012A ACHILLES RUPTURE, LEFT, INITIAL ENCOUNTER: Primary | ICD-10-CM

## 2019-09-26 PROCEDURE — 97140 MANUAL THERAPY 1/> REGIONS: CPT

## 2019-09-26 PROCEDURE — 97110 THERAPEUTIC EXERCISES: CPT

## 2019-09-26 NOTE — PROGRESS NOTES
Daily Note     Today's date: 2019  Patient name: Adrienne Cisneros  : 1950  MRN: [id number]  Referring provider: Kt Lara  Dx:   Encounter Diagnosis     ICD-10-CM    1  Achilles rupture, left, initial encounter S86 012A        Start Time: 0800  Stop Time: 0900  Total time in clinic (min): 60 minutes    Subjective: Patient noted that he feels good today  Patient noted that he forgot his lifts in his shoes [place] his cabin  Patient noted he will get them this weekend  Patient noted the swelling has decreased in the ankle  Patient noted a burning pain in the distal achilles the past two day  Objective: See treatment diary below      Assessment: PT strongly educated the patient on the importance of the lifts and why he may be having the pain 2* no lifting in his shoes  PT noted great improvements in swelling 2* tubigrip donned last session  PT introduced 4 way ankle with resistance  Patient required VCs throughout the session for form and speed  Patient noted improvements post manuals  Patient would benefit from continued PT to allow the patient to return to his PLOF  Plan: Continue per plan of care  Precautions: L partial achilles tear on 19   2 Wedges until 19  1 Wedge until 10/4/19, no wedge in shoe after 10/4/19  See protocol attached to chart and in other orders  [address]      Manual            L ankle PROM nv            L gastroc and soleus STM nv MW MW IASTM?  light                                                    Exercise Diary            bike nv X-ride  10' X-ride  10'          Seated heel rases nv 2x10 3x10          AROM: DF, PF, inversion and eversion nv 2x10 ea RTB  2x10 ea          Ankle alphabet nv x2 x2          Gait training nv            Leg press nv  nv          FTEO/ FTEC nv            BAPS board nv            Toe raises seated  2x10 2x10          Inversion ball squeeze (seated on high table) 2x10  5" hold 2x10  5" hold                                                                                                                                                Modalities  9/11            CP/ HP PRN np

## 2019-09-30 ENCOUNTER — OFFICE VISIT (OUTPATIENT)
Dept: PHYSICAL THERAPY | Facility: CLINIC | Age: 69
End: 2019-09-30
Payer: COMMERCIAL

## 2019-09-30 DIAGNOSIS — S86.012A ACHILLES RUPTURE, LEFT, INITIAL ENCOUNTER: Primary | ICD-10-CM

## 2019-09-30 PROCEDURE — 97110 THERAPEUTIC EXERCISES: CPT | Performed by: PHYSICAL THERAPIST

## 2019-09-30 NOTE — PROGRESS NOTES
Daily Note     Today's date: 2019  Patient name: Ezra Marley  : 1950  MRN: [id number]  Referring provider: Lexus Benavidez  Dx:   Encounter Diagnosis     ICD-10-CM    1  Achilles rupture, left, initial encounter S86 012A                   Subjective: Feels okay today, has 1 heel lift in today per protocol  Objective: See treatment diary below      Assessment:           Plan: Continue per plan of care        Precautions: L partial achilles tear on 19   2 Wedges until 19  1 Wedge until 10/4/19, no wedge in shoe after 10/4/19  See protocol attached to chart and in other orders  [address]      Manual           L ankle PROM nv            L gastroc and soleus STM nv MW MW MM                                                    Exercise Diary           bike nv X-ride  10' X-ride  10' X-ride, 10'         Seated heel rases nv 2x10 3x10 3x15         AROM: DF, PF, inversion and eversion nv 2x10 ea RTB  2x10 ea RTB, 2x10 ea         Ankle alphabet nv x2 x2 x2         Gait training nv            Leg press nv  nv nv         FTEO/ FTEC nv            BAPS board nv            Toe raises seated  2x10 2x10 2x10         Inversion ball squeeze (seated on high table)  2x10  5" hold 2x10  5" hold 2x10x5"                                                                                                                                               Modalities              CP/ HP PRN np

## 2019-10-02 ENCOUNTER — HOSPITAL ENCOUNTER (OUTPATIENT)
Dept: SLEEP CENTER | Facility: CLINIC | Age: 69
Discharge: HOME | End: 2019-10-02
Payer: COMMERCIAL

## 2019-10-02 DIAGNOSIS — G47.33 OBSTRUCTIVE SLEEP APNEA: ICD-10-CM

## 2019-10-02 PROCEDURE — 95811 POLYSOM 6/>YRS CPAP 4/> PARM: CPT

## 2019-10-03 ENCOUNTER — OFFICE VISIT (OUTPATIENT)
Dept: PHYSICAL THERAPY | Facility: CLINIC | Age: 69
End: 2019-10-03
Payer: COMMERCIAL

## 2019-10-03 DIAGNOSIS — S86.012A ACHILLES RUPTURE, LEFT, INITIAL ENCOUNTER: Primary | ICD-10-CM

## 2019-10-03 DIAGNOSIS — G47.33 OSA (OBSTRUCTIVE SLEEP APNEA): Primary | ICD-10-CM

## 2019-10-03 PROCEDURE — 97110 THERAPEUTIC EXERCISES: CPT

## 2019-10-03 PROCEDURE — 97140 MANUAL THERAPY 1/> REGIONS: CPT

## 2019-10-03 NOTE — PROGRESS NOTES
Daily Note     Today's date: 10/3/2019  Patient name: Kerri Rodriguez  : 1950  MRN: [id number]  Referring provider: Shabana Corea  Dx:   Encounter Diagnosis     ICD-10-CM    1  Achilles rupture, left, initial encounter S86 012A        Start Time: 0750  Stop Time: 0845  Total time in clinic (min): 55 minutes    Subjective: Patient noted that he is feeling better each day and feels that the seated heel raises are improving  Patient noted that he has less burning pain a night  Objective: See treatment diary below      Assessment: PT continues to note improvements with tissue mobility post manual techniques  PT educated the patient on taking out the last heel wedge this weekend and went over the ankle 4 way again  PT introduced leg press, balance and step ups to assist c strengthening  Patient had no LOB during balance exercises, however did use a moderate R ankle strategy when his eyes were closed  Patient noted no pain with new exercises  Patient would benefit from continued PT to allow the patient to return to his PLOF  Plan: Continue per plan of care        Precautions: L partial achilles tear on 19   2 Wedges until 19  1 Wedge until 10/4/19, no wedge in shoe after 10/4/19  See protocol attached to chart and in other orders  [address]      Manual  9/11 9/23 9/26 9/30 10/3        L ankle PROM nv            L gastroc and soleus STM nv MW MW MM IASTM  MW  Distal achilles                                                   Exercise Diary  9/11 9/23 9/26 9/30 10/3        bike nv X-ride  10' X-ride  10' X-ride, 10' X-ride  10'        Seated heel rases nv 2x10 3x10 3x15 3x15        AROM: DF, PF, inversion and eversion nv 2x10 ea RTB  2x10 ea RTB, 2x10 ea         Ankle alphabet nv x2 x2 x2         Gait training nv            Leg press nv  nv nv 30#  2x10        FTEO/ FTEC nv    3x30" ea firm surface (S)        BAPS board nv            Toe raises seated  2x10 2x10 2x10 2x10        Inversion ball squeeze (seated on high table)  2x10  5" hold 2x10  5" hold 2x10x5"         Forward step ups     2x10                                                                                                                                 Modalities  9/11            CP/ HP PRN np

## 2019-10-03 NOTE — PROGRESS NOTES
Sleep Study Documentation    Pre-Sleep Study       Sleep testing procedure explained to patient:YES    Patient napped prior to study:NO    Caffeine:Dayshift worker after 12PM   Caffeine use:YES- coffee  6 ounces    Alcohol:Dayshift workers after 5PM: Alcohol use:NO    Typical day for patient:YES       Study Documentation    Sleep Study Indications: The patient experienced hypopneas, central apnea, UAR, PLM's with arousals, and soft to moderate snoring with arousals  All stages of sleep were achieved  EKG was unremarkable  CPAP was tolerated well with a standard Respironics Nuance Pro nasal pillow mask at 10cm, with heated humidification  Snoring was eliminated at 7cm on CPAP  Sleep Study: Treatment   Optimal PAP pressure: 10cm  Leak:None  Snore:Eliminated  REM Obtained:yes  Supplemental O2: no    Minimum SaO2 88  Baseline SaO2 93  PAP mask choice standard Respironics Nuance Pro  PAP mask type:pillows  PAP pressure at which snoring was eliminated 7cm  Mode of Therapy:CPAP  ETCO2:No  CPAP changed to BiPAP:No      EKG abnormalities: no     EEG abnormalities: no    Sleep Study Recorded < 2 hours: N/A    Sleep Study Recorded > 2 hours but incomplete study: N/A    Sleep Study Recorded 6 hours but no sleep obtained: NO    Patient classification: retired       Post-Sleep Study    Medication used at bedtime or during sleep study:NO    Patient reports time it took to fall asleep:less than 20 minutes    Patient reports waking up during study:1 to 2 times  Patient reports returning to sleep without difficulty  Patient reports sleeping 4 to 6 hours without dreaming  Patient reports sleep during study:typical    Patient rated sleepiness: Not sleepy or tired    PAP treatment:yes: Post PAP treatment patient reports feeling unchanged and would wear PAP mask at home

## 2019-10-07 ENCOUNTER — HOSPITAL ENCOUNTER (OUTPATIENT)
Dept: RADIOLOGY | Facility: HOSPITAL | Age: 69
Discharge: HOME | End: 2019-10-07
Payer: COMMERCIAL

## 2019-10-07 ENCOUNTER — OFFICE VISIT (OUTPATIENT)
Dept: OBGYN CLINIC | Facility: HOSPITAL | Age: 69
End: 2019-10-07

## 2019-10-07 ENCOUNTER — OFFICE VISIT (OUTPATIENT)
Dept: PHYSICAL THERAPY | Facility: CLINIC | Age: 69
End: 2019-10-07
Payer: COMMERCIAL

## 2019-10-07 ENCOUNTER — APPOINTMENT (OUTPATIENT)
Dept: PHYSICAL THERAPY | Facility: CLINIC | Age: 69
End: 2019-10-07
Payer: COMMERCIAL

## 2019-10-07 VITALS
HEIGHT: 70 IN | DIASTOLIC BLOOD PRESSURE: 92 MMHG | BODY MASS INDEX: 37.94 KG/M2 | SYSTOLIC BLOOD PRESSURE: 166 MMHG | WEIGHT: 265 LBS | HEART RATE: 75 BPM

## 2019-10-07 DIAGNOSIS — Z48.89 AFTERCARE FOLLOWING SURGERY: Primary | ICD-10-CM

## 2019-10-07 DIAGNOSIS — Z48.89 AFTERCARE FOLLOWING SURGERY: ICD-10-CM

## 2019-10-07 DIAGNOSIS — S86.012A ACHILLES RUPTURE, LEFT, INITIAL ENCOUNTER: Primary | ICD-10-CM

## 2019-10-07 DIAGNOSIS — Z98.1 S/P CERVICAL SPINAL FUSION: ICD-10-CM

## 2019-10-07 PROCEDURE — 97140 MANUAL THERAPY 1/> REGIONS: CPT

## 2019-10-07 PROCEDURE — 72040 X-RAY EXAM NECK SPINE 2-3 VW: CPT

## 2019-10-07 PROCEDURE — 99024 POSTOP FOLLOW-UP VISIT: CPT | Performed by: PHYSICIAN ASSISTANT

## 2019-10-07 PROCEDURE — 97110 THERAPEUTIC EXERCISES: CPT

## 2019-10-07 RX ORDER — METHYLPREDNISOLONE 4 MG/1
TABLET ORAL
Qty: 21 TABLET | Refills: 0 | Status: SHIPPED | OUTPATIENT
Start: 2019-10-07 | End: 2019-11-11

## 2019-10-07 NOTE — PROGRESS NOTES
Daily Note     Today's date: 10/7/2019  Patient name: Kerri Rodriguez  : 1950  MRN: [id number]  Referring provider: Shabana Corea  Dx:   Encounter Diagnosis     ICD-10-CM    1  Achilles rupture, left, initial encounter S86 012A        Start Time: 0808  Stop Time: 0900  Total time in clinic (min): 52 minutes    Subjective: Patient noted he took out the last heel lift in his shoe today  Patient noted each day he feels better  Objective: See treatment diary below      Assessment: PT noted improvements in tissue mobility post manuals  Patient performed x10 heel raises with 2 HHA and 25% WB on the L LE  Patient noted no pain with standing heel raises and leg press  Patient would benefit from continued PT to allow the patient to return to his PLOF  Plan: Continue per plan of care  Precautions: L partial achilles tear on 19    See protocol attached to chart and in other orders  [address]      Manual  9/11 9/23 9/26 9/30 10/3 10/7       L ankle PROM nv            L gastroc and soleus STM nv MW MW MM IASTM  MW  Distal achilles IASTM  MW  Distal Achilles                                                  Exercise Diary  9/11 9/23 9/26 9/30 10/3 10/7       bike nv X-ride  10' X-ride  10' X-ride, 10' X-ride  10' X-ride  10'       Seated heel rases nv 2x10 3x10 3x15 3x15 3x15       AROM: DF, PF, inversion and eversion nv 2x10 ea RTB  2x10 ea RTB, 2x10 ea  GTB  2x10 ea       Ankle alphabet nv x2 x2 x2  HEP       Gait training nv            Leg press nv  nv nv 30#  2x10 40#  2x10       FTEO/ FTEC nv    3x30" ea firm surface (S) 3x30" ea firm (S)       BAPS board nv     nv       Toe raises seated  2x10 2x10 2x10 2x10 2x10       Inversion ball squeeze (seated on high table)  2x10  5" hold 2x10  5" hold 2x10x5"         Forward step ups     2x10 2x10 ea  Descending added with SPC  lvl 0       Standing heel raises      x10 Modalities  9/11            CP/ HP PRN np

## 2019-10-07 NOTE — PROGRESS NOTES
Assessment/Plan:    Patient seen and examined  He is roughly six weeks status post ACDF C5-6 done on 8/27/2019  Overall he is doing well with resolved preoperative left upper extremity radicular symptoms  His main complaint is difficulty swallowing over the last several weeks  He has formed scar tissue underneath the incision  We will send Medrol Dosepak to his pharmacy  He can do gentle massage is for this as well  Follow-up in six weeks for re-evaluation and new x-rays of the cervical spine  Problem List Items Addressed This Visit        Other    S/P cervical spinal fusion      Other Visit Diagnoses     Aftercare following surgery    -  Primary    Relevant Orders    XR spine cervical 2 or 3 vw injury            Subjective:      Patient ID: Rachel Dean is a 77 y o  male  HPI     The patient is a 79-year-old male who presents for a postop visit  He is roughly six weeks status post ACDF C5-6 done on 8/27/2019  Today, he states overall he is doing well and notes basically complete resolution of left upper extremity radicular symptoms  He is happy with the results of the surgery  His biggest complaint today is increasingly difficult time swallowing with a lump underneath his incision  He denies any difficulty breathing  No shortness of breath or chest pain  The following portions of the patient's history were reviewed and updated as appropriate: allergies, current medications, past family history, past medical history, past social history, past surgical history and problem list     Review of Systems      Objective:      /92   Pulse 75   Ht 5' 10" (1 778 m)   Wt 120 kg (265 lb)   BMI 38 02 kg/m²          Physical Exam      No acute distress  Anterior cervical incision is healed  There is palpable scar formation underneath the incision  He is motor and sensory stable C5-T1 bilaterally  Palpable radial pulse bilaterally

## 2019-10-09 ENCOUNTER — TELEPHONE (OUTPATIENT)
Dept: SLEEP CENTER | Facility: CLINIC | Age: 69
End: 2019-10-09

## 2019-10-09 DIAGNOSIS — Z87.39 HISTORY OF GOUT: ICD-10-CM

## 2019-10-09 RX ORDER — ALLOPURINOL 300 MG/1
300 TABLET ORAL
Qty: 90 TABLET | Refills: 1 | Status: SHIPPED | OUTPATIENT
Start: 2019-10-09 | End: 2020-05-07

## 2019-10-09 NOTE — TELEPHONE ENCOUNTER
Patient called the office inquiring about his RX allopurinol(zyloprim) 300mg tablet  He claims that he usually gets 90 tablets for 3 mths but now for whatever reason he is now only getting 30 tablets  Patient would like the quantity changed on this RX if it is possible  He would like to be called at [phone]  Not sure if it can be called in differently as it was already called in 8/13/19    Please advise

## 2019-10-09 NOTE — TELEPHONE ENCOUNTER
Discussed study results- advised to keep DME set-up, rescheduled compliance follow-up  Paperwork to [employer]

## 2019-10-10 ENCOUNTER — OFFICE VISIT (OUTPATIENT)
Dept: PHYSICAL THERAPY | Facility: CLINIC | Age: 69
End: 2019-10-10
Payer: COMMERCIAL

## 2019-10-10 DIAGNOSIS — S86.012A ACHILLES RUPTURE, LEFT, INITIAL ENCOUNTER: Primary | ICD-10-CM

## 2019-10-10 PROCEDURE — 97140 MANUAL THERAPY 1/> REGIONS: CPT

## 2019-10-10 PROCEDURE — 97110 THERAPEUTIC EXERCISES: CPT

## 2019-10-10 NOTE — PROGRESS NOTES
Daily Note     Today's date: 10/10/2019  Patient name: Richie Jimenez  : 1950  MRN: [id number]  Referring provider: Douglas Gonzalez  Dx:   Encounter Diagnosis     ICD-10-CM    1  Achilles rupture, left, initial encounter S86 012A        Start Time: 08  Stop Time: 09  Total time in clinic (min): 60 minutes    Subjective: Patient noted that he was doing well, however yesterday he kicked a shoe and noted increased pain in the L achilles  Patient noted that the ankle is more swollen since he kicked the shoe  Patient noted no fall  Objective: See treatment diary below      Assessment: PT educated the patient on avoiding quick motions at this point  PT continued to educated the patient to amb with a SPC to decrease injury to the tissues  Patient improved with strength and control of setaed heel raises, however he had pain with standing heel raises due to yesterday  Patient demonstrated improvements on the leg press 2* improved form  Patient would benefit from continued PT to allow the patient to return to his PLOF  Plan: Continue per plan of care  Precautions: L partial achilles tear on 19    See protocol attached to chart and in other orders        Manual  9/11 9/23 9/26 9/30 10/3 10/7 10/10      L ankle PROM nv            L gastroc and soleus STM nv MW MW MM IASTM  MW  Distal achilles IASTM  MW  Distal Achilles IASTM  MW  Distal achilles                                                 Exercise Diary  9/11 9/23 9/26 9/30 10/3 10/7 10/10      bike nv X-ride  10' X-ride  10' X-ride, 10' X-ride  10' X-ride  10' X-ride  10'      Seated heel rases nv 2x10 3x10 3x15 3x15 3x15 3x15      AROM: DF, PF, inversion and eversion nv 2x10 ea RTB  2x10 ea RTB, 2x10 ea  GTB  2x10 ea GTB  3x10 ea      Ankle alphabet nv x2 x2 x2  HEP       Gait training nv            Leg press nv  nv nv 30#  2x10 40#  2x10 40#  3x10      FTEO/ FTEC nv    3x30" ea firm surface (S) 3x30" ea firm (S)       BAPS board nv     nv       Toe raises seated  2x10 2x10 2x10 2x10 2x10       Inversion ball squeeze (seated on high table)  2x10  5" hold 2x10  5" hold 2x10x5"         Forward step ups     2x10 2x10 ea  Descending added with SPC  lvl 0       Standing heel raises      x10 x3 pain      bridge       nv      Hip add & abd       nv      Quad set       nv                                                                           Modalities  9/11            CP/ HP PRN np

## 2019-10-14 ENCOUNTER — OFFICE VISIT (OUTPATIENT)
Dept: PHYSICAL THERAPY | Facility: CLINIC | Age: 69
End: 2019-10-14
Payer: COMMERCIAL

## 2019-10-14 DIAGNOSIS — S86.012A ACHILLES RUPTURE, LEFT, INITIAL ENCOUNTER: Primary | ICD-10-CM

## 2019-10-14 PROCEDURE — 97140 MANUAL THERAPY 1/> REGIONS: CPT

## 2019-10-14 PROCEDURE — 97110 THERAPEUTIC EXERCISES: CPT

## 2019-10-14 NOTE — PROGRESS NOTES
Daily Note     Today's date: 10/14/2019  Patient name: Chacho Pulido  : 1950  MRN: [id number]  Referring provider: Kolton Wheeler  Dx:   Encounter Diagnosis     ICD-10-CM    1  Achilles rupture, left, initial encounter S86 012A        Start Time: 0900  Stop Time: 1000  Total time in clinic (min): 60 minutes    Subjective: Patient noted that his achilles has been feeling better, however is sore with quick motions  Objective: See treatment diary below      Assessment: Patient continues to improve with strength and ROM after his quick motion last week  PT introduced the BAPS board to assist with ROM and eccentric strength  Patient required min VCs  PT added hip strengthening to assist c balance and amb  Patient would benefit from continued PT to allow the patient to return to his PLOF  Plan: Continue per plan of care  Precautions: L partial achilles tear on 19    See protocol attached to chart and in other orders        Manual  9/11 9/23 9/26 9/30 10/3 10/7 10/10 10/14     L ankle PROM nv            L gastroc and soleus STM nv MW MW MM IASTM  MW  Distal achilles IASTM  MW  Distal Achilles IASTM  MW  Distal achilles IASTM MW  Distal achilles                                                Exercise Diary  9/11 9/23 9/26 9/30 10/3 10/7 10/10 10/14     bike nv X-ride  10' X-ride  10' X-ride, 10' X-ride  10' X-ride  10' X-ride  10' X-ride  10'     Seated heel rases nv 2x10 3x10 3x15 3x15 3x15 3x15 3x15     AROM: DF, PF, inversion and eversion nv 2x10 ea RTB  2x10 ea RTB, 2x10 ea  GTB  2x10 ea GTB  3x10 ea BTB  2x10 ea     Ankle alphabet nv x2 x2 x2  HEP       Gait training nv            Leg press nv  nv nv 30#  2x10 40#  2x10 40#  3x10 40#  3x10     FTEO/ FTEC nv    3x30" ea firm surface (S) 3x30" ea firm (S)       BAPS board nv     nv  Fwd/back & lateral  2x10 ea     Toe raises seated  2x10 2x10 2x10 2x10 2x10       Inversion ball squeeze (seated on high table)  2x10  5" hold 2x10  5" hold 2x10x5"         Forward step ups     2x10 2x10 ea  Descending added with SPC  lvl 0       Standing heel raises      x10 x3 pain      bridge       nv 2x10     Hip add & abd       nv 2x10 ea  GTB abd     Quad set       nv nv     Standing calf stretch (not aggressive)        3x30" ea                                                             Modalities  9/11            CP/ HP PRN np

## 2019-10-17 ENCOUNTER — EVALUATION (OUTPATIENT)
Dept: PHYSICAL THERAPY | Facility: CLINIC | Age: 69
End: 2019-10-17
Payer: COMMERCIAL

## 2019-10-17 DIAGNOSIS — S86.012A ACHILLES RUPTURE, LEFT, INITIAL ENCOUNTER: Primary | ICD-10-CM

## 2019-10-17 PROCEDURE — 97140 MANUAL THERAPY 1/> REGIONS: CPT

## 2019-10-17 PROCEDURE — 97110 THERAPEUTIC EXERCISES: CPT

## 2019-10-17 NOTE — PROGRESS NOTES
PT Re-Evaluation     Today's date: 10/17/2019  Patient name: Marjorie Mon  : 1950  MRN: [id number]  Referring provider: Troy Spear  Dx:   Encounter Diagnosis     ICD-10-CM    1  Achilles rupture, left, initial encounter S86 012A        Start Time: 0900  Stop Time: 1000  Total time in clinic (min): 60 minutes    Assessment  Assessment details: Since starting physical therapy the patient has improved with L ankle AROM and PROM, strength, gait mechanics and pain levels  Patient has had a few falls and kicked a shoe with his R foot which set him back a little on progress  Patient is able to perform 3x15 seated heel raises on the L foot with no difficulty, however has difficulty with standing heel raises  Patient only reports pain with quick motions and uses a ACE wrap at times for stability  Patient continues to be limited in strength, balance, ROM and functional activities  PT will address the noted impairments by performing hip, knee and ankle strengthening, stretching, balance, functional activities and manual techniques according to the protocol to allow the patient to return to his PLOF  PT recommended 2x/week for 4-6 weeks c a good prognosis 2* noted progress  Impairments: abnormal gait, abnormal muscle tone, abnormal or restricted ROM, activity intolerance, impaired balance, impaired physical strength, lacks appropriate home exercise program, pain with function, safety issue, weight-bearing intolerance and poor body mechanics    Symptom irritability: moderateUnderstanding of Dx/Px/POC: good   Prognosis: good    Goals  STG: In four weeks the patient will:    1  Be (I) with his HEP  (50% MET)  2  Increase DF, inversion and eversion strength to 4+/5 MMT score to assist c amb  (90% MET)  3  Increase L ankle ROM by 5-10 degrees to assist c amb and ADLs  (MET)  4  amb 200ft (I) with proper mechanics and without pain in the L ankle   (75% MET)        LTG: In eight weeks, the patient will:    1  Increase FOTO score to 53 to demonstrate improvements in symptoms and function  (In progress)  2  Demonstrate full L ankle AROM without pain  (75% MET)  3  Perform 30 mins of ADLs without pain  (MET)  4  Increase plantarflexion strength to 5/5 MMT score to assist c ADLs and stairs  (60% MET)  5  Descend the stairs without pain  (50% MET)  6  Perform FTEO for 30 seconds on a non-compliant surface without pain and no LOB  (in progress)  7  Perform FTEC for 30 seconds on a non-compliant surface without pain and no LOB  (in progress)      Plan  Patient would benefit from: skilled physical therapy  Referral necessary: No  Planned modality interventions: cryotherapy and thermotherapy: hydrocollator packs  Planned therapy interventions: abdominal trunk stabilization, joint mobilization, manual therapy, massage, Suarez taping, neuromuscular re-education, patient education, postural training, balance, balance/weight bearing training, body mechanics training, strengthening, stretching, therapeutic activities, therapeutic exercise, therapeutic training, home exercise program and gait training  Frequency: 2x week  Duration in visits: 12  Duration in weeks: 6  Plan of Care beginning date: 10/17/2019  Plan of Care expiration date: 2019  Treatment plan discussed with: patient        Subjective Evaluation    History of Present Illness  Mechanism of injury: Patient is 65% back to his PLOF  Patient noted he has improved with amb, stairs and transfers  Patient noted he is able to do yard work if he wears his ACE bandage with minimal to no pain  Patient would like to continue to work on strengthening, heel raises, ascend the steps normally and balance in the shower             Recurrent probem    Quality of life: good    Pain  Current pain ratin  At best pain ratin  At worst pain ratin (quick motions)  Location: L achillies  Quality: sharp  Relieving factors: ice and rest  Aggravating factors: standing, walking, stair climbing and lifting  Progression: improved    Social Support  Steps to enter house: yes (2 PAULA)  Lives in: multiple-level home  Lives with: spouse    Employment status: not working (retired)  Hand dominance: right      Diagnostic Tests  MRI studies: abnormal  Patient Goals  Patient goals for therapy: decreased edema, decreased pain, improved balance, increased motion, increased strength, independence with ADLs/IADLs and return to sport/leisure activities  Patient goal: "to get back to golf  (has not tried)" "to walk with a normal gait and no pain(50% MET)  " "to improve balance when showering (50% MET) "        Objective     Palpation   Left   Tenderness of the anterior tibialis, lateral gastrocnemius, medial gastrocnemius, posterior tibialis and soleus  Tenderness   Left Ankle/Foot   Tenderness in the Achilles insertion  Neurological Testing     Sensation     Ankle/Foot   Left Ankle/Foot   Intact: light touch    Right Ankle/Foot   Intact: light touch     Active Range of Motion   Left Ankle/Foot   Dorsiflexion (ke): 8 degrees   Plantar flexion: 50 degrees   Inversion: 25 degrees   Eversion: 20 degrees     Right Ankle/Foot   Normal active range of motion    Passive Range of Motion   Left Ankle/Foot    Dorsiflexion (ke): 8 degrees   Plantar flexion: 50 degrees   Inversion: 25 degrees   Eversion: 21 degrees     Right Ankle/Foot  Normal passive range of motion    Strength/Myotome Testing     Left Ankle/Foot   Dorsiflexion: 4+  Plantar flexion: 3+  Inversion: 4+  Eversion: 4+  Great toe flexion: 4  Great toe extension: 4    Right Ankle/Foot   Dorsiflexion: 5  Plantar flexion: 5  Inversion: 5  Eversion: 5  Great toe flexion: 4+  Great toe extension: 4+    Swelling   Left Ankle/Foot   Figure 8: 55 cm    Right Ankle/Foot   Figure 8: 54 25 cm             Precautions: L partial achilles tear on 5/18/19    See protocol attached to chart and in other orders        Manual  9/11 9/23 9/26 9/30 10/3 10/7 10/10 10/14 10/17    L ankle PROM nv            L gastroc and soleus STM nv MW MW MM IASTM  MW  Distal achilles IASTM  MW  Distal Achilles IASTM  MW  Distal achilles IASTM MW  Distal achilles IASTM  MW  Distal achilles    Re-eval         MW                                  Exercise Diary  9/11 9/23 9/26 9/30 10/3 10/7 10/10 10/14 10/17    bike nv X-ride  10' X-ride  10' X-ride, 10' X-ride  10' X-ride  10' X-ride  10' X-ride  10' X-ride  10'    Seated heel rases nv 2x10 3x10 3x15 3x15 3x15 3x15 3x15 3x15    AROM: DF, PF, inversion and eversion nv 2x10 ea RTB  2x10 ea RTB, 2x10 ea  GTB  2x10 ea GTB  3x10 ea BTB  2x10 ea BTB  2x10 ea    Ankle alphabet nv x2 x2 x2  HEP       Gait training nv            Leg press nv  nv nv 30#  2x10 40#  2x10 40#  3x10 40#  3x10 40#  3x10    FTEO/ FTEC nv    3x30" ea firm surface (S) 3x30" ea firm (S)       BAPS board nv     nv  Fwd/back & lateral  2x10 ea 2x10 ea    Toe raises seated  2x10 2x10 2x10 2x10 2x10       Inversion ball squeeze (seated on high table)  2x10  5" hold 2x10  5" hold 2x10x5"         Forward step ups     2x10 2x10 ea  Descending added with SPC  lvl 0       Standing heel raises      x10 x3 pain  x10    bridge       nv 2x10     Hip add & abd       nv 2x10 ea  GTB abd     Quad set       nv nv     Standing calf stretch (not aggressive)        3x30" ea 3x30" ea                                                            Modalities  9/11            CP/ HP PRN np

## 2019-10-18 ENCOUNTER — OFFICE VISIT (OUTPATIENT)
Dept: OBGYN CLINIC | Facility: CLINIC | Age: 69
End: 2019-10-18

## 2019-10-18 VITALS
BODY MASS INDEX: 37.51 KG/M2 | HEART RATE: 90 BPM | DIASTOLIC BLOOD PRESSURE: 81 MMHG | HEIGHT: 70 IN | WEIGHT: 262 LBS | SYSTOLIC BLOOD PRESSURE: 149 MMHG

## 2019-10-18 DIAGNOSIS — M76.62 ACHILLES TENDINITIS OF LEFT LOWER EXTREMITY: ICD-10-CM

## 2019-10-18 DIAGNOSIS — S86.012A ACHILLES RUPTURE, LEFT, INITIAL ENCOUNTER: Primary | ICD-10-CM

## 2019-10-18 PROCEDURE — 99024 POSTOP FOLLOW-UP VISIT: CPT | Performed by: ORTHOPAEDIC SURGERY

## 2019-10-18 NOTE — PATIENT INSTRUCTIONS
CHRISTOPH Valdez    Attending, Orthopaedic Surgery  [facility]    Achilles Rupture - Nonoperative Treatment Protocol    OVERVIEW:   Week 0: cast, touchdown weightbearing with crutches / walker  o Avoid prolonged dependent position (keep foot elevated)   Week 2: CAM walker boot with 3 heel wedges, weightbearing as tolerated  o Wear boot AT ALL TIMES   Week 6: CAM walker boot, remove one wedge each week (so down to 2 wedges at week 6, 1 wedge at week 7, 0 wedges at week 8)  o Start gentle active ankle range of motion (trace alphabet with toes, circles in both directions)   Week 8: start PT, transition to shoe with heel lift, wean off crutches  o PT 2-3 times/week and home exercises daily   Progress with PT over ~4 months  o Week 12: wean off of shoe lift  o Week 20 / 5 months: gentle jog   Week 24 / 6 months:  o Gradual return to sports as tolerated    DETAILED PHYSICAL THERAPY PROTOCOL:  Phase I: weeks 8 - 12   Goals:  o Normalize gait  o Progress range of motion  o Normalize dorsiflexion, inversion, and eversion ankle strength 5/5   Treatment Recommendations:  o Gait training, wean off crutches/cane when gait non-antalgic  o Heel lift in shoe to assist non-apprehensive and normalized gait  o AROM dorsiflexion/plantarflexion/inversion/eversion  o Proprioception training  o Isometrics/isotonics: inversion/eversion  o Sitting heel rise  o PREs plantarflexion/dorsiflexion with knee flexed to 90; after 2 weeks, progress to PREs with knee extended to 0  o Leg press  o Bike  o Alphabet drawing  o Retro treadmill  o Forward step up program  o Underwater treadmill system for gait training   Precautions:  o Avoid passive heel cord stretching   Minimum Criteria for Advancement:  o Normal gait pattern  o Manual muscle grade test of 5/5 for dorsiflexion, inversion, and eversion    Phase II: weeks 12 - 20   Goals:  o Restore full functional range of motion  o Normalize plantarflexion strength 5/5  o Normalize balance  o Return to functional activities without pain  o Ability to descend stairs   Treatment Recommendations:  o Submaximal sport-specific skill development  o Proprioception training: BAPS, prop board, foam rollers, trampoline, Neurocom  o Isometrics/isotonics: inversion/eversion  o Isokinetic plantarflexion/dorsiflexion  o Standing heel rise  o Aggressive PREs plantarflexion  o Progress proximal strengthening (PREs)  o Bike, Stairmaster, Versaclimber  o Forward step down program  o Running in underwater treadmill system   Precautions:  o Avoid pain with therapeutic exercise and functional activities  o Avoid high loading the Achilles tendon (i e  aggressive stretching in dorsiflexion with body weight or jumping)   Minimum Criteria for Advancement:  o No apprehension with activities of daily living  o Normal flexibility  o Adequate strength base shown by ability to perform ten unilateral heel raises  o Ability to descend stairs reciprocally  o Symmetrical lower extremity balance    Phase III: weeks 20 - 28   Goals:  o Demonstrate ability to run forward on a treadmill symptom-free  o Average peak torque of 75% with isokinetic testing  o Maximize strength and flexibility as to meet all demands of ADLs  o Return to functional activity without limitation  o Higher level of dynamic activity with lack of apprehension with sport-specific movements   Treatment Recommendations:  o Start forward treadmill running  o Isokinetic testing and training  o Continue lower extremity strengthening and flexibility program  o Advance proprioception training with perturbation  o Light plyometric training (bilateral jumping activities)  o Continue aggressive plantarflexion PREs (emphasize eccentric activity)  o Submaximal sport specific skill development drills  o Progress bike, Stairmaster, Versaclimber  o Continue to progress proximal strengthening of lower extremities (PREs)   Precautions:  o No apprehension or pain with dynamic activity  o Avoid running or sport activity until adequate strength and flexibility is achieved   Minimum Criteria for Advancement:  o Pain free running  o Average peak torque of isokinetic test = 75% of non-involved  o Normal strength (5/5 throughout ankle)  o Sports-specific drills with zero apprehension    Phase IV: Return to Sport (weeks 28 - one year)   Goals:  o Lack of apprehension with sports activity  o Maximize strength and flexibility as to meet demands of individual's sport activity   Treatment Recommendations:  o Advanced functional exercises and agility exercises  o Plyometrics  o Sport-specific exercises  o Isokinetic testing  o Functional test assessment (such as vertical jump test)   Precautions:  o Avoid pain with therapeutic, functional, and sport activity  o Avoid full sport activity until adequate strength and flexibility   Criteria for Discharge:  o Flexibility and strength to accepted levels for sports performance  o Lack of apprehension with sport-specific movements  o 85% limb symmetry with vertical jump test  o 85% limb symmetry for average peak isokinetic torque (PF/DF/inv/ev)  o Independent performance of gym/home exercise program

## 2019-10-18 NOTE — PROGRESS NOTES
CHRISTOPH Lieberman  Attending, Orthopaedic Surgery  [facility] [address] [facility]      ORTHOPAEDIC FOOT AND ANKLE CLINIC VISIT     Assessment:     Encounter Diagnoses   Name Primary?  Achilles rupture, left, initial encounter Yes    Achilles tendinitis of left lower extremity             Plan:   · The patient verbalized understanding of exam findings and treatment plan  We engaged in the shared decision-making process and treatment options were discussed at length with the patient  Surgical and conservative management discussed today along with risks and benefits  · He is following the PT protocol for nonop achilles treatment for his partial rupture  · He is progressing well with PT   · He is participating actively in his HEP  · We will see him back in 6 weeks for repeat evaluation  · See back in 6 weeks  History of Present Illness:   Chief Complaint:   Chief Complaint   Patient presents with    Left Ankle - Follow-up     New Lozada is a 77 y o  male who is being seen in follow-up for left partial achilles rupture  When we last saw he we recommended PT and nonoperative treatment  Pain has improved  Residual pain is localized at achilles but mild  Pain/symptom timing:  Worse during the day when active  Pain/symptom context:  Worse with activites and work  Pain/symptom modifying factors:  Rest makes better, activities make worse  Pain/symptom associated signs/symptoms: none    Prior treatment   · NSAIDsYes   · Injections No   · Bracing/Orthotics Yes    · Physical Therapy Yes     Orthopedic Surgical History:   See below    Past Medical, Surgical and Social History:  Past Medical History:  has a past medical history of Acute gouty arthritis, Anxiety, Chronic thoracic spine pain, Dysfunction of both eustachian tubes, Erectile dysfunction of non-organic origin, Family history reviewed with no changes, Gout, Hypertension, Sebaceous cyst, and Skin lesion    Problem List: does not have any pertinent problems on file  Past Surgical History:  has a past surgical history that includes Appendectomy; Colonoscopy; Bowel resection; Mouth surgery; Wrist surgery; Cataract extraction; and Cervical fusion (N/A, 8/27/2019)  Family History: family history includes Diabetes in his family and sister; Hypertension in his family; No Known Problems in his father and mother  Social History:  reports that he has quit smoking  He has a 10 00 pack-year smoking history  He has quit using smokeless tobacco  He reports that he drinks alcohol  He reports that he does not use drugs  Current Medications: has a current medication list which includes the following prescription(s): allopurinol, vitamin b-12, methylprednisolone, oxycodone, valsartan-hydrochlorothiazide, gabapentin, and methocarbamol  Allergies: is allergic to levaquin [levofloxacin]  Review of Systems:  General- denies fever/chills  HEENT- denies hearing loss or sore throat  Eyes- denies eye pain or visual disturbances, denies red eyes  Respiratory- denies cough or SOB  Cardio- denies chest pain or palpitations  GI- denies abdominal pain  Endocrine- denies urinary frequency  Urinary- denies pain with urination  Musculoskeletal- Negative except noted above  Skin- denies rashes or wounds  Neurological- denies dizziness or headache  Psychiatric- denies anxiety or difficulty concentrating    Physical Exam:   /81 (BP Location: Left arm, Patient Position: Sitting, Cuff Size: Adult)   Pulse 90   Ht 5' 10" (1 778 m)   Wt 119 kg (262 lb)   BMI 37 59 kg/m²   General/Constitutional: No apparent distress: well-nourished and well developed    Eyes: normal ocular motion  Lymphatic: No appreciable lymphadenopathy  Respiratory: Non-labored breathing  Vascular: No edema, swelling or tenderness, except as noted in detailed exam   Integumentary: No impressive skin lesions present, except as noted in detailed exam   Neuro: No ataxia or tremors noted  Psych: Normal mood and affect, oriented to person, place and time  Appropriate affect  Musculoskeletal: Normal, except as noted in detailed exam and in HPI  Examination    Left    Gait Antalgic   Musculoskeletal Tender to palpation at achilles    Skin Normal       Nails Normal    Range of Motion  10 degrees dorsiflexion, 30 degrees plantarflexion  Subtalar motion: normal    Stability Stable    Muscle Strength 5/5 tibialis anterior  3/5 gastrocnemius-soleus  5/5 posterior tibialis  5/5 peroneal/eversion strength  5/5 EHL  5/5 FHL    Neurologic Normal    Sensation Intact to light touch throughout sural, saphenous, superficial peroneal, deep peroneal and medial/lateral plantar nerve distributions  [place] 5 07 filament (10g) testing deferred  Cardiovascular Brisk capillary refill < 2 seconds,intact DP and PT pulses    Special Tests None      Imaging Studies:   No new imaging        James R Lachman, MD  Foot & Ankle Surgery   Department of [address] [place] [address]      I personally performed the service  Alyne Kawasaki Lachman, MD

## 2019-10-21 ENCOUNTER — APPOINTMENT (OUTPATIENT)
Dept: PHYSICAL THERAPY | Facility: CLINIC | Age: 69
End: 2019-10-21
Payer: COMMERCIAL

## 2019-10-22 ENCOUNTER — OFFICE VISIT (OUTPATIENT)
Dept: PHYSICAL THERAPY | Facility: CLINIC | Age: 69
End: 2019-10-22
Payer: COMMERCIAL

## 2019-10-22 DIAGNOSIS — S86.012A ACHILLES RUPTURE, LEFT, INITIAL ENCOUNTER: Primary | ICD-10-CM

## 2019-10-22 PROCEDURE — 97110 THERAPEUTIC EXERCISES: CPT | Performed by: PHYSICAL THERAPIST

## 2019-10-22 NOTE — PROGRESS NOTES
Daily Note     Today's date: 10/22/2019  Patient name: Mi Healy  : 1950  MRN: [id number]  Referring provider: Sue Benedict  Dx:   Encounter Diagnosis     ICD-10-CM    1  Achilles rupture, left, initial encounter S86 012A                   Subjective: States it's getting better, notices small improvements in gait  Objective: See treatment diary below      Assessment: Completed exercise with correct technique and did not report pain during exercise  Demonstrated moderate fatigue after exercise  Pt would benefit from skilled PT services to reduce pain and increase level of function  Plan: Continue per plan of care  Precautions: L partial achilles tear on 19    See protocol attached to chart and in other orders        Manual  9/11 9/23 9/26 9/30 10/3 10/7 10/10 10/14 10/17 10/22   L ankle PROM nv            L gastroc and soleus STM nv MW MW MM IASTM  MW  Distal achilles IASTM  MW  Distal Achilles IASTM  MW  Distal achilles IASTM MW  Distal achilles IASTM  MW  Distal achilles IASTM MM Distal achilles   Re-eval         MW                                  Exercise Diary  9/11 9/23 9/26 9/30 10/3 10/7 10/10 10/14 10/17 10/22   bike nv X-ride  10' X-ride  10' X-ride, 10' X-ride  10' X-ride  10' X-ride  10' X-ride  10' X-ride  10' X-ride 10'   Seated heel rases nv 2x10 3x10 3x15 3x15 3x15 3x15 3x15 3x15 3x15   AROM: DF, PF, inversion and eversion nv 2x10 ea RTB  2x10 ea RTB, 2x10 ea  GTB  2x10 ea GTB  3x10 ea BTB  2x10 ea BTB  2x10 ea BTB 2x10 ea   Ankle alphabet nv x2 x2 x2  HEP       Gait training nv            Leg press nv  nv nv 30#  2x10 40#  2x10 40#  3x10 40#  3x10 40#  3x10 40# 3x10   FTEO/ FTEC nv    3x30" ea firm surface (S) 3x30" ea firm (S)       BAPS board nv     nv  Fwd/back & lateral  2x10 ea 2x10 ea 2x10 ea   Toe raises seated  2x10 2x10 2x10 2x10 2x10       Inversion ball squeeze (seated on high table)  2x10  5" hold 2x10  5" hold 2x10x5"         Forward step ups 2x10 2x10 ea  Descending added with SPC  lvl 0       Standing heel raises      x10 x3 pain  x10 x10   bridge       nv 2x10  2x10   Hip add & abd       nv 2x10 ea  GTB abd  2x10 ea GTB abd    Quad set       nv nv     Standing calf stretch (not aggressive)        3x30" ea 3x30" ea 3x30" ea                                                           Modalities  9/11            CP/ HP PRN np

## 2019-10-24 ENCOUNTER — TELEPHONE (OUTPATIENT)
Dept: SLEEP CENTER | Facility: CLINIC | Age: 69
End: 2019-10-24

## 2019-10-25 ENCOUNTER — OFFICE VISIT (OUTPATIENT)
Dept: PHYSICAL THERAPY | Facility: CLINIC | Age: 69
End: 2019-10-25
Payer: COMMERCIAL

## 2019-10-25 DIAGNOSIS — S86.012A ACHILLES RUPTURE, LEFT, INITIAL ENCOUNTER: Primary | ICD-10-CM

## 2019-10-25 PROCEDURE — 97140 MANUAL THERAPY 1/> REGIONS: CPT

## 2019-10-25 NOTE — PROGRESS NOTES
Daily Note     Today's date: 10/25/2019  Patient name: Feli Shepherd  : 1950  MRN: [id number]  Referring provider: Duyen Marie  Dx:   Encounter Diagnosis     ICD-10-CM    1  Achilles rupture, left, initial encounter S86 012A        Start Time: 905  Stop Time: 1000  Total time in clinic (min): 55 minutes    Subjective: Patient noted that he continues to feel better  Patient noted he will be away for the next week due to hunting  Objective: See treatment diary below      Assessment: PT introduced treadmill training and the patient was able to amb with improved mechanics post treadmill training  Patient continues to improve with control of the L ankle, however continues to have difficulty with Weight bearing  PT noted improvements with tissue mobility post manuals  Patient would benefit from continued PT to allow the patient to return to his PLOF  Plan: Continue per plan of care  Precautions: L partial achilles tear on 19    See protocol attached to chart and in other orders        Manual  10/25 9/23 9/26 9/30 10/3 10/7 10/10 10/14 10/17 10/22   L ankle PROM nv            L gastroc and soleus IASTM MW MW MW MM IASTM  MW  Distal achilles IASTM  MW  Distal Achilles IASTM  MW  Distal achilles IASTM MW  Distal achilles IASTM  MW  Distal achilles IASTM MM Distal achilles   Re-eval         MW                                  Exercise Diary  10/25 9/23 9/26 9/30 10/3 10/7 10/10 10/14 10/17 10/22   bike X-ride  10' X-ride  10' X-ride  10' X-ride, 10' X-ride  10' X-ride  10' X-ride  10' X-ride  10' X-ride  10' X-ride 10'   Seated heel rases 3x15 2x10 3x10 3x15 3x15 3x15 3x15 3x15 3x15 3x15   AROM: DF, PF, inversion and eversion HEP 2x10 ea RTB  2x10 ea RTB, 2x10 ea  GTB  2x10 ea GTB  3x10 ea BTB  2x10 ea BTB  2x10 ea BTB 2x10 ea   Ankle alphabet HEP x2 x2 x2  HEP       Treadmill training for gait 10' 1 2 mph 2 HHA improved post            Leg press 50#  3x10  nv nv 30#  2x10 40#  2x10 40#  3x10 40#  3x10 40#  3x10 40# 3x10   FTEO/ FTEC 3x30" ea firm (S)    3x30" ea firm surface (S) 3x30" ea firm (S)       BAPS board 3x10 ea     nv  Fwd/back & lateral  2x10 ea 2x10 ea 2x10 ea   Toe raises seated HEP 2x10 2x10 2x10 2x10 2x10       Inversion ball squeeze (seated on high table) HEP 2x10  5" hold 2x10  5" hold 2x10x5"         Forward step ups     2x10 2x10 ea  Descending added with SPC  lvl 0       Standing heel raises x15     x10 x3 pain  x10 x10   bridge       nv 2x10  2x10   Hip add & abd       nv 2x10 ea  GTB abd  2x10 ea GTB abd    Quad set       nv nv     Standing calf stretch (not aggressive) 3x30" ea       3x30" ea 3x30" ea 3x30" ea   [place]  1 jar per                                                       Modalities  9/11            CP/ HP PRN np

## 2019-11-05 ENCOUNTER — OFFICE VISIT (OUTPATIENT)
Dept: PHYSICAL THERAPY | Facility: CLINIC | Age: 69
End: 2019-11-05
Payer: COMMERCIAL

## 2019-11-05 DIAGNOSIS — S86.012A ACHILLES RUPTURE, LEFT, INITIAL ENCOUNTER: Primary | ICD-10-CM

## 2019-11-05 PROCEDURE — 97110 THERAPEUTIC EXERCISES: CPT

## 2019-11-05 PROCEDURE — 97140 MANUAL THERAPY 1/> REGIONS: CPT

## 2019-11-05 NOTE — PROGRESS NOTES
Daily Note     Today's date: 2019  Patient name: Inocente Blevins  : 1950  MRN: [id number]  Referring provider: Ishan Chery  Dx:   Encounter Diagnosis     ICD-10-CM    1  Achilles rupture, left, initial encounter S86 012A        Start Time: 930  Stop Time: 1030  Total time in clinic (min): 60 minutes    Subjective: Patient noted that he was in the wood this past week cutting down wood with a chain saw and noted increased pain in the back of the heel and bottom of the foot  Objective: See treatment diary below      Assessment: PT educated the patient on using a SPC while am on uneven grounds  Patient improved with amb post treadmill training  Patient required moderate cues throughout the session for form  PT noted improvements during IASTM  Patient would benefit from continued PT to allow the patient to return to her PLOF  Plan: Continue per plan of care  Precautions: L partial achilles tear on 19    See protocol attached to chart and in other orders        Manual  10/25 11/5 9/26 9/30 10/3 10/7 10/10 10/14 10/17 10/22   L ankle PROM nv            L gastroc and soleus IASTM MW MW MW MM IASTM  MW  Distal achilles IASTM  MW  Distal Achilles IASTM  MW  Distal achilles IASTM MW  Distal achilles IASTM  MW  Distal achilles IASTM MM Distal achilles   Re-eval         MW                                  Exercise Diary  10/25 11/5 9/26 9/30 10/3 10/7 10/10 10/14 10/17 10/22   bike X-ride  10' X-ride  10' X-ride  10' X-ride, 10' X-ride  10' X-ride  10' X-ride  10' X-ride  10' X-ride  10' X-ride 10'   Seated heel rases 3x15 3x15 3x10 3x15 3x15 3x15 3x15 3x15 3x15 3x15   AROM: DF, PF, inversion and eversion HEP  RTB  2x10 ea RTB, 2x10 ea  GTB  2x10 ea GTB  3x10 ea BTB  2x10 ea BTB  2x10 ea BTB 2x10 ea   Ankle alphabet HEP  x2 x2  HEP       Treadmill training for gait 10' 1 2 mph 2 HHA improved post 10'  1 2 mph  2 HHA             Leg press 50#  3x10  nv nv 30#  2x10 40#  2x10 40#  3x10 40#  3x10 40#  3x10 40# 3x10   FTEO/ FTEC 3x30" ea firm (S)    3x30" ea firm surface (S) 3x30" ea firm (S)       BAPS board 3x10 ea 3x10 ea    nv  Fwd/back & lateral  2x10 ea 2x10 ea 2x10 ea   Toe raises seated HEP  2x10 2x10 2x10 2x10       Inversion ball squeeze (seated on high table) HEP  2x10  5" hold 2x10x5"         Forward step ups     2x10 2x10 ea  Descending added with SPC  lvl 0       Standing heel raises x15 2x10    x10 x3 pain  x10 x10   bridge       nv 2x10  2x10   Hip add & abd       nv 2x10 ea  GTB abd  2x10 ea GTB abd    Quad set       nv nv     Standing calf stretch (not aggressive) 3x30" ea       3x30" ea 3x30" ea 3x30" ea   [place]  1 jar per                                                       Modalities  9/11            CP/ HP PRN np

## 2019-11-08 ENCOUNTER — OFFICE VISIT (OUTPATIENT)
Dept: PHYSICAL THERAPY | Facility: CLINIC | Age: 69
End: 2019-11-08
Payer: COMMERCIAL

## 2019-11-08 DIAGNOSIS — S86.012A ACHILLES RUPTURE, LEFT, INITIAL ENCOUNTER: Primary | ICD-10-CM

## 2019-11-08 PROCEDURE — 97110 THERAPEUTIC EXERCISES: CPT

## 2019-11-08 PROCEDURE — 97140 MANUAL THERAPY 1/> REGIONS: CPT

## 2019-11-08 NOTE — PROGRESS NOTES
Daily Note     Today's date: 2019  Patient name: Angelita Wilkinson  : 1950  MRN: [id number]  Referring provider: Kylah Lopez  Dx:   Encounter Diagnosis     ICD-10-CM    1  Achilles rupture, left, initial encounter S86 012A        Start Time: 1115  Stop Time: 1200  Total time in clinic (min): 45 minutes    Subjective: Patient noted the ankle continues to swell after walking for a longer period of time  Objective: See treatment diary below      Assessment: PT continues to note improvements post manuals  Patient progressed with standing heel raises and continues to amb with improved mechanics after amb on the treadmill  Patient required min VCs throughout the session  Patient would benefit from continued PT to allow the patient to return to his PLOF  Plan: Continue per plan of care  Precautions: L partial achilles tear on 19    See protocol attached to chart and in other orders        Manual  10/25 11/5 11/8 9/30 10/3 10/7 10/10 10/14 10/17 10/22   L ankle PROM nv            L gastroc and soleus IASTM MW MW MW MM IASTM  MW  Distal achilles IASTM  MW  Distal Achilles IASTM  MW  Distal achilles IASTM MW  Distal achilles IASTM  MW  Distal achilles IASTM MM Distal achilles   Re-eval         MW                                  Exercise Diary  10/25 11/5 11/8 9/30 10/3 10/7 10/10 10/14 10/17 10/22   bike X-ride  10' X-ride  10' X-ride  10' X-ride, 10' X-ride  10' X-ride  10' X-ride  10' X-ride  10' X-ride  10' X-ride 10'   Seated heel rases 3x15 3x15 3x10 3x15 3x15 3x15 3x15 3x15 3x15 3x15   AROM: DF, PF, inversion and eversion HEP   RTB, 2x10 ea  GTB  2x10 ea GTB  3x10 ea BTB  2x10 ea BTB  2x10 ea BTB 2x10 ea   Ankle alphabet HEP   x2  HEP       Treadmill training for gait 10' 1 2 mph 2 HHA improved post 10'  1 2 mph  2 HHA   10'  1 4  Mph  2 HHA          Leg press 50#  3x10   nv 30#  2x10 40#  2x10 40#  3x10 40#  3x10 40#  3x10 40# 3x10   FTEO/ FTEC 3x30" ea firm (S)    3x30" ea firm surface (S) 3x30" ea firm (S)       BAPS board 3x10 ea 3x10 ea 3x10 ea   nv  Fwd/back & lateral  2x10 ea 2x10 ea 2x10 ea   Toe raises seated HEP   2x10 2x10 2x10       Inversion ball squeeze (seated on high table) HEP   2x10x5"         Forward step ups     2x10 2x10 ea  Descending added with SPC  lvl 0       Standing heel raises x15 2x10 2x10   x10 x3 pain  x10 x10   bridge       nv 2x10  2x10   Hip add & abd       nv 2x10 ea  GTB abd  2x10 ea GTB abd    Quad set       nv nv     Standing calf stretch (not aggressive) 3x30" ea  3x30" ea     3x30" ea 3x30" ea 3x30" ea   [place]  1 jar per  1 jar per                                                     Modalities  9/11            CP/ HP PRN np

## 2019-11-11 ENCOUNTER — OFFICE VISIT (OUTPATIENT)
Dept: FAMILY MEDICINE CLINIC | Facility: CLINIC | Age: 69
End: 2019-11-11
Payer: COMMERCIAL

## 2019-11-11 VITALS
RESPIRATION RATE: 18 BRPM | DIASTOLIC BLOOD PRESSURE: 70 MMHG | OXYGEN SATURATION: 96 % | SYSTOLIC BLOOD PRESSURE: 140 MMHG | BODY MASS INDEX: 38.37 KG/M2 | TEMPERATURE: 98 F | WEIGHT: 268 LBS | HEIGHT: 70 IN | HEART RATE: 84 BPM

## 2019-11-11 DIAGNOSIS — Z23 NEED FOR VACCINATION: ICD-10-CM

## 2019-11-11 DIAGNOSIS — Z00.00 ENCOUNTER FOR MEDICARE ANNUAL WELLNESS EXAM: Primary | ICD-10-CM

## 2019-11-11 DIAGNOSIS — Z12.5 SCREENING FOR PROSTATE CANCER: ICD-10-CM

## 2019-11-11 DIAGNOSIS — S86.012A RUPTURE OF LEFT ACHILLES TENDON, INITIAL ENCOUNTER: ICD-10-CM

## 2019-11-11 DIAGNOSIS — G47.33 OSA (OBSTRUCTIVE SLEEP APNEA): ICD-10-CM

## 2019-11-11 DIAGNOSIS — R73.9 ELEVATED BLOOD SUGAR: ICD-10-CM

## 2019-11-11 DIAGNOSIS — L82.0 SEBORRHEIC KERATOSES, INFLAMED: ICD-10-CM

## 2019-11-11 DIAGNOSIS — E78.2 COMBINED HYPERLIPIDEMIA: ICD-10-CM

## 2019-11-11 DIAGNOSIS — Z87.39 HISTORY OF GOUT: ICD-10-CM

## 2019-11-11 DIAGNOSIS — Z98.1 S/P CERVICAL SPINAL FUSION: ICD-10-CM

## 2019-11-11 DIAGNOSIS — L30.9 HAND DERMATITIS: ICD-10-CM

## 2019-11-11 DIAGNOSIS — E03.8 SUBCLINICAL HYPOTHYROIDISM: ICD-10-CM

## 2019-11-11 DIAGNOSIS — E66.9 OBESITY (BMI 35.0-39.9 WITHOUT COMORBIDITY): ICD-10-CM

## 2019-11-11 DIAGNOSIS — M72.0 DUPUYTREN CONTRACTURE: ICD-10-CM

## 2019-11-11 PROCEDURE — 1101F PT FALLS ASSESS-DOCD LE1/YR: CPT | Performed by: FAMILY MEDICINE

## 2019-11-11 PROCEDURE — G0439 PPPS, SUBSEQ VISIT: HCPCS | Performed by: FAMILY MEDICINE

## 2019-11-11 PROCEDURE — 3725F SCREEN DEPRESSION PERFORMED: CPT | Performed by: FAMILY MEDICINE

## 2019-11-11 PROCEDURE — 99214 OFFICE O/P EST MOD 30 MIN: CPT | Performed by: FAMILY MEDICINE

## 2019-11-11 PROCEDURE — G0009 ADMIN PNEUMOCOCCAL VACCINE: HCPCS | Performed by: FAMILY MEDICINE

## 2019-11-11 PROCEDURE — 90670 PCV13 VACCINE IM: CPT | Performed by: FAMILY MEDICINE

## 2019-11-11 RX ORDER — BETAMETHASONE DIPROPIONATE 0.5 MG/G
CREAM TOPICAL 2 TIMES DAILY
Qty: 30 G | Refills: 5 | Status: SHIPPED | OUTPATIENT
Start: 2019-11-11 | End: 2019-12-30 | Stop reason: SDUPTHER

## 2019-11-11 NOTE — PROGRESS NOTES
[facility]      NAME: Claudette Salazar  AGE: 77 y o  SEX: male  : 1950   MRN: [id number]    DATE: 2019  TIME: 12:03 PM    Assessment and Plan     Problem List Items Addressed This Visit     Combined hyperlipidemia       Cholesterol  236/148  Unfortunately, patient has been unable to exercise due to his orthopedic injuries lately  He is now starting to increase activity  Patient is refusing medication at this time  Will recheck lipids prior to next appointment to give him some time to improve his  Diet/ exercise and optimize weight  Relevant Orders    Comprehensive metabolic panel    Lipid Panel with Direct LDL reflex    History of gout      Doing well on allopurinol 300 mg daily without any recent flares  Will recheck uric acid prior to next appointment         Relevant Orders    Uric acid    Subclinical hypothyroidism      TSH from 2019 was normal   Will continue to monitor         Relevant Orders    TSH, 3rd generation with Free T4 reflex    Elevated blood sugar      Blood sugar 1 2019  Recommend reduced carb diet and increase exercise  Will continue to monitor         Relevant Orders    CBC and differential    Comprehensive metabolic panel    Hemoglobin A1C    MANDY (obstructive sleep apnea)      Patient recently started CPAP  Using nasal pillows  S/P cervical spinal fusion       Status post cervical fusion 2019 by Dr Gary De La Torre  Overall, he has improved  Continue follow-up with Ortho as directed         Achilles rupture, left      Status post partial left Achilles tendon tear May 2019  While going up steps  Patient is being followed by Orthopedics, Dr Veto Yusuf  He is being followed with conservative management  Going to physical therapy twice weekly,  An making some progress  Hand dermatitis      Patient with hand dermatitis  May be psoriasis  He also has a patch on his posterior scalp    Will give trial of dip Prolene cream to be applied b i d , and decrease frequency once response is attained  Consider referral to Dermatology if symptoms persist         Relevant Medications    betamethasone, augmented, (DIPROLENE-AF) 0 05 % cream    Dupuytren contracture       Patient with asymptomatic contractures bilateral hands  Patient declines referral to hand specialist at this time, but will notify me if his symptoms worsen         Seborrheic keratoses, inflamed       Patient with inflamed lesion right shoulder  Recommend schedule surgery appointment for shave excision and destruction         Relevant Medications    betamethasone, augmented, (DIPROLENE-AF) 0 05 % cream    Obesity (BMI 35 0-39 9 without comorbidity)      Current BMI 38  Patient has been unable to exercise lately due to Achilles tendon tear and cervical fusion this summer  Will continue to monitor           Other Visit Diagnoses     Encounter for Medicare annual wellness exam    -  Primary    Screening for prostate cancer        Relevant Orders    PSA, Total Screen    Need for vaccination        Relevant Orders    PNEUMOCOCCAL CONJUGATE VACCINE 13-VALENT GREATER THAN 6 MONTHS (Completed)              Return to office in: AWV, recheck 6 mos, FBW prior at [facility] [place] today  Chief Complaint     Chief Complaint   Patient presents with    Medicare Wellness Visit    Follow-up     6 months check up       History of Present Illness     Recheck chronic medical problems today  Patient recently sustained a partial tear of his left Achilles tendon in May of this year  He also is status post cervical fusion this summer  He is going to physical therapy twice weekly for his ankle, and making some progress  Otherwise doing well  Doing well on allopurinol for gout  Doing well on valsartan hypertension    Last labs 4/22/2019      The following portions of the patient's history were reviewed and updated as appropriate: allergies, current medications, past family history, past medical history, past social history, past surgical history and problem list     Review of Systems   Review of Systems   Respiratory: Negative  Cardiovascular: Negative  Gastrointestinal: Negative  Genitourinary: Negative  Active Problem List     Patient Active Problem List   Diagnosis    Rotator cuff syndrome of right shoulder    Exogenous obesity    Combined hyperlipidemia    Benign essential hypertension    Chronic thoracic spine pain    History of gout    Chronic lumbar radiculopathy    Cervical radiculitis    Plantar fasciitis of right foot    Subclinical hypothyroidism    Elevated blood sugar    Eczema    Fatigue    MANDY (obstructive sleep apnea)    S/P cervical spinal fusion    Achilles rupture, left    Hand dermatitis    Dupuytren contracture    Seborrheic keratoses, inflamed    Obesity (BMI 35 0-39 9 without comorbidity)       Objective   /70 (BP Location: Left arm, Patient Position: Sitting, Cuff Size: Adult)   Pulse 84   Temp 98 °F (36 7 °C) (Tympanic)   Resp 18   Ht 5' 9 69" (1 77 m)   Wt 122 kg (268 lb)   SpO2 96%   BMI 38 80 kg/m²     Physical Exam   Cardiovascular: Normal rate, regular rhythm, normal heart sounds and intact distal pulses  Carotids: no bruits  Ext: no edema   Pulmonary/Chest: Effort normal  No respiratory distress  He has no wheezes  He has no rales  Psychiatric: He has a normal mood and affect  His behavior is normal  Thought content normal        Pertinent Laboratory/Diagnostic Studies:  labs 4/22    Current Medications     Current Outpatient Medications:     allopurinol (ZYLOPRIM) 300 mg tablet, Take 1 tablet (300 mg total) by mouth daily at bedtime, Disp: 90 tablet, Rfl: 1    valsartan-hydrochlorothiazide (DIOVAN-HCT) 160-12 5 MG per tablet, TAKE 1 TABLET ONCE DAILY  , Disp: 90 tablet, Rfl: 1    betamethasone, augmented, (DIPROLENE-AF) 0 05 % cream, Apply topically 2 (two) times a day, Disp: 30 g, Rfl: 5    cyanocobalamin (VITAMIN B-12) 1,000 mcg tablet, Take by mouth daily, Disp: , Rfl:     Health Maintenance     Health Maintenance   Topic Date Due    BMI: Followup Plan  01/11/1969    Falls: Plan of Care  01/11/2016    PT PLAN OF CARE  11/16/2019    DTaP,Tdap,and Td Vaccines (1 - Tdap) 05/10/2020 (Originally 1/11/1972)    Hepatitis C Screening  11/11/2020 (Originally 1950)    Fall Risk  11/11/2020    Depression Screening PHQ  11/11/2020    Medicare Annual Wellness Visit (AWV)  11/11/2020    BMI: Adult  11/11/2020    Pneumococcal Vaccine: 65+ Years (2 of 2 - PPSV23) 11/11/2020    CRC Screening: Colonoscopy  02/14/2028    INFLUENZA VACCINE  Completed    Pneumococcal Vaccine: Pediatrics (0 to 5 Years) and At-Risk Patients (6 to 59 Years)  Aged Out    HEPATITIS B VACCINES  Aged [employer] History   Administered Date(s) Administered    INFLUENZA 11/01/2015, 11/02/2017, 09/23/2018    Influenza Quadrivalent Preservative Free 3 years and older IM 11/02/2017    Influenza Quadrivalent, 6-35 Months IM 11/01/2015    Influenza, high dose seasonal 0 5 mL 10/03/2019    Pneumococcal Conjugate 13-Valent 11/11/2019       Kevin Cunningham DO  [facility]  BMI Counseling: Body mass index is 38 8 kg/m²  The BMI is above normal  Nutrition recommendations include reducing portion sizes

## 2019-11-11 NOTE — ASSESSMENT & PLAN NOTE
Cholesterol April 22nd 236/148  Unfortunately, patient has been unable to exercise due to his orthopedic injuries lately  He is now starting to increase activity  Patient is refusing medication at this time  Will recheck lipids prior to next appointment to give him some time to improve his  Diet/ exercise and optimize weight

## 2019-11-11 NOTE — ASSESSMENT & PLAN NOTE
Patient with asymptomatic contractures bilateral hands    Patient declines referral to hand specialist at this time, but will notify me if his symptoms worsen

## 2019-11-11 NOTE — ASSESSMENT & PLAN NOTE
Status post cervical fusion August 2019 by Dr Omar Hernandez  Overall, he has improved  Continue follow-up with Ortho as directed

## 2019-11-11 NOTE — PROGRESS NOTES
Assessment and Plan:    Medicare wellness visit  Patient does not have a living will or healthcare power of   He has information to obtain these documents  Depression screen and fall risk were negative  Current with colonoscopy and PSA screening  Patient had flu shot  Due for pneumonia vaccine series and Shingrix (  Discussed with patient)      Problem List Items Addressed This Visit     Combined hyperlipidemia    History of gout    Subclinical hypothyroidism    Elevated blood sugar      Other Visit Diagnoses     Encounter for Medicare annual wellness exam    -  Primary    Screening for prostate cancer               Preventive health issues were discussed with patient, and age appropriate screening tests were ordered as noted in patient's After Visit Summary  Personalized health advice and appropriate referrals for health education or preventive services given if needed, as noted in patient's After Visit Summary  History of Present Illness:     Patient presents for Welcome to Medicare visit       Patient Care Team:  Zacarias Vazquez DO as PCP - General     Review of Systems:     Review of Systems   Problem List:     Patient Active Problem List   Diagnosis    Rotator cuff syndrome of right shoulder    Exogenous obesity    Combined hyperlipidemia    Benign essential hypertension    Chronic thoracic spine pain    History of gout    Chronic lumbar radiculopathy    Cervical radiculitis    Plantar fasciitis of right foot    Subclinical hypothyroidism    Elevated blood sugar    Eczema    Fatigue    MANDY (obstructive sleep apnea)    Snoring    S/P cervical spinal fusion      Past Medical and Surgical History:     Past Medical History:   Diagnosis Date    Acute gouty arthritis     last assessed 6/1/13     Anxiety     last assessed 7/11/14     Chronic thoracic spine pain     last assessed 5/13/16     Dysfunction of both eustachian tubes     last assessed 8/16/13    Erectile dysfunction of non-organic origin     last assessed 10/8/13     Family history reviewed with no changes     medical history     Gout     Hypertension     Sebaceous cyst     last assessed 12/16/13     Skin lesion     last assessed 1/2/14      Past Surgical History:   Procedure Laterality Date    APPENDECTOMY      12years old    BOWEL RESECTION      CATARACT EXTRACTION      CERVICAL FUSION N/A 8/27/2019    Procedure: Anterior cervical discectomy w fusion C5-6, with allograft and neuromonitoring;  Surgeon: Per Da Silva MD;  [facility]: [facility];  Service: Orthopedics    COLONOSCOPY      MOUTH SURGERY      WRIST SURGERY        Family History:     Family History   Problem Relation Age of Onset    Diabetes Sister     Diabetes Family     Hypertension Family     No Known Problems Mother     No Known Problems Father       Social History:     Social History     Socioeconomic History    Marital status: /Civil Union     Spouse name: None    Number of children: None    Years of education: None    Highest education level: None   Occupational History    None   Social Needs    Financial resource strain: None    Food insecurity:     Worry: None     Inability: None    Transportation needs:     Medical: None     Non-medical: None   Tobacco Use    Smoking status: Former Smoker     Packs/day: 0 50     Years: 20 00     Pack years: 10 00    Smokeless tobacco: Former User   Substance and Sexual Activity    Alcohol use: Yes     Frequency: Monthly or less     Drinks per session: 1 or 2     Binge frequency: Never    Drug use: No    Sexual activity: None   Lifestyle    Physical activity:     Days per week: None     Minutes per session: None    Stress: None   Relationships    Social connections:     Talks on phone: None     Gets together: None     Attends Shinto service: None     Active member of club or organization: None     Attends meetings of clubs or organizations: None     Relationship status: None    Intimate partner violence:     Fear of current or ex partner: None     Emotionally abused: None     Physically abused: None     Forced sexual activity: None   Other Topics Concern    None   Social History Narrative    None      Medications and Allergies:     Current Outpatient Medications   Medication Sig Dispense Refill    allopurinol (ZYLOPRIM) 300 mg tablet Take 1 tablet (300 mg total) by mouth daily at bedtime 90 tablet 1    valsartan-hydrochlorothiazide (DIOVAN-HCT) 160-12 5 MG per tablet TAKE 1 TABLET ONCE DAILY  90 tablet 1    cyanocobalamin (VITAMIN B-12) 1,000 mcg tablet Take by mouth daily      gabapentin (NEURONTIN) 300 mg capsule Take 1 capsule (300 mg total) by mouth 2 (two) times a day (Patient not taking: Reported on 10/18/2019) 60 capsule 0    methocarbamol (ROBAXIN) 500 mg tablet Take 1 tablet (500 mg total) by mouth 4 (four) times a day as needed for muscle spasms (Patient not taking: Reported on 10/18/2019) 20 tablet 0    methylPREDNISolone 4 MG tablet therapy pack 24mg PO on day 1, then decrease by 4mg/day x 5 days per dose pack instructions  (Patient not taking: Reported on 11/11/2019) 21 tablet 0    oxyCODONE (ROXICODONE) 5 mg immediate release tablet Take 1 tablet (5 mg total) by mouth every 6 (six) hours as needed for severe painMax Daily Amount: 20 mg (Patient not taking: Reported on 11/11/2019) 20 tablet 0     No current facility-administered medications for this visit        Allergies   Allergen Reactions    Levaquin [Levofloxacin] Arthralgia      Immunizations:     Immunization History   Administered Date(s) Administered    INFLUENZA 11/01/2015, 11/02/2017, 09/23/2018    Influenza Quadrivalent Preservative Free 3 years and older IM 11/02/2017    Influenza Quadrivalent, 6-35 Months IM 11/01/2015    Influenza, high dose seasonal 0 5 mL 10/03/2019      Health Maintenance:         Topic Date Due    Hepatitis C Screening  11/11/2020 (Originally 1950)    CRC Screening: Colonoscopy  02/14/2028         Topic Date Due    Pneumococcal Vaccine: 65+ Years (1 of 2 - PCV13) 01/11/2016      Medicare Screening Tests and Risk Assessments:     Chastity Xiong is here for his Subsequent Wellness visit  Last Medicare Wellness visit information reviewed, patient interviewed and updates made to the record today  Health Risk Assessment:   Patient rates overall health as good  Patient feels that their physical health rating is same  Eyesight was rated as same  Hearing was rated as same  Patient feels that their emotional and mental health rating is same  Pain experienced in the last 7 days has been some  Patient's pain rating has been 4/10  Patient states that he has experienced no weight loss or gain in last 6 months  Depression Screening:   PHQ-2 Score: 0      Fall Risk Screening: In the past year, patient has experienced: no history of falling in past year      Home Safety:  Patient has trouble with stairs inside or outside of their home  Patient has working smoke alarms and has no working carbon monoxide detector  Home safety hazards include: none  Nutrition:   Current diet is Regular  Medications:   Patient is currently taking over-the-counter supplements  OTC medications include: see medication list  Patient is able to manage medications  Activities of Daily Living (ADLs)/Instrumental Activities of Daily Living (IADLs):   Walk and transfer into and out of bed and chair?: Yes  Dress and groom yourself?: Yes    Bathe or shower yourself?: Yes    Feed yourself? Yes  Do your laundry/housekeeping?: Yes  Manage your money, pay your bills and track your expenses?: Yes  Make your own meals?: Yes    Do your own shopping?: Yes    Previous Hospitalizations:   Any hospitalizations or ED visits within the last 12 months?: Yes    How many hospitalizations have you had in the last year?: 1-2    Advance Care Planning:   Living will: No    Durable POA for healthcare:  Yes    Advanced directive: Yes    Advanced directive counseling given: Yes    Five wishes given: Yes    End of Life Decisions reviewed with patient: Yes    Provider agrees with end of life decisions: Yes      Cognitive Screening:   Provider or family/friend/caregiver concerned regarding cognition?: No    PREVENTIVE SCREENINGS      Cardiovascular Screening:    General: Screening Not Indicated and History Lipid Disorder      Diabetes Screening:     General: Screening Current      Colorectal Cancer Screening:     General: Screening Current      Prostate Cancer Screening:    General: Screening Current      Osteoporosis Screening:    General: Risks and Benefits Discussed      Abdominal Aortic Aneurysm (AAA) Screening:    Risk factors include: age between 73-69 yo and tobacco use        Lung Cancer Screening:     General: Risks and Benefits Discussed      Hepatitis C Screening:    General: Screening Current    No exam data present     Physical Exam:     /70 (BP Location: Left arm, Patient Position: Sitting, Cuff Size: Adult)   Pulse 84   Temp 98 °F (36 7 °C) (Tympanic)   Resp 18   Ht 5' 9 69" (1 77 m)   Wt 122 kg (268 lb)   SpO2 96%   BMI 38 80 kg/m²     Physical Exam    Theresa Rdz DO

## 2019-11-11 NOTE — ASSESSMENT & PLAN NOTE
Patient with inflamed lesion right shoulder    Recommend schedule surgery appointment for shave excision and destruction

## 2019-11-11 NOTE — ASSESSMENT & PLAN NOTE
Current BMI 38  Patient has been unable to exercise lately due to Achilles tendon tear and cervical fusion this summer    Will continue to monitor

## 2019-11-11 NOTE — ASSESSMENT & PLAN NOTE
Doing well on allopurinol 300 mg daily without any recent flares    Will recheck uric acid prior to next appointment

## 2019-11-11 NOTE — PATIENT INSTRUCTIONS
Medicare Preventive Visit Patient Instructions  Thank you for completing your Welcome to Medicare Visit or Medicare Annual Wellness Visit today  Your next wellness visit will be due in one year (11/11/2020)  The screening/preventive services that you may require over the next 5-10 years are detailed below  Some tests may not apply to you based off risk factors and/or age  Screening tests ordered at today's visit but not completed yet may show as past due  Also, please note that scanned in results may not display below  Preventive Screenings:  Service Recommendations Previous Testing/Comments   Colorectal Cancer Screening  · Colonoscopy    · Fecal Occult Blood Test (FOBT)/Fecal Immunochemical Test (FIT)  · Fecal DNA/Cologuard Test  · Flexible Sigmoidoscopy Age: 54-65 years old   Colonoscopy: every 10 years (May be performed more frequently if at higher risk)  OR  FOBT/FIT: every 1 year  OR  Cologuard: every 3 years  OR  Sigmoidoscopy: every 5 years  Screening may be recommended earlier than age 48 if at higher risk for colorectal cancer  Also, an individualized decision between you and your healthcare provider will decide whether screening between the ages of 74-80 would be appropriate   Colonoscopy: 02/14/2018  FOBT/FIT: Not on file  Cologuard: Not on file  Sigmoidoscopy: Not on file    Screening Current     Prostate Cancer Screening Individualized decision between patient and health care provider in men between ages of 53-78   Medicare will cover every 12 months beginning on the day after your 50th birthday PSA: 3 8 ng/mL     Screening Current     Hepatitis C Screening Once for adults born between 1945 and 1965  More frequently in patients at high risk for Hepatitis C Hep C Antibody: Not on file    Screening Current   Diabetes Screening 1-2 times per year if you're at risk for diabetes or have pre-diabetes Fasting glucose: 90 mg/dL   A1C: 6 4 %    Screening Current   Cholesterol Screening Once every 5 years if you don't have a lipid disorder  May order more often based on risk factors  Lipid panel: 12/20/2018    Screening Not Indicated  History Lipid Disorder      Other Preventive Screenings Covered by Medicare:  1  Abdominal Aortic Aneurysm (AAA) Screening: covered once if your at risk  You're considered to be at risk if you have a family history of AAA or a male between the age of 73-68 who smoking at least 100 cigarettes in your lifetime  2  Lung Cancer Screening: covers low dose CT scan once per year if you meet all of the following conditions: (1) Age 50-69; (2) No signs or symptoms of lung cancer; (3) Current smoker or have quit smoking within the last 15 years; (4) You have a tobacco smoking history of at least 30 pack years (packs per day x number of years you smoked); (5) You get a written order from a healthcare provider  3  Glaucoma Screening: covered annually if you're considered high risk: (1) You have diabetes OR (2) Family history of glaucoma OR (3)  aged 48 and older OR (3)  American aged 72 and older  3  Osteoporosis Screening: covered every 2 years if you meet one of the following conditions: (1) Have a vertebral abnormality; (2) On glucocorticoid therapy for more than 3 months; (3) Have primary hyperparathyroidism; (4) On osteoporosis medications and need to assess response to drug therapy  5  HIV Screening: covered annually if you're between the age of 12-76  Also covered annually if you are younger than 13 and older than 72 with risk factors for HIV infection  For pregnant patients, it is covered up to 3 times per pregnancy      Immunizations:  Immunization Recommendations   Influenza Vaccine Annual influenza vaccination during flu season is recommended for all persons aged >= 6 months who do not have contraindications   Pneumococcal Vaccine (Prevnar and Pneumovax)  * Prevnar = PCV13  * Pneumovax = PPSV23 Adults 25-60 years old: 1-3 doses may be recommended based on certain risk factors  Adults 72 years old: Prevnar (PCV13) vaccine recommended followed by Pneumovax (PPSV23) vaccine  If already received PPSV23 since turning 65, then PCV13 recommended at least one year after PPSV23 dose  Hepatitis B Vaccine 3 dose series if at intermediate or high risk (ex: diabetes, end stage renal disease, liver disease)   Tetanus (Td) Vaccine - COST NOT COVERED BY MEDICARE PART B Following completion of primary series, a booster dose should be given every 10 years to maintain immunity against tetanus  Td may also be given as tetanus wound prophylaxis  Tdap Vaccine - COST NOT COVERED BY MEDICARE PART B Recommended at least once for all adults  For pregnant patients, recommended with each pregnancy  Shingles Vaccine (Shingrix) - COST NOT COVERED BY MEDICARE PART B  2 shot series recommended in those aged 48 and above     Health Maintenance Due:      Topic Date Due    Hepatitis C Screening  11/11/2020 (Originally 1950)    CRC Screening: Colonoscopy  02/14/2028     Immunizations Due:      Topic Date Due    Pneumococcal Vaccine: 65+ Years (1 of 2 - PCV13) 01/11/2016     Advance Directives   What are advance directives? Advance directives are legal documents that state your wishes and plans for medical care  These plans are made ahead of time in case you lose your ability to make decisions for yourself  Advance directives can apply to any medical decision, such as the treatments you want, and if you want to donate organs  What are the types of advance directives? There are many types of advance directives, and each state has rules about how to use them  You may choose a combination of any of the following:  · Living will: This is a written record of the treatment you want  You can also choose which treatments you do not want, which to limit, and which to stop at a certain time  This includes surgery, medicine, IV fluid, and tube feedings     · Durable power of  for healthcare [facility]): This is a written record that states who you want to make healthcare choices for you when you are unable to make them for yourself  This person, called a proxy, is usually a family member or a friend  You may choose more than 1 proxy  · Do not resuscitate (DNR) order:  A DNR order is used in case your heart stops beating or you stop breathing  It is a request not to have certain forms of treatment, such as CPR  A DNR order may be included in other types of advance directives  · Medical directive: This covers the care that you want if you are in a coma, near death, or unable to make decisions for yourself  You can list the treatments you want for each condition  Treatment may include pain medicine, surgery, blood transfusions, dialysis, IV or tube feedings, and a ventilator (breathing machine)  · Values history: This document has questions about your views, beliefs, and how you feel and think about life  This information can help others choose the care that you would choose  Why are advance directives important? An advance directive helps you control your care  Although spoken wishes may be used, it is better to have your wishes written down  Spoken wishes can be misunderstood, or not followed  Treatments may be given even if you do not want them  An advance directive may make it easier for your family to make difficult choices about your care  Weight Management   Why it is important to manage your weight:  Being overweight increases your risk of health conditions such as heart disease, high blood pressure, type 2 diabetes, and certain types of cancer  It can also increase your risk for osteoarthritis, sleep apnea, and other respiratory problems  Aim for a slow, steady weight loss  Even a small amount of weight loss can lower your risk of health problems  How to lose weight safely:  A safe and healthy way to lose weight is to eat fewer calories and get regular exercise   You can lose up about 1 pound a week by decreasing the number of calories you eat by 500 calories each day  Healthy meal plan for weight management:  A healthy meal plan includes a variety of foods, contains fewer calories, and helps you stay healthy  A healthy meal plan includes the following:  · Eat whole-grain foods more often  A healthy meal plan should contain fiber  Fiber is the part of grains, fruits, and vegetables that is not broken down by your body  Whole-grain foods are healthy and provide extra fiber in your diet  Some examples of whole-grain foods are whole-wheat breads and pastas, oatmeal, brown rice, and bulgur  · Eat a variety of vegetables every day  Include dark, leafy greens such as spinach, kale, dustin greens, and mustard greens  Eat yellow and orange vegetables such as carrots, sweet potatoes, and winter squash  · Eat a variety of fruits every day  Choose fresh or canned fruit (canned in its own juice or light syrup) instead of juice  Fruit juice has very little or no fiber  · Eat low-fat dairy foods  Drink fat-free (skim) milk or 1% milk  Eat fat-free yogurt and low-fat cottage cheese  Try low-fat cheeses such as mozzarella and other reduced-fat cheeses  · Choose meat and other protein foods that are low in fat  Choose beans or other legumes such as split peas or lentils  Choose fish, skinless poultry (chicken or turkey), or lean cuts of red meat (beef or pork)  Before you cook meat or poultry, cut off any visible fat  · Use less fat and oil  Try baking foods instead of frying them  Add less fat, such as margarine, sour cream, regular salad dressing and mayonnaise to foods  Eat fewer high-fat foods  Some examples of high-fat foods include french fries, doughnuts, ice cream, and cakes  · Eat fewer sweets  Limit foods and drinks that are high in sugar  This includes candy, cookies, regular soda, and sweetened drinks  Exercise:  Exercise at least 30 minutes per day on most days of the week   Some examples of exercise include walking, biking, dancing, and swimming  You can also fit in more physical activity by taking the stairs instead of the elevator or parking farther away from stores  Ask your healthcare provider about the best exercise plan for you  © Copyright [employer] 2018 Information is for End User's use only and may not be sold, redistributed or otherwise used for commercial purposes  All illustrations and images included in CareNotes® are the copyrighted property of A D A M , Inc  or [address] [place] [address]  Obesity   AMBULATORY CARE:   Obesity  is when your body mass index (BMI) is greater than 30  Your healthcare provider will use your height and weight to measure your BMI  The risks of obesity include  many health problems, such as injuries or physical disability  You may need tests to check for the following:  · Diabetes     · High blood pressure or high cholesterol     · Heart disease     · Gallbladder or liver disease     · Cancer of the colon, breast, prostate, liver, or kidney     · Sleep apnea     · Arthritis or gout  Seek care immediately if:   · You have a severe headache, confusion, or difficulty speaking  · You have weakness on one side of your body  · You have chest pain, sweating, or shortness of breath  Contact your healthcare provider if:   · You have symptoms of gallbladder or liver disease, such as pain in your upper abdomen  · You have knee or hip pain and discomfort while walking  · You have symptoms of diabetes, such as intense hunger and thirst, and frequent urination  · You have symptoms of sleep apnea, such as snoring or daytime sleepiness  · You have questions or concerns about your condition or care  Treatment for obesity  focuses on helping you lose weight to improve your health  Even a small decrease in BMI can reduce the risk for many health problems   Your healthcare provider will help you set a weight-loss goal   · Lifestyle changes  are the first step in treating obesity  These include making healthy food choices and getting regular physical activity  Your healthcare provider may suggest a weight-loss program that involves coaching, education, and therapy  · Medicine  may help you lose weight when it is used with a healthy diet and physical activity  · Surgery  can help you lose weight if you are very obese and have other health problems  There are several types of weight-loss surgery  Ask your healthcare provider for more information  Be successful losing weight:   · Set small, realistic goals  An example of a small goal is to walk for 20 minutes 5 days a week  Anther goal is to lose 5% of your body weight  · Tell friends, family members, and coworkers about your goals  and ask for their support  Ask a friend to lose weight with you, or join a weight-loss support group  · Identify foods or triggers that may cause you to overeat , and find ways to avoid them  Remove tempting high-calorie foods from your home and workplace  Place a bowl of fresh fruit on your kitchen counter  If stress causes you to eat, then find other ways to cope with stress  · Keep a diary to track what you eat and drink  Also write down how many minutes of physical activity you do each day  Weigh yourself once a week and record it in your diary  Eating changes: You will need to eat 500 to 1,000 fewer calories each day than you currently eat to lose 1 to 2 pounds a week  The following changes will help you cut calories:  · Eat smaller portions  Use small plates, no larger than 9 inches in diameter  Fill your plate half full of fruits and vegetables  Measure your food using measuring cups until you know what a serving size looks like  · Eat 3 meals and 1 or 2 snacks each day  Plan your meals in advance  [place] and eat at home most of the time  Eat slowly       · Eat fruits and vegetables at every meal   They are low in calories and high in fiber, which makes you feel full  Do not add butter, margarine, or cream sauce to vegetables  Use herbs to season steamed vegetables  · Eat less fat and fewer fried foods  Eat more baked or grilled chicken and fish  These protein sources are lower in calories and fat than red meat  Limit fast food  Dress your salads with olive oil and vinegar instead of bottled dressing  · Limit the amount of sugar you eat  Do not drink sugary beverages  Limit alcohol  Activity changes:  Physical activity is good for your body in many ways  It helps you burn calories and build strong muscles  It decreases stress and depression, and improves your mood  It can also help you sleep better  Talk to your healthcare provider before you begin an exercise program   · Exercise for at least 30 minutes 5 days a week  Start slowly  Set aside time each day for physical activity that you enjoy and that is convenient for you  It is best to do both weight training and an activity that increases your heart rate, such as walking, bicycling, or swimming  · Find ways to be more active  Do yard work and housecleaning  Walk up the stairs instead of using elevators  Spend your leisure time going to events that require walking, such as outdoor festivals or fairs  This extra physical activity can help you lose weight and keep it off  Follow up with your healthcare provider as directed: You may need to meet with a dietitian  Write down your questions so you remember to ask them during your visits  © 2017 [address] Information is for End User's use only and may not be sold, redistributed or otherwise used for commercial purposes  All illustrations and images included in CareNotes® are the copyrighted property of A D A M , Inc  or Zhao Vazquez  The above information is an  only  It is not intended as medical advice for individual conditions or treatments   Talk to your doctor, nurse or pharmacist before following any medical regimen to see if it is safe and effective for you

## 2019-11-11 NOTE — ASSESSMENT & PLAN NOTE
Status post partial left Achilles tendon tear May 2019  While going up steps  Patient is being followed by Orthopedics, Dr Luther Richardson  He is being followed with conservative management  Going to physical therapy twice weekly,  An making some progress

## 2019-11-11 NOTE — ASSESSMENT & PLAN NOTE
Blood sugar 1 4/14/2019  Recommend reduced carb diet and increase exercise    Will continue to monitor

## 2019-11-11 NOTE — ASSESSMENT & PLAN NOTE
Patient with hand dermatitis  May be psoriasis  He also has a patch on his posterior scalp  Will give trial of dip Prolene cream to be applied b i d , and decrease frequency once response is attained    Consider referral to Dermatology if symptoms persist

## 2019-11-12 ENCOUNTER — OFFICE VISIT (OUTPATIENT)
Dept: PHYSICAL THERAPY | Facility: CLINIC | Age: 69
End: 2019-11-12
Payer: COMMERCIAL

## 2019-11-12 DIAGNOSIS — S86.012A ACHILLES RUPTURE, LEFT, INITIAL ENCOUNTER: Primary | ICD-10-CM

## 2019-11-12 PROCEDURE — 97140 MANUAL THERAPY 1/> REGIONS: CPT | Performed by: PHYSICAL THERAPIST

## 2019-11-12 PROCEDURE — 97110 THERAPEUTIC EXERCISES: CPT | Performed by: PHYSICAL THERAPIST

## 2019-11-12 NOTE — PROGRESS NOTES
Daily Note     Today's date: 2019  Patient name: Marek Brewer  : 1950  MRN: [id number]  Referring provider: Jose Enrique Pike  Dx:   Encounter Diagnosis     ICD-10-CM    1  Achilles rupture, left, initial encounter S86 [id number]                   Subjective: Notices improvements, he is able to ascend stairs reciprocally and has been walking better  Objective: See treatment diary below      Assessment: Completed exercise with correct technique and without reports of pain  Demonstrated moderate fatigue after exercise  Pt would benefit from continued PT services to reduce pain and increase level of function  Plan: Continue per plan of care  Precautions: L partial achilles tear on 19    See protocol attached to chart and in other orders        Manual  10/25 11/5 11/8 11/12 10/3 10/7 10/10 10/14 10/17 10/22   L ankle PROM nv            L gastroc and soleus IASTM MW MW MW MM IASTM  MW  Distal achilles IASTM  MW  Distal Achilles IASTM  MW  Distal achilles IASTM MW  Distal achilles IASTM  MW  Distal achilles IASTM MM Distal achilles   Re-eval         MW                                  Exercise Diary  10/25 11/5 11/8 11/12 10/3 10/7 10/10 10/14 10/17 10/22   bike X-ride  10' X-ride  10' X-ride  10' X-ride, 10' X-ride  10' X-ride  10' X-ride  10' X-ride  10' X-ride  10' X-ride 10'   Seated heel rases 3x15 3x15 3x10 3x15 3x15 3x15 3x15 3x15 3x15 3x15   AROM: DF, PF, inversion and eversion HEP     GTB  2x10 ea GTB  3x10 ea BTB  2x10 ea BTB  2x10 ea BTB 2x10 ea   Ankle alphabet HEP     HEP       Treadmill training for gait 10' 1 2 mph 2 HHA improved post 10'  1 2 mph  2 HHA   10'  1 4  Mph  2 HHA 10' 1 4 mph 2 HHA         Leg press 50#  3x10   50# 3x10 30#  2x10 40#  2x10 40#  3x10 40#  3x10 40#  3x10 40# 3x10   FTEO/ FTEC 3x30" ea firm (S)    3x30" ea firm surface (S) 3x30" ea firm (S)       BAPS board 3x10 ea 3x10 ea 3x10 ea 3x10 ea  nv  Fwd/back & lateral  2x10 ea 2x10 ea 2x10 ea   Toe raises seated HEP    2x10 2x10       Inversion ball squeeze (seated on high table) HEP            Forward step ups     2x10 2x10 ea  Descending added with SPC  lvl 0       Standing heel raises x15 2x10 2x10 2x10  x10 x3 pain  x10 x10   bridge       nv 2x10  2x10   Hip add & abd       nv 2x10 ea  GTB abd  2x10 ea GTB abd    Quad set       nv nv     Standing calf stretch (not aggressive) 3x30" ea  3x30" ea 3x30" ea    3x30" ea 3x30" ea 3x30" ea   Alejandro  1 jar per  1 jar per 1 jar per                                                    Modalities  9/11            CP/ HP PRN np

## 2019-11-15 ENCOUNTER — OFFICE VISIT (OUTPATIENT)
Dept: PHYSICAL THERAPY | Facility: CLINIC | Age: 69
End: 2019-11-15
Payer: COMMERCIAL

## 2019-11-15 DIAGNOSIS — S86.012A ACHILLES RUPTURE, LEFT, INITIAL ENCOUNTER: Primary | ICD-10-CM

## 2019-11-15 PROCEDURE — 97110 THERAPEUTIC EXERCISES: CPT

## 2019-11-15 NOTE — PROGRESS NOTES
Daily Note     Today's date: 11/15/2019  Patient name: Tian Saunders  : 1950  MRN: [id number]  Referring provider: Chapito Potter  Dx:   Encounter Diagnosis     ICD-10-CM    1  Achilles rupture, left, initial encounter S86 [id number]        Start Time: 930  Stop Time: 1030  Total time in clinic (min): 60 minutes    Subjective: Patient noted that he performed the stairs a lot over the week and the achilles feels tight  Patient noted his back is really tight 2* walking differently  Objective: See treatment diary below      Assessment: PT performed PA mobs to the thoracic spine to assist c pain during amb  Patient noted improvement post manual and stretch  PT and the patient noted increased L gastroc activation during standing heel raises at today's session  Patient continues to demonstrate improvements in strength 2* noting less pain and increase flexibility in the L ankle  Patient would benefit from continued PT to allow the patient to return to his PLOF  Plan: Continue per plan of care  Precautions: L partial achilles tear on 19    See protocol attached to chart and in other orders        Manual  10/25 11/5 11/8 11/12 11/15 10/7 10/10 10/14 10/17 10/22   L ankle PROM nv            L gastroc and soleus IASTM MW MW MW MM MW IASTM  MW  Distal Achilles IASTM  MW  Distal achilles IASTM MW  Distal achilles IASTM  MW  Distal achilles IASTM MM Distal achilles   Re-eval         MW    PA mobs thoracic spine     MW  Grade III                         Exercise Diary  10/25 11/5 11/8 11/12 11/15 10/7 10/10 10/14 10/17 10/22   bike X-ride  10' X-ride  10' X-ride  10' X-ride, 10' X-ride  10' retro X-ride  10' X-ride  10' X-ride  10' X-ride  10' X-ride 10'   Seated heel rases 3x15 3x15 3x10 3x15 3x15 3x15 3x15 3x15 3x15 3x15   AROM: DF, PF, inversion and eversion HEP     GTB  2x10 ea GTB  3x10 ea BTB  2x10 ea BTB  2x10 ea BTB 2x10 ea   Ankle alphabet HEP     HEP       Treadmill training for gait 10' 1 2 mph 2 HHA improved post 10'  1 2 mph  2 HHA   10'  1 4  Mph  2 HHA 10' 1 4 mph 2 HHA 7' total  2 0 mph  1 HHA        Leg press 50#  3x10   50# 3x10 50#  3x10  DL  30#  2x10 ea  SL 40#  2x10 40#  3x10 40#  3x10 40#  3x10 40# 3x10   FTEO/ FTEC 3x30" ea firm (S)     3x30" ea firm (S)       BAPS board 3x10 ea 3x10 ea 3x10 ea 3x10 ea  nv  Fwd/back & lateral  2x10 ea 2x10 ea 2x10 ea   Toe raises seated HEP     2x10       Inversion ball squeeze (seated on high table) HEP            Forward step ups      2x10 ea  Descending added with SPC  lvl 0       Standing heel raises x15 2x10 2x10 2x10 3x10 x10 x3 pain  x10 x10   bridge       nv 2x10  2x10   Hip add & abd     standing hip abd  2x10 ea  nv 2x10 ea  GTB abd  2x10 ea GTB abd    Quad set       nv nv     Standing calf stretch (not aggressive) 3x30" ea  3x30" ea 3x30" ea 3x30" ea   3x30" ea 3x30" ea 3x30" ea   [place]  1 jar per  1 jar per 1 jar per         DL squat at barre     2x10        skiier stretch     5x15" ea                         Modalities  9/11            CP/ HP PRN np

## 2019-11-18 ENCOUNTER — OFFICE VISIT (OUTPATIENT)
Dept: OBGYN CLINIC | Facility: HOSPITAL | Age: 69
End: 2019-11-18
Payer: COMMERCIAL

## 2019-11-18 ENCOUNTER — HOSPITAL ENCOUNTER (OUTPATIENT)
Dept: RADIOLOGY | Facility: HOSPITAL | Age: 69
Discharge: HOME | End: 2019-11-18
Payer: COMMERCIAL

## 2019-11-18 VITALS
BODY MASS INDEX: 38.37 KG/M2 | HEART RATE: 77 BPM | DIASTOLIC BLOOD PRESSURE: 82 MMHG | WEIGHT: 268 LBS | SYSTOLIC BLOOD PRESSURE: 146 MMHG | HEIGHT: 70 IN

## 2019-11-18 DIAGNOSIS — M54.50 SPINE PAIN, LUMBAR: ICD-10-CM

## 2019-11-18 DIAGNOSIS — M51.36 DDD (DEGENERATIVE DISC DISEASE), LUMBAR: ICD-10-CM

## 2019-11-18 DIAGNOSIS — Z98.1 S/P CERVICAL SPINAL FUSION: ICD-10-CM

## 2019-11-18 DIAGNOSIS — Z98.1 S/P CERVICAL SPINAL FUSION: Primary | ICD-10-CM

## 2019-11-18 PROCEDURE — 99213 OFFICE O/P EST LOW 20 MIN: CPT | Performed by: PHYSICIAN ASSISTANT

## 2019-11-18 PROCEDURE — 99024 POSTOP FOLLOW-UP VISIT: CPT | Performed by: PHYSICIAN ASSISTANT

## 2019-11-18 PROCEDURE — 72110 X-RAY EXAM L-2 SPINE 4/>VWS: CPT

## 2019-11-18 PROCEDURE — 72040 X-RAY EXAM NECK SPINE 2-3 VW: CPT

## 2019-11-18 NOTE — PROGRESS NOTES
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 grade 1 spondylolisthesis L4 on L5  For this problem, we recommend initiating physical therapy program   Follow-up for both of these problems in three months  X-rays of cervical spine will be obtained at that time  If his lower back is not feeling better, we will get an MRI of the lumbar spine  Problem List Items Addressed This Visit        Other    S/P cervical spinal fusion - Primary    Relevant Orders    XR spine cervical 2 or 3 vw injury            Subjective:      Patient ID: Negrita Smith is a 77 y o  male  HPI     The patient is a 60-year-old male presents for a postop visit  He is almost three months status post ACDF C5-6 done on 8/27/2019  Overall he is doing very well and denies any significant planes at today's visit  Swallowing issues have resolved  He does complain of mild neck stiffness with rotation to the left side however it is not really bothering him  He does complain of lower back pain at today's visit  He states his anterior cervical incision has healed  The following portions of the patient's history were reviewed and updated as appropriate: allergies, current medications, past family history, past medical history, past social history, past surgical history and problem list     Review of Systems      Objective:      /82   Pulse 77   Ht 5' 9 6" (1 768 m)   Wt 122 kg (268 lb)   BMI 38 90 kg/m²          Physical Exam      No acute distress    Gait is without assistance  Anterior cervical incision is healed  He is grossly motor and sensory stable C5-T1 bilaterally  Upper extremities are warm and well perfused

## 2019-11-19 ENCOUNTER — EVALUATION (OUTPATIENT)
Dept: PHYSICAL THERAPY | Facility: CLINIC | Age: 69
End: 2019-11-19
Payer: COMMERCIAL

## 2019-11-19 DIAGNOSIS — S86.012A ACHILLES RUPTURE, LEFT, INITIAL ENCOUNTER: Primary | ICD-10-CM

## 2019-11-19 DIAGNOSIS — M51.36 DDD (DEGENERATIVE DISC DISEASE), LUMBAR: ICD-10-CM

## 2019-11-19 PROCEDURE — 97140 MANUAL THERAPY 1/> REGIONS: CPT

## 2019-11-19 PROCEDURE — 97110 THERAPEUTIC EXERCISES: CPT

## 2019-11-19 NOTE — PROGRESS NOTES
PT Re-Evaluation     Today's date: 2019  Patient name: Sheela Adan  : 1950  MRN: [id number]  Referring provider: Sita Yeung  Dx:   Encounter Diagnosis     ICD-10-CM    1  Achilles rupture, left, initial encounter S86 012A    2  DDD (degenerative disc disease), lumbar M51 36        Start Time: 0366  Stop Time: 1030  Total time in clinic (min): 52 minutes    Assessment  Assessment details: Patient continues to progress with R plantar flexion strength as well as flexibility and ROM in the R ankle  Patient continues to improve his gait with treadmill training on even surfaces, however has increased difficulty with uneven surfaces and stairs due to pain levels with a quick stretch of the achilles tendon  Patient was also evaluated for his R sided thoracic to lumbar spine pain that started about 6 months ago  Patient presents with hypomobility with flexion and extension as well as rotation of the spine  Patient has increased pain with coughing and sneezing as well as transfers  PT educated the patient on log rolling during today's session as well as educated him on thoracic/lumbar mobility stretches which decreased some of his pain  PT will continue to address the noted impairments and incorporate treatment for the back pain by performing hip, ankle and core strengthening, stretching, balance, functional activities and manual techniques to allow the patient to return to his PLOF  PT recommended 2x/week for 4-6 weeks c a good prognosis 2* noted progress so far  Impairments: abnormal gait, abnormal muscle tone, abnormal or restricted ROM, activity intolerance, impaired balance, impaired physical strength, lacks appropriate home exercise program, pain with function, safety issue, weight-bearing intolerance and poor body mechanics    Symptom irritability: moderateUnderstanding of Dx/Px/POC: good   Prognosis: good    Goals  STG: In four weeks the patient will:    1   Be (I) with his HEP  (50% MET)  2  Increase DF, inversion and eversion strength to 4+/5 MMT score to assist c amb  (90% MET)  3  Increase L ankle ROM by 5-10 degrees to assist c amb and ADLs  (MET)  4  amb 200ft (I) with proper mechanics and without pain in the L ankle  (100% MET even surface, 50% uneven surface)        LTG: In eight weeks, the patient will:    1  Increase FOTO score to 53 to demonstrate improvements in symptoms and function  (In progress)  2  Demonstrate full L ankle AROM without pain  (75% MET)  3  Perform 30 mins of ADLs without pain  (MET)  4  Increase plantarflexion strength to 5/5 MMT score to assist c ADLs and stairs  (60% MET)  5  Descend the stairs without pain  (50% MET)  6  Perform FTEO for 30 seconds on a non-compliant surface without pain and no LOB  (MET)  7  Perform FTEC for 30 seconds on a non-compliant surface without pain and no LOB  (in progress)      New goals: In 4 weeks the patient will:  1  Cough or sneeze without pain in the thoracic or lumbar spine  2  Performed log rolling technique without cues by PT  3  Perform a sit<>Stand without back pain and with proper breathing techniques         Plan  Patient would benefit from: skilled physical therapy  Referral necessary: No  Planned modality interventions: cryotherapy and thermotherapy: hydrocollator packs  Planned therapy interventions: abdominal trunk stabilization, joint mobilization, manual therapy, massage, Suarez taping, neuromuscular re-education, patient education, postural training, balance, balance/weight bearing training, body mechanics training, strengthening, stretching, therapeutic activities, therapeutic exercise, therapeutic training, home exercise program and gait training  Frequency: 2x week  Duration in visits: 12  Duration in weeks: 6  Plan of Care beginning date: 11/19/2019  Plan of Care expiration date: 12/31/2019  Treatment plan discussed with: patient        Subjective Evaluation    History of Present Illness  Mechanism of injury: Patient is 70% back to his PLOF  Patient noted that he continues to have pain with uneven surfaces and steps  Patient noted that he is able to take a longer stride without pain  Patient has improved with balance with walking on an even surface  Patient noted that balance continues to be an issue especially in the shower  Patient noted over the past 6 months his back started to hurt from the R of the thoracic spine to the lumbar spine  Patient notes no numbness or tingling in the LEs  Patient noted he has increased pain with sneezing, coughing, getting in and out bed, transfers (sitting down), and dressing  Recurrent probem    Quality of life: good    Pain  Current pain ratin  At best pain ratin  At worst pain ratin (quick motions (achilles 9/10, back 10/10))  Location: L achillies  Quality: sharp  Relieving factors: ice and rest  Aggravating factors: standing, walking, stair climbing and lifting  Progression: improved    Social Support  Steps to enter house: yes (2 PAULA)  Lives in: multiple-level home  Lives with: spouse    Employment status: not working (retired)  Hand dominance: right      Diagnostic Tests  MRI studies: abnormal  Patient Goals  Patient goals for therapy: decreased edema, decreased pain, improved balance, increased motion, increased strength, independence with ADLs/IADLs and return to sport/leisure activities  Patient goal: "to get back to golf  (has not tried)" "to walk with a normal gait and no pain(70% MET)  " "to improve balance when showering (50% MET) "        Objective     Palpation   Left   Tenderness of the anterior tibialis, lateral gastrocnemius, medial gastrocnemius, posterior tibialis and soleus  Tenderness   Left Ankle/Foot   Tenderness in the Achilles insertion       Neurological Testing     Sensation     Ankle/Foot   Left Ankle/Foot   Intact: light touch    Right Ankle/Foot   Intact: light touch     Active Range of Motion Cervical/Thoracic Spine       Thoracic    Flexion:  Restriction level: moderate  Extension:  Restriction level: moderate  Left lateral flexion:  Restriction level: minimal  Right lateral flexion:  Restriction level: minimal  Left rotation:  Restriction level: moderate  Right rotation:  Restriction level: moderate    Lumbar   Flexion:  Restriction level: maximal  Extension:  Restriction level: moderate  Left lateral flexion:  Restriction level: minimal  Right lateral flexion:  Restriction level: minimal  Left rotation:  Restriction level: moderate  Right rotation:  Restriction level: moderate  Left Ankle/Foot   Dorsiflexion (ke): 8 degrees   Plantar flexion: 50 degrees   Inversion: 25 degrees   Eversion: 20 degrees     Right Ankle/Foot   Normal active range of motion    Passive Range of Motion   Left Ankle/Foot    Dorsiflexion (ke): 9 degrees   Plantar flexion: 50 degrees   Inversion: 25 degrees   Eversion: 21 degrees     Right Ankle/Foot  Normal passive range of motion    Strength/Myotome Testing     Left Hip   Planes of Motion   Flexion: 4+  Extension: 4  Abduction: 4-  Adduction: 4  External rotation: 4-  Internal rotation: 4-    Right Hip   Planes of Motion   Flexion: 4  Extension: 4  Abduction: 4-  Adduction: 4  External rotation: 4-  Internal rotation: 4-    Left Knee   Flexion: 4  Extension: 4+    Right Knee   Flexion: 4  Extension: 4+    Left Ankle/Foot   Dorsiflexion: 4+  Plantar flexion: 4-  Inversion: 4+  Eversion: 4+  Great toe flexion: 4  Great toe extension: 4    Right Ankle/Foot   Dorsiflexion: 5  Plantar flexion: 5  Inversion: 5  Eversion: 5  Great toe flexion: 4+  Great toe extension: 4+    Swelling   Left Ankle/Foot   Figure 8: 55 cm    Right Ankle/Foot   Figure 8: 54 25 cm      Flowsheet Rows      Most Recent Value   PT/OT G-Codes   Current Score  58   Projected Score  65             Precautions: L partial achilles tear on 5/18/19    See protocol attached to chart and in other orders        Manual 10/25 11/5 11/8 11/12 11/15 11/19 10/10 10/14 10/17 10/22   L ankle PROM nv            L gastroc and soleus IASTM MW MW MW MM MW IASTM  MW  Distal Achilles IASTM  MW  Distal achilles IASTM MW  Distal achilles IASTM  MW  Distal achilles IASTM MM Distal achilles   Re-eval      MW   MW    PA mobs thoracic spine     MW  Grade III MW  Grade  III and lumbar       Rib rotation mob      MW  Grade  III           Exercise Diary  10/25 11/5 11/8 11/12 11/15 11/19 10/10 10/14 10/17 10/22   bike X-ride  10' X-ride  10' X-ride  10' X-ride, 10' X-ride  10' retro  X-ride  10' X-ride  10' X-ride  10' X-ride 10'   Seated heel rases 3x15 3x15 3x10 3x15 3x15  3x15 3x15 3x15 3x15   AROM: DF, PF, inversion and eversion HEP      GTB  3x10 ea BTB  2x10 ea BTB  2x10 ea BTB 2x10 ea   Ankle alphabet HEP            Treadmill training for gait 10' 1 2 mph 2 HHA improved post 10'  1 2 mph  2 HHA   10'  1 4  Mph  2 HHA 10' 1 4 mph 2 HHA 7' total  2 0 mph  1 HHA        Leg press 50#  3x10   50# 3x10 50#  3x10  DL  30#  2x10 ea  SL  40#  3x10 40#  3x10 40#  3x10 40# 3x10   FTEO/ FTEC 3x30" ea firm (S)            BAPS board 3x10 ea 3x10 ea 3x10 ea 3x10 ea    Fwd/back & lateral  2x10 ea 2x10 ea 2x10 ea   Toe raises seated HEP            Inversion ball squeeze (seated on high table) HEP            Forward step ups             Standing heel raises x15 2x10 2x10 2x10 3x10 3x10 x3 pain  x10 x10   bridge       nv 2x10  2x10   Hip add & abd     standing hip abd  2x10 ea  nv 2x10 ea  GTB abd  2x10 ea GTB abd    Quad set       nv nv     Standing calf stretch (not aggressive) 3x30" ea  3x30" ea 3x30" ea 3x30" ea   3x30" ea 3x30" ea 3x30" ea   [place]  1 jar per  1 jar per 1 jar per         DL squat at barre     2x10        skiier stretch     5x15" ea 5x15" ea       Open books      10x10" ea       Lower trunk rotation      10x10" ea           Modalities  9/11            CP/ HP PRN np

## 2019-11-22 ENCOUNTER — OFFICE VISIT (OUTPATIENT)
Dept: PHYSICAL THERAPY | Facility: CLINIC | Age: 69
End: 2019-11-22
Payer: COMMERCIAL

## 2019-11-22 DIAGNOSIS — S86.012A ACHILLES RUPTURE, LEFT, INITIAL ENCOUNTER: Primary | ICD-10-CM

## 2019-11-22 DIAGNOSIS — M51.36 DDD (DEGENERATIVE DISC DISEASE), LUMBAR: ICD-10-CM

## 2019-11-22 PROCEDURE — 97110 THERAPEUTIC EXERCISES: CPT

## 2019-11-22 PROCEDURE — 97140 MANUAL THERAPY 1/> REGIONS: CPT

## 2019-11-22 NOTE — PROGRESS NOTES
Daily Note     Today's date: 2019  Patient name: Joon Shen  : 1950  MRN: [id number]  Referring provider: Macy Fleming  Dx:   Encounter Diagnosis     ICD-10-CM    1  Achilles rupture, left, initial encounter S86 012A    2  DDD (degenerative disc disease), lumbar M51 36        Start Time: 0930  Stop Time: 1030  Total time in clinic (min): 60 minutes    Subjective: Patient noted he has been taken care of his 79 yo mother and sleeping on an air mattress over the past week  Patient noted his back has been really stiff and has pain with coughing  Patient noted that he has increased pain with washing dishes  Objective: See treatment diary below      Assessment: PT educated the patient on performing the back stretches every day to assist c pain levels  PT introduced scapular retractions to assist with standing at the sink washing dishes  Patient continues to improve with heel raise form  Patient required min VCs for breathing during stretches  PT noted increased tissue tightness in the soleus musculature at today's session  It improved post manuals  Patient would benefit from continued PT to allow the patient to return to his PLOF  Plan: Continue per plan of care  Precautions: L partial achilles tear on 19    See protocol attached to chart and in other orders        Manual  10/25 11/5 11/8 11/12 11/15 11/19 11/22 10/14 10/17 10/22   L ankle PROM nv            L gastroc and soleus IASTM MW MW MW MM MW IASTM  MW  Distal Achilles IASTM  MW  Distal achilles IASTM MW  Distal achilles IASTM  MW  Distal achilles IASTM MM Distal achilles   Re-eval      MW   MW    PA mobs thoracic spine     MW  Grade III MW  Grade  III and lumbar MW  Grade III  And lumbar      Rib rotation mob      MW  Grade  III           Exercise Diary  10/25 11/5 11/8 11/12 11/15 11/19 11/22 10/14 10/17 10/22   bike X-ride  10' X-ride  10' X-ride  10' X-ride, 10' X-ride  10' retro  X-ride  10' X-ride  10' X-ride  10' X-ride 10'   Seated heel rases 3x15 3x15 3x10 3x15 3x15  3x15 3x15 3x15 3x15   AROM: DF, PF, inversion and eversion HEP       BTB  2x10 ea BTB  2x10 ea BTB 2x10 ea   Ankle alphabet HEP            Treadmill training for gait 10' 1 2 mph 2 HHA improved post 10'  1 2 mph  2 HHA   10'  1 4  Mph  2 HHA 10' 1 4 mph 2 HHA 7' total  2 0 mph  1 HHA  10' total  2 3 mph      Leg press 50#  3x10   50# 3x10 50#  3x10  DL  30#  2x10 ea  SL  50#  3x10  DL  30#  2x10 ea  SL 40#  3x10 40#  3x10 40# 3x10   FTEO/ FTEC 3x30" ea firm (S)            BAPS board 3x10 ea 3x10 ea 3x10 ea 3x10 ea    Fwd/back & lateral  2x10 ea 2x10 ea 2x10 ea   pec stretch       5x15"       Scapular retraction       2x10  5" hold      Forward step ups             Standing heel raises x15 2x10 2x10 2x10 3x10 3x10 3x10  x10 x10   bridge        2x10  2x10   Hip add & abd     standing hip abd  2x10 ea   2x10 ea  GTB abd  2x10 ea GTB abd    Quad set        nv     Standing calf stretch (not aggressive) 3x30" ea  3x30" ea 3x30" ea 3x30" ea   3x30" ea 3x30" ea 3x30" ea   [place]  1 jar per  1 jar per 1 jar per         DL squat at barre     2x10        skiier stretch     5x15" ea 5x15" ea 5x15" ea      Open books      10x10" ea 10x10" ea      Lower trunk rotation      10x10" ea 10x10" ea          Modalities  9/11            CP/ HP PRN np

## 2019-11-26 ENCOUNTER — OFFICE VISIT (OUTPATIENT)
Dept: PHYSICAL THERAPY | Facility: CLINIC | Age: 69
End: 2019-11-26
Payer: COMMERCIAL

## 2019-11-26 DIAGNOSIS — S86.012A ACHILLES RUPTURE, LEFT, INITIAL ENCOUNTER: Primary | ICD-10-CM

## 2019-11-26 DIAGNOSIS — M51.36 DDD (DEGENERATIVE DISC DISEASE), LUMBAR: ICD-10-CM

## 2019-11-26 PROCEDURE — 97110 THERAPEUTIC EXERCISES: CPT

## 2019-11-26 NOTE — PROGRESS NOTES
Daily Note     Today's date: 2019  Patient name: Quita Aguirre  : 1950  MRN: [id number]  Referring provider: Eva Cerna  Dx:   Encounter Diagnosis     ICD-10-CM    1  Achilles rupture, left, initial encounter S86 012A    2  DDD (degenerative disc disease), lumbar M51 36        Start Time: 0930  Stop Time: 1030  Total time in clinic (min): 60 minutes    Subjective: Patient presented to the clinic with increased back pain that woke him up this morning  Patient noted it hurts with movement and breathing at times  Objective: See treatment diary below  SaO2: 96%  HR: 74 bpm  BP: 150/90 mmHg, 10 mins after 145/78 mmHg        Assessment: PT assessed vitals 2* patient having pain with breathing  PT educated the patient to follow up with his PCP about his BP  Patient agreed he would  PT educated the patient on breathing techniques during transfers and bed mobility  Patient performed transfers with breathing techniques and noted minimal to no pain which is an improvement  PT educated the patient on the use of a towel roll and stretching to assist c pain levels until next visit  Patient is to resume normal exercises at next session if pain is less  Patient would benefit from continued PT to allow the patient to return to his PLOF  Plan: Continue per plan of care  Precautions: L partial achilles tear on 19    See protocol attached to chart and in other orders        Manual  10/25 11/5 11/8 11/12 11/15 11/19 11/22 11/26 10/17 10/22   L ankle PROM nv            L gastroc and soleus IASTM MW MW MW MM MW IASTM  MW  Distal Achilles IASTM  MW  Distal achilles IASTM MW  Distal achilles IASTM  MW  Distal achilles IASTM MM Distal achilles   Re-eval      MW   MW    PA mobs thoracic spine     MW  Grade III MW  Grade  III and lumbar MW  Grade III  And lumbar Mw  Grade  III and lumbar     Rib rotation mob      MW  Grade  III           Exercise Diary  10/25 11/5 11/8 11/12 11/15 11/19 11/22 11/26 10/17 10/22   bike X-ride  10' X-ride  10' X-ride  10' X-ride, 10' X-ride  10' retro  X-ride  10' X-ride  10' X-ride  10' X-ride 10'   Seated heel rases 3x15 3x15 3x10 3x15 3x15  3x15  3x15 3x15   AROM: DF, PF, inversion and eversion HEP        BTB  2x10 ea BTB 2x10 ea   Ankle alphabet HEP            Treadmill training for gait 10' 1 2 mph 2 HHA improved post 10'  1 2 mph  2 HHA   10'  1 4  Mph  2 HHA 10' 1 4 mph 2 HHA 7' total  2 0 mph  1 HHA  10' total  2 3 mph      Leg press 50#  3x10   50# 3x10 50#  3x10  DL  30#  2x10 ea  SL  50#  3x10  DL  30#  2x10 ea  SL  40#  3x10 40# 3x10   FTEO/ FTEC 3x30" ea firm (S)            BAPS board 3x10 ea 3x10 ea 3x10 ea 3x10 ea     2x10 ea 2x10 ea   pec stretch       5x15"       Scapular retraction       2x10  5" hold      Forward step ups             Standing heel raises x15 2x10 2x10 2x10 3x10 3x10 3x10  x10 x10   bridge          2x10   Hip add & abd     standing hip abd  2x10 ea     2x10 ea GTB abd    Quad set             Standing calf stretch (not aggressive) 3x30" ea  3x30" ea 3x30" ea 3x30" ea    3x30" ea 3x30" ea   [place]  1 jar per  1 jar per 1 jar per         DL squat at barre     2x10        skiier stretch     5x15" ea 5x15" ea 5x15" ea      Open books      10x10" ea 10x10" ea 10x10" ea     Lower trunk rotation      10x10" ea 10x10" ea 10x10" ea     Serratus punch        2x10     Chin tuck        2x10         Modalities  9/11            CP/ HP PRN np

## 2019-12-03 ENCOUNTER — OFFICE VISIT (OUTPATIENT)
Dept: FAMILY MEDICINE CLINIC | Facility: CLINIC | Age: 69
End: 2019-12-03
Payer: COMMERCIAL

## 2019-12-03 ENCOUNTER — OFFICE VISIT (OUTPATIENT)
Dept: PHYSICAL THERAPY | Facility: CLINIC | Age: 69
End: 2019-12-03
Payer: COMMERCIAL

## 2019-12-03 VITALS
RESPIRATION RATE: 20 BRPM | WEIGHT: 264 LBS | TEMPERATURE: 98 F | DIASTOLIC BLOOD PRESSURE: 80 MMHG | BODY MASS INDEX: 39.1 KG/M2 | SYSTOLIC BLOOD PRESSURE: 140 MMHG | OXYGEN SATURATION: 98 % | HEART RATE: 85 BPM | HEIGHT: 69 IN

## 2019-12-03 DIAGNOSIS — M51.36 DDD (DEGENERATIVE DISC DISEASE), LUMBAR: ICD-10-CM

## 2019-12-03 DIAGNOSIS — G89.29 CHRONIC RIGHT-SIDED THORACIC BACK PAIN: ICD-10-CM

## 2019-12-03 DIAGNOSIS — M54.50 ACUTE LOW BACK PAIN WITHOUT SCIATICA, UNSPECIFIED BACK PAIN LATERALITY: Primary | ICD-10-CM

## 2019-12-03 DIAGNOSIS — S86.012A ACHILLES RUPTURE, LEFT, INITIAL ENCOUNTER: Primary | ICD-10-CM

## 2019-12-03 DIAGNOSIS — M54.6 CHRONIC RIGHT-SIDED THORACIC BACK PAIN: ICD-10-CM

## 2019-12-03 LAB
SL AMB  POCT GLUCOSE, UA: ABNORMAL
SL AMB LEUKOCYTE ESTERASE,UA: ABNORMAL
SL AMB POCT BILIRUBIN,UA: ABNORMAL
SL AMB POCT BLOOD,UA: ABNORMAL
SL AMB POCT CLARITY,UA: CLEAR
SL AMB POCT COLOR,UA: YELLOW
SL AMB POCT KETONES,UA: ABNORMAL
SL AMB POCT NITRITE,UA: ABNORMAL
SL AMB POCT PH,UA: 5.5
SL AMB POCT SPECIFIC GRAVITY,UA: 1.03
SL AMB POCT URINE PROTEIN: ABNORMAL
SL AMB POCT UROBILINOGEN: 0.2

## 2019-12-03 PROCEDURE — 81003 URINALYSIS AUTO W/O SCOPE: CPT | Performed by: FAMILY MEDICINE

## 2019-12-03 PROCEDURE — 3008F BODY MASS INDEX DOCD: CPT | Performed by: FAMILY MEDICINE

## 2019-12-03 PROCEDURE — 99213 OFFICE O/P EST LOW 20 MIN: CPT | Performed by: FAMILY MEDICINE

## 2019-12-03 PROCEDURE — 97140 MANUAL THERAPY 1/> REGIONS: CPT

## 2019-12-03 PROCEDURE — 1160F RVW MEDS BY RX/DR IN RCRD: CPT | Performed by: FAMILY MEDICINE

## 2019-12-03 RX ORDER — DICLOFENAC SODIUM 75 MG/1
75 TABLET, DELAYED RELEASE ORAL 2 TIMES DAILY
Qty: 30 TABLET | Refills: 0 | Status: SHIPPED | OUTPATIENT
Start: 2019-12-03 | End: 2020-05-15

## 2019-12-03 RX ORDER — CYCLOBENZAPRINE HCL 10 MG
10 TABLET ORAL
Qty: 30 TABLET | Refills: 0 | Status: SHIPPED | OUTPATIENT
Start: 2019-12-03 | End: 2020-05-15

## 2019-12-03 NOTE — PROGRESS NOTES
[facility]      NAME: Mable Diaz  AGE: 77 y o  SEX: male  : 1950   MRN: [id number]    DATE: 12/3/2019  TIME: 4:28 PM    Assessment and Plan     Problem List Items Addressed This Visit     None      Visit Diagnoses     Acute low back pain without sciatica, unspecified back pain laterality    -  Primary    Relevant Orders    POCT urine dip auto non-scope (Completed)    Chronic right-sided thoracic back pain        Relevant Medications    cyclobenzaprine (FLEXERIL) 10 mg tablet    diclofenac (VOLTAREN) 75 mg EC tablet      Muscle relaxant at HS, NSAID during the day  Stretches  Discussed need for weight loss  Return to office in:  Next scheduled appointment    :   Falls Plan of Care: balance, strength, and gait training instructions were provided  Patient assessed for orthostatic hypotension  Assessed feet and footwear  Chief Complaint     Chief Complaint   Patient presents with    Back Pain     x 2 weeks       History of Present Illness     Patient complains of back pain for several weeks  The back pain is located in the mid and low back  He expressed this to his spine surgery in when he was at follow-up for his neck surgery and lumbar spine x-rays were ordered which did reveal degenerative changes  He had taken some muscle relaxant which she had at home that were given to him after his neck surgery but he ran out of them  The following portions of the patient's history were reviewed and updated as appropriate: allergies, current medications, past family history, past medical history, past social history, past surgical history and problem list     Review of Systems   Review of Systems   Constitutional: Negative  Respiratory: Negative  Cardiovascular: Negative  Gastrointestinal: Negative  Genitourinary: Negative  Musculoskeletal: Positive for arthralgias and back pain  Psychiatric/Behavioral: Negative          Active Problem List     Patient Active Problem List   Diagnosis    Rotator cuff syndrome of right shoulder    Exogenous obesity    Combined hyperlipidemia    Benign essential hypertension    Chronic thoracic spine pain    History of gout    Chronic lumbar radiculopathy    Cervical radiculitis    Plantar fasciitis of right foot    Subclinical hypothyroidism    Elevated blood sugar    Eczema    Fatigue    MANDY (obstructive sleep apnea)    S/P cervical spinal fusion    Achilles rupture, left    Hand dermatitis    Dupuytren contracture    Seborrheic keratoses, inflamed    Obesity (BMI 35 0-39 9 without comorbidity)       Objective   /80 (BP Location: Left arm, Patient Position: Sitting, Cuff Size: Large)   Pulse 85   Temp 98 °F (36 7 °C)   Resp 20   Ht 5' 9 29" (1 76 m)   Wt 120 kg (264 lb)   SpO2 98%   BMI 38 66 kg/m²     Physical Exam   Musculoskeletal:   Tenderness mid thoracic region right greater than left in the paravertebral muscular area  Also in the lumbar region bilaterally  Current Medications     Current Outpatient Medications:     allopurinol (ZYLOPRIM) 300 mg tablet, Take 1 tablet (300 mg total) by mouth daily at bedtime, Disp: 90 tablet, Rfl: 1    betamethasone, augmented, (DIPROLENE-AF) 0 05 % cream, Apply topically 2 (two) times a day, Disp: 30 g, Rfl: 5    cyanocobalamin (VITAMIN B-12) 1,000 mcg tablet, Take by mouth daily, Disp: , Rfl:     valsartan-hydrochlorothiazide (DIOVAN-HCT) 160-12 5 MG per tablet, TAKE 1 TABLET ONCE DAILY  , Disp: 90 tablet, Rfl: 1    cyclobenzaprine (FLEXERIL) 10 mg tablet, Take 1 tablet (10 mg total) by mouth daily at bedtime, Disp: 30 tablet, Rfl: 0    diclofenac (VOLTAREN) 75 mg EC tablet, Take 1 tablet (75 mg total) by mouth 2 (two) times a day, Disp: 30 tablet, Rfl: 0    Health Maintenance     Health Maintenance   Topic Date Due    Falls: Plan of Care  01/11/2016    DTaP,Tdap,and Td Vaccines (1 - Tdap) 05/10/2020 (Originally 1/11/1961)    Hepatitis C Screening  11/11/2020 (Originally 1950)    PT PLAN OF CARE  12/19/2019    Fall Risk  11/11/2020    Depression Screening PHQ  11/11/2020    Medicare Annual Wellness Visit (AWV)  11/11/2020    BMI: Followup Plan  11/11/2020    Pneumococcal Vaccine: 65+ Years (2 of 2 - PPSV23) 11/11/2020    BMI: Adult  11/18/2020    CRC Screening: Colonoscopy  02/14/2028    Influenza Vaccine  Completed    Pneumococcal Vaccine: Pediatrics (0 to 5 Years) and At-Risk Patients (6 to 59 Years)  Aged Out    HIB Vaccine  Aged Out    Hepatitis B Vaccine  Aged Out    IPV Vaccine  Aged Out    Hepatitis A Vaccine  Aged Out    Meningococcal ACWY Vaccine  Aged Out    HPV Vaccine  Aged [employer] History   Administered Date(s) Administered    INFLUENZA 11/01/2015, 11/02/2017, 09/23/2018    Influenza Quadrivalent Preservative Free 3 years and older IM 11/02/2017    Influenza Quadrivalent, 6-35 Months IM 11/01/2015    Influenza, high dose seasonal 0 5 mL 10/03/2019    Pneumococcal Conjugate 13-Valent 11/11/2019       Demi Barrientos DO  [facility]

## 2019-12-03 NOTE — PROGRESS NOTES
Daily Note     Today's date: 12/3/2019  Patient name: Sheela Adan  : 1950  MRN: [id number]  Referring provider: Sita Yeung  Dx:   Encounter Diagnosis     ICD-10-CM    1  Achilles rupture, left, initial encounter S86 012A    2  DDD (degenerative disc disease), lumbar M51 36        Start Time: 0830  Stop Time: 0900  Total time in clinic (min): 30 minutes    Subjective: Patient noted that over the weekend he had a lot of flank pain  Patient noted that he had a hard time sleeping this weekend because of the pain  Patient noted he now gets the pain when he is not moving  Objective: See treatment diary below      Assessment: PT encouraged the patient to follow up with his PCP 2* having pain without movement  PT held manual techniques on lumbar/ thoracic spine due to pain levels  PT performed IASTM to the achilles tendon and over all it is improving  Patient required less cues for breathing during transfers  Patient would benefit from continued PT to allow the patient to return to his PLOF  Plan: Continue per plan of care  Precautions: L partial achilles tear on 19    See protocol attached to chart and in other orders        Manual  10/25 11/5 11/8 11/12 11/15 11/19 11/22 11/26 12/3 10/22   L ankle PROM nv            L gastroc and soleus IASTM MW MW MW MM MW IASTM  MW  Distal Achilles IASTM  MW  Distal achilles IASTM MW  Distal achilles IASTM  MW  Distal achilles IASTM MM Distal achilles   Re-eval      MW       PA mobs thoracic spine     MW  Grade III MW  Grade  III and lumbar MW  Grade III  And lumbar Mw  Grade  III and lumbar     Rib rotation mob      MW  Grade  III           Exercise Diary  10/25 11/5 11/8 11/12 11/15 11/19 11/22 11/26 12/3 10/22   bike X-ride  10' X-ride  10' X-ride  10' X-ride, 10' X-ride  10' retro  X-ride  10' X-ride  10'  X-ride 10'   Seated heel rases 3x15 3x15 3x10 3x15 3x15  3x15   3x15   AROM: DF, PF, inversion and eversion HEP         BTB 2x10 ea Ankle alphabet HEP            Treadmill training for gait 10' 1 2 mph 2 HHA improved post 10'  1 2 mph  2 HHA   10'  1 4  Mph  2 HHA 10' 1 4 mph 2 HHA 7' total  2 0 mph  1 HHA  10' total  2 3 mph      Leg press 50#  3x10   50# 3x10 50#  3x10  DL  30#  2x10 ea  SL  50#  3x10  DL  30#  2x10 ea  SL   40# 3x10   FTEO/ FTEC 3x30" ea firm (S)            BAPS board 3x10 ea 3x10 ea 3x10 ea 3x10 ea      2x10 ea   pec stretch       5x15"       Scapular retraction       2x10  5" hold      Forward step ups             Standing heel raises x15 2x10 2x10 2x10 3x10 3x10 3x10   x10   bridge          2x10   Hip add & abd     standing hip abd  2x10 ea     2x10 ea GTB abd    Quad set             Standing calf stretch (not aggressive) 3x30" ea  3x30" ea 3x30" ea 3x30" ea     3x30" ea   [place]  1 jar per  1 jar per 1 jar per         DL squat at barre     2x10        skiier stretch     5x15" ea 5x15" ea 5x15" ea      Open books      10x10" ea 10x10" ea 10x10" ea     Lower trunk rotation      10x10" ea 10x10" ea 10x10" ea     Serratus punch        2x10     Chin tuck        2x10         Modalities  9/11            CP/ HP PRN np

## 2019-12-05 ENCOUNTER — OFFICE VISIT (OUTPATIENT)
Dept: PHYSICAL THERAPY | Facility: CLINIC | Age: 69
End: 2019-12-05
Payer: COMMERCIAL

## 2019-12-05 DIAGNOSIS — S86.012A ACHILLES RUPTURE, LEFT, INITIAL ENCOUNTER: Primary | ICD-10-CM

## 2019-12-05 DIAGNOSIS — M51.36 DDD (DEGENERATIVE DISC DISEASE), LUMBAR: ICD-10-CM

## 2019-12-05 PROCEDURE — 97140 MANUAL THERAPY 1/> REGIONS: CPT

## 2019-12-05 PROCEDURE — 97110 THERAPEUTIC EXERCISES: CPT

## 2019-12-05 NOTE — PROGRESS NOTES
Daily Note     Today's date: 2019  Patient name: Mi Healy  : 1950  MRN: [id number]  Referring provider: Sue Benedict  Dx:   Encounter Diagnosis     ICD-10-CM    1  Achilles rupture, left, initial encounter S86 012A    2  DDD (degenerative disc disease), lumbar M51 36        Start Time: 09  Stop Time: 1030  Total time in clinic (min): 60 minutes    Subjective: Patient noted he followed up with his PCP and was prescribed a muscle relaxer  Patient noted he feels great today and has no back pain  Objective: See treatment diary below      Assessment: Patient resumed normal exercise routine 2* patient feeling better at the start of the session  Patient noted some cramping in the back post obstacle course, however it was relieved by stretches  Patient performed an obstacle course that incorporated balance and negotiating hurdles  Patient required CGA during hurdles and min A x 1 for balance beam  PT educated the patient on continuing to stretch over the weekend  Patient would benefit from continued PT to allow the patient to return to his PLOF  Plan: Continue per plan of care  Precautions: L partial achilles tear on 19    See protocol attached to chart and in other orders        Manual  10/25 11/5 11/8 11/12 11/15 11/19 11/22 11/26 12/3 12/5   L ankle PROM nv            L gastroc and soleus IASTM MW MW MW MM MW IASTM  MW  Distal Achilles IASTM  MW  Distal achilles IASTM MW  Distal achilles IASTM  MW  Distal achilles IASTM MM Distal achilles   Re-eval      MW       PA mobs thoracic spine     MW  Grade III MW  Grade  III and lumbar MW  Grade III  And lumbar Mw  Grade  III and lumbar  MW  Grade  III and lumbar   Rib rotation mob      MW  Grade  III       Grade V thrust, Thoracic, screw PA technique          MW       Exercise Diary  10/25 11/5 11/8 11/12 11/15 11/19 11/22 11/26 12/3 12/5   bike X-ride  10' X-ride  10' X-ride  10' X-ride, 10' X-ride  10' retro  X-ride  10' X-ride  10'  X-ride 10'   Seated heel rases 3x15 3x15 3x10 3x15 3x15  3x15   HEP   AROM: DF, PF, inversion and eversion HEP            Ankle alphabet HEP            Treadmill training for gait 10' 1 2 mph 2 HHA improved post 10'  1 2 mph  2 HHA   10'  1 4  Mph  2 HHA 10' 1 4 mph 2 HHA 7' total  2 0 mph  1 HHA  10' total  2 3 mph      Leg press 50#  3x10   50# 3x10 50#  3x10  DL  30#  2x10 ea  SL  50#  3x10  DL  30#  2x10 ea  SL   50#  3x10  DL  30#  3x10 ea  SL   FTEO/ FTEC 3x30" ea firm (S)         Obstacle course (hurdles and balance)  x4 ea  Min A for balance beam   BAPS board 3x10 ea 3x10 ea 3x10 ea 3x10 ea         pec stretch       5x15"    5x15"    Scapular retraction       2x10  5" hold      Forward step ups             Standing heel raises x15 2x10 2x10 2x10 3x10 3x10 3x10   3x10   bridge          2x10   Hip add & abd     standing hip abd  2x10 ea        Quad set             Standing calf stretch (not aggressive) 3x30" ea  3x30" ea 3x30" ea 3x30" ea     3x30" ea   [place]  1 jar per  1 jar per 1 jar per         DL squat at barre     2x10        skiier stretch     5x15" ea 5x15" ea 5x15" ea      Open books      10x10" ea 10x10" ea 10x10" ea  5x15" ea   Lower trunk rotation      10x10" ea 10x10" ea 10x10" ea  5x15" ea   Serratus punch        2x10  2x10   Chin tuck        2x10  2x10  5"hold       Modalities  9/11            CP/ HP PRN np

## 2019-12-10 ENCOUNTER — OFFICE VISIT (OUTPATIENT)
Dept: PHYSICAL THERAPY | Facility: CLINIC | Age: 69
End: 2019-12-10
Payer: COMMERCIAL

## 2019-12-10 DIAGNOSIS — M51.36 DDD (DEGENERATIVE DISC DISEASE), LUMBAR: ICD-10-CM

## 2019-12-10 DIAGNOSIS — S86.012A ACHILLES RUPTURE, LEFT, INITIAL ENCOUNTER: Primary | ICD-10-CM

## 2019-12-10 PROCEDURE — 97110 THERAPEUTIC EXERCISES: CPT

## 2019-12-10 PROCEDURE — 97140 MANUAL THERAPY 1/> REGIONS: CPT

## 2019-12-10 NOTE — PROGRESS NOTES
Daily Note     Today's date: 12/10/2019  Patient name: Marjorie Mon  : 1950  MRN: [id number]  Referring provider: Troy Spear  Dx:   Encounter Diagnosis     ICD-10-CM    1  Achilles rupture, left, initial encounter S86 012A    2  DDD (degenerative disc disease), lumbar M51 36        Start Time: 0830  Stop Time: 0915  Total time in clinic (min): 45 minutes    Subjective: Patient noted he continues to feel pretty good  Patient noted balance continues to be an issue  Objective: See treatment diary below      Assessment: Patient performed lateral and forward stepping patterns during obstacle course with no LOB or HHA  Although his balance has been improving functionally with his eyes open, he is challenged by balance activities with his eyes closed  Patient continues to improve with strength surrounding the ankle 2* increased resistance  PT noted improvements with tissue mobility post manuals  Patient would benefit from continued PT to allow the patient to return to his PLOF  Plan: Continue per plan of care  Precautions: L partial achilles tear on 19    See protocol attached to chart and in other orders        Manual  12/10 11/5 11/8 11/12 11/15 11/19 11/22 11/26 12/3 12/5   L ankle PROM D/C            L gastroc and soleus IASTM, distal achilles MW MW MW MM MW IASTM  MW  Distal Achilles IASTM  MW  Distal achilles IASTM MW  Distal achilles IASTM  MW  Distal achilles IASTM MM Distal achilles   Re-eval      MW       PA mobs thoracic spine MW  Grade  III  And lumbar    MW  Grade III MW  Grade  III and lumbar MW  Grade III  And lumbar Mw  Grade  III and lumbar  MW  Grade  III and lumbar   Rib rotation mob      MW  Grade  III       Grade V thrust, Thoracic, screw PA technique          MW       Exercise Diary  12/10 11/5 11/8 11/12 11/15 11/19 11/22 11/26 12/3 12/5   bike X-ride  10' X-ride  10' X-ride  10' X-ride, 10' X-ride  10' retro  X-ride  10' X-ride  10'  X-ride 10'   Seated heel rases D/C 3x15 3x10 3x15 3x15  3x15   HEP   AROM: DF, PF, inversion and eversion HEP            Ankle alphabet HEP            Treadmill training for gait 5' 10'  1 2 mph  2 HHA   10'  1 4  Mph  2 HHA 10' 1 4 mph 2 HHA 7' total  2 0 mph  1 HHA  10' total  2 3 mph      Leg press 50#  3x10  30#  3x10 ea  SL   50# 3x10 50#  3x10  DL  30#  2x10 ea  SL  50#  3x10  DL  30#  2x10 ea  SL   50#  3x10  DL  30#  3x10 ea  SL   FTEC on foam 3x30" ea 1 HHA            Obstacle course (hurdles and balance)   (S)  x4 ea   Fwd and lateral         Obstacle course (hurdles and balance)  x4 ea  Min A for balance beam   BAPS board  3x10 ea 3x10 ea 3x10 ea         pec stretch       5x15"    5x15"    Scapular retraction       2x10  5" hold      Forward step ups             Standing heel raises 3x10 2x10 2x10 2x10 3x10 3x10 3x10   3x10   bridge 2x10         2x10   Hip add & abd     standing hip abd  2x10 ea        Quad set             Standing calf stretch (not aggressive) 3x30" ea  3x30" ea 3x30" ea 3x30" ea     3x30" ea   [place] [address]    1 jar per 1 jar per         DL squat at barre     2x10        skiier stretch 5x15" ea    5x15" ea 5x15" ea 5x15" ea      Open books 5x15" ea     10x10" ea 10x10" ea 10x10" ea  5x15" ea   Lower trunk rotation      10x10" ea 10x10" ea 10x10" ea  5x15" ea   Serratus punch        2x10  2x10   Chin tuck        2x10  2x10  5"hold       Modalities  9/11            CP/ HP PRN np

## 2019-12-12 ENCOUNTER — OFFICE VISIT (OUTPATIENT)
Dept: PHYSICAL THERAPY | Facility: CLINIC | Age: 69
End: 2019-12-12
Payer: COMMERCIAL

## 2019-12-12 DIAGNOSIS — M51.36 DDD (DEGENERATIVE DISC DISEASE), LUMBAR: ICD-10-CM

## 2019-12-12 DIAGNOSIS — S86.012A ACHILLES RUPTURE, LEFT, INITIAL ENCOUNTER: Primary | ICD-10-CM

## 2019-12-12 PROCEDURE — 97140 MANUAL THERAPY 1/> REGIONS: CPT

## 2019-12-12 PROCEDURE — 97110 THERAPEUTIC EXERCISES: CPT

## 2019-12-12 NOTE — PROGRESS NOTES
Daily Note     Today's date: 2019  Patient name: Roseann Almendarez  : 1950  MRN: [id number]  Referring provider: Vimal Nicolas  Dx:   Encounter Diagnosis     ICD-10-CM    1  Achilles rupture, left, initial encounter S86 012A    2  DDD (degenerative disc disease), lumbar M51 36                   Subjective: Patient noted that his back is back to normal and his L foot has been feeling good  Patient noted that he wants to take a two week break from PT to perform his HEP  Objective: See treatment diary below      Assessment: PT educated the patient on his HEP and agrees to follow up in two weeks  PT noted great mobility in the achilles tissue mobility post manuals  Patient performed obstacle course with multiple quick turns without LOB, however did required 1 HHA  Patient would benefit from continued PT to allow the patient to return to his PLOF  Plan: Continue per plan of care  Precautions: L partial achilles tear on 19    See protocol attached to chart and in other orders        Manual  12/10 12/12 11/8 11/12 11/15 11/19 11/22 11/26 12/3 12/5   L ankle PROM D/C            L gastroc and soleus IASTM, distal achilles MW MW MW MM MW IASTM  MW  Distal Achilles IASTM  MW  Distal achilles IASTM MW  Distal achilles IASTM  MW  Distal achilles IASTM MM Distal achilles   Re-eval      MW       PA mobs thoracic spine MW  Grade  III  And lumbar    MW  Grade III MW  Grade  III and lumbar MW  Grade III  And lumbar Mw  Grade  III and lumbar  MW  Grade  III and lumbar   Rib rotation mob      MW  Grade  III       Grade V thrust, Thoracic, screw PA technique          MW       Exercise Diary  12/10 12/12 11/8 11/12 11/15 11/19 11/22 11/26 12/3 12/5   bike X-ride  10' X-ride  10' X-ride  10' X-ride, 10' X-ride  10' retro  X-ride  10' X-ride  10'  X-ride 10'   Seated heel rases D/C  3x10 3x15 3x15  3x15   HEP   AROM: DF, PF, inversion and eversion HEP            Ankle alphabet HEP Treadmill training for gait 5' 10'  1 8 mph  2 HHA   10'  1 4  Mph  2 HHA 10' 1 4 mph 2 HHA 7' total  2 0 mph  1 HHA  10' total  2 3 mph      Leg press 50#  3x10  30#  3x10 ea  SL 50#  3x10  30#  3x10 ea  SL  50# 3x10 50#  3x10  DL  30#  2x10 ea  SL  50#  3x10  DL  30#  2x10 ea  SL   50#  3x10  DL  30#  3x10 ea  SL   FTEC on foam 3x30" ea 1 HHA            Obstacle course (hurdles and balance)   (S)  x4 ea   Fwd and lateral 1 HHA  x4  Quick turns  No LOB        Obstacle course (hurdles and balance)  x4 ea  Min A for balance beam   BAPS board   3x10 ea 3x10 ea         pec stretch       5x15"    5x15"    Scapular retraction       2x10  5" hold      Forward step ups             Standing heel raises 3x10 3x10 2x10 2x10 3x10 3x10 3x10   3x10   bridge 2x10 2x10  5" hold        2x10   Hip add & abd     standing hip abd  2x10 ea        Quad set             Standing calf stretch (not aggressive) 3x30" ea 3x30" ea 3x30" ea 3x30" ea 3x30" ea     3x30" ea   [place]    1 jar per 1 jar per         DL squat at barre     2x10        skiier stretch 5x15" ea 5x15" ea   5x15" ea 5x15" ea 5x15" ea      Open books 5x15" ea 5x15" ea    10x10" ea 10x10" ea 10x10" ea  5x15" ea   Lower trunk rotation  5x15" ea    10x10" ea 10x10" ea 10x10" ea  5x15" ea   Serratus punch        2x10  2x10   Chin tuck        2x10  2x10  5"hold       Modalities  9/11            CP/ HP PRN np

## 2019-12-20 ENCOUNTER — OFFICE VISIT (OUTPATIENT)
Dept: SLEEP CENTER | Facility: CLINIC | Age: 69
End: 2019-12-20
Payer: COMMERCIAL

## 2019-12-20 ENCOUNTER — TELEPHONE (OUTPATIENT)
Dept: SLEEP CENTER | Facility: CLINIC | Age: 69
End: 2019-12-20

## 2019-12-20 VITALS
DIASTOLIC BLOOD PRESSURE: 60 MMHG | HEIGHT: 69 IN | SYSTOLIC BLOOD PRESSURE: 122 MMHG | WEIGHT: 266 LBS | BODY MASS INDEX: 39.4 KG/M2

## 2019-12-20 DIAGNOSIS — G47.00 INSOMNIA, UNSPECIFIED TYPE: ICD-10-CM

## 2019-12-20 DIAGNOSIS — G47.19 EXCESSIVE DAYTIME SLEEPINESS: ICD-10-CM

## 2019-12-20 DIAGNOSIS — G47.33 OSA (OBSTRUCTIVE SLEEP APNEA): Primary | ICD-10-CM

## 2019-12-20 DIAGNOSIS — I10 BENIGN ESSENTIAL HYPERTENSION: ICD-10-CM

## 2019-12-20 DIAGNOSIS — E66.9 OBESITY (BMI 35.0-39.9 WITHOUT COMORBIDITY): ICD-10-CM

## 2019-12-20 PROCEDURE — 3074F SYST BP LT 130 MM HG: CPT | Performed by: INTERNAL MEDICINE

## 2019-12-20 PROCEDURE — 3078F DIAST BP <80 MM HG: CPT | Performed by: INTERNAL MEDICINE

## 2019-12-20 PROCEDURE — 99214 OFFICE O/P EST MOD 30 MIN: CPT | Performed by: INTERNAL MEDICINE

## 2019-12-20 NOTE — PROGRESS NOTES
fyi  Review of Systems      Genitourinary none   Cardiology none   Gastrointestinal none   Neurology none   Constitutional claustrophobia   Integumentary rash or dry skin   Psychiatry none   Musculoskeletal muscle aches and back pain   Pulmonary none   ENT none   Endocrine none   Hematological none

## 2019-12-20 NOTE — PROGRESS NOTES
Follow-Up Note - [facility] 61  77 y o  male  :1950  GDV:[id number]    CC: I saw this patient for follow-up in clinic today for his Sleep Disordered Breathing, Coexisting Sleep and Medical Problems  A sleep study to titrate Positive airway pressure (PAP) therapy was undertaken  Patient is here to review results and to initiate therapy  The study demonstrated sleep disordered breathing was successfully remediated with PAP at 10 cm H2O  The prior diagnostic home sleep study in 2019 demonstrated : DOMINIC (respiratory event index of) 50 /hour  Minimum oxygen saturation was 65 percent and 15% of the study was spent with saturations less than 90%  The snore index was 23%  PFSH, Problem List, Medications & Allergies were reviewed in EMR  Interval changes: none reported  He  has a past medical history of Acute gouty arthritis, Anxiety, Chronic thoracic spine pain, Dysfunction of both eustachian tubes, Erectile dysfunction of non-organic origin, Family history reviewed with no changes, Gout, Hypertension, Sebaceous cyst, and Skin lesion  He has a current medication list which includes the following prescription(s): allopurinol, betamethasone (augmented), vitamin b-12, cyclobenzaprine, diclofenac, and valsartan-hydrochlorothiazide  ROS: Reviewed (see attached)  DATA REVIEWED:  using PAP > 4 hours/night 90% of the time  Estimated DOMINIC 6 9/hour at pressure of 10cm H2O @90th percentile  SUBJECTIVE: Regarding use of PAP, Tory Billy reports:   · He is experiencing some adverse effects: dry nose  · He is   benefiting from use: sleeping better   Sleep Routine: He reports getting 9 hrs sleep  ; he has no difficulty initiating or maintaining sleep   He awakens spontaneously and feels refreshed  He significantly improved  excessive drowsiness   He rated himself at Total score: 6 /24 on the [place] sleepiness scale  Habits: reports that he has quit smoking   He has a 10 00 pack-year smoking history  He has quit using smokeless tobacco ,  reports that he drinks alcohol ,  reports that he does not use drugs  , Caffeine use: limited , Exercise routine: none   OBJECTIVE: /60   Ht 5' 9" (1 753 m)   Wt 121 kg (266 lb)   BMI 39 28 kg/m²    Constitutional: Patient is well groomed; well appearing  Skin/Extrem: warm & dry; col & hydration normal; no edema  Psych: cooperativeand in no distress  Mental State appears normal   CNS: Alert, orientated, clear & coherent speech  H&N: EOMI; NC/AT:no facial pressure marks, no rashes  ENMT Mucus membranes normal Nasal airway:patent  Oral airway: crowded  Resp:effort is normal CVS: RRR ABD:truncal obesity MSK:Gait normal     ASSESSMENT: Primary Sleep/Secondary(to Medical or Psych conditions) & comorbidities   1  MANDY (obstructive sleep apnea)  PAP DME Pressure Change    PAP DME Resupply/Reorder   2  Insomnia, unspecified type      Improved   3  Excessive daytime sleepiness      Improved   4  Benign essential hypertension     5  Obesity (BMI 35 0-39 9 without comorbidity)       PLAN:  1  I reviewed results of the Sleep studies with the patient  2  Treatment with  PAP is medically necessary and [place] [employer] is agreable to continue use  3  Care of equipment, methods to improve comfort using PAP and importance of compliance with therapy were discussed  4  Pressure setting: change 10-12 cmH2O     5  Rx provided to replace supplies and Care coordinated with DME provider  6  Strategies for weight reduction were discussed  7  Follow-up is advised in 1 year or sooner if needed to monitor progress, compliance and to adjust therapy  Thank you for allowing me to participate in the care of this patient      Sincerely,    Authenticated electronically by Ani Stallworth MD on 70/56/87   Board Certified Specialist

## 2019-12-20 NOTE — PATIENT INSTRUCTIONS

## 2019-12-23 ENCOUNTER — TELEPHONE (OUTPATIENT)
Dept: SLEEP CENTER | Facility: CLINIC | Age: 69
End: 2019-12-23

## 2019-12-30 ENCOUNTER — PROCEDURE VISIT (OUTPATIENT)
Dept: FAMILY MEDICINE CLINIC | Facility: CLINIC | Age: 69
End: 2019-12-30
Payer: COMMERCIAL

## 2019-12-30 ENCOUNTER — APPOINTMENT (OUTPATIENT)
Dept: PHYSICAL THERAPY | Facility: CLINIC | Age: 69
End: 2019-12-30
Payer: COMMERCIAL

## 2019-12-30 VITALS
WEIGHT: 269 LBS | SYSTOLIC BLOOD PRESSURE: 148 MMHG | BODY MASS INDEX: 39.84 KG/M2 | OXYGEN SATURATION: 95 % | TEMPERATURE: 98 F | HEIGHT: 69 IN | DIASTOLIC BLOOD PRESSURE: 90 MMHG | RESPIRATION RATE: 18 BRPM | HEART RATE: 97 BPM

## 2019-12-30 DIAGNOSIS — L30.9 HAND DERMATITIS: ICD-10-CM

## 2019-12-30 DIAGNOSIS — L82.0 SEBORRHEIC KERATOSES, INFLAMED: Primary | ICD-10-CM

## 2019-12-30 PROCEDURE — 99213 OFFICE O/P EST LOW 20 MIN: CPT | Performed by: FAMILY MEDICINE

## 2019-12-30 RX ORDER — BETAMETHASONE DIPROPIONATE 0.5 MG/G
CREAM TOPICAL 2 TIMES DAILY
Qty: 30 G | Refills: 5 | Status: SHIPPED | OUTPATIENT
Start: 2019-12-30 | End: 2020-05-15

## 2019-12-31 NOTE — ASSESSMENT & PLAN NOTE
Much improved since starting diprolene AF cream, qd-bid prn  Will refill med today    Patient instructed to decrease frequency when symptoms are improving

## 2019-12-31 NOTE — PROGRESS NOTES
[address] [facility]      NAME: Negrita Smith  AGE: 77 y o  SEX: male  : 1950   MRN: [id number]    DATE: 2019  TIME: 7:55 PM    Assessment and Plan     Problem List Items Addressed This Visit     Hand dermatitis      Much improved since starting diprolene AF cream, qd-bid prn  Will refill med today  Patient instructed to decrease frequency when symptoms are improving         Relevant Medications    betamethasone, augmented, (DIPROLENE-AF) 0 05 % cream    Seborrheic keratoses, inflamed - Primary    Relevant Medications    betamethasone, augmented, (DIPROLENE-AF) 0 05 % cream    Other Relevant Orders    Biopsy              Return to office in:  Call further problems    Chief Complaint     Chief Complaint   Patient presents with    Procedure       History of Present Illness       Patient presents to have lesion on right shoulder removed today  He also would like his hands recheck  He was seen few months ago and prescribed dip Prolene cream       The following portions of the patient's history were reviewed and updated as appropriate: allergies, current medications, past family history, past medical history, past social history, past surgical history and problem list     Review of Systems   Review of Systems   Respiratory: Negative  Cardiovascular: Negative  Gastrointestinal: Negative  Genitourinary: Negative  Skin: Positive for rash         Active Problem List     Patient Active Problem List   Diagnosis    Rotator cuff syndrome of right shoulder    Exogenous obesity    Combined hyperlipidemia    Benign essential hypertension    Chronic thoracic spine pain    History of gout    Chronic lumbar radiculopathy    Cervical radiculitis    Plantar fasciitis of right foot    Subclinical hypothyroidism    Elevated blood sugar    Eczema    Fatigue    MANDY (obstructive sleep apnea)    S/P cervical spinal fusion    Achilles rupture, left    Hand dermatitis    Dupuytren contracture    Seborrheic keratoses, inflamed    Obesity (BMI 35 0-39 9 without comorbidity)       Objective   /90 (BP Location: Left arm, Patient Position: Sitting, Cuff Size: Large)   Pulse 97   Temp 98 °F (36 7 °C) (Tympanic)   Resp 18   Ht 5' 8 9" (1 75 m)   Wt 122 kg (269 lb)   SpO2 95%   BMI 39 84 kg/m²     Physical Exam   Skin:     Dry hands  Overall much improved since last visit       Pertinent Laboratory/Diagnostic Studies:   none    Current Medications     Current Outpatient Medications:     allopurinol (ZYLOPRIM) 300 mg tablet, Take 1 tablet (300 mg total) by mouth daily at bedtime, Disp: 90 tablet, Rfl: 1    betamethasone, augmented, (DIPROLENE-AF) 0 05 % cream, Apply topically 2 (two) times a day, Disp: 30 g, Rfl: 5    cyclobenzaprine (FLEXERIL) 10 mg tablet, Take 1 tablet (10 mg total) by mouth daily at bedtime, Disp: 30 tablet, Rfl: 0    diclofenac (VOLTAREN) 75 mg EC tablet, Take 1 tablet (75 mg total) by mouth 2 (two) times a day, Disp: 30 tablet, Rfl: 0    valsartan-hydrochlorothiazide (DIOVAN-HCT) 160-12 5 MG per tablet, TAKE 1 TABLET ONCE DAILY  , Disp: 90 tablet, Rfl: 1    Health Maintenance     Health Maintenance   Topic Date Due    PT PLAN OF CARE  12/19/2019    DTaP,Tdap,and Td Vaccines (1 - Tdap) 05/10/2020 (Originally 1/11/1961)    Hepatitis C Screening  11/11/2020 (Originally 1950)    Fall Risk  11/11/2020    Depression Screening PHQ  11/11/2020    Medicare Annual Wellness Visit (AWV)  11/11/2020    BMI: Followup Plan  11/11/2020    Pneumococcal Vaccine: 65+ Years (2 of 2 - PPSV23) 11/11/2020    Falls: Plan of Care  12/03/2020    BMI: Adult  12/20/2020    CRC Screening: Colonoscopy  02/14/2028    Influenza Vaccine  Completed    Pneumococcal Vaccine: Pediatrics (0 to 5 Years) and At-Risk Patients (6 to 59 Years)  Aged Out    HIB Vaccine  Aged Out    Hepatitis B Vaccine  Aged Out    IPV Vaccine  Aged Out    Hepatitis A Vaccine  Aged Nicolas Hoover Meningococcal ACWY Vaccine  Aged Out    HPV Vaccine  Aged Out     Immunization History   Administered Date(s) Administered    INFLUENZA 11/01/2015, 11/02/2017, 09/23/2018    Influenza Quadrivalent Preservative Free 3 years and older IM 11/02/2017    Influenza Quadrivalent, 6-35 Months IM 11/01/2015    Influenza, high dose seasonal 0 5 mL 10/03/2019    Pneumococcal Conjugate 13-Valent 11/11/2019       Brayan Terry DO  [facility]

## 2019-12-31 NOTE — PROGRESS NOTES
Biopsy  Date/Time: 1/7/2020 8:14 AM  Performed by: Smita Wells DO  Authorized by: Smita Wells DO     Procedure Details - Skin Biopsy:     Biopsy tissue type: skin    Biopsy method: shave biopsy      Body area:  Upper extremity    Upper extremity location:  R shoulder    Initial size (mm):  10    Final defect size (mm):  10    Malignancy: benign lesion       10 mm unevenly pigmented lesion R shoulder  Shave biopsy performed  Lesion sent to pathology  Pt tolerated procedure well   Will call w results    ADDENDUM: BIOPSY SHOWED INFLAMED SK

## 2020-01-06 LAB
CLINICAL INFO: NORMAL
PATH REPORT.FINAL DX SPEC: NORMAL
PROCEDURE TYPE: NORMAL
QUESTION/PROBLEM: NORMAL
SL AMB CONTAINER TYPE: NORMAL
SL AMB FINAL RESOLUTION: NORMAL
SL AMB REPORT STATUS: NORMAL
SPECIMEN SOURCE: NORMAL

## 2020-01-07 PROCEDURE — 11102 TANGNTL BX SKIN SINGLE LES: CPT | Performed by: FAMILY MEDICINE

## 2020-02-05 DIAGNOSIS — I10 ESSENTIAL HYPERTENSION: ICD-10-CM

## 2020-02-05 RX ORDER — VALSARTAN AND HYDROCHLOROTHIAZIDE 160; 12.5 MG/1; MG/1
TABLET, FILM COATED ORAL
Qty: 90 TABLET | Refills: 0 | Status: SHIPPED | OUTPATIENT
Start: 2020-02-05 | End: 2020-05-06 | Stop reason: SDUPTHER

## 2020-05-06 DIAGNOSIS — I10 ESSENTIAL HYPERTENSION: ICD-10-CM

## 2020-05-06 RX ORDER — VALSARTAN AND HYDROCHLOROTHIAZIDE 160; 12.5 MG/1; MG/1
1 TABLET, FILM COATED ORAL DAILY
Qty: 90 TABLET | Refills: 1 | Status: SHIPPED | OUTPATIENT
Start: 2020-05-06 | End: 2020-10-06

## 2020-05-07 DIAGNOSIS — Z87.39 HISTORY OF GOUT: ICD-10-CM

## 2020-05-07 RX ORDER — ALLOPURINOL 300 MG/1
TABLET ORAL
Qty: 90 TABLET | Refills: 1 | Status: SHIPPED | OUTPATIENT
Start: 2020-05-07 | End: 2021-02-17

## 2020-05-08 LAB — HBA1C MFR BLD HPLC: 6.1 %

## 2020-05-15 ENCOUNTER — OFFICE VISIT (OUTPATIENT)
Dept: FAMILY MEDICINE CLINIC | Facility: CLINIC | Age: 70
End: 2020-05-15
Payer: COMMERCIAL

## 2020-05-15 VITALS
HEIGHT: 69 IN | SYSTOLIC BLOOD PRESSURE: 140 MMHG | RESPIRATION RATE: 18 BRPM | OXYGEN SATURATION: 97 % | BODY MASS INDEX: 40.73 KG/M2 | HEART RATE: 72 BPM | TEMPERATURE: 97.7 F | WEIGHT: 275 LBS | DIASTOLIC BLOOD PRESSURE: 84 MMHG

## 2020-05-15 DIAGNOSIS — E66.9 OBESITY (BMI 35.0-39.9 WITHOUT COMORBIDITY): ICD-10-CM

## 2020-05-15 DIAGNOSIS — I10 BENIGN ESSENTIAL HYPERTENSION: ICD-10-CM

## 2020-05-15 DIAGNOSIS — E78.2 COMBINED HYPERLIPIDEMIA: Primary | ICD-10-CM

## 2020-05-15 DIAGNOSIS — G47.33 OSA (OBSTRUCTIVE SLEEP APNEA): ICD-10-CM

## 2020-05-15 DIAGNOSIS — Z87.39 HISTORY OF GOUT: ICD-10-CM

## 2020-05-15 DIAGNOSIS — R73.9 ELEVATED BLOOD SUGAR: ICD-10-CM

## 2020-05-15 DIAGNOSIS — E03.8 SUBCLINICAL HYPOTHYROIDISM: ICD-10-CM

## 2020-05-15 DIAGNOSIS — S86.012A RUPTURE OF LEFT ACHILLES TENDON, INITIAL ENCOUNTER: ICD-10-CM

## 2020-05-15 DIAGNOSIS — R97.20 ELEVATED PSA: ICD-10-CM

## 2020-05-15 PROCEDURE — 1036F TOBACCO NON-USER: CPT | Performed by: FAMILY MEDICINE

## 2020-05-15 PROCEDURE — 3008F BODY MASS INDEX DOCD: CPT | Performed by: FAMILY MEDICINE

## 2020-05-15 PROCEDURE — 4040F PNEUMOC VAC/ADMIN/RCVD: CPT | Performed by: FAMILY MEDICINE

## 2020-05-15 PROCEDURE — 99214 OFFICE O/P EST MOD 30 MIN: CPT | Performed by: FAMILY MEDICINE

## 2020-05-15 PROCEDURE — 3079F DIAST BP 80-89 MM HG: CPT | Performed by: FAMILY MEDICINE

## 2020-05-15 PROCEDURE — 1160F RVW MEDS BY RX/DR IN RCRD: CPT | Performed by: FAMILY MEDICINE

## 2020-05-15 PROCEDURE — 3077F SYST BP >= 140 MM HG: CPT | Performed by: FAMILY MEDICINE

## 2020-10-06 DIAGNOSIS — I10 ESSENTIAL HYPERTENSION: ICD-10-CM

## 2020-10-06 RX ORDER — VALSARTAN AND HYDROCHLOROTHIAZIDE 160; 12.5 MG/1; MG/1
TABLET, FILM COATED ORAL
Qty: 90 TABLET | Refills: 1 | Status: SHIPPED | OUTPATIENT
Start: 2020-10-06 | End: 2021-04-17

## 2020-11-05 LAB — HBA1C MFR BLD HPLC: 6.2 %

## 2020-12-09 ENCOUNTER — OFFICE VISIT (OUTPATIENT)
Dept: FAMILY MEDICINE CLINIC | Facility: CLINIC | Age: 70
End: 2020-12-09
Payer: COMMERCIAL

## 2020-12-09 VITALS
TEMPERATURE: 98 F | RESPIRATION RATE: 18 BRPM | HEART RATE: 83 BPM | WEIGHT: 273 LBS | HEIGHT: 69 IN | OXYGEN SATURATION: 98 % | DIASTOLIC BLOOD PRESSURE: 80 MMHG | BODY MASS INDEX: 40.43 KG/M2 | SYSTOLIC BLOOD PRESSURE: 140 MMHG

## 2020-12-09 DIAGNOSIS — I10 BENIGN ESSENTIAL HYPERTENSION: ICD-10-CM

## 2020-12-09 DIAGNOSIS — R97.20 ELEVATED PSA: ICD-10-CM

## 2020-12-09 DIAGNOSIS — E66.9 OBESITY (BMI 35.0-39.9 WITHOUT COMORBIDITY): ICD-10-CM

## 2020-12-09 DIAGNOSIS — G47.33 OSA (OBSTRUCTIVE SLEEP APNEA): ICD-10-CM

## 2020-12-09 DIAGNOSIS — Z12.5 SCREENING FOR PROSTATE CANCER: ICD-10-CM

## 2020-12-09 DIAGNOSIS — E78.2 COMBINED HYPERLIPIDEMIA: ICD-10-CM

## 2020-12-09 DIAGNOSIS — Z00.00 ENCOUNTER FOR ANNUAL WELLNESS EXAM IN MEDICARE PATIENT: ICD-10-CM

## 2020-12-09 DIAGNOSIS — R73.9 ELEVATED BLOOD SUGAR: ICD-10-CM

## 2020-12-09 DIAGNOSIS — Z87.39 HISTORY OF GOUT: ICD-10-CM

## 2020-12-09 DIAGNOSIS — E03.8 SUBCLINICAL HYPOTHYROIDISM: ICD-10-CM

## 2020-12-09 DIAGNOSIS — Z23 NEED FOR VACCINATION: Primary | ICD-10-CM

## 2020-12-09 DIAGNOSIS — R20.2 PARESTHESIA OF UPPER AND LOWER EXTREMITIES OF BOTH SIDES: ICD-10-CM

## 2020-12-09 PROCEDURE — 3008F BODY MASS INDEX DOCD: CPT | Performed by: FAMILY MEDICINE

## 2020-12-09 PROCEDURE — 1036F TOBACCO NON-USER: CPT | Performed by: FAMILY MEDICINE

## 2020-12-09 PROCEDURE — 1125F AMNT PAIN NOTED PAIN PRSNT: CPT | Performed by: FAMILY MEDICINE

## 2020-12-09 PROCEDURE — 3077F SYST BP >= 140 MM HG: CPT | Performed by: FAMILY MEDICINE

## 2020-12-09 PROCEDURE — 99214 OFFICE O/P EST MOD 30 MIN: CPT | Performed by: FAMILY MEDICINE

## 2020-12-09 PROCEDURE — 1160F RVW MEDS BY RX/DR IN RCRD: CPT | Performed by: FAMILY MEDICINE

## 2020-12-09 PROCEDURE — 3079F DIAST BP 80-89 MM HG: CPT | Performed by: FAMILY MEDICINE

## 2020-12-09 PROCEDURE — 1170F FXNL STATUS ASSESSED: CPT | Performed by: FAMILY MEDICINE

## 2020-12-09 PROCEDURE — 3288F FALL RISK ASSESSMENT DOCD: CPT | Performed by: FAMILY MEDICINE

## 2020-12-09 PROCEDURE — 4040F PNEUMOC VAC/ADMIN/RCVD: CPT | Performed by: FAMILY MEDICINE

## 2020-12-09 PROCEDURE — G0009 ADMIN PNEUMOCOCCAL VACCINE: HCPCS | Performed by: FAMILY MEDICINE

## 2020-12-09 PROCEDURE — 3725F SCREEN DEPRESSION PERFORMED: CPT | Performed by: FAMILY MEDICINE

## 2020-12-09 PROCEDURE — 90732 PPSV23 VACC 2 YRS+ SUBQ/IM: CPT | Performed by: FAMILY MEDICINE

## 2020-12-09 PROCEDURE — G0439 PPPS, SUBSEQ VISIT: HCPCS | Performed by: FAMILY MEDICINE

## 2020-12-09 PROCEDURE — 1101F PT FALLS ASSESS-DOCD LE1/YR: CPT | Performed by: FAMILY MEDICINE

## 2020-12-22 ENCOUNTER — OFFICE VISIT (OUTPATIENT)
Dept: SLEEP CENTER | Facility: CLINIC | Age: 70
End: 2020-12-22
Payer: COMMERCIAL

## 2020-12-22 VITALS
BODY MASS INDEX: 39.34 KG/M2 | OXYGEN SATURATION: 96 % | DIASTOLIC BLOOD PRESSURE: 70 MMHG | HEIGHT: 70 IN | HEART RATE: 72 BPM | SYSTOLIC BLOOD PRESSURE: 132 MMHG | WEIGHT: 274.8 LBS

## 2020-12-22 DIAGNOSIS — E66.9 OBESITY (BMI 35.0-39.9 WITHOUT COMORBIDITY): ICD-10-CM

## 2020-12-22 DIAGNOSIS — R40.0 DAYTIME SLEEPINESS: ICD-10-CM

## 2020-12-22 DIAGNOSIS — G47.33 OSA (OBSTRUCTIVE SLEEP APNEA): Primary | ICD-10-CM

## 2020-12-22 DIAGNOSIS — I10 BENIGN ESSENTIAL HYPERTENSION: ICD-10-CM

## 2020-12-22 PROCEDURE — 3008F BODY MASS INDEX DOCD: CPT | Performed by: INTERNAL MEDICINE

## 2020-12-22 PROCEDURE — 1160F RVW MEDS BY RX/DR IN RCRD: CPT | Performed by: INTERNAL MEDICINE

## 2020-12-22 PROCEDURE — 1036F TOBACCO NON-USER: CPT | Performed by: INTERNAL MEDICINE

## 2020-12-22 PROCEDURE — 3075F SYST BP GE 130 - 139MM HG: CPT | Performed by: INTERNAL MEDICINE

## 2020-12-22 PROCEDURE — 99214 OFFICE O/P EST MOD 30 MIN: CPT | Performed by: INTERNAL MEDICINE

## 2020-12-22 PROCEDURE — 3078F DIAST BP <80 MM HG: CPT | Performed by: INTERNAL MEDICINE

## 2020-12-23 ENCOUNTER — TELEPHONE (OUTPATIENT)
Dept: SLEEP CENTER | Facility: CLINIC | Age: 70
End: 2020-12-23

## 2021-01-06 NOTE — PROGRESS NOTES
Daily Note     Today's date: 2018  Patient name: Jill Carroll  : 1950  MRN: [id number]  Referring provider: Gerhard Freitas DO  Dx:   Encounter Diagnosis     ICD-10-CM    1  Chronic lumbar radiculopathy M54 16    2  Chronic right-sided low back pain with right-sided sciatica M54 41     G89 29                 Visit 7    Subjective: Pt reports his groin pain is much better and the right sided back pain is less too  He has more central back pain now  Objective: See treatment diary below  The patient performed the following as per flow sheet:   --Therapeutic Exercises for 27 minutes  --Manual Therapy for 20 minutes  --Moist Heat and TENS for 15 minutes  --Mechanical Traction for 15 minutes  Assessment: Pt progress is moderate  Luis Waters was wearing sneakers today with the heel lift, and he was asked today to male more of an effort coming into PT twice a week instead of one  Plan: Continue per plan of care       Manual    8         CPA L2-L5 10  10  10  10  10  10  10         TPR Gmax and IP 10  10  10  10  10  10  10                                                                                       Exercise Diary                         Fall out    2x15  30x  30x  30x  30x  30x          Bridge w/ band    15x:05  15x  15x  15x  15x  15x          PF str    3x:30  3x  3x  3x  3x  3x          Rot on SB    3'  3  3  3  3  3          Hip flex str      3x:30  3x  3x  3x  3x         Hip add w/ TB        20x  20x  30x  30x         Ball squeeze            20x  20x                                                                                                                                                                                                                                                                                                         HEP no                           Modalities                        HP/TENS 15' 15  15  15  15  15  15       [facility] Traction 15'  15  15  15  15  15  15                                   General Sunscreen Counseling: I recommended a broad spectrum sunscreen with Zinc or Titanium Oxide with a SPF of 30 or higher.  I explained that SPF 30 sunscreens block approximately 97 percent of the sun's harmful rays.  Sunscreens should be applied at least 15 minutes prior to expected sun exposure and then every 2 hours after that as long as sun exposure continues. If swimming or exercising sunscreen should be reapplied every 45 minutes to an hour after getting wet or sweating.  One ounce, or the equivalent of a shot glass full of sunscreen, is adequate to protect the skin not covered by a bathing suit. I also recommended a lip balm with a sunscreen as well. Sun protective clothing can be used in lieu of sunscreen but must be worn the entire time you are exposed to the sun's rays. Detail Level: Detailed

## 2021-01-22 ENCOUNTER — HOSPITAL ENCOUNTER (INPATIENT)
Facility: HOSPITAL | Age: 71
LOS: 3 days | Discharge: HOME | DRG: 309 | End: 2021-01-25
Attending: EMERGENCY MEDICINE | Admitting: FAMILY MEDICINE
Payer: COMMERCIAL

## 2021-01-22 ENCOUNTER — APPOINTMENT (EMERGENCY)
Dept: RADIOLOGY | Facility: HOSPITAL | Age: 71
DRG: 309 | End: 2021-01-22
Payer: COMMERCIAL

## 2021-01-22 DIAGNOSIS — R77.8 ELEVATED TROPONIN: ICD-10-CM

## 2021-01-22 DIAGNOSIS — I48.92 ATRIAL FLUTTER (HCC): Primary | ICD-10-CM

## 2021-01-22 DIAGNOSIS — E78.5 DYSLIPIDEMIA: ICD-10-CM

## 2021-01-22 PROBLEM — I48.91 ATRIAL FIBRILLATION WITH RVR (HCC): Status: RESOLVED | Noted: 2021-01-22 | Resolved: 2021-01-22

## 2021-01-22 PROBLEM — I48.91 ATRIAL FIBRILLATION WITH RVR (HCC): Status: ACTIVE | Noted: 2021-01-22

## 2021-01-22 PROBLEM — G47.33 OSA ON CPAP: Status: ACTIVE | Noted: 2021-01-22

## 2021-01-22 PROBLEM — Z99.89 OSA ON CPAP: Status: ACTIVE | Noted: 2021-01-22

## 2021-01-22 PROBLEM — R73.03 PREDIABETES: Status: ACTIVE | Noted: 2021-01-22

## 2021-01-22 LAB
ALBUMIN SERPL BCP-MCNC: 3.4 G/DL (ref 3.5–5)
ALP SERPL-CCNC: 71 U/L (ref 46–116)
ALT SERPL W P-5'-P-CCNC: 27 U/L (ref 12–78)
ANION GAP SERPL CALCULATED.3IONS-SCNC: 6 MMOL/L (ref 4–13)
APTT PPP: 40 SECONDS (ref 23–37)
AST SERPL W P-5'-P-CCNC: 17 U/L (ref 5–45)
BASOPHILS # BLD AUTO: 0.06 THOUSANDS/ΜL (ref 0–0.1)
BASOPHILS NFR BLD AUTO: 1 % (ref 0–1)
BILIRUB SERPL-MCNC: 0.38 MG/DL (ref 0.2–1)
BUN SERPL-MCNC: 24 MG/DL (ref 5–25)
CALCIUM ALBUM COR SERPL-MCNC: 9.3 MG/DL (ref 8.3–10.1)
CALCIUM SERPL-MCNC: 8.8 MG/DL (ref 8.3–10.1)
CHLORIDE SERPL-SCNC: 101 MMOL/L (ref 100–108)
CO2 SERPL-SCNC: 28 MMOL/L (ref 21–32)
CREAT SERPL-MCNC: 1.29 MG/DL (ref 0.6–1.3)
EOSINOPHIL # BLD AUTO: 0.22 THOUSAND/ΜL (ref 0–0.61)
EOSINOPHIL NFR BLD AUTO: 2 % (ref 0–6)
ERYTHROCYTE [DISTWIDTH] IN BLOOD BY AUTOMATED COUNT: 13.2 % (ref 11.6–15.1)
GFR SERPL CREATININE-BSD FRML MDRD: 56 ML/MIN/1.73SQ M
GLUCOSE SERPL-MCNC: 199 MG/DL (ref 65–140)
HCT VFR BLD AUTO: 46.6 % (ref 36.5–49.3)
HGB BLD-MCNC: 15.6 G/DL (ref 12–17)
IMM GRANULOCYTES # BLD AUTO: 0.05 THOUSAND/UL (ref 0–0.2)
IMM GRANULOCYTES NFR BLD AUTO: 1 % (ref 0–2)
INR PPP: 0.97 (ref 0.84–1.19)
LYMPHOCYTES # BLD AUTO: 2.73 THOUSANDS/ΜL (ref 0.6–4.47)
LYMPHOCYTES NFR BLD AUTO: 28 % (ref 14–44)
MCH RBC QN AUTO: 30.2 PG (ref 26.8–34.3)
MCHC RBC AUTO-ENTMCNC: 33.5 G/DL (ref 31.4–37.4)
MCV RBC AUTO: 90 FL (ref 82–98)
MONOCYTES # BLD AUTO: 1.18 THOUSAND/ΜL (ref 0.17–1.22)
MONOCYTES NFR BLD AUTO: 12 % (ref 4–12)
NEUTROPHILS # BLD AUTO: 5.43 THOUSANDS/ΜL (ref 1.85–7.62)
NEUTS SEG NFR BLD AUTO: 56 % (ref 43–75)
NRBC BLD AUTO-RTO: 0 /100 WBCS
PLATELET # BLD AUTO: 256 THOUSANDS/UL (ref 149–390)
PMV BLD AUTO: 9.3 FL (ref 8.9–12.7)
POTASSIUM SERPL-SCNC: 3.7 MMOL/L (ref 3.5–5.3)
PROT SERPL-MCNC: 7.3 G/DL (ref 6.4–8.2)
PROTHROMBIN TIME: 12.7 SECONDS (ref 11.6–14.5)
RBC # BLD AUTO: 5.16 MILLION/UL (ref 3.88–5.62)
SODIUM SERPL-SCNC: 135 MMOL/L (ref 136–145)
TROPONIN I SERPL-MCNC: 0.83 NG/ML
TROPONIN I SERPL-MCNC: 0.99 NG/ML
TSH SERPL DL<=0.05 MIU/L-ACNC: 2.78 UIU/ML (ref 0.36–3.74)
TSH SERPL DL<=0.05 MIU/L-ACNC: 2.83 UIU/ML (ref 0.36–3.74)
WBC # BLD AUTO: 9.67 THOUSAND/UL (ref 4.31–10.16)

## 2021-01-22 PROCEDURE — 99291 CRITICAL CARE FIRST HOUR: CPT | Performed by: EMERGENCY MEDICINE

## 2021-01-22 PROCEDURE — 85730 THROMBOPLASTIN TIME PARTIAL: CPT | Performed by: EMERGENCY MEDICINE

## 2021-01-22 PROCEDURE — 84484 ASSAY OF TROPONIN QUANT: CPT | Performed by: EMERGENCY MEDICINE

## 2021-01-22 PROCEDURE — 93005 ELECTROCARDIOGRAM TRACING: CPT

## 2021-01-22 PROCEDURE — 99285 EMERGENCY DEPT VISIT HI MDM: CPT

## 2021-01-22 PROCEDURE — 36415 COLL VENOUS BLD VENIPUNCTURE: CPT | Performed by: EMERGENCY MEDICINE

## 2021-01-22 PROCEDURE — 99291 CRITICAL CARE FIRST HOUR: CPT | Performed by: INTERNAL MEDICINE

## 2021-01-22 PROCEDURE — 96376 TX/PRO/DX INJ SAME DRUG ADON: CPT

## 2021-01-22 PROCEDURE — 84443 ASSAY THYROID STIM HORMONE: CPT | Performed by: INTERNAL MEDICINE

## 2021-01-22 PROCEDURE — 71045 X-RAY EXAM CHEST 1 VIEW: CPT

## 2021-01-22 PROCEDURE — 85610 PROTHROMBIN TIME: CPT | Performed by: EMERGENCY MEDICINE

## 2021-01-22 PROCEDURE — 96375 TX/PRO/DX INJ NEW DRUG ADDON: CPT

## 2021-01-22 PROCEDURE — 84443 ASSAY THYROID STIM HORMONE: CPT | Performed by: EMERGENCY MEDICINE

## 2021-01-22 PROCEDURE — 84484 ASSAY OF TROPONIN QUANT: CPT | Performed by: INTERNAL MEDICINE

## 2021-01-22 PROCEDURE — 96374 THER/PROPH/DIAG INJ IV PUSH: CPT

## 2021-01-22 PROCEDURE — 85025 COMPLETE CBC W/AUTO DIFF WBC: CPT | Performed by: EMERGENCY MEDICINE

## 2021-01-22 PROCEDURE — 80053 COMPREHEN METABOLIC PANEL: CPT | Performed by: EMERGENCY MEDICINE

## 2021-01-22 PROCEDURE — 96365 THER/PROPH/DIAG IV INF INIT: CPT

## 2021-01-22 RX ORDER — DILTIAZEM HYDROCHLORIDE 5 MG/ML
10 INJECTION INTRAVENOUS ONCE
Status: COMPLETED | OUTPATIENT
Start: 2021-01-22 | End: 2021-01-22

## 2021-01-22 RX ORDER — HEPARIN SODIUM 1000 [USP'U]/ML
4000 INJECTION, SOLUTION INTRAVENOUS; SUBCUTANEOUS ONCE
Status: COMPLETED | OUTPATIENT
Start: 2021-01-22 | End: 2021-01-22

## 2021-01-22 RX ORDER — HEPARIN SODIUM 10000 [USP'U]/100ML
3-20 INJECTION, SOLUTION INTRAVENOUS
Status: DISCONTINUED | OUTPATIENT
Start: 2021-01-22 | End: 2021-01-23

## 2021-01-22 RX ORDER — ACETAMINOPHEN 325 MG/1
650 TABLET ORAL EVERY 6 HOURS PRN
Status: DISCONTINUED | OUTPATIENT
Start: 2021-01-22 | End: 2021-01-25 | Stop reason: HOSPADM

## 2021-01-22 RX ORDER — ONDANSETRON 2 MG/ML
4 INJECTION INTRAMUSCULAR; INTRAVENOUS EVERY 6 HOURS PRN
Status: DISCONTINUED | OUTPATIENT
Start: 2021-01-22 | End: 2021-01-25 | Stop reason: HOSPADM

## 2021-01-22 RX ORDER — MAGNESIUM HYDROXIDE/ALUMINUM HYDROXICE/SIMETHICONE 120; 1200; 1200 MG/30ML; MG/30ML; MG/30ML
30 SUSPENSION ORAL EVERY 6 HOURS PRN
Status: DISCONTINUED | OUTPATIENT
Start: 2021-01-22 | End: 2021-01-25 | Stop reason: HOSPADM

## 2021-01-22 RX ORDER — ASPIRIN 325 MG
325 TABLET ORAL ONCE
Status: COMPLETED | OUTPATIENT
Start: 2021-01-22 | End: 2021-01-22

## 2021-01-22 RX ORDER — HEPARIN SODIUM 1000 [USP'U]/ML
4000 INJECTION, SOLUTION INTRAVENOUS; SUBCUTANEOUS
Status: DISCONTINUED | OUTPATIENT
Start: 2021-01-22 | End: 2021-01-23

## 2021-01-22 RX ORDER — CALCIUM CARBONATE 200(500)MG
1000 TABLET,CHEWABLE ORAL DAILY PRN
Status: DISCONTINUED | OUTPATIENT
Start: 2021-01-22 | End: 2021-01-25 | Stop reason: HOSPADM

## 2021-01-22 RX ORDER — DOCUSATE SODIUM 100 MG/1
100 CAPSULE, LIQUID FILLED ORAL 2 TIMES DAILY
Status: DISCONTINUED | OUTPATIENT
Start: 2021-01-22 | End: 2021-01-25 | Stop reason: HOSPADM

## 2021-01-22 RX ORDER — SIMETHICONE 80 MG
80 TABLET,CHEWABLE ORAL 4 TIMES DAILY PRN
Status: DISCONTINUED | OUTPATIENT
Start: 2021-01-22 | End: 2021-01-25 | Stop reason: HOSPADM

## 2021-01-22 RX ORDER — HEPARIN SODIUM 1000 [USP'U]/ML
2000 INJECTION, SOLUTION INTRAVENOUS; SUBCUTANEOUS
Status: DISCONTINUED | OUTPATIENT
Start: 2021-01-22 | End: 2021-01-23

## 2021-01-22 RX ORDER — ALLOPURINOL 100 MG/1
300 TABLET ORAL
Status: DISCONTINUED | OUTPATIENT
Start: 2021-01-22 | End: 2021-01-25 | Stop reason: HOSPADM

## 2021-01-22 RX ADMIN — ASPIRIN 325 MG ORAL TABLET 325 MG: 325 PILL ORAL at 20:10

## 2021-01-22 RX ADMIN — DILTIAZEM HYDROCHLORIDE 10 MG: 5 INJECTION INTRAVENOUS at 19:40

## 2021-01-22 RX ADMIN — HEPARIN SODIUM 11.1 UNITS/KG/HR: 10000 INJECTION, SOLUTION INTRAVENOUS at 20:12

## 2021-01-22 RX ADMIN — HEPARIN SODIUM 4000 UNITS: 1000 INJECTION INTRAVENOUS; SUBCUTANEOUS at 20:10

## 2021-01-22 RX ADMIN — ALLOPURINOL 300 MG: 100 TABLET ORAL at 22:57

## 2021-01-22 RX ADMIN — DILTIAZEM HYDROCHLORIDE 2.5 MG/HR: 5 INJECTION INTRAVENOUS at 20:07

## 2021-01-22 RX ADMIN — DILTIAZEM HYDROCHLORIDE 2.5 MG/HR: 5 INJECTION INTRAVENOUS at 19:50

## 2021-01-23 LAB
ANION GAP SERPL CALCULATED.3IONS-SCNC: 4 MMOL/L (ref 4–13)
APTT PPP: 69 SECONDS (ref 23–37)
APTT PPP: 78 SECONDS (ref 23–37)
ATRIAL RATE: 167 BPM
ATRIAL RATE: 340 BPM
ATRIAL RATE: 357 BPM
ATRIAL RATE: 72 BPM
ATRIAL RATE: 74 BPM
BUN SERPL-MCNC: 22 MG/DL (ref 5–25)
CALCIUM SERPL-MCNC: 8.6 MG/DL (ref 8.3–10.1)
CHLORIDE SERPL-SCNC: 105 MMOL/L (ref 100–108)
CHOLEST SERPL-MCNC: 212 MG/DL (ref 50–200)
CO2 SERPL-SCNC: 28 MMOL/L (ref 21–32)
CREAT SERPL-MCNC: 1.06 MG/DL (ref 0.6–1.3)
ERYTHROCYTE [DISTWIDTH] IN BLOOD BY AUTOMATED COUNT: 13.3 % (ref 11.6–15.1)
FLUAV RNA RESP QL NAA+PROBE: NEGATIVE
FLUBV RNA RESP QL NAA+PROBE: NEGATIVE
GFR SERPL CREATININE-BSD FRML MDRD: 71 ML/MIN/1.73SQ M
GLUCOSE SERPL-MCNC: 104 MG/DL (ref 65–140)
GLUCOSE SERPL-MCNC: 114 MG/DL (ref 65–140)
GLUCOSE SERPL-MCNC: 119 MG/DL (ref 65–140)
GLUCOSE SERPL-MCNC: 125 MG/DL (ref 65–140)
GLUCOSE SERPL-MCNC: 92 MG/DL (ref 65–140)
HCT VFR BLD AUTO: 41.2 % (ref 36.5–49.3)
HDLC SERPL-MCNC: 46 MG/DL
HGB BLD-MCNC: 13.7 G/DL (ref 12–17)
LDLC SERPL CALC-MCNC: 122 MG/DL (ref 0–100)
MAGNESIUM SERPL-MCNC: 2.1 MG/DL (ref 1.6–2.6)
MCH RBC QN AUTO: 29.9 PG (ref 26.8–34.3)
MCHC RBC AUTO-ENTMCNC: 33.3 G/DL (ref 31.4–37.4)
MCV RBC AUTO: 90 FL (ref 82–98)
NONHDLC SERPL-MCNC: 166 MG/DL
P AXIS: 253 DEGREES
P AXIS: 260 DEGREES
P AXIS: 37 DEGREES
P AXIS: 42 DEGREES
PLATELET # BLD AUTO: 218 THOUSANDS/UL (ref 149–390)
PMV BLD AUTO: 9.4 FL (ref 8.9–12.7)
POTASSIUM SERPL-SCNC: 3.4 MMOL/L (ref 3.5–5.3)
PR INTERVAL: 162 MS
PR INTERVAL: 162 MS
QRS AXIS: 18 DEGREES
QRS AXIS: 21 DEGREES
QRS AXIS: 27 DEGREES
QRS AXIS: 28 DEGREES
QRS AXIS: 30 DEGREES
QRSD INTERVAL: 136 MS
QRSD INTERVAL: 76 MS
QRSD INTERVAL: 78 MS
QRSD INTERVAL: 86 MS
QRSD INTERVAL: 92 MS
QT INTERVAL: 266 MS
QT INTERVAL: 334 MS
QT INTERVAL: 340 MS
QT INTERVAL: 372 MS
QT INTERVAL: 382 MS
QTC INTERVAL: 409 MS
QTC INTERVAL: 412 MS
QTC INTERVAL: 418 MS
QTC INTERVAL: 443 MS
QTC INTERVAL: 485 MS
RBC # BLD AUTO: 4.58 MILLION/UL (ref 3.88–5.62)
RSV RNA RESP QL NAA+PROBE: NEGATIVE
SARS-COV-2 RNA RESP QL NAA+PROBE: NEGATIVE
SODIUM SERPL-SCNC: 137 MMOL/L (ref 136–145)
T WAVE AXIS: -42 DEGREES
T WAVE AXIS: 191 DEGREES
T WAVE AXIS: 33 DEGREES
T WAVE AXIS: 47 DEGREES
T WAVE AXIS: 55 DEGREES
TRIGL SERPL-MCNC: 219 MG/DL
TROPONIN I SERPL-MCNC: 0.97 NG/ML
VENTRICULAR RATE: 127 BPM
VENTRICULAR RATE: 167 BPM
VENTRICULAR RATE: 72 BPM
VENTRICULAR RATE: 74 BPM
VENTRICULAR RATE: 87 BPM
WBC # BLD AUTO: 8.13 THOUSAND/UL (ref 4.31–10.16)

## 2021-01-23 PROCEDURE — 82948 REAGENT STRIP/BLOOD GLUCOSE: CPT

## 2021-01-23 PROCEDURE — 83735 ASSAY OF MAGNESIUM: CPT | Performed by: FAMILY MEDICINE

## 2021-01-23 PROCEDURE — 84484 ASSAY OF TROPONIN QUANT: CPT | Performed by: INTERNAL MEDICINE

## 2021-01-23 PROCEDURE — 93005 ELECTROCARDIOGRAM TRACING: CPT

## 2021-01-23 PROCEDURE — 93010 ELECTROCARDIOGRAM REPORT: CPT

## 2021-01-23 PROCEDURE — 0241U HB NFCT DS VIR RESP RNA 4 TRGT: CPT | Performed by: INTERNAL MEDICINE

## 2021-01-23 PROCEDURE — 97163 PT EVAL HIGH COMPLEX 45 MIN: CPT

## 2021-01-23 PROCEDURE — 85027 COMPLETE CBC AUTOMATED: CPT | Performed by: INTERNAL MEDICINE

## 2021-01-23 PROCEDURE — 80061 LIPID PANEL: CPT | Performed by: INTERNAL MEDICINE

## 2021-01-23 PROCEDURE — 80048 BASIC METABOLIC PNL TOTAL CA: CPT | Performed by: INTERNAL MEDICINE

## 2021-01-23 PROCEDURE — 99223 1ST HOSP IP/OBS HIGH 75: CPT | Performed by: INTERNAL MEDICINE

## 2021-01-23 PROCEDURE — 85730 THROMBOPLASTIN TIME PARTIAL: CPT | Performed by: INTERNAL MEDICINE

## 2021-01-23 PROCEDURE — 99232 SBSQ HOSP IP/OBS MODERATE 35: CPT | Performed by: FAMILY MEDICINE

## 2021-01-23 RX ORDER — METOPROLOL SUCCINATE 25 MG/1
25 TABLET, EXTENDED RELEASE ORAL DAILY
Status: DISCONTINUED | OUTPATIENT
Start: 2021-01-23 | End: 2021-01-24

## 2021-01-23 RX ORDER — POTASSIUM CHLORIDE 20 MEQ/1
40 TABLET, EXTENDED RELEASE ORAL ONCE
Status: COMPLETED | OUTPATIENT
Start: 2021-01-23 | End: 2021-01-23

## 2021-01-23 RX ADMIN — RIVAROXABAN 20 MG: 20 TABLET, FILM COATED ORAL at 12:43

## 2021-01-23 RX ADMIN — POTASSIUM CHLORIDE 40 MEQ: 1500 TABLET, EXTENDED RELEASE ORAL at 12:43

## 2021-01-23 RX ADMIN — ALLOPURINOL 300 MG: 100 TABLET ORAL at 21:17

## 2021-01-23 RX ADMIN — METOPROLOL SUCCINATE 25 MG: 25 TABLET, EXTENDED RELEASE ORAL at 12:43

## 2021-01-23 RX ADMIN — HYDROCHLOROTHIAZIDE: 12.5 TABLET ORAL at 09:04

## 2021-01-23 RX ADMIN — ACETAMINOPHEN 650 MG: 325 TABLET ORAL at 12:48

## 2021-01-23 NOTE — PHYSICAL THERAPY NOTE
Physical Therapy Evaluation:    2 forms of pt ID verified:name,birthdate and pt ID cathleen    Patient's Name: Deanna Painting    Admitting Diagnosis  Atrial flutter ([address] ) [I48 92]  Chest pain [R07 9]  Elevated troponin [R77 8]    Problem List  Patient Active Problem List   Diagnosis    Rotator cuff syndrome of right shoulder    Exogenous obesity    Combined hyperlipidemia    Benign essential hypertension    Chronic thoracic spine pain    History of gout    Chronic lumbar radiculopathy    Cervical radiculitis    Plantar fasciitis of right foot    Subclinical hypothyroidism    Elevated blood sugar    Eczema    Fatigue    MANDY (obstructive sleep apnea)    S/P cervical spinal fusion    Achilles rupture, left    Hand dermatitis    Dupuytren contracture    Seborrheic keratoses, inflamed    Obesity (BMI 35 0-39 9 without comorbidity)    Elevated PSA    Paresthesia of upper and lower extremities of both sides    Prediabetes    MANDY on CPAP    Atrial flutter ([address] )       Past Medical History  Past Medical History:   Diagnosis Date    Acute gouty arthritis     last assessed 6/1/13     Anxiety     last assessed 7/11/14     Chronic thoracic spine pain     last assessed 5/13/16     Dysfunction of both eustachian tubes     last assessed 8/16/13    Erectile dysfunction of non-organic origin     last assessed 10/8/13     Family history reviewed with no changes     medical history     Gout     Hypertension     Sebaceous cyst     last assessed 12/16/13     Skin lesion     last assessed 1/2/14        Past Surgical History  Past Surgical History:   Procedure Laterality Date    APPENDECTOMY      12years old    BOWEL RESECTION      CATARACT EXTRACTION      CERVICAL FUSION N/A 8/27/2019    Procedure: Anterior cervical discectomy w fusion C5-6, with allograft and neuromonitoring;  Surgeon: Daisy Mccarthy MD;  [facility]: [facility];  Service: Orthopedics    COLONOSCOPY      MOUTH SURGERY      WRIST SURGERY          01/23/21 0900   PT Last Visit   PT Visit Date 01/23/21   Note Type   Note type Evaluation   Pain Assessment   Pain Assessment Tool Pain Assessment not indicated - pt denies pain   Pain Score No Pain   Home Living   Type of [address] Two level   Home Equipment Other (Comment)  (no DME)   Additional Comments pt lives with wife in 2 ,reports being completely (I) PTA,no use of DME   Prior Function   Level of [place] Independent with ADLs and functional mobility   Lives With Spouse  (available to A as needed upon DC)   Receives Help From Family   ADL Assistance Independent   IADLs Independent   Falls in the last 6 months 0   Vocational Retired   Restrictions/Precautions   Other Precautions Impulsive;Multiple lines;Telemetry; Fall Risk   General   Additional Pertinent History Aflutter,elevated troponin,CP,suspect MI type 2   Family/Caregiver Present No   Cognition   Overall Cognitive Status WFL   Arousal/Participation Alert   Orientation Level Oriented X4   Following Commands Follows one step commands without difficulty   RLE Assessment   RLE Assessment   (4/5 grossly throughout)   LLE Assessment   LLE Assessment   (4/5 grossly throughout)   Coordination   Movements are Fluid and Coordinated 0   Coordination and Movement Description ataxic and unsteady especially during turns,dec BLE step length   Sensation WFL   Light Touch   RLE Light Touch Grossly intact   LLE Light Touch Grossly intact   Bed Mobility   Rolling R 6  Modified independent   Rolling L 6  Modified independent   Supine to Sit 5  Supervision   Sit to Supine 5  Supervision   Transfers   Sit to Stand 4  Minimal assistance   Additional items Assist x 1;Bedrails; Impulsive;Verbal cues   Stand to Sit 4  Minimal assistance   Additional items Assist x 1;Bedrails; Impulsive;Verbal cues   Ambulation/Elevation   Gait pattern Wide SOL; Forward Flexion; Inconsistent martha; Foward flexed; Ataxia   Gait Assistance 5  Supervision Additional items Assist x 1;Verbal cues   Assistive Device None; Other (Comment)  (occasional use of IV pole)   Distance 100 feet with occasional use of IV pole and no DME   Stair Management Assistance Not tested  (pt declined stair training at this time)   Balance   Static Sitting Good   Dynamic Sitting Poor +   Static Standing Fair   Dynamic Standing Fair   Ambulatory Fair   Endurance Deficit   Endurance Deficit Yes   Endurance Deficit Description pt reports SOB during and following mobility,slow martha,inc HR during mobility to 130's   Activity Tolerance   Activity Tolerance Patient limited by fatigue  (fair)   Nurse Made Aware yes   Assessment   Prognosis Fair   Problem List Decreased strength;Decreased endurance; Impaired balance;Decreased mobility;Obesity; Decreased skin integrity; Decreased safety awareness   Assessment pt is a 79 yo male admitted to [facility] 2* intermittent CP,Aflutter,inc trponin and enzymes and suspect MI type 2  Pt lives with wife in 2 SH,no use of DME PTA,reports being completely (I) PTA,active and retired,likes to spend time with grandchildren  Pt currently is not at functional mobility baseline,ataxic and unsteady gait pattern,impulsive at times,reports of SOB during and following mobility,occasional use of IV pole,IV medication and multiple lines  Pt demonstrates minimal deficits during functional mobility and gait including dec endurance,dec balance,dec BLE strength,ataxic and unsteady gait pattern and needs minAx1 for transfers,S for gait and mod I for BM  Pt declined stair training during PT eval  Pt would cont to benefit from skilled inpt PT services to maximize functional independence,dec caregiver burden and to A with acheiving pts [facility]     Barriers to Discharge Inaccessible home environment  ([address] [facility])   Goals   Patient Goals to get my breath back and to be less tired   STG Expiration Date 02/02/21   Short Term Goal #1 in 7-10 days:(1) Pt will be able to ambulate greater than 150 feet with use of appropriate DME as needed on various surfaces needing mod I level of A in order to A pt to return to PLOF, (2) activity tolerance:45 mins/45mins, (3) pt will be able to perform sit to stand transfers needing mod I level of A to and from various surfaces consistently in order to return to PLOF, (4) pt will be able to perform BM completely (I) to A pt to return to PLOF, (5) (I) with BLE therapeutic ex HEP in various positions to A pt to inc balance,strength,mobility,endurance and to A to dec pain, (6) inc balance 1/2 grade in order to dec fall risk, (7) pt will be able to go up and down 1 full flight of steps needing S to mod (I) level of A in order to navigate 2 SH as able and as needed prior to D/C, (8) cont to provide pt and pt family education for safe D/C planning, (9) inc BLE strength 1/2 to 1 full grade in order to A pt to inc balance,strength,mobility,endurance and to A to dec pain   PT Treatment Day 0   Plan   Treatment/Interventions Functional transfer training;LE strengthening/ROM; Elevations; Therapeutic exercise; Endurance training;Patient/family training;Equipment eval/education; Bed mobility;Gait training;OT;Spoke to nursing   PT Frequency Other (Comment)  (3-5x/wk)   Recommendation   PT Discharge Recommendation Return to previous environment with social support;Return to previous environment with no needs           Gail Alonzo, PT, DPT@

## 2021-01-23 NOTE — PLAN OF CARE
Problem: Potential for Falls  Goal: Patient will remain free of falls  Description: INTERVENTIONS:  - Assess patient frequently for physical needs  -  Identify cognitive and physical deficits and behaviors that affect risk of falls    -  [place] fall precautions as indicated by assessment   - Educate patient/family on patient safety including physical limitations  - Instruct patient to call for assistance with activity based on assessment  - Modify environment to reduce risk of injury  - Consider OT/PT consult to assist with strengthening/mobility  Outcome: Progressing     Problem: PAIN - ADULT  Goal: Verbalizes/displays adequate comfort level or baseline comfort level  Description: Interventions:  - Encourage patient to monitor pain and request assistance  - Assess pain using appropriate pain scale  - Administer analgesics based on type and severity of pain and evaluate response  - Implement non-pharmacological measures as appropriate and evaluate response  - Consider cultural and social influences on pain and pain management  - Notify physician/advanced practitioner if interventions unsuccessful or patient reports new pain  Outcome: Progressing     Problem: INFECTION - ADULT  Goal: Absence or prevention of progression during hospitalization  Description: INTERVENTIONS:  - Assess and monitor for signs and symptoms of infection  - Monitor lab/diagnostic results  - Monitor all insertion sites, i e  indwelling lines, tubes, and drains  - Monitor endotracheal if appropriate and nasal secretions for changes in amount and color  - [place] appropriate cooling/warming therapies per order  - Administer medications as ordered  - Instruct and encourage patient and family to use good hand hygiene technique  - Identify and instruct in appropriate isolation precautions for identified infection/condition  Outcome: Progressing  Goal: Absence of fever/infection during neutropenic period  Description: INTERVENTIONS:  - Monitor WBC    Outcome: Progressing     Problem: SAFETY ADULT  Goal: Patient will remain free of falls  Description: INTERVENTIONS:  - Assess patient frequently for physical needs  -  Identify cognitive and physical deficits and behaviors that affect risk of falls    -  [place] fall precautions as indicated by assessment   - Educate patient/family on patient safety including physical limitations  - Instruct patient to call for assistance with activity based on assessment  - Modify environment to reduce risk of injury  - Consider OT/PT consult to assist with strengthening/mobility  Outcome: Progressing  Goal: Maintain or return to baseline ADL function  Description: INTERVENTIONS:  -  Assess patient's ability to carry out ADLs; assess patient's baseline for ADL function and identify physical deficits which impact ability to perform ADLs (bathing, care of mouth/teeth, toileting, grooming, dressing, etc )  - Assess/evaluate cause of self-care deficits   - Assess range of motion  - Assess patient's mobility; develop plan if impaired  - Assess patient's need for assistive devices and provide as appropriate  - Encourage maximum independence but intervene and supervise when necessary  - Involve family in performance of ADLs  - Assess for home care needs following discharge   - Consider OT consult to assist with ADL evaluation and planning for discharge  - Provide patient education as appropriate  Outcome: Progressing  Goal: Maintain or return mobility status to optimal level  Description: INTERVENTIONS:  - Assess patient's baseline mobility status (ambulation, transfers, stairs, etc )    - Identify cognitive and physical deficits and behaviors that affect mobility  - Identify mobility aids required to assist with transfers and/or ambulation (gait belt, sit-to-stand, lift, walker, cane, etc )  - [place] fall precautions as indicated by assessment  - Record patient progress and toleration of activity level on Mobility SBAR; progress patient to next Phase/Stage  - Instruct patient to call for assistance with activity based on assessment  - Consider rehabilitation consult to assist with strengthening/weightbearing, etc   Outcome: Progressing     Problem: DISCHARGE PLANNING  Goal: Discharge to home or other facility with appropriate resources  Description: INTERVENTIONS:  - Identify barriers to discharge w/patient and caregiver  - Arrange for needed discharge resources and transportation as appropriate  - Identify discharge learning needs (meds, wound care, etc )  - Arrange for interpretive services to assist at discharge as needed  - Refer to Case Management Department for coordinating discharge planning if the patient needs post-hospital services based on physician/advanced practitioner order or complex needs related to functional status, cognitive ability, or social support system  Outcome: Progressing     Problem: Knowledge Deficit  Goal: Patient/family/caregiver demonstrates understanding of disease process, treatment plan, medications, and discharge instructions  Description: Complete learning assessment and assess knowledge base    Interventions:  - Provide teaching at level of understanding  - Provide teaching via preferred learning methods  Outcome: Progressing     Problem: GENITOURINARY - ADULT  Goal: Maintains or returns to baseline urinary function  Description: INTERVENTIONS:  - Assess urinary function  - Encourage oral fluids to ensure adequate hydration if ordered  - Administer IV fluids as ordered to ensure adequate hydration  - Administer ordered medications as needed  - Offer frequent toileting  - Follow urinary retention protocol if ordered  Outcome: Progressing     Problem: METABOLIC, FLUID AND ELECTROLYTES - ADULT  Goal: Fluid balance maintained  Description: INTERVENTIONS:  - Monitor labs   - Monitor I/O and WT  - Instruct patient on fluid and nutrition as appropriate  - Assess for signs & symptoms of volume excess or deficit  Outcome: Progressing     Problem: SKIN/TISSUE INTEGRITY - ADULT  Goal: Skin integrity remains intact  Description: INTERVENTIONS  - Identify patients at risk for skin breakdown  - Assess and monitor skin integrity  - Assess and monitor nutrition and hydration status  - Monitor labs (i e  albumin)  - Assess for incontinence   - Turn and reposition patient  - Assist with mobility/ambulation  - Relieve pressure over bony prominences  - Avoid friction and shearing  - Provide appropriate hygiene as needed including keeping skin clean and dry  - Evaluate need for skin moisturizer/barrier cream  - Collaborate with interdisciplinary team (i e  Nutrition, Rehabilitation, etc )   - Patient/family teaching  Outcome: Progressing     Problem: HEMATOLOGIC - ADULT  Goal: Maintains hematologic stability  Description: INTERVENTIONS  - Assess for signs and symptoms of bleeding or hemorrhage  - Monitor labs  - Administer supportive blood products/factors as ordered and appropriate  Outcome: Progressing

## 2021-01-23 NOTE — ED PROVIDER NOTES
History  Chief Complaint   Patient presents with    Chest Pain     Began a "couple days ago" with mid-sternal chest pain  Feels "choking" arounf the neck  +HA +dizzy Pt reports elevated HR recently "average 175 " Pt reports episode of diaphoresis  73-year-old female with a past medical history of hypertension who presents for chest pain  He describes that he has had chest pain for several days at the middle of his chest, he has had shortness of breath, feeling of lightheadedness  Does not describe pleuritic chest pain  No new leg pain or swelling  No new cough under loss of taste or smell, no fevers  Patient reports that in the past he has had this occur intermittently, however this is the worst it has ever been and accompanied by chest pain  Describes that yesterday he felt nauseous, was clammy, and sweating  Reports that today he is overall better, but came in for his high heart rate that he measured at home as high as 160-170  ROS otherwise negative  Prior to Admission Medications   Prescriptions Last Dose Informant Patient Reported?  Taking?   allopurinol (ZYLOPRIM) 300 mg tablet 1/21/2021 at Unknown time Self No Yes   Sig: TAKE 1 TABLET BY MOUTH DAILY AT BEDTIME   valsartan-hydrochlorothiazide (DIOVAN-HCT) 160-12 5 MG per tablet 1/21/2021 at Unknown time Self No Yes   Sig: TAKE 1 TABLET BY MOUTH EVERY DAY      Facility-Administered Medications: None       Past Medical History:   Diagnosis Date    Acute gouty arthritis     last assessed 6/1/13     Anxiety     last assessed 7/11/14     Chronic thoracic spine pain     last assessed 5/13/16     Dysfunction of both eustachian tubes     last assessed 8/16/13    Erectile dysfunction of non-organic origin     last assessed 10/8/13     Family history reviewed with no changes     medical history     Gout     Hypertension     Sebaceous cyst     last assessed 12/16/13     Skin lesion     last assessed 1/2/14        Past Surgical History: Procedure Laterality Date    APPENDECTOMY      12years old    BOWEL RESECTION      CATARACT EXTRACTION      CERVICAL FUSION N/A 2019    Procedure: Anterior cervical discectomy w fusion C5-6, with allograft and neuromonitoring;  Surgeon: Franky Bloch, MD;  Location: [facility];  Service: Orthopedics    COLONOSCOPY      MOUTH SURGERY      WRIST SURGERY         Family History   Problem Relation Age of Onset    Diabetes Sister     Diabetes Family     Hypertension Family     No Known Problems Mother     No Known Problems Father      I have reviewed and agree with the history as documented  E-Cigarette/Vaping    E-Cigarette Use Never User      E-Cigarette/Vaping Substances    Nicotine No     THC No     CBD No     Flavoring No     Other No     Unknown No      Social History     Tobacco Use    Smoking status: Former Smoker     Packs/day: 0 50     Years: 20 00     Pack years: 10 00     Quit date:      Years since quittin 0    Smokeless tobacco: Never Used   Substance Use Topics    Alcohol use: Yes     Frequency: Monthly or less     Drinks per session: 1 or 2     Binge frequency: Never     Comment: social    Drug use: No        Review of Systems   Constitutional: Negative for chills, diaphoresis, fatigue and fever  HENT: Negative for congestion and sore throat  Eyes: Negative for visual disturbance  Respiratory: Positive for shortness of breath  Negative for cough and chest tightness  Cardiovascular: Positive for chest pain  Negative for palpitations and leg swelling  Gastrointestinal: Positive for nausea  Negative for abdominal distention, abdominal pain, constipation, diarrhea and vomiting  Genitourinary: Negative for difficulty urinating and dysuria  Musculoskeletal: Negative for arthralgias and myalgias  Skin: Negative for rash  Neurological: Positive for light-headedness  Negative for dizziness, weakness, numbness and headaches     Psychiatric/Behavioral: Negative for agitation, behavioral problems and confusion  The patient is not nervous/anxious  All other systems reviewed and are negative  Physical Exam  ED Triage Vitals   Temperature Pulse Respirations Blood Pressure SpO2   01/22/21 1846 01/22/21 1846 01/22/21 1846 01/22/21 1846 01/22/21 1846   98 7 °F (37 1 °C) (!) 163 22 (!) 173/98 97 %      Temp Source Heart Rate Source Patient Position - Orthostatic VS BP Location FiO2 (%)   01/22/21 1846 01/22/21 1856 01/22/21 1846 01/22/21 1846 --   Oral Monitor Lying Left arm       Pain Score       01/22/21 1846       No Pain             Orthostatic Vital Signs  Vitals:    01/22/21 1945 01/22/21 2016 01/22/21 2045 01/22/21 2102   BP: 135/84 140/77 130/77 128/82   Pulse: 88 84 82 73   Patient Position - Orthostatic VS: Lying Lying Lying Sitting       Physical Exam  Constitutional:       Appearance: He is well-developed  HENT:      Head: Normocephalic and atraumatic  Eyes:      Pupils: Pupils are equal, round, and reactive to light  Cardiovascular:      Rate and Rhythm: Regular rhythm  Tachycardia present  Heart sounds: Normal heart sounds  No murmur  Pulmonary:      Effort: Pulmonary effort is normal  No respiratory distress  Breath sounds: Normal breath sounds  No decreased breath sounds, wheezing, rhonchi or rales  Abdominal:      General: Bowel sounds are normal  There is no distension  Palpations: Abdomen is soft  Tenderness: There is no abdominal tenderness  Musculoskeletal:         General: No deformity  Right lower leg: He exhibits no tenderness  No edema  Left lower leg: He exhibits no tenderness  No edema  Skin:     General: Skin is warm  Findings: No rash  Neurological:      Mental Status: He is alert and oriented to person, place, and time  Psychiatric:         Behavior: Behavior normal          Thought Content:  Thought content normal          Judgment: Judgment normal          ED Medications  Medications   diltiazem (CARDIZEM) 125 mg in sodium chloride 0 9 % 125 mL infusion (2 5 mg/hr Intravenous New Bag 1/22/21 2007)   heparin (porcine) 25,000 units in 0 45% NaCl 250 mL infusion (premix) (11 1 Units/kg/hr × 90 kg (Order-Specific) Intravenous New Bag 1/22/21 2012)   heparin (porcine) injection 4,000 Units (has no administration in time range)   heparin (porcine) injection 2,000 Units (has no administration in time range)   allopurinol (ZYLOPRIM) tablet 300 mg (has no administration in time range)   acetaminophen (TYLENOL) tablet 650 mg (has no administration in time range)   docusate sodium (COLACE) capsule 100 mg (has no administration in time range)   ondansetron (ZOFRAN) injection 4 mg (has no administration in time range)   aluminum-magnesium hydroxide-simethicone (MYLANTA) oral suspension 30 mL (has no administration in time range)   calcium carbonate (TUMS) chewable tablet 1,000 mg (has no administration in time range)   simethicone (MYLICON) chewable tablet 80 mg (has no administration in time range)   insulin lispro (HumaLOG) 100 units/mL subcutaneous injection 1-5 Units (has no administration in time range)   losartan potassium-hydrochlorothiazide (HYZAAR 100/12  5) combo dose (has no administration in time range)   diltiazem (CARDIZEM) 125 mg in sodium chloride 0 9 % 125 mL infusion (0 mg/hr Intravenous Stopped 1/22/21 2006)   diltiazem (CARDIZEM) injection 10 mg (10 mg Intravenous Given 1/22/21 1940)   aspirin tablet 325 mg (325 mg Oral Given 1/22/21 2010)   heparin (porcine) injection 4,000 Units (4,000 Units Intravenous Given 1/22/21 2010)       Diagnostic Studies  Results Reviewed     Procedure Component Value Units Date/Time    TSH, 3rd generation with Free T4 reflex [[id number]]  (Normal) Collected: 01/22/21 1858    Lab Status: Final result Specimen: Blood from Arm, Left Updated: 01/22/21 2004     TSH 3RD GENERATON 2 832 uIU/mL     Narrative:      Patients undergoing fluorescein dye angiography may retain small amounts of fluorescein in the body for 48-72 hours post procedure  Samples containing fluorescein can produce falsely depressed TSH values  If the patient had this procedure,a specimen should be resubmitted post fluorescein clearance        Protime-INR [[id number]]  (Normal) Collected: 01/22/21 1858    Lab Status: Final result Specimen: Blood from Arm, Left Updated: 01/22/21 2001     Protime 12 7 seconds      INR 0 97    APTT [[id number]]  (Abnormal) Collected: 01/22/21 1858    Lab Status: Final result Specimen: Blood from Arm, Left Updated: 01/22/21 2001     PTT 40 seconds     APTT six (6) hours after Heparin bolus/drip initiation or dosing change [[id number]]     Lab Status: No result Specimen: Blood     Troponin I [[id number]]  (Abnormal) Collected: 01/22/21 1858    Lab Status: Final result Specimen: Blood from Arm, Left Updated: 01/22/21 1943     Troponin I 0 83 ng/mL     Comprehensive metabolic panel [[id number]]  (Abnormal) Collected: 01/22/21 1858    Lab Status: Final result Specimen: Blood from Arm, Left Updated: 01/22/21 1916     Sodium 135 mmol/L      Potassium 3 7 mmol/L      Chloride 101 mmol/L      CO2 28 mmol/L      ANION GAP 6 mmol/L      BUN 24 mg/dL      Creatinine 1 29 mg/dL      Glucose 199 mg/dL      Calcium 8 8 mg/dL      Corrected Calcium 9 3 mg/dL      AST 17 U/L      ALT 27 U/L      Alkaline Phosphatase 71 U/L      Total Protein 7 3 g/dL      Albumin 3 4 g/dL      Total Bilirubin 0 38 mg/dL      eGFR 56 ml/min/1 73sq m     Laz:      Lorin guidelines for Chronic Kidney Disease (CKD):     Stage 1 with normal or high GFR (GFR > 90 mL/min/1 73 square meters)    Stage 2 Mild CKD (GFR = 60-89 mL/min/1 73 square meters)    Stage 3A Moderate CKD (GFR = 45-59 mL/min/1 73 square meters)    Stage 3B Moderate CKD (GFR = 30-44 mL/min/1 73 square meters)    Stage 4 Severe CKD (GFR = 15-29 mL/min/1 73 square meters)    Stage 5 End Stage CKD (GFR <15 mL/min/1 73 square meters)  Note: GFR calculation is accurate only with a steady state creatinine    CBC and differential [[id number]] Collected: 01/22/21 1858    Lab Status: Final result Specimen: Blood from Arm, Left Updated: 01/22/21 1902     WBC 9 67 Thousand/uL      RBC 5 16 Million/uL      Hemoglobin 15 6 g/dL      Hematocrit 46 6 %      MCV 90 fL      MCH 30 2 pg      MCHC 33 5 g/dL      RDW 13 2 %      MPV 9 3 fL      Platelets 068 Thousands/uL      nRBC 0 /100 WBCs      Neutrophils Relative 56 %      Immat GRANS % 1 %      Lymphocytes Relative 28 %      Monocytes Relative 12 %      Eosinophils Relative 2 %      Basophils Relative 1 %      Neutrophils Absolute 5 43 Thousands/µL      Immature Grans Absolute 0 05 Thousand/uL      Lymphocytes Absolute 2 73 Thousands/µL      Monocytes Absolute 1 18 Thousand/µL      Eosinophils Absolute 0 22 Thousand/µL      Basophils Absolute 0 06 Thousands/µL                  XR chest 1 view portable   ED Interpretation by Meryl Salazar MD (01/22 1935)   No acute cardiopulmonary disease              Procedures  ECG 12 Lead Documentation Only    Date/Time: 1/22/2021 9:12 PM  Performed by: Meryl Salazar MD  Authorized by: Meryl Salazar MD     Indications / Diagnosis:  Cp  ECG reviewed by me, the ED Provider: yes    Patient location:  ED  Previous ECG:     Previous ECG:  Unavailable  Interpretation:     Interpretation: abnormal    Rate:     ECG rate:  167    ECG rate assessment: tachycardic    Rhythm:     Rhythm: atrial flutter    Ectopy:     Ectopy: none    QRS:     QRS axis:  Normal  Conduction:     Conduction: normal    ST segments:     ST segments:  Normal  T waves:     T waves: normal    ECG 12 Lead Documentation Only    Date/Time: 1/22/2021 9:13 PM  Performed by: Meryl Salazar MD  Authorized by: Meryl Salazar MD     Indications / Diagnosis:  Cp  ECG reviewed by me, the ED Provider: yes    Patient location: ED  Previous ECG:     Previous ECG:  Compared to current    Similarity:  Changes noted  Interpretation:     Interpretation: abnormal    Rate:     ECG rate:  87    ECG rate assessment: normal    Rhythm:     Rhythm: atrial flutter    Ectopy:     Ectopy: none    QRS:     QRS axis:  Normal  Conduction:     Conduction: normal    ST segments:     ST segments:  Normal  T waves:     T waves: normal            ED Course  ED Course as of Jan 22 2115 Fri Jan 22, 2021 1950 Troponin I(!): 0 83   1950 Troponin I(!): 0 83   1955 Aspirin and heparin started  On dilt drip currently  Reshooting EKG now that HR is in 80s to evaluate for acute ischemic events given slightly elevated troponin  [id number] SCORE      Most Recent Value   [id number]   Age  1 Filed at: 01/22/2021 2112   Sex  0 Filed at: 01/22/2021 2112   CHF History  0 Filed at: 01/22/2021 2112   HTN History  1 Filed at: 01/22/2021 2112   Stroke or TIA Symptoms Previously  0 Filed at: 01/22/2021 2112   Vascular Disease History  0 Filed at: 01/22/2021 2112   Diabetes History  0 Filed at: 01/22/2021 2112   [id number] Score  2 Filed at: 01/22/2021 2112                            SBIRT 22yo+      Most Recent Value   SBIRT (23 yo +)   In order to provide better care to our patients, we are screening all of our patients for alcohol and drug use  Would it be okay to ask you these screening questions? Yes Filed at: 01/22/2021 1850   Initial Alcohol Screen: US AUDIT-C    1  How often do you have a drink containing alcohol?  0 Filed at: 01/22/2021 1850   2  How many drinks containing alcohol do you have on a typical day you are drinking? 0 Filed at: 01/22/2021 1850   3a  Male UNDER 65: How often do you have five or more drinks on one occasion? 0 Filed at: 01/22/2021 1850   3b  FEMALE Any Age, or MALE 65+: How often do you have 4 or more drinks on one occassion?   0 Filed at: 01/22/2021 1850   Audit-C Score  0 Filed at: 01/22/2021 1850   ZORAN: How many times in the past year have you    Used an illegal drug or used a prescription medication for non-medical reasons? Never Filed at: 01/22/2021 1850                MDM  Number of Diagnoses or Management Options  Atrial flutter [facility]):   Elevated troponin:   Diagnosis management comments: Patient's presentation is concerning for ACS versus spontaneous arrhythmia  Initial EKG was concerning for atrial flutter  Patient was started on diltiazem, and had cardiac workup with TSH ordered  Cardiac workup demonstrated a mildly elevated troponin of 0 83  Upon slowing his rate, his EKG was consistent with atrial flutter  He was started on heparin ACS protocol, aspirin, and was admitted to [facility]  Disposition  Final diagnoses:   Atrial flutter ([address] )   Elevated troponin     Time reflects when diagnosis was documented in both MDM as applicable and the Disposition within this note     Time User Action Codes Description Comment    1/22/2021  8:21 PM Sunitha Sports Add [I48 92] Atrial flutter ([address] )     1/22/2021  8:21 PM Suntiha Sports Add [R77 8] Elevated troponin       ED Disposition     ED Disposition Condition Date/Time Comment    Admit Stable Fri Jan 22, 2021  8:21 PM Case was discussed with [facility] and the patient's admission status was agreed to be Admission Status: inpatient status to the service of Dr Myra Chaudhary          Follow-up Information     Follow up With Specialties Details Why [address] [place] [address] [address]  [place] [address]  [phone]            Current Discharge Medication List      CONTINUE these medications which have NOT CHANGED    Details   allopurinol (ZYLOPRIM) 300 mg tablet TAKE 1 TABLET BY MOUTH DAILY AT BEDTIME  Qty: 90 tablet, Refills: 1    Associated Diagnoses: History of gout      valsartan-hydrochlorothiazide (DIOVAN-HCT) 160-12 5 MG per tablet TAKE 1 TABLET BY MOUTH EVERY DAY  Qty: 90 tablet, Refills: 1    Associated Diagnoses: Essential hypertension           No discharge procedures on file  PDMP Review     None           ED Provider  Attending physically available and evaluated Hurley Camps  I managed the patient along with the ED Attending      Electronically Signed by         Carolina Clifton MD  01/22/21 0589

## 2021-01-23 NOTE — PLAN OF CARE
Problem: PHYSICAL THERAPY ADULT  Goal: Performs mobility at highest level of function for planned discharge setting  See evaluation for individualized goals  Description: Treatment/Interventions: Functional transfer training, LE strengthening/ROM, Elevations, Therapeutic exercise, Endurance training, Patient/family training, Equipment eval/education, Bed mobility, Gait training, OT, Spoke to nursing          See flowsheet documentation for full assessment, interventions and recommendations  Note: Prognosis: Fair  Problem List: Decreased strength, Decreased endurance, Impaired balance, Decreased mobility, Obesity, Decreased skin integrity, Decreased safety awareness  Assessment: pt is a 81 yo male admitted to [facility] 2* intermittent CP,Aflutter,inc trponin and enzymes and suspect MI type 2  Pt lives with wife in 2 [place],no use of DME PTA,reports being completely (I) PTA,active and retired,likes to spend time with grandchildren  Pt currently is not at functional mobility baseline,ataxic and unsteady gait pattern,impulsive at times,reports of SOB during and following mobility,occasional use of IV pole,IV medication and multiple lines  Pt demonstrates minimal deficits during functional mobility and gait including dec endurance,dec balance,dec BLE strength,ataxic and unsteady gait pattern and needs minAx1 for transfers,S for gait and mod I for BM  Pt declined stair training during PT eval  Pt would cont to benefit from skilled inpt PT services to maximize functional independence,dec caregiver burden and to A with acheiving pts STG  Barriers to Discharge: Inaccessible home environment([address] [facility])     PT Discharge Recommendation: Return to previous environment with social support, Return to previous environment with no needs          See flowsheet documentation for full assessment

## 2021-01-23 NOTE — PLAN OF CARE
Problem: Potential for Falls  Goal: Patient will remain free of falls  Description: INTERVENTIONS:  - Assess patient frequently for physical needs  -  Identify cognitive and physical deficits and behaviors that affect risk of falls    -  Russell fall precautions as indicated by assessment   - Educate patient/family on patient safety including physical limitations  - Instruct patient to call for assistance with activity based on assessment  - Modify environment to reduce risk of injury  - Consider OT/PT consult to assist with strengthening/mobility  Outcome: Progressing     Problem: PAIN - ADULT  Goal: Verbalizes/displays adequate comfort level or baseline comfort level  Description: Interventions:  - Encourage patient to monitor pain and request assistance  - Assess pain using appropriate pain scale  - Administer analgesics based on type and severity of pain and evaluate response  - Implement non-pharmacological measures as appropriate and evaluate response  - Consider cultural and social influences on pain and pain management  - Notify physician/advanced practitioner if interventions unsuccessful or patient reports new pain  Outcome: Progressing     Problem: INFECTION - ADULT  Goal: Absence or prevention of progression during hospitalization  Description: INTERVENTIONS:  - Assess and monitor for signs and symptoms of infection  - Monitor lab/diagnostic results  - Monitor all insertion sites, i e  indwelling lines, tubes, and drains  - Monitor endotracheal if appropriate and nasal secretions for changes in amount and color  - Russell appropriate cooling/warming therapies per order  - Administer medications as ordered  - Instruct and encourage patient and family to use good hand hygiene technique  - Identify and instruct in appropriate isolation precautions for identified infection/condition  Outcome: Progressing  Goal: Absence of fever/infection during neutropenic period  Description: INTERVENTIONS:  - Monitor WBC    Outcome: Progressing     Problem: SAFETY ADULT  Goal: Patient will remain free of falls  Description: INTERVENTIONS:  - Assess patient frequently for physical needs  -  Identify cognitive and physical deficits and behaviors that affect risk of falls    -  [place] fall precautions as indicated by assessment   - Educate patient/family on patient safety including physical limitations  - Instruct patient to call for assistance with activity based on assessment  - Modify environment to reduce risk of injury  - Consider OT/PT consult to assist with strengthening/mobility  Outcome: Progressing  Goal: Maintain or return to baseline ADL function  Description: INTERVENTIONS:  -  Assess patient's ability to carry out ADLs; assess patient's baseline for ADL function and identify physical deficits which impact ability to perform ADLs (bathing, care of mouth/teeth, toileting, grooming, dressing, etc )  - Assess/evaluate cause of self-care deficits   - Assess range of motion  - Assess patient's mobility; develop plan if impaired  - Assess patient's need for assistive devices and provide as appropriate  - Encourage maximum independence but intervene and supervise when necessary  - Involve family in performance of ADLs  - Assess for home care needs following discharge   - Consider OT consult to assist with ADL evaluation and planning for discharge  - Provide patient education as appropriate  Outcome: Progressing  Goal: Maintain or return mobility status to optimal level  Description: INTERVENTIONS:  - Assess patient's baseline mobility status (ambulation, transfers, stairs, etc )    - Identify cognitive and physical deficits and behaviors that affect mobility  - Identify mobility aids required to assist with transfers and/or ambulation (gait belt, sit-to-stand, lift, walker, cane, etc )  - [place] fall precautions as indicated by assessment  - Record patient progress and toleration of activity level on Mobility SBAR; progress patient to next Phase/Stage  - Instruct patient to call for assistance with activity based on assessment  - Consider rehabilitation consult to assist with strengthening/weightbearing, etc   Outcome: Progressing     Problem: DISCHARGE PLANNING  Goal: Discharge to home or other facility with appropriate resources  Description: INTERVENTIONS:  - Identify barriers to discharge w/patient and caregiver  - Arrange for needed discharge resources and transportation as appropriate  - Identify discharge learning needs (meds, wound care, etc )  - Arrange for interpretive services to assist at discharge as needed  - Refer to Case Management Department for coordinating discharge planning if the patient needs post-hospital services based on physician/advanced practitioner order or complex needs related to functional status, cognitive ability, or social support system  Outcome: Progressing     Problem: Knowledge Deficit  Goal: Patient/family/caregiver demonstrates understanding of disease process, treatment plan, medications, and discharge instructions  Description: Complete learning assessment and assess knowledge base    Interventions:  - Provide teaching at level of understanding  - Provide teaching via preferred learning methods  Outcome: Progressing     Problem: GENITOURINARY - ADULT  Goal: Maintains or returns to baseline urinary function  Description: INTERVENTIONS:  - Assess urinary function  - Encourage oral fluids to ensure adequate hydration if ordered  - Administer IV fluids as ordered to ensure adequate hydration  - Administer ordered medications as needed  - Offer frequent toileting  - Follow urinary retention protocol if ordered  Outcome: Progressing     Problem: METABOLIC, FLUID AND ELECTROLYTES - ADULT  Goal: Fluid balance maintained  Description: INTERVENTIONS:  - Monitor labs   - Monitor I/O and WT  - Instruct patient on fluid and nutrition as appropriate  - Assess for signs & symptoms of volume excess or deficit  Outcome: Progressing     Problem: SKIN/TISSUE INTEGRITY - ADULT  Goal: Skin integrity remains intact  Description: INTERVENTIONS  - Identify patients at risk for skin breakdown  - Assess and monitor skin integrity  - Assess and monitor nutrition and hydration status  - Monitor labs (i e  albumin)  - Assess for incontinence   - Turn and reposition patient  - Assist with mobility/ambulation  - Relieve pressure over bony prominences  - Avoid friction and shearing  - Provide appropriate hygiene as needed including keeping skin clean and dry  - Evaluate need for skin moisturizer/barrier cream  - Collaborate with interdisciplinary team (i e  Nutrition, Rehabilitation, etc )   - Patient/family teaching  Outcome: Progressing     Problem: HEMATOLOGIC - ADULT  Goal: Maintains hematologic stability  Description: INTERVENTIONS  - Assess for signs and symptoms of bleeding or hemorrhage  - Monitor labs  - Administer supportive blood products/factors as ordered and appropriate  Outcome: Progressing

## 2021-01-23 NOTE — ASSESSMENT & PLAN NOTE
Patient presented with intermittent chest pain and discomfort with evidence of atrial flutter with rapid ventricular response  He has been started on intravenous diltiazem with rate control  Echocardiogram ordered  TSH and free T4 normal  He was initially on heparin drip, but currently switched to [facility]  Cardiology recommends to start metoprolol twice a day  Patient has converted to sinus rhythm at this time    Will monitor      Due to slightly elevated cardiac enzymes likely due to rapid ventricular response, patient would benefit from stress test on Monday  Recent Labs     01/22/21  1858 01/22/21  2253 01/23/21  0216   TROPONINI 0 83* 0 99* 0 97*

## 2021-01-23 NOTE — ASSESSMENT & PLAN NOTE
History of prediabetes with hemoglobin A1c of 6 2  Will place sliding scale and basal bolus protocol - monitor blood sugars while admitted

## 2021-01-23 NOTE — PROGRESS NOTES
Progress Note - Inocente Blevins 1950, 80 y o  male MRN: [id number]    Unit/Bed#: E4 -01 Encounter: [id number]    Primary Care Provider: Neel Ivory DO   Date and time admitted to hospital: 1/22/2021  6:49 PM        MANDY on CPAP  Assessment & Plan  Negative COVID  Can start with CPAP at night    Prediabetes  Assessment & Plan  History of prediabetes with hemoglobin A1c of 6 2  Will place sliding scale and basal bolus protocol - monitor blood sugars while admitted    Subclinical hypothyroidism  Assessment & Plan  Patient with history of subclinical hypothyroidism  TSH and free T4 are normal now    Benign essential hypertension  Assessment & Plan  Resume home antihypertensive regimen   Started metoprolol    Exogenous obesity  Assessment & Plan  Recommend outpatient weight loss    * Atrial flutter ([address] )  Assessment & Plan  Patient presented with intermittent chest pain and discomfort with evidence of atrial flutter with rapid ventricular response  He has been started on intravenous diltiazem with rate control  Echocardiogram ordered  TSH and free T4 normal  He was initially on heparin drip, but currently switched to [facility]  Cardiology recommends to start metoprolol twice a day  Patient has converted to sinus rhythm at this time  Will monitor      Due to slightly elevated cardiac enzymes likely due to rapid ventricular response, patient would benefit from stress test on Monday  Recent Labs     01/22/21  1858 01/22/21  2253 01/23/21  0216   TROPONINI 0 83* 0 99* 0 97*               VTE Pharmacologic Prophylaxis:   Pharmacologic: Rivaroxaban (Xarelto)  Mechanical VTE Prophylaxis in Place: Yes    Patient Centered Rounds: I have performed bedside rounds with nursing staff today  Discussions with Specialists or Other Care Team Provider:  Cardiology    Education and Discussions with Family / Patient:  Patient    Time Spent for Care: 20 minutes    More than 50% of total time spent on counseling and coordination of care as described above  Current Length of Stay: 1 day(s)    Current Patient Status: Inpatient   Certification Statement: The patient will continue to require additional inpatient hospital stay due to Atrial flutter for further cardiologic testing    Discharge Plan:  48 hours    Code Status: Level 1 - Full Code      Subjective:   Patient was seen and examined  He continues to report "a flushing sensation in his chest"  Objective:     Vitals:   Temp (24hrs), Av 9 °F (36 6 °C), Min:97 1 °F (36 2 °C), Max:98 7 °F (37 1 °C)    Temp:  [97 1 °F (36 2 °C)-98 7 °F (37 1 °C)] 97 1 °F (36 2 °C)  HR:  [] 76  Resp:  [16-28] 18  BP: (110-184)/() 110/67  SpO2:  [92 %-98 %] 92 %  Body mass index is 38 59 kg/m²  Input and Output Summary (last 24 hours):     No intake or output data in the 24 hours ending 21 1544    Physical Exam:     Physical Exam  Constitutional:       Appearance: He is well-developed  Cardiovascular:      Rate and Rhythm: Normal rate and regular rhythm  Heart sounds: Normal heart sounds  Pulmonary:      Effort: Pulmonary effort is normal       Breath sounds: Normal breath sounds  Abdominal:      Palpations: Abdomen is soft  Skin:     General: Skin is warm  Findings: No erythema or rash  Neurological:      Mental Status: He is alert           Additional Data:     Labs:    Results from last 7 days   Lab Units 21  0533 21  1858   WBC Thousand/uL 8 13 9 67   HEMOGLOBIN g/dL 13 7 15 6   HEMATOCRIT % 41 2 46 6   PLATELETS Thousands/uL 218 256   NEUTROS PCT %  --  56   LYMPHS PCT %  --  28   MONOS PCT %  --  12   EOS PCT %  --  2     Results from last 7 days   Lab Units 21  0533 21  1858   SODIUM mmol/L 137 135*   POTASSIUM mmol/L 3 4* 3 7   CHLORIDE mmol/L 105 101   CO2 mmol/L 28 28   BUN mg/dL 22 24   CREATININE mg/dL 1 06 1 29   ANION GAP mmol/L 4 6   CALCIUM mg/dL 8 6 8 8   ALBUMIN g/dL  --  3 4*   TOTAL BILIRUBIN mg/dL  -- 0  38   ALK PHOS U/L  --  71   ALT U/L  --  27   AST U/L  --  17   GLUCOSE RANDOM mg/dL 119 199*     Results from last 7 days   Lab Units 01/22/21  1858   INR  0 97     Results from last 7 days   Lab Units 01/23/21  1218 01/23/21  0837   POC GLUCOSE mg/dl 114 125                   * I Have Reviewed All Lab Data Listed Above  * Additional Pertinent Lab Tests Reviewed: All Labs Within Last 24 Hours Reviewed    Imaging:      Recent Cultures (last 7 days):           Last 24 Hours Medication List:   Current Facility-Administered Medications   Medication Dose Route Frequency Provider Last Rate    acetaminophen  650 mg Oral Q6H PRN Beatriz Apple, DO      allopurinol  300 mg Oral HS Slava Gomez, DO      aluminum-magnesium hydroxide-simethicone  30 mL Oral Q6H PRN Beatriz Rose, DO      calcium carbonate  1,000 mg Oral Daily PRN Beatrzi Rose, DO      docusate sodium  100 mg Oral BID Slava Gomez, DO      insulin lispro  1-5 Units Subcutaneous TID AC Slava Gomez, DO      losartan potassium-hydrochlorothiazide (HYZAAR 100/12  5) combo dose   Oral Daily Beatriz Rose, DO      metoprolol succinate  25 mg Oral Daily Beto Bryan MD      ondansetron  4 mg Intravenous Q6H PRN Beatriz Rose, DO      rivaroxaban  20 mg Oral Daily With Breakfast Beto Bryan MD      simethicone  80 mg Oral 4x Daily PRN Beatriz Rose,           Today, Patient Was Seen By: Efren Guzman MD    ** Please Note: Dictation voice to text software may have been used in the creation of this document   **

## 2021-01-23 NOTE — ASSESSMENT & PLAN NOTE
Patient presented with intermittent chest pain and discomfort with evidence of atrial flutter with rapid ventricular response  He has been started on intravenous diltiazem with rate control    Echocardiogram ordered  TSH and free T4  Start anticoagulation with heparin drip for now  Cardiology consultation to discuss cardioversion versus ablation  CHADS2 Vasc score of 2    Patient with elevated cardiac enzyme - suspect type 2 myocardial infarction  Will trend cardiac enzymes  Recent Labs     01/22/21  1858   TROPONINI 0 83*

## 2021-01-23 NOTE — UTILIZATION REVIEW
Initial Clinical Review    Admission: Date/Time/Statement:   Admission Orders (From admission, onward)     Ordered        01/22/21 2022  Inpatient Admission  Once                   Orders Placed This Encounter   Procedures    Inpatient Admission     Standing Status:   Standing     Number of Occurrences:   1     Order Specific Question:   Level of Care     Answer:   Med Surg [16]     Order Specific Question:   Estimated length of stay     Answer:   More than 2 Midnights     Order Specific Question:   Certification     Answer:   I certify that inpatient services are medically necessary for this patient for a duration of greater than two midnights  See H&P and MD Progress Notes for additional information about the patient's course of treatment  ED Arrival Information     Expected Arrival Acuity Means of Arrival Escorted By Service Admission Type    - 1/22/2021 18:41 Emergent Walk-In Self General Medicine Emergency    Arrival Complaint    palpitations, chest pain, arm pain        Chief Complaint   Patient presents with    Chest Pain     Began a "couple days ago" with mid-sternal chest pain  Feels "choking" arounf the neck  +HA +dizzy Pt reports elevated HR recently "average 175 " Pt reports episode of diaphoresis  Assessment/Plan: 79 yo male w/ MANDY on CPAP, htn, obesity, to ED from home admitted Inpatient d/t Aflutter with RVR  presented with intermittent chest pain and discomfort, shortness of breath difficulty breathing as well as heart palpitations which started 3 days prior to arrival  Troponin 0 83  CHADS2 Vasc score of 2  Echocardiogram ordered  IV diltiazem  heparin gtt  Cardiology consult  discuss cardioversion versus ablation  1/23 CARDIOLOGY CONSULT:Paroxysmal Atrial flutter w RVR up to 170bpm, flutter waves appeared typical neg saw tooth, pos V1, then went into Atrial fibrillation and broke last night 12:50am in hospital on Diltiazem infusion  Highly symptomatic w SOB, palpitations and Chest pressure  Had similar symptoms intermittently in past but never as long or severe, no prior dx  [id number]=1   Troponin elevation 0 98 trending down, type 2, not an MI, demand ischemia from rapid rate  Essential HTN: Hypertensive to SBP 180smmHg when admitted, now BP is normalized  Xarelto, toprol XL, valsartan/hctz  If EF tomorrow is abnormal would get Nuclear stress vs cath prior to discharge  1/23:continue to require additional inpatient hospital stay due to Atrial flutter for further cardiologic testing     1/24:continue to require additional inpatient hospital stay due to stress test stress test ordered  Increase metoprolol XL  TTE today      ED Triage Vitals   Temperature Pulse Respirations Blood Pressure SpO2   01/22/21 1846 01/22/21 1846 01/22/21 1846 01/22/21 1846 01/22/21 1846   98 7 °F (37 1 °C) (!) 163 22 (!) 173/98 97 %      Temp Source Heart Rate Source Patient Position - Orthostatic VS BP Location FiO2 (%)   01/22/21 1846 01/22/21 1856 01/22/21 1846 01/22/21 1846 --   Oral Monitor Lying Left arm       Pain Score       01/22/21 1846       No Pain          Wt Readings from Last 1 Encounters:   01/22/21 122 kg (268 lb 15 4 oz)     Additional Vital Signs:   01/23/21 1241  97 1 °F (36 2 °C)Abnormal   76  18  110/67  --  92 %  --  Sitting   01/23/21 0827  97 7 °F (36 5 °C)  95  20  112/64  --  95 %  None (Room air)  Lying   01/22/21 2327  97 4 °F (36 3 °C)Abnormal   57  18  123/61  --  97 %  None (Room air)  Lying   01/22/21 2102  98 5 °F (36 9 °C)  73  20  128/82  92  98 %  None (Room air)  Sitting   01/22/21 2045  --  82  28Abnormal   130/77  99  96 %  None (Room air)  Lying   01/22/21 2016  --  84  16  140/77  --  98 %  None (Room air)  Lying   01/22/21 1945  --  88  16  135/84  103  98 %  None (Room air)  Lying   01/22/21 1930  --  126Abnormal   18  128/68  93  96 %  None (Room air)  Lying   01/22/21 1856  --  168Abnormal   18  184/124Abnormal   --  98 %  None (Room air)  Lying   01/22/21 1846  98 7 °F (37 1 °C) 163Abnormal   22  173/98Abnormal   --  97 %  None (Room air)  Lying             Pertinent Labs/Diagnostic Test Results:   Results from last 7 days   Lab Units 01/23/21  0216   SARS-COV-2  Negative     Results from last 7 days   Lab Units 01/23/21  0533 01/22/21  1858   WBC Thousand/uL 8 13 9 67   HEMOGLOBIN g/dL 13 7 15 6   HEMATOCRIT % 41 2 46 6   PLATELETS Thousands/uL 218 256   NEUTROS ABS Thousands/µL  --  5 43         Results from last 7 days   Lab Units 01/23/21  0533 01/22/21  1858   SODIUM mmol/L 137 135*   POTASSIUM mmol/L 3 4* 3 7   CHLORIDE mmol/L 105 101   CO2 mmol/L 28 28   ANION GAP mmol/L 4 6   BUN mg/dL 22 24   CREATININE mg/dL 1 06 1 29   EGFR ml/min/1 73sq m 71 56   CALCIUM mg/dL 8 6 8 8   MAGNESIUM mg/dL 2 1  --      Results from last 7 days   Lab Units 01/22/21  1858   AST U/L 17   ALT U/L 27   ALK PHOS U/L 71   TOTAL PROTEIN g/dL 7 3   ALBUMIN g/dL 3 4*   TOTAL BILIRUBIN mg/dL 0 38     Results from last 7 days   Lab Units 01/23/21  1218 01/23/21  0837   POC GLUCOSE mg/dl 114 125     Results from last 7 days   Lab Units 01/23/21  0533 01/22/21  1858   GLUCOSE RANDOM mg/dL 119 199*       Results from last 7 days   Lab Units 01/23/21  0216 01/22/21  2253 01/22/21  1858   TROPONIN I ng/mL 0 97* 0 99* 0 83*         Results from last 7 days   Lab Units 01/23/21  0850 01/23/21  0216 01/22/21  1858   PROTIME seconds  --   --  12 7   INR   --   --  0 97   PTT seconds 78* 69* 40*     Results from last 7 days   Lab Units 01/22/21  1858   TSH 3RD GENERATON uIU/mL 2 782  2 832       Results from last 7 days   Lab Units 01/23/21  0216   INFLUENZA A PCR  Negative   INFLUENZA B PCR  Negative   RSV PCR  Negative     1/22 CXR:No acute cardiopulmonary disease  In the setting of clinically suspected/proven COVID-19, this plain film appearance does not contain findings that raise concern for viral pneumonia such as COVID-19, but does not rule out this diagnosis      1/22 ECHO:LEFT VENTRICLE:  Systolic function was normal  Ejection fraction was estimated to be 60 %    There were no regional wall motion abnormalities      LEFT ATRIUM:  The atrium was mildly dilated      TRICUSPID VALVE:  There was trace regurgitation    1/22 EKG:  ECG 12 Lead Documentation Only   Date/Time: 1/22/2021 9:12 PM  Performed by: Dudley Thomas MD  Authorized by: Dudley Thomas MD      Indications / Diagnosis:  Cp  ECG reviewed by me, the ED Provider: yes    Patient location:  ED  Previous ECG:     Previous ECG:  Unavailable  Interpretation:     Interpretation: abnormal    Rate:     ECG rate:  167    ECG rate assessment: tachycardic    Rhythm:     Rhythm: atrial flutter    Ectopy:     Ectopy: none    QRS:     QRS axis:  Normal  Conduction:     Conduction: normal    ST segments:     ST segments:  Normal  T waves:     T waves: normal    ECG 12 Lead Documentation Only   Date/Time: 1/22/2021 9:13 PM  Performed by: Dudley Thomas MD  Authorized by: Dudley Thomas MD      Indications / Diagnosis:  Cp  ECG reviewed by me, the ED Provider: yes    Patient location:  ED  Previous ECG:     Previous ECG:  Compared to current    Similarity:  Changes noted  Interpretation:     Interpretation: abnormal    Rate:     ECG rate:  87    ECG rate assessment: normal    Rhythm:     Rhythm: atrial flutter    Ectopy:     Ectopy: none    QRS:     QRS axis:  Normal  Conduction:     Conduction: normal    ST segments:     ST segments:  Normal  T waves:     T waves: normal               ED Treatment:   Medication Administration from 01/22/2021 1841 to 01/22/2021 2052       Date/Time Order Dose Route Action Action by Comments                01/22/2021 1950 diltiazem (CARDIZEM) 125 mg in sodium chloride 0 9 % 125 mL infusion 2 5 mg/hr Intravenous New Bag       01/22/2021 1940 diltiazem (CARDIZEM) injection 10 mg 10 mg Intravenous Given                  01/22/2021 2010 aspirin tablet 325 mg 325 mg Oral Given       01/22/2021 2007 diltiazem (CARDIZEM) 125 mg in sodium chloride 0 9 % 125 mL infusion 2 5 mg/hr Intravenous New Bag       01/22/2021 2010 heparin (porcine) injection 4,000 Units 4,000 Units Intravenous Given       01/22/2021 2012 heparin (porcine) 25,000 units in 0 45% NaCl 250 mL infusion (premix) 11 1 Units/kg/hr Intravenous New Bag          Past Medical History:   Diagnosis Date    Acute gouty arthritis     last assessed 6/1/13     Anxiety     last assessed 7/11/14     Chronic thoracic spine pain     last assessed 5/13/16     Dysfunction of both eustachian tubes     last assessed 8/16/13    Erectile dysfunction of non-organic origin     last assessed 10/8/13     Family history reviewed with no changes     medical history     Gout     Hypertension     Sebaceous cyst     last assessed 12/16/13     Skin lesion     last assessed 1/2/14      Present on Admission:   Benign essential hypertension   Exogenous obesity   Subclinical hypothyroidism      Admitting Diagnosis: Atrial flutter ([facility] [address] ) [I48 92]  Chest pain [R07 9]  Elevated troponin [R77 8]  Age/Sex: 80 y o  male  Admission Orders:  Scheduled Medications:  allopurinol, 300 mg, Oral, HS  docusate sodium, 100 mg, Oral, BID  insulin lispro, 1-5 Units, Subcutaneous, TID AC  losartan potassium-hydrochlorothiazide (HYZAAR 100/12  5) combo dose, , Oral, Daily  metoprolol succinate, 25 mg, Oral, Daily  rivaroxaban, 20 mg, Oral, Daily With Breakfast    diltiazem (CARDIZEM) 125 mg in sodium chloride 0 9 % 125 mL infusion   Rate: 1-15 mL/hr Dose: 1-15 mg/hr  Freq: Titrated Route: IV  Last Dose: Stopped (01/23/21 1241)  Start: 01/22/21 2000 End: 01/23/21 1135    Admin Instructions:   Continue as directed by physician - reordered to correct frequency to titrated  Titrate by 2 5 mg/hr every 15 minutes to achieve target heart rate below 110 bpm   Refrigerate  Caution: Look-alike/sound-alike drug name! High-alert medication!             2007      1135-D/C'd  1241        heparin (porcine) 25,000 units in 0 45% NaCl 250 mL infusion (premix)   Rate: 2 7-18 mL/hr Dose: 3-20 Units/kg/hr  Weight Dosing Info: 90 kg (Order-Specific)  Freq: Titrated Route: IV  Last Dose: Stopped (01/23/21 1241)  Start: 01/22/21 2015 End: 01/23/21 1135                    PRN Meds:  acetaminophen, 650 mg, Oral, Q6H PRN 1/23 X 1  aluminum-magnesium hydroxide-simethicone, 30 mL, Oral, Q6H PRN  calcium carbonate, 1,000 mg, Oral, Daily PRN  ondansetron, 4 mg, Intravenous, Q6H PRN  simethicone, 80 mg, Oral, 4x Daily PRN      AMBULATE   PT/OT  I&O  DAILY WEIGHTS  TELE  CARDIAC DIET  IP CONSULT TO CARDIOLOGY    Network Utilization Review Department  ATTENTION: Please call with any questions or concerns to [phone] and carefully listen to the prompts so that you are directed to the right person  All voicemails are confidential   Scottie Foss all requests for admission clinical reviews, approved or denied determinations and any other requests to dedicated fax number below belonging to the campus where the patient is receiving treatment   List of dedicated fax numbers for the Facilities:  [address] DENIALS (Administrative/Medical Necessity) [phone]   [address] (Maternity/NICU/Pediatrics) [phone] [address] Kisha Buck 9874 (Caitlin Galeano "Erin" 103) [address] [place] [address] [address] [place]) [address] [place] [address] 951 [phone]

## 2021-01-23 NOTE — CONSULTS
Electrophysiology-Cardiology (EP)   [facility] 80 y o  male MRN: [id number]  Unit/Bed#: E4 -01 Encounter: [id number]        IMPRESSION:  1  Paroxysmal Atrial flutter w RVR up to 170bpm, flutter waves appeared typical neg saw tooth, pos V1, then went into Atrial fibrillation and broke last night 12:50am in hospital on Diltiazem infusion  Highly symptomatic w SOB, palpitations and Chest pressure  Had similar symptoms intermittently in past but never as long or severe, no prior dx  ISFLJ0Mele=0 (age)    2  Troponin elevation 0 98 trending down, type 2, not an MI, demand ischemia from rapid rate  3  Essential HTN: Hypertensive to SBP 180smmHg when admitted, now BP is normalized  4  H/o mild edema and shortness of breath for past year and half  5  MANDY on CPAP    6  Alcohol- drinks heavily intermittently can drink "6 beers plus some bourbon" when watching a game, but then can go weeks without alcohol  7  Obesity    PLAN:  1  Start Xarelto 20mg daily for atrial flutter, stop heparin  2  Start toprol XL 25mg daily for the flutter  And titrate up as tolerated  Can continue valsartan/hctz outpatient (here on losartn/hctz)  3  Echocardiogram tomorrow  4  If EF tomorrow is abnormal would get Nuclear stress vs cath prior to discharge  5  Check Lipids  6  May consider ablation of atrial flutter as outpatient, will set up EP follow up  7  Alcohol cessation, discussed w him  Referring Physian: Diane Brown MD    Chief Complain/Reason for Referal:   Deepika Bourne is a 80 y o     Patient Active Problem List    Diagnosis Date Noted    Prediabetes 01/22/2021     Priority: Low    MANDY on CPAP 01/22/2021     Priority: Low    Atrial flutter ([address] ) 01/22/2021     Priority: Low    Paresthesia of upper and lower extremities of both sides 12/09/2020     Priority: Low    Elevated PSA 05/15/2020     Priority: Low    Achilles rupture, left 11/11/2019     Priority: Low    Hand dermatitis 11/11/2019     Priority: Low    Dupuytren contracture 11/11/2019     Priority: Low    Seborrheic keratoses, inflamed 11/11/2019     Priority: Low    Obesity (BMI 35 0-39 9 without comorbidity) 11/11/2019     Priority: Low    S/P cervical spinal fusion 08/28/2019     Priority: Low    MANDY (obstructive sleep apnea)      Priority: Low    Fatigue 05/10/2019     Priority: Low    Plantar fasciitis of right foot 03/21/2019     Priority: Low    Subclinical hypothyroidism 03/21/2019     Priority: Low    Elevated blood sugar 03/21/2019     Priority: Low    Eczema 03/21/2019     Priority: Low    Cervical radiculitis 01/07/2019     Priority: Low    Chronic lumbar radiculopathy 07/12/2018     Priority: Low    History of gout 05/18/2018     Priority: Low    Rotator cuff syndrome of right shoulder 07/24/2017     Priority: Low    Chronic thoracic spine pain 09/20/2016     Priority: Low    Exogenous obesity 07/01/2016     Priority: Low    Combined hyperlipidemia 07/01/2016     Priority: Low    Benign essential hypertension 05/15/2012     Priority: Low     79 yo male no cardiac history other than HTN present w acute on set of rapid HR and chest pressure and was hypertensive  HR was up to 170s in rapid aflutter w variable block, and was treated w IV diltizem and heparin and he went into afib then converted w/o a pause after midnight  Feels better today  He has had intermittent palpitations for pasty year and half but not as long or strong before  No prior dx of afib/flutter  He has had "sock" lines and some mild ankle edema past year and does get tired easy and feels short of breath w moderate exertion  Admitts to drinking heavily intermittently  No smoking  He is compliant w CPAP            Past Medical History:   Diagnosis Date    Acute gouty arthritis     last assessed 6/1/13     Anxiety     last assessed 7/11/14     Chronic thoracic spine pain     last assessed 5/13/16     Dysfunction of both eustachian tubes last assessed 8/16/13    Erectile dysfunction of non-organic origin     last assessed 10/8/13     Family history reviewed with no changes     medical history     Gout     Hypertension     Sebaceous cyst     last assessed 12/16/13     Skin lesion     last assessed 1/2/14        Medications Prior to Admission   Medication    allopurinol (ZYLOPRIM) 300 mg tablet    valsartan-hydrochlorothiazide (DIOVAN-HCT) 160-12 5 MG per tablet       Scheduled Meds:  Current Facility-Administered Medications   Medication Dose Route Frequency Provider Last Rate    acetaminophen  650 mg Oral Q6H PRN Celina Villa, DO      allopurinol  300 mg Oral HS Slava Gomez, DO      aluminum-magnesium hydroxide-simethicone  30 mL Oral Q6H PRN Celina Vilal, DO      calcium carbonate  1,000 mg Oral Daily PRN Celina Villa, DO      docusate sodium  100 mg Oral BID Slava Gomez, DO      insulin lispro  1-5 Units Subcutaneous TID AC Slava Gomez, DO      losartan potassium-hydrochlorothiazide (HYZAAR 100/12  5) combo dose   Oral Daily Celina Villa, DO      metoprolol succinate  25 mg Oral Daily Zoraida Pugh MD      ondansetron  4 mg Intravenous Q6H PRN Celina Villa, DO      potassium chloride  40 mEq Oral Once Alejandra Clancy MD      rivaroxaban  20 mg Oral Daily With Breakfast Zoraida Pugh MD      simethicone  80 mg Oral 4x Daily PRN Celina Villa, DO       Continuous Infusions:   PRN Meds:   acetaminophen    aluminum-magnesium hydroxide-simethicone    calcium carbonate    ondansetron    simethicone  Allergies   Allergen Reactions    Levaquin [Levofloxacin] Arthralgia     I reviewed the Home Medication list in the chart       Family History   Problem Relation Age of Onset    Diabetes Sister     Diabetes Family     Hypertension Family     No Known Problems Mother     No Known Problems Father        Social History     Socioeconomic History    Marital status: /Civil Union     Spouse name: Not on file    Number of children: Not on file    Years of education: Not on file    Highest education level: Not on file   Occupational History    Not on file   Social Needs    Financial resource strain: Not on file    Food insecurity     Worry: Not on file     Inability: Not on file    Transportation needs     Medical: Not on file     Non-medical: Not on file   Tobacco Use    Smoking status: Former Smoker     Packs/day: 0 50     Years: 20 00     Pack years: 10 00     Quit date: 36     Years since quittin 0    Smokeless tobacco: Never Used   Substance and Sexual Activity    Alcohol use: Yes     Frequency: Monthly or less     Drinks per session: 1 or 2     Binge frequency: Never     Comment: social    Drug use: No    Sexual activity: Not Currently   Lifestyle    Physical activity     Days per week: Not on file     Minutes per session: Not on file    Stress: Not on file   Relationships    Social connections     Talks on phone: Not on file     Gets together: Not on file     Attends Rastafari service: Not on file     Active member of club or organization: Not on file     Attends meetings of clubs or organizations: Not on file     Relationship status: Not on file    Intimate partner violence     Fear of current or ex partner: Not on file     Emotionally abused: Not on file     Physically abused: Not on file     Forced sexual activity: Not on file   Other Topics Concern    Not on file   Social History Narrative    Not on file       Review of Systems -12 Point ROS reviewed and are negative or noted in chart except for Pertinent Positives Pertaining to Cardiovascular and Respiratory in HPI above       Vitals:    21 0827   BP: 112/64   Pulse: 95   Resp: 20   Temp: 97 7 °F (36 5 °C)   SpO2: 95%     Vitals:    21 1846 21 2102   Weight: 123 kg (272 lb 0 8 oz) 122 kg (268 lb 15 4 oz)   No intake or output data in the 24 hours ending 21 1141      GEN: Now acute distress, Alert and oriented, well appearing obese male  HEENT:Head, neck, ears, oral pharynx: Mucus membranes moist, oral pharynx clear, nares clear  External ears normal  EYES: Pupils equal, sclera anicteric, midline, normal conjuctiva  NECK: No JVD, supple, no obvious masses or thryomegaly or goiter  CARDIOVASCULAR: RRR, No murmur, rub, gallops S1,S2  LUNGS: Clear To auscultation bilaterally, normal effort, no rales, rhonchi, crackles  ABDOMEN: protuberant  Soft, nondistended, nontender, without obvious organomegaly or ascites  EXTREMITIES/VASCULAR: trace edema  Radial pulses intact, pedal pulses difficult to palpate, warm an well perfused  PSYCH: Normal Affect, no overt suicidal ideation, linear speech pattern without evidence of psychosis  NEURO: Grossly intact, moving all extremiteis equal, face symmetric, alert and responsive, no obvious focal defecits  HEME: No bleeding, bruising, petechia, purpura  SKIN: No significant rashes, warm, no diaphoresis or pallor       Lab Results:     CBC with diff:   Results from last 7 days   Lab Units 01/23/21  0533 01/22/21  1858   WBC Thousand/uL 8 13 9 67   HEMOGLOBIN g/dL 13 7 15 6   HEMATOCRIT % 41 2 46 6   MCV fL 90 90   PLATELETS Thousands/uL 218 256   MCH pg 29 9 30 2   MCHC g/dL 33 3 33 5   RDW % 13 3 13 2   MPV fL 9 4 9 3   NRBC AUTO /100 WBCs  --  0         CMP:  Results from last 7 days   Lab Units 01/23/21  0533 01/22/21  1858   POTASSIUM mmol/L 3 4* 3 7   CHLORIDE mmol/L 105 101   CO2 mmol/L 28 28   BUN mg/dL 22 24   CREATININE mg/dL 1 06 1 29   CALCIUM mg/dL 8 6 8 8   AST U/L  --  17   ALT U/L  --  27   ALK PHOS U/L  --  71   EGFR ml/min/1 73sq m 71 56         BMP:  Results from last 7 days   Lab Units 01/23/21  0533 01/22/21  1858   POTASSIUM mmol/L 3 4* 3 7   CHLORIDE mmol/L 105 101   CO2 mmol/L 28 28   BUN mg/dL 22 24   CREATININE mg/dL 1 06 1 29   CALCIUM mg/dL 8 6 8 8       BNP:   Results Reviewed     Procedure Component Value Units Date/Time    TSH, 3rd generation [[id number]]  (Normal) Collected: 01/22/21 1858    Lab Status: Final result Specimen: Blood from Arm, Left Updated: 01/22/21 2217     TSH 3RD GENERATON 2 782 uIU/mL     Narrative:      Patients undergoing fluorescein dye angiography may retain small amounts of fluorescein in the body for 48-72 hours post procedure  Samples containing fluorescein can produce falsely depressed TSH values  If the patient had this procedure,a specimen should be resubmitted post fluorescein clearance  TSH, 3rd generation with Free T4 reflex [[id number]]  (Normal) Collected: 01/22/21 1858    Lab Status: Final result Specimen: Blood from Arm, Left Updated: 01/22/21 2004     TSH 3RD GENERATON 2 832 uIU/mL     Narrative:      Patients undergoing fluorescein dye angiography may retain small amounts of fluorescein in the body for 48-72 hours post procedure  Samples containing fluorescein can produce falsely depressed TSH values  If the patient had this procedure,a specimen should be resubmitted post fluorescein clearance        Omer Mitts [[id number]]  (Normal) Collected: 01/22/21 1858    Lab Status: Final result Specimen: Blood from Arm, Left Updated: 01/22/21 2001     Protime 12 7 seconds      INR 0 97    APTT [[id number]]  (Abnormal) Collected: 01/22/21 1858    Lab Status: Final result Specimen: Blood from Arm, Left Updated: 01/22/21 2001     PTT 40 seconds     APTT six (6) hours after Heparin bolus/drip initiation or dosing change [[id number]]     Lab Status: No result Specimen: Blood     Troponin I [[id number]]  (Abnormal) Collected: 01/22/21 1858    Lab Status: Final result Specimen: Blood from Arm, Left Updated: 01/22/21 1943     Troponin I 0 83 ng/mL     Comprehensive metabolic panel [[id number]]  (Abnormal) Collected: 01/22/21 1858    Lab Status: Final result Specimen: Blood from Arm, Left Updated: 01/22/21 1916     Sodium 135 mmol/L      Potassium 3 7 mmol/L      Chloride 101 mmol/L      CO2 28 mmol/L      ANION GAP 6 mmol/L      BUN 24 mg/dL Creatinine 1 29 mg/dL      Glucose 199 mg/dL      Calcium 8 8 mg/dL      Corrected Calcium 9 3 mg/dL      AST 17 U/L      ALT 27 U/L      Alkaline Phosphatase 71 U/L      Total Protein 7 3 g/dL      Albumin 3 4 g/dL      Total Bilirubin 0 38 mg/dL      eGFR 56 ml/min/1 73sq m     Narrative:      [place] guidelines for Chronic Kidney Disease (CKD):     Stage 1 with normal or high GFR (GFR > 90 mL/min/1 73 square meters)    Stage 2 Mild CKD (GFR = 60-89 mL/min/1 73 square meters)    Stage 3A Moderate CKD (GFR = 45-59 mL/min/1 73 square meters)    Stage 3B Moderate CKD (GFR = 30-44 mL/min/1 73 square meters)    Stage 4 Severe CKD (GFR = 15-29 mL/min/1 73 square meters)    Stage 5 End Stage CKD (GFR <15 mL/min/1 73 square meters)  Note: GFR calculation is accurate only with a steady state creatinine    CBC and differential [[id number]] Collected: 01/22/21 1858    Lab Status: Final result Specimen: Blood from Arm, Left Updated: 01/22/21 1902     WBC 9 67 Thousand/uL      RBC 5 16 Million/uL      Hemoglobin 15 6 g/dL      Hematocrit 46 6 %      MCV 90 fL      MCH 30 2 pg      MCHC 33 5 g/dL      RDW 13 2 %      MPV 9 3 fL      Platelets 502 Thousands/uL      nRBC 0 /100 WBCs      Neutrophils Relative 56 %      Immat GRANS % 1 %      Lymphocytes Relative 28 %      Monocytes Relative 12 %      Eosinophils Relative 2 %      Basophils Relative 1 %      Neutrophils Absolute 5 43 Thousands/µL      Immature Grans Absolute 0 05 Thousand/uL      Lymphocytes Absolute 2 73 Thousands/µL      Monocytes Absolute 1 18 Thousand/µL      Eosinophils Absolute 0 22 Thousand/µL      Basophils Absolute 0 06 Thousands/µL         No results for input(s): BNP in the last 72 hours       Results from last 7 days   Lab Units 01/23/21  0216 01/22/21  2253 01/22/21  1858   TROPONIN I ng/mL 0 97* 0 99* 0 83*         Magnesium:   Results from last 7 days   Lab Units 01/23/21  0533   MAGNESIUM mg/dL 2 1       Coags: Results from last 7 days   Lab Units 01/23/21  0850 01/23/21  0216 01/22/21  1858   PTT seconds 78* 69* 40*   INR   --   --  0 97       TSH:       Lipid Profile:         Cardiac testing:   No results found for this or any previous visit  No results found for this or any previous visit  No results found for this or any previous visit  No results found for this or any previous visit  CXR: No cardiomegaly, clear lungs  EKG/TELE: Aflutter w RVR, flutter waves neg inf leads, pos V1 typical fltuter, very rapid rates up to 170bpm   He had aflutter around 100-120bpm on telemetry and then went to afib and then converted to NSR after Midnight  Now NSR w PACls HR 80s  I have personally reviewed the EKG, CXR and Telemetry images directly and the above is my interpretation

## 2021-01-23 NOTE — ED NOTES
Per Dr Terese Flor cardizem drip to be initiated at 2 5mg/hr instead of 5mg/hr due to patient's HR of 90 post bolus dose        Carlie Morocho RN  01/22/21 0874

## 2021-01-23 NOTE — ED ATTENDING ATTESTATION
1/22/2021  I, Zainab Roberts DO, saw and evaluated the patient  I have discussed the patient with the resident/non-physician practitioner and agree with the resident's/non-physician practitioner's findings, Plan of Care, and MDM as documented in the resident's/non-physician practitioner's note, except where noted  All available labs and Radiology studies were reviewed  I was present for key portions of any procedure(s) performed by the resident/non-physician practitioner and I was immediately available to provide assistance  At this point I agree with the current assessment done in the Emergency Department  I have conducted an independent evaluation of this patient a history and physical is as follows:    79 yo M h/o HTN presenting for evaluation of chest discomfort, lightheadedness, diaphoresis  States sx the past 2 days, was trying to avoid coming in, but today noticed his HR was in 160s, so this prompted him to come in  Still reports some chest discomfort, but improving  No syncope  Denies history of similar in the past  No known CAD or history of arrhythmias  Found to be in [address] in 150s/160s, BP stable  HR improved to 120s on it's own, started on Cardizem with bolus and infusion  Cardiac workup, labs to r/o metabolic derangement, thyroid function   Admit for further workup/management    ED Course         Critical Care Time  CriticalCare Time  Performed by: Zainab Roberts DO  Authorized by: Zainab Roberts DO     Critical care provider statement:     Critical care time (minutes):  40    Critical care time was exclusive of:  Separately billable procedures and treating other patients and teaching time    Critical care was necessary to treat or prevent imminent or life-threatening deterioration of the following conditions:  Cardiac failure and circulatory failure    Critical care was time spent personally by me on the following activities:  Blood draw for specimens, obtaining history from patient or surrogate, development of treatment plan with patient or surrogate, evaluation of patient's response to treatment, examination of patient, interpretation of cardiac output measurements, ordering and performing treatments and interventions, ordering and review of laboratory studies, ordering and review of radiographic studies, re-evaluation of patient's condition and review of old charts  Comments:      New rapid aflutter with variable block, chest pain, elevated troponin, required cardizem bolus and infusion

## 2021-01-23 NOTE — H&P
H&P- [facility] 1950, 80 y o  male MRN: [id number]    Unit/Bed#: E4 -01 Encounter: [id number]    Primary Care Provider: Sarah Bowman DO   Date and time admitted to hospital: 1/22/2021  6:49 PM        * Atrial flutter [facility])  Assessment & Plan  Patient presented with intermittent chest pain and discomfort with evidence of atrial flutter with rapid ventricular response  He has been started on intravenous diltiazem with rate control  Echocardiogram ordered  TSH and free T4  Start anticoagulation with heparin drip for now  Cardiology consultation to discuss cardioversion versus ablation  CHADS2 Vasc score of 2    Patient with elevated cardiac enzyme - suspect type 2 myocardial infarction  Will trend cardiac enzymes  Recent Labs     01/22/21  1858   TROPONINI 0 83*           MANDY on CPAP  Assessment & Plan  Will need to check for COVID-19 prior to initiating CPAP    Prediabetes  Assessment & Plan  History of prediabetes with hemoglobin A1c of 6 2  Will place sliding scale and basal bolus protocol - monitor blood sugars while admitted    Subclinical hypothyroidism  Assessment & Plan  Patient with history of subclinical hypothyroidism  TSH and free T4 ordered    Benign essential hypertension  Assessment & Plan  Resume home antihypertensive regimen - monitor while on diltiazem drip    Exogenous obesity  Assessment & Plan  Recommend outpatient weight loss        VTE Prophylaxis: Heparin Drip  / sequential compression device   Code Status:  Full code  POLST: There is no POLST form on file for this patient (pre-hospital)  Discussion with family: Wife contacted at 9:37 pm    Anticipated Length of Stay:  Patient will be admitted on an Inpatient basis with an anticipated length of stay of  greater than 2 midnights  Justification for Hospital Stay:  Atrial flutter with rapid ventricular response    Total Time for Visit, including Counseling / Coordination of Care: 45 minutes    Greater than 50% of this total time spent on direct patient counseling and coordination of care  Chief Complaint:   Shortness of breath difficulty breathing    History of Present Illness:    New Lozada is a 80 y o  male who presents with shortness of breath difficulty breathing as well as heart palpitations which started 3 days prior to arrival in the emergency room  He states that he often has heart palpitations however this episode felt like a pulled muscle  He had significant shortness of breath, and could not get comfortable  He denies any cardiac history, he does have a history of obesity as well as sleep apnea  He denies any nausea vomiting or other medication changes  No lightheaded or dizziness during evaluation    Review of Systems:    A complete and comprehensive 14 point organ system review was performed and all other systems are negative other than stated above in the HPI    Past Medical and Surgical History:     Past Medical History:   Diagnosis Date    Acute gouty arthritis     last assessed 6/1/13     Anxiety     last assessed 7/11/14     Chronic thoracic spine pain     last assessed 5/13/16     Dysfunction of both eustachian tubes     last assessed 8/16/13    Erectile dysfunction of non-organic origin     last assessed 10/8/13     Family history reviewed with no changes     medical history     Gout     Hypertension     Sebaceous cyst     last assessed 12/16/13     Skin lesion     last assessed 1/2/14        Past Surgical History:   Procedure Laterality Date    APPENDECTOMY      12years old    BOWEL RESECTION      CATARACT EXTRACTION      CERVICAL FUSION N/A 8/27/2019    Procedure: Anterior cervical discectomy w fusion C5-6, with allograft and neuromonitoring;  Surgeon: Roe Kwok MD;  Location: [facility];  Service: Orthopedics    COLONOSCOPY      MOUTH SURGERY      WRIST SURGERY         Meds/Allergies:    Prior to Admission medications    Medication Sig Start Date End Date Taking?  Authorizing Provider   allopurinol (ZYLOPRIM) 300 mg tablet TAKE 1 TABLET BY MOUTH DAILY AT BEDTIME 20  Yes Jenise Seen, DO   valsartan-hydrochlorothiazide (DIOVAN-HCT) 160-12 5 MG per tablet TAKE 1 TABLET BY MOUTH EVERY DAY 10/6/20  Yes Nan Seen, DO     I have reviewed home medications with patient personally  Allergies:    Allergies   Allergen Reactions    Levaquin [Levofloxacin] Arthralgia       Social History:     Marital Status: /Civil Union   Occupation:  Retired from [employer],     Substance Use History:   Social History     Substance and Sexual Activity   Alcohol Use Yes    Frequency: Monthly or less    Drinks per session: 1 or 2    Binge frequency: Never    Comment: social     Social History     Tobacco Use   Smoking Status Former Smoker    Packs/day: 0 50    Years: 20 00    Pack years: 10 00    Quit date: 36    Years since quittin 0   Smokeless Tobacco Never Used     Social History     Substance and Sexual Activity   Drug Use No       Family History:    Family History   Problem Relation Age of Onset    Diabetes Sister     Diabetes Family     Hypertension Family     No Known Problems Mother     No Known Problems Father        Physical Exam:     Vitals:   Blood Pressure: 128/82 (21)  Pulse: 73 (21)  Temperature: 98 5 °F (36 9 °C) (21)  Temp Source: Temporal (21)  Respirations: 20 (21)  Height: 5' 10" (177 8 cm) (21)  Weight - Scale: 122 kg (268 lb 15 4 oz) (21)  SpO2: 98 % (21)      General: well appearing, no acute distress  HEENT: atraumatic, PERRLA, moist mucosa, normal pharynx, normal tonsils and adenoids, normal tongue, no fluid in sinuses  Neck: Trachea midline, no carotid bruit, no masses  Respiratory: normal chest wall expansion, CTA B, no r/r/w, no rubs  Cardiovascular:  Tachycardic, irregular regularAbdomen: Soft, non-tender, non-distended, normal bowel sounds in all quadrants, no hepatosplenomegaly, no tympany  Rectal: deferred  Musculoskeletal: normal ROM in upper and lower extremities  Integumentary: warm, dry, and pink, with no rash, purpura, or petechia  Heme/Lymph: no lymphadenopathy, no bruises  Neurological: Cranial Nerves II-XII grossly intact  Psychiatric: cooperative with normal mood, affect, and cognition    Additional Data:     Lab Results: I have personally reviewed pertinent reports  Results from last 7 days   Lab Units 01/22/21  1858   WBC Thousand/uL 9 67   HEMOGLOBIN g/dL 15 6   HEMATOCRIT % 46 6   PLATELETS Thousands/uL 256   NEUTROS PCT % 56   LYMPHS PCT % 28   MONOS PCT % 12   EOS PCT % 2     Results from last 7 days   Lab Units 01/22/21  1858   SODIUM mmol/L 135*   POTASSIUM mmol/L 3 7   CHLORIDE mmol/L 101   CO2 mmol/L 28   BUN mg/dL 24   CREATININE mg/dL 1 29   ANION GAP mmol/L 6   CALCIUM mg/dL 8 8   ALBUMIN g/dL 3 4*   TOTAL BILIRUBIN mg/dL 0 38   ALK PHOS U/L 71   ALT U/L 27   AST U/L 17   GLUCOSE RANDOM mg/dL 199*     Results from last 7 days   Lab Units 01/22/21  1858   INR  0 97                   Imaging: I have personally reviewed pertinent reports  XR chest 1 view portable   ED Interpretation by Felipa Livingston MD (01/22 1935)   No acute cardiopulmonary disease  EKG, Pathology, and Other Studies Reviewed on Admission:   · On Cardizem drip, the patient is atrial flutter to rate of 87, no acute ST segment elevations or depressions  Allscripts / Epic Records Reviewed: Yes     ** Please Note: This note has been constructed using a voice recognition system  **    Total critical care time 60 minutes  Total critical care time documented does not include time spent on separately billed procedures or the services of  nurse practioners or physician assistants  I have personally seen and examined the patient  I have reviewed all diagnostic interpretations and treatment plans as written   I was present for the key portions of any procedures performed and the inclusive time noted in any critical care statement  Critical care time includes patient management by me, time spent at the patients bedside, time to review lab and imaging results, discussing patient care, documentation in the medical record, and time spent with the family or caregiver      Meets Critical care criteria based on  Organ System affected cardiac, atrial flutter with rapid ventricular response  Time Spent 60 minutes  Action taken is intravenous diltiazem drip, without this the patient has a life threatening condition for which rapid deterioation is imminent and will lead to potential death if untreated

## 2021-01-24 ENCOUNTER — APPOINTMENT (INPATIENT)
Dept: NON INVASIVE DIAGNOSTICS | Facility: HOSPITAL | Age: 71
DRG: 309 | End: 2021-01-24
Payer: COMMERCIAL

## 2021-01-24 PROBLEM — F10.90 ALCOHOL USE: Status: ACTIVE | Noted: 2021-01-24

## 2021-01-24 PROBLEM — Z72.89 ALCOHOL USE: Status: ACTIVE | Noted: 2021-01-24

## 2021-01-24 PROBLEM — Z78.9 ALCOHOL USE: Status: ACTIVE | Noted: 2021-01-24

## 2021-01-24 LAB
ANION GAP SERPL CALCULATED.3IONS-SCNC: 7 MMOL/L (ref 4–13)
BUN SERPL-MCNC: 14 MG/DL (ref 5–25)
CALCIUM SERPL-MCNC: 8.7 MG/DL (ref 8.3–10.1)
CHLORIDE SERPL-SCNC: 103 MMOL/L (ref 100–108)
CO2 SERPL-SCNC: 26 MMOL/L (ref 21–32)
CREAT SERPL-MCNC: 0.98 MG/DL (ref 0.6–1.3)
ERYTHROCYTE [DISTWIDTH] IN BLOOD BY AUTOMATED COUNT: 13.2 % (ref 11.6–15.1)
GFR SERPL CREATININE-BSD FRML MDRD: 78 ML/MIN/1.73SQ M
GLUCOSE SERPL-MCNC: 106 MG/DL (ref 65–140)
GLUCOSE SERPL-MCNC: 113 MG/DL (ref 65–140)
GLUCOSE SERPL-MCNC: 113 MG/DL (ref 65–140)
GLUCOSE SERPL-MCNC: 117 MG/DL (ref 65–140)
GLUCOSE SERPL-MCNC: 137 MG/DL (ref 65–140)
HCT VFR BLD AUTO: 43.4 % (ref 36.5–49.3)
HGB BLD-MCNC: 14 G/DL (ref 12–17)
MCH RBC QN AUTO: 30.3 PG (ref 26.8–34.3)
MCHC RBC AUTO-ENTMCNC: 32.3 G/DL (ref 31.4–37.4)
MCV RBC AUTO: 94 FL (ref 82–98)
PLATELET # BLD AUTO: 219 THOUSANDS/UL (ref 149–390)
PMV BLD AUTO: 9.7 FL (ref 8.9–12.7)
POTASSIUM SERPL-SCNC: 4 MMOL/L (ref 3.5–5.3)
RBC # BLD AUTO: 4.62 MILLION/UL (ref 3.88–5.62)
SODIUM SERPL-SCNC: 136 MMOL/L (ref 136–145)
WBC # BLD AUTO: 7.9 THOUSAND/UL (ref 4.31–10.16)

## 2021-01-24 PROCEDURE — 93306 TTE W/DOPPLER COMPLETE: CPT

## 2021-01-24 PROCEDURE — 99232 SBSQ HOSP IP/OBS MODERATE 35: CPT | Performed by: FAMILY MEDICINE

## 2021-01-24 PROCEDURE — 82948 REAGENT STRIP/BLOOD GLUCOSE: CPT

## 2021-01-24 PROCEDURE — 80048 BASIC METABOLIC PNL TOTAL CA: CPT | Performed by: INTERNAL MEDICINE

## 2021-01-24 PROCEDURE — 85027 COMPLETE CBC AUTOMATED: CPT | Performed by: INTERNAL MEDICINE

## 2021-01-24 PROCEDURE — 99232 SBSQ HOSP IP/OBS MODERATE 35: CPT | Performed by: INTERNAL MEDICINE

## 2021-01-24 PROCEDURE — C8929 TTE W OR WO FOL WCON,DOPPLER: HCPCS

## 2021-01-24 RX ORDER — LANOLIN ALCOHOL/MO/W.PET/CERES
3 CREAM (GRAM) TOPICAL
Status: DISCONTINUED | OUTPATIENT
Start: 2021-01-24 | End: 2021-01-25 | Stop reason: HOSPADM

## 2021-01-24 RX ORDER — METOPROLOL SUCCINATE 50 MG/1
50 TABLET, EXTENDED RELEASE ORAL DAILY
Status: DISCONTINUED | OUTPATIENT
Start: 2021-01-24 | End: 2021-01-25 | Stop reason: HOSPADM

## 2021-01-24 RX ORDER — ATORVASTATIN CALCIUM 40 MG/1
40 TABLET, FILM COATED ORAL
Status: DISCONTINUED | OUTPATIENT
Start: 2021-01-24 | End: 2021-01-25 | Stop reason: HOSPADM

## 2021-01-24 RX ADMIN — PERFLUTREN 0.4 ML/MIN: 6.52 INJECTION, SUSPENSION INTRAVENOUS at 10:35

## 2021-01-24 RX ADMIN — MELATONIN 3 MG: at 03:28

## 2021-01-24 RX ADMIN — ALLOPURINOL 300 MG: 100 TABLET ORAL at 21:11

## 2021-01-24 RX ADMIN — ATORVASTATIN CALCIUM 40 MG: 40 TABLET, FILM COATED ORAL at 17:44

## 2021-01-24 RX ADMIN — HYDROCHLOROTHIAZIDE: 12.5 TABLET ORAL at 08:57

## 2021-01-24 RX ADMIN — ACETAMINOPHEN 650 MG: 325 TABLET ORAL at 03:31

## 2021-01-24 RX ADMIN — MELATONIN 3 MG: at 21:11

## 2021-01-24 RX ADMIN — METOPROLOL SUCCINATE 50 MG: 50 TABLET, EXTENDED RELEASE ORAL at 08:57

## 2021-01-24 RX ADMIN — RIVAROXABAN 20 MG: 20 TABLET, FILM COATED ORAL at 08:57

## 2021-01-24 NOTE — PLAN OF CARE
Problem: Potential for Falls  Goal: Patient will remain free of falls  Description: INTERVENTIONS:  - Assess patient frequently for physical needs  -  Identify cognitive and physical deficits and behaviors that affect risk of falls    -  [place] fall precautions as indicated by assessment   - Educate patient/family on patient safety including physical limitations  - Instruct patient to call for assistance with activity based on assessment  - Modify environment to reduce risk of injury  - Consider OT/PT consult to assist with strengthening/mobility  Outcome: Progressing     Problem: PAIN - ADULT  Goal: Verbalizes/displays adequate comfort level or baseline comfort level  Description: Interventions:  - Encourage patient to monitor pain and request assistance  - Assess pain using appropriate pain scale  - Administer analgesics based on type and severity of pain and evaluate response  - Implement non-pharmacological measures as appropriate and evaluate response  - Consider cultural and social influences on pain and pain management  - Notify physician/advanced practitioner if interventions unsuccessful or patient reports new pain  Outcome: Progressing     Problem: INFECTION - ADULT  Goal: Absence or prevention of progression during hospitalization  Description: INTERVENTIONS:  - Assess and monitor for signs and symptoms of infection  - Monitor lab/diagnostic results  - Monitor all insertion sites, i e  indwelling lines, tubes, and drains  - Monitor endotracheal if appropriate and nasal secretions for changes in amount and color  - [facility] appropriate cooling/warming therapies per order  - Administer medications as ordered  - Instruct and encourage patient and family to use good hand hygiene technique  - Identify and instruct in appropriate isolation precautions for identified infection/condition  Outcome: Progressing  Goal: Absence of fever/infection during neutropenic period  Description: INTERVENTIONS:  - Monitor WBC    Outcome: Progressing     Problem: SAFETY ADULT  Goal: Patient will remain free of falls  Description: INTERVENTIONS:  - Assess patient frequently for physical needs  -  Identify cognitive and physical deficits and behaviors that affect risk of falls    -  [place] fall precautions as indicated by assessment   - Educate patient/family on patient safety including physical limitations  - Instruct patient to call for assistance with activity based on assessment  - Modify environment to reduce risk of injury  - Consider OT/PT consult to assist with strengthening/mobility  Outcome: Progressing  Goal: Maintain or return to baseline ADL function  Description: INTERVENTIONS:  -  Assess patient's ability to carry out ADLs; assess patient's baseline for ADL function and identify physical deficits which impact ability to perform ADLs (bathing, care of mouth/teeth, toileting, grooming, dressing, etc )  - Assess/evaluate cause of self-care deficits   - Assess range of motion  - Assess patient's mobility; develop plan if impaired  - Assess patient's need for assistive devices and provide as appropriate  - Encourage maximum independence but intervene and supervise when necessary  - Involve family in performance of ADLs  - Assess for home care needs following discharge   - Consider OT consult to assist with ADL evaluation and planning for discharge  - Provide patient education as appropriate  Outcome: Progressing  Goal: Maintain or return mobility status to optimal level  Description: INTERVENTIONS:  - Assess patient's baseline mobility status (ambulation, transfers, stairs, etc )    - Identify cognitive and physical deficits and behaviors that affect mobility  - Identify mobility aids required to assist with transfers and/or ambulation (gait belt, sit-to-stand, lift, walker, cane, etc )  - [place] fall precautions as indicated by assessment  - Record patient progress and toleration of activity level on Mobility SBAR; progress patient to next Phase/Stage  - Instruct patient to call for assistance with activity based on assessment  - Consider rehabilitation consult to assist with strengthening/weightbearing, etc   Outcome: Progressing     Problem: DISCHARGE PLANNING  Goal: Discharge to home or other facility with appropriate resources  Description: INTERVENTIONS:  - Identify barriers to discharge w/patient and caregiver  - Arrange for needed discharge resources and transportation as appropriate  - Identify discharge learning needs (meds, wound care, etc )  - Arrange for interpretive services to assist at discharge as needed  - Refer to Case Management Department for coordinating discharge planning if the patient needs post-hospital services based on physician/advanced practitioner order or complex needs related to functional status, cognitive ability, or social support system  Outcome: Progressing     Problem: Knowledge Deficit  Goal: Patient/family/caregiver demonstrates understanding of disease process, treatment plan, medications, and discharge instructions  Description: Complete learning assessment and assess knowledge base    Interventions:  - Provide teaching at level of understanding  - Provide teaching via preferred learning methods  Outcome: Progressing     Problem: GENITOURINARY - ADULT  Goal: Maintains or returns to baseline urinary function  Description: INTERVENTIONS:  - Assess urinary function  - Encourage oral fluids to ensure adequate hydration if ordered  - Administer IV fluids as ordered to ensure adequate hydration  - Administer ordered medications as needed  - Offer frequent toileting  - Follow urinary retention protocol if ordered  Outcome: Progressing     Problem: METABOLIC, FLUID AND ELECTROLYTES - ADULT  Goal: Fluid balance maintained  Description: INTERVENTIONS:  - Monitor labs   - Monitor I/O and WT  - Instruct patient on fluid and nutrition as appropriate  - Assess for signs & symptoms of volume excess or deficit  Outcome: Progressing     Problem: SKIN/TISSUE INTEGRITY - ADULT  Goal: Skin integrity remains intact  Description: INTERVENTIONS  - Identify patients at risk for skin breakdown  - Assess and monitor skin integrity  - Assess and monitor nutrition and hydration status  - Monitor labs (i e  albumin)  - Assess for incontinence   - Turn and reposition patient  - Assist with mobility/ambulation  - Relieve pressure over bony prominences  - Avoid friction and shearing  - Provide appropriate hygiene as needed including keeping skin clean and dry  - Evaluate need for skin moisturizer/barrier cream  - Collaborate with interdisciplinary team (i e  Nutrition, Rehabilitation, etc )   - Patient/family teaching  Outcome: Progressing     Problem: HEMATOLOGIC - ADULT  Goal: Maintains hematologic stability  Description: INTERVENTIONS  - Assess for signs and symptoms of bleeding or hemorrhage  - Monitor labs  - Administer supportive blood products/factors as ordered and appropriate  Outcome: Progressing

## 2021-01-24 NOTE — ASSESSMENT & PLAN NOTE
Patient presented with intermittent chest pain and discomfort with evidence of atrial flutter with rapid ventricular response  He has been started on intravenous diltiazem with rate control  Echocardiogram ordered  TSH and free T4 normal  He was initially on heparin drip, but currently switched to [facility]  Cardiology recommends to start metoprolol twice a day  Patient has converted to sinus rhythm at this time    Will monitor      Due to slightly elevated cardiac enzymes likely due to rapid ventricular response, patient would benefit from stress test on Monday  Recent Labs     01/22/21  1858 01/22/21  2253 01/23/21  0216   TROPONINI 0 83* 0 99* 0 97*

## 2021-01-24 NOTE — PROGRESS NOTES
Progress Note - Nallely Shaffer 1950, 80 y o  male MRN: [id number]    Unit/Bed#: E4 -01 Encounter: [id number]    Primary Care Provider: Meena Wilder DO   Date and time admitted to hospital: 1/22/2021  6:49 PM        Alcohol use  Assessment & Plan  No signs of withdrawal   Patient was counseled to avoid drinking alcohol to prevent Afib  MANDY on CPAP  Assessment & Plan  Negative COVID  Can start with CPAP at night    Prediabetes  Assessment & Plan  History of prediabetes with hemoglobin A1c of 6 2  Will place sliding scale and basal bolus protocol - monitor blood sugars while admitted    Subclinical hypothyroidism  Assessment & Plan  Patient with history of subclinical hypothyroidism  TSH and free T4 are normal now    Benign essential hypertension  Assessment & Plan  Resume home antihypertensive regimen   Increased metoprolol    Exogenous obesity  Assessment & Plan  Recommend outpatient weight loss    * Atrial flutter [facility])  Assessment & Plan  Patient presented with intermittent chest pain and discomfort with evidence of atrial flutter with rapid ventricular response  He has been started on intravenous diltiazem with rate control  Echocardiogram ordered  TSH and free T4 normal  He was initially on heparin drip, but currently switched to [facility]  Cardiology recommends to start metoprolol twice a day  Patient has converted to sinus rhythm at this time  Will monitor      Due to slightly elevated cardiac enzymes likely due to rapid ventricular response, patient would benefit from stress test on Monday  Recent Labs     01/22/21  1858 01/22/21  2253 01/23/21  0216   TROPONINI 0 83* 0 99* 0 97*             VTE Pharmacologic Prophylaxis:   Pharmacologic: Rivaroxaban (Xarelto)  Mechanical VTE Prophylaxis in Place: Yes    Patient Centered Rounds: I have performed bedside rounds with nursing staff today      Discussions with Specialists or Other Care Team Provider: cardiology    Education and Discussions with Family / Patient: patient    Time Spent for Care: 20 minutes  More than 50% of total time spent on counseling and coordination of care as described above  Current Length of Stay: 2 day(s)    Current Patient Status: Inpatient   Certification Statement: The patient will continue to require additional inpatient hospital stay due to stress test    Discharge Plan: 24 hrs    Code Status: Level 1 - Full Code      Subjective:   Patient was seen and examined  He denies any complaints today  Objective:     Vitals:   Temp (24hrs), Av 2 °F (36 2 °C), Min:96 2 °F (35 7 °C), Max:98 4 °F (36 9 °C)    Temp:  [96 2 °F (35 7 °C)-98 4 °F (36 9 °C)] 96 9 °F (36 1 °C)  HR:  [62-72] 68  Resp:  [18-20] 20  BP: (102-137)/(55-85) 124/72  SpO2:  [95 %-97 %] 95 %  Body mass index is 38 7 kg/m²  Input and Output Summary (last 24 hours): Intake/Output Summary (Last 24 hours) at 2021 1423  Last data filed at 2021 1225  Gross per 24 hour   Intake 200 ml   Output 500 ml   Net -300 ml       Physical Exam:     Physical Exam  Constitutional:       Appearance: He is well-developed  Cardiovascular:      Rate and Rhythm: Normal rate and regular rhythm  Heart sounds: Normal heart sounds  Pulmonary:      Effort: Pulmonary effort is normal  No respiratory distress  Breath sounds: Normal breath sounds  Abdominal:      Tenderness: There is no abdominal tenderness  Musculoskeletal:         General: No deformity  Skin:     General: Skin is warm  Findings: No erythema or rash  Neurological:      Mental Status: He is alert             Additional Data:     Labs:    Results from last 7 days   Lab Units 21  0630  21  1858   WBC Thousand/uL 7 90   < > 9 67   HEMOGLOBIN g/dL 14 0   < > 15 6   HEMATOCRIT % 43 4   < > 46 6   PLATELETS Thousands/uL 219   < > 256   NEUTROS PCT %  --   --  56   LYMPHS PCT %  --   --  28   MONOS PCT %  --   --  12   EOS PCT %  --   --  2    < > = values in this interval not displayed  Results from last 7 days   Lab Units 01/24/21  0630  01/22/21  1858   SODIUM mmol/L 136   < > 135*   POTASSIUM mmol/L 4 0   < > 3 7   CHLORIDE mmol/L 103   < > 101   CO2 mmol/L 26   < > 28   BUN mg/dL 14   < > 24   CREATININE mg/dL 0 98   < > 1 29   ANION GAP mmol/L 7   < > 6   CALCIUM mg/dL 8 7   < > 8 8   ALBUMIN g/dL  --   --  3 4*   TOTAL BILIRUBIN mg/dL  --   --  0 38   ALK PHOS U/L  --   --  71   ALT U/L  --   --  27   AST U/L  --   --  17   GLUCOSE RANDOM mg/dL 113   < > 199*    < > = values in this interval not displayed  Results from last 7 days   Lab Units 01/22/21  1858   INR  0 97     Results from last 7 days   Lab Units 01/24/21  1113 01/24/21  0732 01/23/21  2047 01/23/21  1619 01/23/21  1218 01/23/21  0837   POC GLUCOSE mg/dl 117 113 104 92 114 125                   * I Have Reviewed All Lab Data Listed Above  * Additional Pertinent Lab Tests Reviewed: All Labs Within Last 24 Hours Reviewed    Imaging:      Recent Cultures (last 7 days):           Last 24 Hours Medication List:   Current Facility-Administered Medications   Medication Dose Route Frequency Provider Last Rate    acetaminophen  650 mg Oral Q6H PRN Sathish Greens, DO      allopurinol  300 mg Oral HS Slava Gomez DO      aluminum-magnesium hydroxide-simethicone  30 mL Oral Q6H PRN Sathish Ansari, DO      atorvastatin  40 mg Oral Daily With Leonides Holley MD      calcium carbonate  1,000 mg Oral Daily PRN Sathish Ansari, DO      docusate sodium  100 mg Oral BID Slava Gomez, DO      insulin lispro  1-5 Units Subcutaneous TID AC Slava Gomez, DO      losartan potassium-hydrochlorothiazide (HYZAAR 100/12  5) combo dose   Oral Daily Slava Gomez, DO      melatonin  3 mg Oral HS ALEJANDRO Escobar      metoprolol succinate  50 mg Oral Daily Donna Adler MD      ondansetron  4 mg Intravenous Q6H PRN Sathish Ansari, DO      rivaroxaban  20 mg Oral Daily With Breakfast Donna Adler MD      simethicone 80 mg Oral 4x Daily PRN Zainab Sia, DO          Today, Patient Was Seen By: Benedict Ga MD    ** Please Note: Dictation voice to text software may have been used in the creation of this document   **

## 2021-01-24 NOTE — QUICK NOTE
Please note, scale was not zero'd for 0600 weight; scale was zero'd and 5470 reflects pt's accurate standing weight

## 2021-01-24 NOTE — PROGRESS NOTES
Progress Note - Electrophysiology-Cardiology (EP)   Quita Aguirre 80 y o  male MRN: [id number]  Unit/Bed#: E4 -01 Encounter: [id number]    Assessment:  1  Paroxysmal Atrial flutter w RVR up to 170bpm, flutter waves appeared typical neg saw tooth, pos V1, then went into Atrial fibrillation and broke last night 12:50am in hospital on Diltiazem infusion  Highly symptomatic w SOB, palpitations and Chest pressure  Had similar symptoms intermittently in past but never as long or severe, no prior dx  [id number] (age)  Since starting metoprolol has been in NSR in 62s       2  Troponin elevation 0 98 trending down, type 2, not an MI, demand ischemia from rapid rate      3  Essential HTN: Hypertensive to SBP 180smmHg when admitted, now BP is normalized  4  H/o mild edema and shortness of breath for past year and half       5  MANDY on CPAP     6  Alcohol- drinks heavily intermittently can drink "6 beers plus some bourbon" when watching a game, but then can go weeks without alcohol       7  Obesity    8  Dyslipidemia  LDL 160s    Plan:  1  Increase metoprolol XL 50mg daily for aflutter  2  Cont xarelto  3  continue valsartan/hctz outpatient (here on losartn/hctz) for HTN  4  Start Atorvastatin 40mg randhawa for dyslipidemia  5  TTE today  6  Nuclear stress tomorrow  Principal Problem:    Atrial flutter (HCC)  Active Problems:    Exogenous obesity    Benign essential hypertension    Subclinical hypothyroidism    Prediabetes    MANDY on CPAP    Chief Complaint: f/u aflutter  Subjective: Feels a little short of breath, did not get CPAP last night   No cp, palpitations    Scheduled Meds:  Current Facility-Administered Medications   Medication Dose Route Frequency Provider Last Rate    acetaminophen  650 mg Oral Q6H PRN Isabell Lara, DO      allopurinol  300 mg Oral HS Slava Gomez, DO      aluminum-magnesium hydroxide-simethicone  30 mL Oral Q6H PRN Isabell Lara, DO      atorvastatin  40 mg Oral Daily With [employer] Rose Mary Caicedo MD      calcium carbonate  1,000 mg Oral Daily PRN Zainab Sia, DO      docusate sodium  100 mg Oral BID Slava Gomez, DO      insulin lispro  1-5 Units Subcutaneous TID AC Slava Gomez, DO      losartan potassium-hydrochlorothiazide (HYZAAR 100/12  5) combo dose   Oral Daily Slava Echeverria,       melatonin  3 mg Oral HS Leeta Odor, CRNP      metoprolol succinate  50 mg Oral Daily Marylouise Nyhan, MD      ondansetron  4 mg Intravenous Q6H PRN Zainab Sia, DO      rivaroxaban  20 mg Oral Daily With Breakfast Marylouise Nyhan, MD      simethicone  80 mg Oral 4x Daily PRN Zainab Sia, DO       Continuous Infusions:   PRN Meds:   acetaminophen    aluminum-magnesium hydroxide-simethicone    calcium carbonate    ondansetron    simethicone        ROS: 12 point ROS is done and reviewed,  negative except pertinent positives in CV, above  Vitals: /83 (BP Location: Left arm)   Pulse 62   Temp (!) 96 2 °F (35 7 °C) (Temporal)   Resp 18   Ht 5' 10" (1 778 m)   Wt 122 kg (269 lb 11 2 oz)   SpO2 96%   BMI 38 70 kg/m²   Vitals:    01/24/21 0600 01/24/21 0758   Weight: 112 kg (246 lb 7 6 oz) 122 kg (269 lb 11 2 oz)       Intake/Output Summary (Last 24 hours) at 1/24/2021 0839  Last data filed at 1/24/2021 0601  Gross per 24 hour   Intake 200 ml   Output --   Net 200 ml       GEN: Now acute distress, Alert and oriented, well appearing  HEENT:Head, neck, ears, oral pharynx: Mucus membranes moist, oral pharynx clear, nares clear  External ears normal  EYES: Pupils equal, sclera anicteric, midline, normal conjuctiva  NECK: No JVD, supple, no obvious masses or thryomegaly or goiter  CARDIOVASCULAR: RRR, No murmur, rub, gallops S1,S2  LUNGS: Clear To auscultation bilaterally, normal effort, no rales, rhonchi, crackles  ABDOMEN: Soft, nondistended, nontender, without obvious organomegaly or ascites  EXTREMITIES/VASCULAR: No edema   Radial pulses intact, pedal pulses difficult to palpate, warm an well perfused  PSYCH: Normal Affect, no overt suicidal ideation, linear speech pattern without evidence of psychosis  NEURO: Grossly intact, moving all extremiteis equal, face symmetric, alert and responsive, no obvious focal defecits  HEME: No bleeding, bruising, petechia, purpura  SKIN: No significant rashes, warm, no diaphoresis or pallor  Lab Results:   Lab Results:     CBC with diff:   Results from last 7 days   Lab Units 01/24/21  0630 01/23/21  0533 01/22/21  1858   WBC Thousand/uL 7 90 8 13 9 67   HEMOGLOBIN g/dL 14 0 13 7 15 6   HEMATOCRIT % 43 4 41 2 46 6   MCV fL 94 90 90   PLATELETS Thousands/uL 219 218 256   MCH pg 30 3 29 9 30 2   MCHC g/dL 32 3 33 3 33 5   RDW % 13 2 13 3 13 2   MPV fL 9 7 9 4 9 3   NRBC AUTO /100 WBCs  --   --  0         CMP:  Results from last 7 days   Lab Units 01/24/21  0630 01/23/21  0533 01/22/21  1858   POTASSIUM mmol/L 4 0 3 4* 3 7   CHLORIDE mmol/L 103 105 101   CO2 mmol/L 26 28 28   BUN mg/dL 14 22 24   CREATININE mg/dL 0 98 1 06 1 29   CALCIUM mg/dL 8 7 8 6 8 8   AST U/L  --   --  17   ALT U/L  --   --  27   ALK PHOS U/L  --   --  71   EGFR ml/min/1 73sq m 78 71 56         BMP:  Results from last 7 days   Lab Units 01/24/21  0630 01/23/21  0533 01/22/21  1858   POTASSIUM mmol/L 4 0 3 4* 3 7   CHLORIDE mmol/L 103 105 101   CO2 mmol/L 26 28 28   BUN mg/dL 14 22 24   CREATININE mg/dL 0 98 1 06 1 29   CALCIUM mg/dL 8 7 8 6 8 8       BNP:   Results Reviewed     Procedure Component Value Units Date/Time    TSH, 3rd generation [[id number]]  (Normal) Collected: 01/22/21 1858    Lab Status: Final result Specimen: Blood from Arm, Left Updated: 01/22/21 2217     TSH 3RD GENERATON 2 782 uIU/mL     Narrative:      Patients undergoing fluorescein dye angiography may retain small amounts of fluorescein in the body for 48-72 hours post procedure  Samples containing fluorescein can produce falsely depressed TSH values   If the patient had this procedure,a specimen should be resubmitted post fluorescein clearance  TSH, 3rd generation with Free T4 reflex [[id number]]  (Normal) Collected: 01/22/21 1858    Lab Status: Final result Specimen: Blood from Arm, Left Updated: 01/22/21 2004     TSH 3RD GENERATON 2 832 uIU/mL     Narrative:      Patients undergoing fluorescein dye angiography may retain small amounts of fluorescein in the body for 48-72 hours post procedure  Samples containing fluorescein can produce falsely depressed TSH values  If the patient had this procedure,a specimen should be resubmitted post fluorescein clearance        Protime-INR [[id number]]  (Normal) Collected: 01/22/21 1858    Lab Status: Final result Specimen: Blood from Arm, Left Updated: 01/22/21 2001     Protime 12 7 seconds      INR 0 97    APTT [[id number]]  (Abnormal) Collected: 01/22/21 1858    Lab Status: Final result Specimen: Blood from Arm, Left Updated: 01/22/21 2001     PTT 40 seconds     APTT six (6) hours after Heparin bolus/drip initiation or dosing change [[id number]]     Lab Status: No result Specimen: Blood     Troponin I [[id number]]  (Abnormal) Collected: 01/22/21 1858    Lab Status: Final result Specimen: Blood from Arm, Left Updated: 01/22/21 1943     Troponin I 0 83 ng/mL     Comprehensive metabolic panel [[id number]]  (Abnormal) Collected: 01/22/21 1858    Lab Status: Final result Specimen: Blood from Arm, Left Updated: 01/22/21 1916     Sodium 135 mmol/L      Potassium 3 7 mmol/L      Chloride 101 mmol/L      CO2 28 mmol/L      ANION GAP 6 mmol/L      BUN 24 mg/dL      Creatinine 1 29 mg/dL      Glucose 199 mg/dL      Calcium 8 8 mg/dL      Corrected Calcium 9 3 mg/dL      AST 17 U/L      ALT 27 U/L      Alkaline Phosphatase 71 U/L      Total Protein 7 3 g/dL      Albumin 3 4 g/dL      Total Bilirubin 0 38 mg/dL      eGFR 56 ml/min/1 73sq m     Narrative:      [place] guidelines for Chronic Kidney Disease (CKD):     Stage 1 with normal or high GFR (GFR > 90 mL/min/1 73 square meters)    Stage 2 Mild CKD (GFR = 60-89 mL/min/1 73 square meters)    Stage 3A Moderate CKD (GFR = 45-59 mL/min/1 73 square meters)    Stage 3B Moderate CKD (GFR = 30-44 mL/min/1 73 square meters)    Stage 4 Severe CKD (GFR = 15-29 mL/min/1 73 square meters)    Stage 5 End Stage CKD (GFR <15 mL/min/1 73 square meters)  Note: GFR calculation is accurate only with a steady state creatinine    CBC and differential [[id number]] Collected: 01/22/21 1858    Lab Status: Final result Specimen: Blood from Arm, Left Updated: 01/22/21 1902     WBC 9 67 Thousand/uL      RBC 5 16 Million/uL      Hemoglobin 15 6 g/dL      Hematocrit 46 6 %      MCV 90 fL      MCH 30 2 pg      MCHC 33 5 g/dL      RDW 13 2 %      MPV 9 3 fL      Platelets 420 Thousands/uL      nRBC 0 /100 WBCs      Neutrophils Relative 56 %      Immat GRANS % 1 %      Lymphocytes Relative 28 %      Monocytes Relative 12 %      Eosinophils Relative 2 %      Basophils Relative 1 %      Neutrophils Absolute 5 43 Thousands/µL      Immature Grans Absolute 0 05 Thousand/uL      Lymphocytes Absolute 2 73 Thousands/µL      Monocytes Absolute 1 18 Thousand/µL      Eosinophils Absolute 0 22 Thousand/µL      Basophils Absolute 0 06 Thousands/µL         No results for input(s): BNP in the last 72 hours  Results from last 7 days   Lab Units 01/23/21  0216 01/22/21  2253 01/22/21  1858   TROPONIN I ng/mL 0 97* 0 99* 0 83*         Magnesium:   Results from last 7 days   Lab Units 01/23/21  0533   MAGNESIUM mg/dL 2 1       Coags:   Results from last 7 days   Lab Units 01/23/21  0850 01/23/21  0216 01/22/21  1858   PTT seconds 78* 69* 40*   INR   --   --  0 97       TSH:       Lipid Profile:   Results from last 7 days   Lab Units 01/23/21  1331   TRIGLYCERIDES mg/dL 219*   HDL mg/dL 46       Cardiac testing:   No results found for this or any previous visit  No results found for this or any previous visit  No results found for this or any previous visit    No results found for this or any previous visit        EKG/TELE: NSR hr 60s

## 2021-01-25 ENCOUNTER — APPOINTMENT (INPATIENT)
Dept: NON INVASIVE DIAGNOSTICS | Facility: HOSPITAL | Age: 71
DRG: 309 | End: 2021-01-25
Payer: COMMERCIAL

## 2021-01-25 ENCOUNTER — APPOINTMENT (INPATIENT)
Dept: NUCLEAR MEDICINE | Facility: HOSPITAL | Age: 71
DRG: 309 | End: 2021-01-25
Payer: COMMERCIAL

## 2021-01-25 VITALS
TEMPERATURE: 96.6 F | HEIGHT: 70 IN | DIASTOLIC BLOOD PRESSURE: 65 MMHG | BODY MASS INDEX: 38.88 KG/M2 | RESPIRATION RATE: 18 BRPM | OXYGEN SATURATION: 97 % | SYSTOLIC BLOOD PRESSURE: 116 MMHG | HEART RATE: 67 BPM | WEIGHT: 271.61 LBS

## 2021-01-25 PROBLEM — R77.8 ELEVATED TROPONIN: Status: ACTIVE | Noted: 2021-01-25

## 2021-01-25 PROBLEM — E78.5 DYSLIPIDEMIA: Status: ACTIVE | Noted: 2021-01-25

## 2021-01-25 PROBLEM — R79.89 ELEVATED TROPONIN: Status: ACTIVE | Noted: 2021-01-25

## 2021-01-25 PROBLEM — F10.90 HABITUAL ALCOHOL USE: Status: ACTIVE | Noted: 2021-01-24

## 2021-01-25 LAB
ANION GAP SERPL CALCULATED.3IONS-SCNC: 2 MMOL/L (ref 4–13)
BUN SERPL-MCNC: 14 MG/DL (ref 5–25)
CALCIUM SERPL-MCNC: 8.9 MG/DL (ref 8.3–10.1)
CHLORIDE SERPL-SCNC: 103 MMOL/L (ref 100–108)
CO2 SERPL-SCNC: 33 MMOL/L (ref 21–32)
CREAT SERPL-MCNC: 0.97 MG/DL (ref 0.6–1.3)
ERYTHROCYTE [DISTWIDTH] IN BLOOD BY AUTOMATED COUNT: 13.1 % (ref 11.6–15.1)
GFR SERPL CREATININE-BSD FRML MDRD: 79 ML/MIN/1.73SQ M
GLUCOSE SERPL-MCNC: 111 MG/DL (ref 65–140)
GLUCOSE SERPL-MCNC: 118 MG/DL (ref 65–140)
GLUCOSE SERPL-MCNC: 136 MG/DL (ref 65–140)
GLUCOSE SERPL-MCNC: 88 MG/DL (ref 65–140)
HCT VFR BLD AUTO: 43.9 % (ref 36.5–49.3)
HGB BLD-MCNC: 14.4 G/DL (ref 12–17)
MCH RBC QN AUTO: 29.9 PG (ref 26.8–34.3)
MCHC RBC AUTO-ENTMCNC: 32.8 G/DL (ref 31.4–37.4)
MCV RBC AUTO: 91 FL (ref 82–98)
PLATELET # BLD AUTO: 252 THOUSANDS/UL (ref 149–390)
PMV BLD AUTO: 9.8 FL (ref 8.9–12.7)
POTASSIUM SERPL-SCNC: 3.7 MMOL/L (ref 3.5–5.3)
RBC # BLD AUTO: 4.82 MILLION/UL (ref 3.88–5.62)
SODIUM SERPL-SCNC: 138 MMOL/L (ref 136–145)
WBC # BLD AUTO: 9.15 THOUSAND/UL (ref 4.31–10.16)

## 2021-01-25 PROCEDURE — 93017 CV STRESS TEST TRACING ONLY: CPT

## 2021-01-25 PROCEDURE — 99239 HOSP IP/OBS DSCHRG MGMT >30: CPT | Performed by: INTERNAL MEDICINE

## 2021-01-25 PROCEDURE — 93018 CV STRESS TEST I&R ONLY: CPT | Performed by: INTERNAL MEDICINE

## 2021-01-25 PROCEDURE — 85027 COMPLETE CBC AUTOMATED: CPT | Performed by: INTERNAL MEDICINE

## 2021-01-25 PROCEDURE — 99232 SBSQ HOSP IP/OBS MODERATE 35: CPT | Performed by: INTERNAL MEDICINE

## 2021-01-25 PROCEDURE — 93016 CV STRESS TEST SUPVJ ONLY: CPT | Performed by: INTERNAL MEDICINE

## 2021-01-25 PROCEDURE — A9502 TC99M TETROFOSMIN: HCPCS

## 2021-01-25 PROCEDURE — 80048 BASIC METABOLIC PNL TOTAL CA: CPT | Performed by: INTERNAL MEDICINE

## 2021-01-25 PROCEDURE — 78452 HT MUSCLE IMAGE SPECT MULT: CPT | Performed by: INTERNAL MEDICINE

## 2021-01-25 PROCEDURE — 82948 REAGENT STRIP/BLOOD GLUCOSE: CPT

## 2021-01-25 PROCEDURE — 78452 HT MUSCLE IMAGE SPECT MULT: CPT

## 2021-01-25 PROCEDURE — G1004 CDSM NDSC: HCPCS

## 2021-01-25 RX ORDER — ATORVASTATIN CALCIUM 40 MG/1
40 TABLET, FILM COATED ORAL DAILY
Qty: 30 TABLET | Refills: 0 | Status: SHIPPED | OUTPATIENT
Start: 2021-01-25 | End: 2021-02-22 | Stop reason: SDUPTHER

## 2021-01-25 RX ORDER — METOPROLOL SUCCINATE 50 MG/1
50 TABLET, EXTENDED RELEASE ORAL DAILY
Qty: 30 TABLET | Refills: 0 | Status: SHIPPED | OUTPATIENT
Start: 2021-01-26 | End: 2021-02-22 | Stop reason: SDUPTHER

## 2021-01-25 RX ADMIN — RIVAROXABAN 20 MG: 20 TABLET, FILM COATED ORAL at 11:23

## 2021-01-25 RX ADMIN — DOCUSATE SODIUM 100 MG: 100 CAPSULE, LIQUID FILLED ORAL at 11:24

## 2021-01-25 RX ADMIN — ATORVASTATIN CALCIUM 40 MG: 40 TABLET, FILM COATED ORAL at 17:07

## 2021-01-25 RX ADMIN — HYDROCHLOROTHIAZIDE: 12.5 TABLET ORAL at 11:25

## 2021-01-25 RX ADMIN — METOPROLOL SUCCINATE 50 MG: 50 TABLET, EXTENDED RELEASE ORAL at 11:23

## 2021-01-25 RX ADMIN — REGADENOSON 0.4 MG: 0.08 INJECTION, SOLUTION INTRAVENOUS at 10:21

## 2021-01-25 NOTE — ASSESSMENT & PLAN NOTE
Currently in SR rate controlled on Toprol-XL 50 mg daily  Fully anticoagulated on Xarelto  [facility]

## 2021-01-25 NOTE — ASSESSMENT & PLAN NOTE
Currently in SR rate controlled on Toprol-XL 50 mg daily  Fully anticoagulated on Xarelto  Prescription sent to \[facility]\"" to determine insurance coverage

## 2021-01-25 NOTE — PROGRESS NOTES
Progress Note - Mi Healy 1950, 80 y o  male MRN: [id number]    Unit/Bed#: E4 -01 Encounter: [id number]    Primary Care Provider: Janelle Gordon DO   Date and time admitted to hospital: 2021  6:49 PM        * Atrial flutter ([address] )  Assessment & Plan  Currently in SR rate controlled on Toprol-XL 50 mg daily  Fully anticoagulated on Xarelto  Prescription sent to [facility] to determine insurance coverage    Elevated troponin  Assessment & Plan  Likely due to rapid atrial flutter  Stress test done today  Results pending    Habitual alcohol use  Assessment & Plan  Discussed regularly accepted daily alcohol usage  He acknowledges that he is drinking more than that  Discussed cutting down on alcohol use to prevent exacerbation/RVR    Benign essential hypertension  Assessment & Plan  Continue Toprol XL 50 mg daily with hold parameters    MANDY on CPAP  Assessment & Plan  Resume CPAP at night      VTE Pharmacologic Prophylaxis:   Pharmacologic: Rivaroxaban (Xarelto)  Mechanical VTE Prophylaxis in Place: No    Patient Centered Rounds: I have performed bedside rounds with nursing staff today  Discussions with Specialists or Other Care Team Provider:  Hospital course reviewed    Education and Discussions with Family / Patient: spoke with wife     Time Spent for Care: 25  More than 50% of total time spent on counseling and coordination of care as described above      Current Length of Stay: 3 day(s)    Current Patient Status: Inpatient   Certification Statement: The patient will continue to require additional inpatient hospital stay due to stress    Discharge Plan: possible dc today or tomorrow    Code Status: Level 1 - Full Code      Subjective:   Seen and examined earlier  No CP or sob    Objective:     Vitals:   Temp (24hrs), Av 4 °F (36 3 °C), Min:96 7 °F (35 9 °C), Max:98 3 °F (36 8 °C)    Temp:  [96 7 °F (35 9 °C)-98 3 °F (36 8 °C)] 97 1 °F (36 2 °C)  HR:  [62-63] 62  Resp:  [16-20] 16  BP: (113-130)/(59-76) 127/76  SpO2:  [95 %-97 %] 97 %  Body mass index is 38 97 kg/m²  Input and Output Summary (last 24 hours): Intake/Output Summary (Last 24 hours) at 1/25/2021 1509  Last data filed at 1/25/2021 1136  Gross per 24 hour   Intake --   Output 2700 ml   Net -2700 ml       Physical Exam:     Physical Exam  Constitutional:       Appearance: He is obese  He is not ill-appearing or diaphoretic  HENT:      Head: Normocephalic  Nose: No rhinorrhea  Eyes:      General: No scleral icterus  Neck:      Musculoskeletal: Neck supple  Cardiovascular:      Rate and Rhythm: Regular rhythm  Heart sounds: No murmur  No gallop  Pulmonary:      Effort: Pulmonary effort is normal    Abdominal:      Palpations: Abdomen is soft  Comments: Large protuberant   Musculoskeletal:      Right lower leg: No edema  Left lower leg: No edema  Skin:     General: Skin is warm and dry  Neurological:      Mental Status: He is alert  Mental status is at baseline  Psychiatric:         Mood and Affect: Mood normal          Behavior: Behavior normal      Additional Data:     Labs:    Results from last 7 days   Lab Units 01/25/21  0518  01/22/21  1858   WBC Thousand/uL 9 15   < > 9 67   HEMOGLOBIN g/dL 14 4   < > 15 6   HEMATOCRIT % 43 9   < > 46 6   PLATELETS Thousands/uL 252   < > 256   NEUTROS PCT %  --   --  56   LYMPHS PCT %  --   --  28   MONOS PCT %  --   --  12   EOS PCT %  --   --  2    < > = values in this interval not displayed       Results from last 7 days   Lab Units 01/25/21  0518  01/22/21  1858   SODIUM mmol/L 138   < > 135*   POTASSIUM mmol/L 3 7   < > 3 7   CHLORIDE mmol/L 103   < > 101   CO2 mmol/L 33*   < > 28   BUN mg/dL 14   < > 24   CREATININE mg/dL 0 97   < > 1 29   ANION GAP mmol/L 2*   < > 6   CALCIUM mg/dL 8 9   < > 8 8   ALBUMIN g/dL  --   --  3 4*   TOTAL BILIRUBIN mg/dL  --   --  0 38   ALK PHOS U/L  --   --  71   ALT U/L  --   --  27   AST U/L  --   --  17   GLUCOSE RANDOM mg/dL 111   < > 199*    < > = values in this interval not displayed  Results from last 7 days   Lab Units 01/22/21  1858   INR  0 97     Results from last 7 days   Lab Units 01/25/21  1132 01/25/21  0716 01/24/21  1956 01/24/21  1550 01/24/21  1113 01/24/21  0732 01/23/21  2047 01/23/21  1619 01/23/21  1218 01/23/21  0837   POC GLUCOSE mg/dl 88 118 137 106 117 113 104 92 114 125                   * I Have Reviewed All Lab Data Listed Above  * Additional Pertinent Lab Tests Reviewed: All Labs Within Last 24 Hours Reviewed    Imaging:    Imaging Reports Reviewed Today Include: echo    Recent Cultures (last 7 days):           Last 24 Hours Medication List:   Current Facility-Administered Medications   Medication Dose Route Frequency Provider Last Rate    acetaminophen  650 mg Oral Q6H PRN Merline Massing, DO      allopurinol  300 mg Oral HS Slava Gomez, DO      aluminum-magnesium hydroxide-simethicone  30 mL Oral Q6H PRN Merline Massing, DO      atorvastatin  40 mg Oral Daily With Jordan Milligan MD      calcium carbonate  1,000 mg Oral Daily PRN Merline Massing, DO      docusate sodium  100 mg Oral BID Slava Gomez DO      insulin lispro  1-5 Units Subcutaneous TID AC Slava Gomez, DO      losartan potassium-hydrochlorothiazide (HYZAAR 100/12  5) combo dose   Oral Daily Slava Gomez, DO      melatonin  3 mg Oral HS ALEJANDRO Escobar      metoprolol succinate  50 mg Oral Daily Malathi Solorzano MD      ondansetron  4 mg Intravenous Q6H PRN Merline Massing, DO      rivaroxaban  20 mg Oral Daily With Breakfast Malathi Solorzano MD      simethicone  80 mg Oral 4x Daily PRN Merline Massing, DO          Today, Patient Was Seen By: Silva Cervantes MD    ** Please Note: Dictation voice to text software may have been used in the creation of this document   **

## 2021-01-25 NOTE — CASE MANAGEMENT
LOS: 2 DAYS   BUNDLE: NO  READMISSION: NO  UNPLANNED READMISSION SCORE: 8 AND GREEN    CM met with patient at bedside  Explained role of CM to pt  CM obtained the following information from the patient  HOME: 2 SH w/ no matthew to enter home  LIVES WITH: wife  ADLs:  Independent with all ADL's  MEALS: Independent and wife assist with meals  DME: [facility]  HHC/VNA: No history  STR: No history  MH concerns: Denies history  D&A concerns: Denies history  Pt has history of ETOH abuse in the chart  INCOME SOURCE: Retired  PCP: Dr Hemant Holley: [facility]:  No LW  Wife is the emergency contact  DRIVES: Yes  TRANSPORT at DC: Wife will transport at d/c      CM reviewed d/c planning process including the following: identifying help at home, patient preference for d/c planning needs, Discharge Lounge, [facility] Meds to Bed program, availability of treatment team to discuss questions or concerns patient and/or family may have regarding understanding medications and recognizing signs and symptoms once discharged  CM also encouraged patient to follow up with all recommended appointments after discharge  Patient advised of importance for patient and family to participate in managing patients medical well being  Cm reviewed pt care coordination rounds with Dr David Russell  Pt is not medically cleared for d/c  Pt is for a stress test today  Script for Xarelto was sent to [address] [place] [address] for simon check  Cost of Xarelto will be $30/mo and pt is Eligible to use copay on the 1st fill  Cm informed MD of above  Cm department will continue to follow patient through discharge

## 2021-01-25 NOTE — DISCHARGE INSTR - AVS FIRST PAGE
· Your diagnosed with and abnormal heart rhythm called atrial flutter  · You were started on Xarelto for stroke prevention as atrial flutter can increase your stroke risk  · You were also started on metoprolol to control the atrial flutter  · The heart doctor will call you regarding her follow-up appointment  · Return to the emergency department if you develop any uncontrolled bleeding

## 2021-01-25 NOTE — ASSESSMENT & PLAN NOTE
Discussed regularly accepted daily alcohol usage  He acknowledges that he is drinking more than that    Discussed cutting down on alcohol use to prevent exacerbation/RVR

## 2021-01-25 NOTE — PLAN OF CARE
Problem: Potential for Falls  Goal: Patient will remain free of falls  Description: INTERVENTIONS:  - Assess patient frequently for physical needs  -  Identify cognitive and physical deficits and behaviors that affect risk of falls    -  [place] fall precautions as indicated by assessment   - Educate patient/family on patient safety including physical limitations  - Instruct patient to call for assistance with activity based on assessment  - Modify environment to reduce risk of injury  - Consider OT/PT consult to assist with strengthening/mobility  Outcome: Progressing     Problem: PAIN - ADULT  Goal: Verbalizes/displays adequate comfort level or baseline comfort level  Description: Interventions:  - Encourage patient to monitor pain and request assistance  - Assess pain using appropriate pain scale  - Administer analgesics based on type and severity of pain and evaluate response  - Implement non-pharmacological measures as appropriate and evaluate response  - Consider cultural and social influences on pain and pain management  - Notify physician/advanced practitioner if interventions unsuccessful or patient reports new pain  Outcome: Progressing     Problem: INFECTION - ADULT  Goal: Absence or prevention of progression during hospitalization  Description: INTERVENTIONS:  - Assess and monitor for signs and symptoms of infection  - Monitor lab/diagnostic results  - Monitor all insertion sites, i e  indwelling lines, tubes, and drains  - Monitor endotracheal if appropriate and nasal secretions for changes in amount and color  - [place] appropriate cooling/warming therapies per order  - Administer medications as ordered  - Instruct and encourage patient and family to use good hand hygiene technique  - Identify and instruct in appropriate isolation precautions for identified infection/condition  Outcome: Progressing  Goal: Absence of fever/infection during neutropenic period  Description: INTERVENTIONS:  - Monitor WBC    Outcome: Progressing     Problem: SAFETY ADULT  Goal: Patient will remain free of falls  Description: INTERVENTIONS:  - Assess patient frequently for physical needs  -  Identify cognitive and physical deficits and behaviors that affect risk of falls  -  [place] fall precautions as indicated by assessment   - Educate patient/family on patient safety including physical limitations  - Instruct patient to call for assistance with activity based on assessment  - Modify environment to reduce risk of injury  - Consider OT/PT consult to assist with strengthening/mobility  Outcome: Progressing  Goal: Maintain or return to baseline ADL function  Description: INTERVENTIONS:  - Assess patient frequently for physical needs  -  Identify cognitive and physical deficits and behaviors that affect risk of falls    -  [place] fall precautions as indicated by assessment   - Educate patient/family on patient safety including physical limitations  - Instruct patient to call for assistance with activity based on assessment  - Modify environment to reduce risk of injury  - Consider OT/PT consult to assist with strengthening/mobility  Outcome: Progressing  Goal: Maintain or return mobility status to optimal level  Description: INTERVENTIONS:  -  Assess patient's ability to carry out ADLs; assess patient's baseline for ADL function and identify physical deficits which impact ability to perform ADLs (bathing, care of mouth/teeth, toileting, grooming, dressing, etc )  - Assess/evaluate cause of self-care deficits   - Assess range of motion  - Assess patient's mobility; develop plan if impaired  - Assess patient's need for assistive devices and provide as appropriate  - Encourage maximum independence but intervene and supervise when necessary  - Involve family in performance of ADLs  - Assess for home care needs following discharge   - Consider OT consult to assist with ADL evaluation and planning for discharge  - Provide patient education as appropriate  Outcome: Progressing     Problem: DISCHARGE PLANNING  Goal: Discharge to home or other facility with appropriate resources  Description: INTERVENTIONS:  - Identify barriers to discharge w/patient and caregiver  - Arrange for needed discharge resources and transportation as appropriate  - Identify discharge learning needs (meds, wound care, etc )  - Arrange for interpretive services to assist at discharge as needed  - Refer to Case Management Department for coordinating discharge planning if the patient needs post-hospital services based on physician/advanced practitioner order or complex needs related to functional status, cognitive ability, or social support system  Outcome: Progressing     Problem: Knowledge Deficit  Goal: Patient/family/caregiver demonstrates understanding of disease process, treatment plan, medications, and discharge instructions  Description: Complete learning assessment and assess knowledge base    Interventions:  - Provide teaching at level of understanding  - Provide teaching via preferred learning methods  Outcome: Progressing     Problem: GENITOURINARY - ADULT  Goal: Maintains or returns to baseline urinary function  Description: INTERVENTIONS:  - Assess urinary function  - Encourage oral fluids to ensure adequate hydration if ordered  - Administer IV fluids as ordered to ensure adequate hydration  - Administer ordered medications as needed  - Offer frequent toileting  - Follow urinary retention protocol if ordered  Outcome: Progressing     Problem: METABOLIC, FLUID AND ELECTROLYTES - ADULT  Goal: Fluid balance maintained  Description: INTERVENTIONS:  - Monitor labs   - Monitor I/O and WT  - Instruct patient on fluid and nutrition as appropriate  - Assess for signs & symptoms of volume excess or deficit  Outcome: Progressing     Problem: SKIN/TISSUE INTEGRITY - ADULT  Goal: Skin integrity remains intact  Description: INTERVENTIONS  - Identify patients at risk for skin breakdown  - Assess and monitor skin integrity  - Assess and monitor nutrition and hydration status  - Monitor labs (i e  albumin)  - Assess for incontinence   - Turn and reposition patient  - Assist with mobility/ambulation  - Relieve pressure over bony prominences  - Avoid friction and shearing  - Provide appropriate hygiene as needed including keeping skin clean and dry  - Evaluate need for skin moisturizer/barrier cream  - Collaborate with interdisciplinary team (i e  Nutrition, Rehabilitation, etc )   - Patient/family teaching  Outcome: Progressing     Problem: HEMATOLOGIC - ADULT  Goal: Maintains hematologic stability  Description: INTERVENTIONS  - Assess for signs and symptoms of bleeding or hemorrhage  - Monitor labs  - Administer supportive blood products/factors as ordered and appropriate  Outcome: Progressing

## 2021-01-25 NOTE — PROGRESS NOTES
Cardiology Progress Note   Minerva Bosworth, MD Gerilyn Bud, MD Matilda Hidden, DO, Hebert Bio Mansoor, MD Francetta Cole, DO, Arpita Richardson DO, [facility]  ----------------------------------------------------------------  [address] [address] [address] 80 y o  male MRN: [id number]  Unit/Bed#: E4 -01 Encounter: [id number]      ASSESSMENT:    Paroxysmal atrial flutter with RVR   Shortness of breath dyspnea on exertion secondary to paroxysmal atrial fibrillation   Elevated troponin secondary to type 2 myocardial injury from rapid atrial fibrillation   LVEF 60%, mild LA dilatation, trace TR, January 2021   Pharmacologic nuclear stress test negative for myocardial ischemia, January 2021   Hypertension   MANDY on CPAP   Alcohol use   Obesity   Dyslipidemia    PLAN:  Patient without further atrial flutter on telemetry  Echocardiogram demonstrates normal left ventricular function with only mild left atrial dilatation  Pharmacologic nuclear stress test was found to be negative for myocardial ischemia  He remains asymptomatic and feels back to his baseline  Continue Xarelto and Toprol-XL for atrial fibrillation  Statin therapy  Losartan and hydrochlorothiazide combination for blood pressure control; blood pressure is controlled today  Should the patient have worsening symptoms in frequency or severity or change in quality, I have instructed him to seek immediate medical attention/dial 911  Outpatient follow-up with electrophysiology to discuss possible ablation and follow-up with primary Cardiology in the office for additional CV testing as clinically indicated    Signed: Diya Ramirez DO, [facility]      History of Present Illness:  Patient seen and examined  Denies chest pain, pressure, tightness or squeezing  Denies lightheadedness, dizziness or palpitations  Denies lower extremity swelling, orthopnea or paroxysmal nocturnal dyspnea        Review of Systems:  Review of Systems   Constitution: Negative for decreased appetite, fever, weight gain and weight loss  HENT: Negative for congestion and sore throat  Eyes: Negative for visual disturbance  Cardiovascular: Negative for chest pain, dyspnea on exertion, leg swelling, near-syncope and palpitations  Respiratory: Negative for cough and shortness of breath  Hematologic/Lymphatic: Negative for bleeding problem  Skin: Negative for rash  Musculoskeletal: Negative for myalgias and neck pain  Gastrointestinal: Negative for abdominal pain and nausea  Neurological: Negative for light-headedness and weakness  Psychiatric/Behavioral: Negative for depression  Allergies   Allergen Reactions    Levaquin [Levofloxacin] Arthralgia       No current facility-administered medications on file prior to encounter  Current Outpatient Medications on File Prior to Encounter   Medication Sig    allopurinol (ZYLOPRIM) 300 mg tablet TAKE 1 TABLET BY MOUTH DAILY AT BEDTIME    valsartan-hydrochlorothiazide (DIOVAN-HCT) 160-12 5 MG per tablet TAKE 1 TABLET BY MOUTH EVERY DAY        Current Facility-Administered Medications   Medication Dose Route Frequency Provider Last Rate    acetaminophen  650 mg Oral Q6H PRN [facility], DO      allopurinol  300 mg Oral HS Slava Gomez DO      aluminum-magnesium hydroxide-simethicone  30 mL Oral Q6H PRN [facility], DO      atorvastatin  40 mg Oral Daily With Eliana Story MD      calcium carbonate  1,000 mg Oral Daily PRN [facility], DO      docusate sodium  100 mg Oral BID Slava Gomez DO      insulin lispro  1-5 Units Subcutaneous TID AC Slava Gomez DO      losartan potassium-hydrochlorothiazide (HYZAAR 100/12  5) combo dose   Oral Daily Slava Gomez DO      melatonin  3 mg Oral HS ALEJANDRO Escobar      metoprolol succinate  50 mg Oral Daily Nicki Connelly MD      on[facility]etron  4 [facility]venous Q6H PRN [facility], [facility]      rivaroxaban  20 mg Oral Daily With Breakfast Kylah Munguia MD      simethicone  80 mg Oral 4x Daily PRN Sherry Arias, DO              Vitals:    01/24/21 2001 01/24/21 2300 01/25/21 0600 01/25/21 0712   BP: 130/68 114/65  127/76   BP Location: Right arm Right arm  Left arm   Pulse: 63 63  62   Resp: 20 19  16   Temp: (!) 97 3 °F (36 3 °C) 98 3 °F (36 8 °C)  (!) 97 1 °F (36 2 °C)   TempSrc: Tympanic Temporal  Temporal   SpO2: 96% 96%  97%   Weight:   123 kg (271 lb 9 7 oz)    Height:             Intake/Output Summary (Last 24 hours) at 1/25/2021 8412  Last data filed at 1/24/2021 2101  Gross per 24 hour   Intake --   Output 2200 ml   Net -2200 ml       Weight change: 10 5 kg (23 lb 3 6 oz)    PHYSICAL EXAMINATION:  Gen: Awake, Alert, NAD  HEENT: AT/NC, Anicteric, mmm  Neck: Supple, No elevated JVP  Resp: CTA bilaterally no w/r/r  CV: RRR +S1, S2, No m/r/g  Abd: Soft, NT/ND + BS  Ext: warm, no LE edema bilaterally  Neuro: Follows commands, moves all extermities  Psych: Appropriate affect    Lab Results:  Results from last 7 days   Lab Units 01/25/21  0518   WBC Thousand/uL 9 15   HEMOGLOBIN g/dL 14 4   HEMATOCRIT % 43 9   PLATELETS Thousands/uL 252     Results from last 7 days   Lab Units 01/25/21  0518  01/22/21  1858   POTASSIUM mmol/L 3 7   < > 3 7   CHLORIDE mmol/L 103   < > 101   CO2 mmol/L 33*   < > 28   BUN mg/dL 14   < > 24   CREATININE mg/dL 0 97   < > 1 29   CALCIUM mg/dL 8 9   < > 8 8   ALK PHOS U/L  --   --  71   ALT U/L  --   --  27   AST U/L  --   --  17    < > = values in this interval not displayed  No results found for: TROPONINT      Results from last 7 days   Lab Units 01/23/21  0216 01/22/21  2253 01/22/21  1858   TROPONIN I ng/mL 0 97* 0 99* 0 83*     Results from last 7 days   Lab Units 01/22/21  1858   INR  0 97       Tele: SR    This note was completed in part utilizing [employer] Direct Software    Grammatical errors, random word insertions, spelling mistakes, and incomplete sentences may be an occasional consequence of this system secondary to software limitations, ambient noise, and hardware issues  If you have any questions or concerns about the content, text, or information contained within the body of this dictation, please contact the provider for clarification

## 2021-01-25 NOTE — OCCUPATIONAL THERAPY NOTE
Occupational Therapy Cancellation Note        Patient Name: Karla Gonzalez  GPCDJ'H Date: 1/25/2021        OT consult received and pt off floor at stress test  Will continue to follow to address OT POC as appropriate      Calvin Whittington MS, OTR/L

## 2021-01-25 NOTE — PLAN OF CARE
Problem: Potential for Falls  Goal: Patient will remain free of falls  Description: INTERVENTIONS:  - Assess patient frequently for physical needs  -  Identify cognitive and physical deficits and behaviors that affect risk of falls    -  [place] fall precautions as indicated by assessment   - Educate patient/family on patient safety including physical limitations  - Instruct patient to call for assistance with activity based on assessment  - Modify environment to reduce risk of injury  - Consider OT/PT consult to assist with strengthening/mobility  1/25/2021 1750 by Lisa Lebron RN  Outcome: Completed  1/25/2021 1335 by Lisa Lebron RN  Outcome: Progressing     Problem: PAIN - ADULT  Goal: Verbalizes/displays adequate comfort level or baseline comfort level  Description: Interventions:  - Encourage patient to monitor pain and request assistance  - Assess pain using appropriate pain scale  - Administer analgesics based on type and severity of pain and evaluate response  - Implement non-pharmacological measures as appropriate and evaluate response  - Consider cultural and social influences on pain and pain management  - Notify physician/advanced practitioner if interventions unsuccessful or patient reports new pain  1/25/2021 1750 by Lisa Lebron RN  Outcome: Completed  1/25/2021 1335 by Lisa Lebron RN  Outcome: Progressing     Problem: INFECTION - ADULT  Goal: Absence or prevention of progression during hospitalization  Description: INTERVENTIONS:  - Assess and monitor for signs and symptoms of infection  - Monitor lab/diagnostic results  - Monitor all insertion sites, i e  indwelling lines, tubes, and drains  - Monitor endotracheal if appropriate and nasal secretions for changes in amount and color  - [place] appropriate cooling/warming therapies per order  - Administer medications as ordered  - Instruct and encourage patient and family to use good hand hygiene technique  - Identify and instruct in appropriate isolation precautions for identified infection/condition  1/25/2021 1750 by Swapnil Fenton RN  Outcome: Completed  1/25/2021 1335 by Swapnil Fenton RN  Outcome: Progressing  Goal: Absence of fever/infection during neutropenic period  Description: INTERVENTIONS:  - Monitor WBC    1/25/2021 1750 by Swapnil Fenton RN  Outcome: Completed  1/25/2021 1335 by Swapnil Fenton RN  Outcome: Progressing     Problem: SAFETY ADULT  Goal: Patient will remain free of falls  Description: INTERVENTIONS:  - Assess patient frequently for physical needs  -  Identify cognitive and physical deficits and behaviors that affect risk of falls    -  [place] fall precautions as indicated by assessment   - Educate patient/family on patient safety including physical limitations  - Instruct patient to call for assistance with activity based on assessment  - Modify environment to reduce risk of injury  - Consider OT/PT consult to assist with strengthening/mobility  1/25/2021 1750 by Swapnil Fenton RN  Outcome: Completed  1/25/2021 1335 by Swapnil Fenton RN  Outcome: Progressing  Goal: Maintain or return to baseline ADL function  Description: INTERVENTIONS:  -  Assess patient's ability to carry out ADLs; assess patient's baseline for ADL function and identify physical deficits which impact ability to perform ADLs (bathing, care of mouth/teeth, toileting, grooming, dressing, etc )  - Assess/evaluate cause of self-care deficits   - Assess range of motion  - Assess patient's mobility; develop plan if impaired  - Assess patient's need for assistive devices and provide as appropriate  - Encourage maximum independence but intervene and supervise when necessary  - Involve family in performance of ADLs  - Assess for home care needs following discharge   - Consider OT consult to assist with ADL evaluation and planning for discharge  - Provide patient education as appropriate  1/25/2021 1750 by Swapnil Fenton RN  Outcome: Completed  1/25/2021 1335 by Swapnil Fenton RN  Outcome: Progressing  Goal: Maintain or return mobility status to optimal level  Description: INTERVENTIONS:  - Assess patient's baseline mobility status (ambulation, transfers, stairs, etc )    - Identify cognitive and physical deficits and behaviors that affect mobility  - Identify mobility aids required to assist with transfers and/or ambulation (gait belt, sit-to-stand, lift, walker, cane, etc )  - [place] fall precautions as indicated by assessment  - Record patient progress and toleration of activity level on Mobility SBAR; progress patient to next Phase/Stage  - Instruct patient to call for assistance with activity based on assessment  - Consider rehabilitation consult to assist with strengthening/weightbearing, etc   1/25/2021 1750 by Maynor Jorgensen RN  Outcome: Completed  1/25/2021 1335 by Maynor Jorgensen RN  Outcome: Progressing     Problem: DISCHARGE PLANNING  Goal: Discharge to home or other facility with appropriate resources  Description: INTERVENTIONS:  - Identify barriers to discharge w/patient and caregiver  - Arrange for needed discharge resources and transportation as appropriate  - Identify discharge learning needs (meds, wound care, etc )  - Arrange for interpretive services to assist at discharge as needed  - Refer to Case Management Department for coordinating discharge planning if the patient needs post-hospital services based on physician/advanced practitioner order or complex needs related to functional status, cognitive ability, or social support system  1/25/2021 1750 by Maynor Jorgensen RN  Outcome: Completed  1/25/2021 1335 by Maynor Jorgensen RN  Outcome: Progressing     Problem: Knowledge Deficit  Goal: Patient/family/caregiver demonstrates understanding of disease process, treatment plan, medications, and discharge instructions  Description: Complete learning assessment and assess knowledge base    Interventions:  - Provide teaching at level of understanding  - Provide teaching via preferred learning methods  1/25/2021 1750 by Kylah Simental RN  Outcome: Completed  1/25/2021 1335 by Kylah Simental RN  Outcome: Progressing     Problem: GENITOURINARY - ADULT  Goal: Maintains or returns to baseline urinary function  Description: INTERVENTIONS:  - Assess urinary function  - Encourage oral fluids to ensure adequate hydration if ordered  - Administer IV fluids as ordered to ensure adequate hydration  - Administer ordered medications as needed  - Offer frequent toileting  - Follow urinary retention protocol if ordered  1/25/2021 1750 by Kylah Simental RN  Outcome: Completed  1/25/2021 1335 by Kylah Simental RN  Outcome: Progressing     Problem: METABOLIC, FLUID AND ELECTROLYTES - ADULT  Goal: Fluid balance maintained  Description: INTERVENTIONS:  - Monitor labs   - Monitor I/O and WT  - Instruct patient on fluid and nutrition as appropriate  - Assess for signs & symptoms of volume excess or deficit  1/25/2021 1750 by Kylah Simental RN  Outcome: Completed  1/25/2021 1335 by Kylah Simental RN  Outcome: Progressing     Problem: SKIN/TISSUE INTEGRITY - ADULT  Goal: Skin integrity remains intact  Description: INTERVENTIONS  - Identify patients at risk for skin breakdown  - Assess and monitor skin integrity  - Assess and monitor nutrition and hydration status  - Monitor labs (i e  albumin)  - Assess for incontinence   - Turn and reposition patient  - Assist with mobility/ambulation  - Relieve pressure over bony prominences  - Avoid friction and shearing  - Provide appropriate hygiene as needed including keeping skin clean and dry  - Evaluate need for skin moisturizer/barrier cream  - Collaborate with interdisciplinary team (i e  Nutrition, Rehabilitation, etc )   - Patient/family teaching  1/25/2021 1750 by Kylah Simental RN  Outcome: Completed  1/25/2021 1335 by Kylah Simental RN  Outcome: Progressing     Problem: HEMATOLOGIC - ADULT  Goal: Maintains hematologic stability  Description: INTERVENTIONS  - Assess for signs and symptoms of bleeding or hemorrhage  - Monitor labs  - Administer supportive blood products/factors as ordered and appropriate  1/25/2021 1750 by Swapnil Fenton RN  Outcome: Completed  1/25/2021 1335 by Swapnil Fenton, RN  Outcome: Progressing

## 2021-01-25 NOTE — DISCHARGE SUMMARY
Discharge- Tian Saunders 1950, 80 y o  male MRN: [id number]    Unit/Bed#: E4 -01 Encounter: [id number]    Primary Care Provider: Valentina Joy DO   Date and time admitted to hospital: 1/22/2021  6:49 PM        * Atrial flutter ([address] )  Assessment & Plan  Currently in SR rate controlled on Toprol-XL 50 mg daily  Fully anticoagulated on Xarelto  [facility]    Elevated troponin  Assessment & Plan  Likely due to rapid atrial flutter  Stress test negative for ischemia    Habitual alcohol use  Assessment & Plan  Discussed regularly accepted daily alcohol usage  He acknowledges that he is drinking more than that  Discussed cutting down on alcohol use to prevent exacerbation/RVR    Benign essential hypertension  Assessment & Plan  Continue Toprol XL 50 mg daily with hold parameters        Discharging Physician / Practitioner: Leonila Rodriguez MD  PCP: Valentina Joy DO  Admission Date:   Admission Orders (From admission, onward)     Ordered        01/22/21 2022  Inpatient Admission  Once                   Discharge Date: 01/25/21    Resolved Problems  Date Reviewed: 1/25/2021          Resolved    Atrial fibrillation with RVR ([address] ) 1/22/2021     Resolved by  Rissa Peralta DO          Consultations During Hospital Stay:  · Cardiology    Procedures Performed:   · Stress test    Significant Findings / Test Results:   · A flutter with RVR  · Stress test negative for ischemia    Incidental Findings:   · None     Test Results Pending at Discharge (will require follow up): · None     Outpatient Tests Requested:  · None    Complications:  None    Reason for Admission:  Shortness of breath    Hospital Course:     Tian Saunders is a 80 y o  male patient who originally presented to the hospital on 1/22/2021 due to shortness of breath and palpitations  He was found to have atrial flutter with rapid ventricular rate    He was started on intravenous Cardizem and intravenous heparin and was evaluated by Cardiology  He eventually went back to normal sinus rhythm  Anticoagulation was switched to Xarelto 20 mg daily which she tolerated  Rate control was switched to Toprol XL 50 mg daily  During his brief hospitalization, he had mild troponin elevation which was due to the flutter with RVR  A stress test was performed and he had no ischemia  He was also started on Lipitor due to abnormal lipid profile with cholesterol of 212 and LDL of 122  The patient was advised to cut down on his drinking since he was drinking more than the allowed daily alcohol limits        Please see above list of diagnoses and related plan for additional information  Condition at Discharge: good     Discharge Day Visit / Exam:     * Please refer to separate progress note for these details *    Discussion with Family:  Spoke with wife    Discharge instructions/Information to patient and family:   See after visit summary for information provided to patient and family  Provisions for Follow-Up Care:  See after visit summary for information related to follow-up care and any pertinent home health orders  Disposition:     Home    Planned Readmission: no     Discharge Statement:  I spent >30 minutes discharging the patient  This time was spent on the day of discharge  I had direct contact with the patient on the day of discharge  Greater than 50% of the total time was spent examining patient, answering all patient questions, arranging and discussing plan of care with patient as well as directly providing post-discharge instructions  Additional time then spent on discharge activities  Discharge Medications:  See after visit summary for reconciled discharge medications provided to patient and family        ** Please Note: This note has been constructed using a voice recognition system **

## 2021-01-26 ENCOUNTER — TRANSITIONAL CARE MANAGEMENT (OUTPATIENT)
Dept: FAMILY MEDICINE CLINIC | Facility: CLINIC | Age: 71
End: 2021-01-26

## 2021-01-28 LAB
CHEST PAIN STATEMENT: NORMAL
MAX DIASTOLIC BP: 80 MMHG
MAX HEART RATE: 90 BPM
MAX PREDICTED HEART RATE: 150 BPM
MAX. SYSTOLIC BP: 142 MMHG
PROTOCOL NAME: NORMAL
REASON FOR TERMINATION: NORMAL
TARGET HR FORMULA: NORMAL
TIME IN EXERCISE PHASE: NORMAL

## 2021-02-17 DIAGNOSIS — Z87.39 HISTORY OF GOUT: ICD-10-CM

## 2021-02-18 RX ORDER — ALLOPURINOL 300 MG/1
TABLET ORAL
Qty: 90 TABLET | Refills: 1 | Status: SHIPPED | OUTPATIENT
Start: 2021-02-18 | End: 2021-05-14

## 2021-02-22 ENCOUNTER — TELEMEDICINE (OUTPATIENT)
Dept: CARDIOLOGY CLINIC | Facility: CLINIC | Age: 71
End: 2021-02-22
Payer: COMMERCIAL

## 2021-02-22 VITALS
WEIGHT: 266.7 LBS | DIASTOLIC BLOOD PRESSURE: 72 MMHG | BODY MASS INDEX: 38.18 KG/M2 | HEIGHT: 70 IN | SYSTOLIC BLOOD PRESSURE: 128 MMHG | HEART RATE: 64 BPM

## 2021-02-22 DIAGNOSIS — I48.92 ATRIAL FLUTTER (HCC): ICD-10-CM

## 2021-02-22 DIAGNOSIS — E78.5 DYSLIPIDEMIA: ICD-10-CM

## 2021-02-22 DIAGNOSIS — I48.92 ATRIAL FLUTTER, UNSPECIFIED TYPE (HCC): Primary | ICD-10-CM

## 2021-02-22 PROCEDURE — 93000 ELECTROCARDIOGRAM COMPLETE: CPT | Performed by: INTERNAL MEDICINE

## 2021-02-22 PROCEDURE — 3074F SYST BP LT 130 MM HG: CPT | Performed by: INTERNAL MEDICINE

## 2021-02-22 PROCEDURE — 3008F BODY MASS INDEX DOCD: CPT | Performed by: INTERNAL MEDICINE

## 2021-02-22 PROCEDURE — 99214 OFFICE O/P EST MOD 30 MIN: CPT | Performed by: INTERNAL MEDICINE

## 2021-02-22 PROCEDURE — 3078F DIAST BP <80 MM HG: CPT | Performed by: INTERNAL MEDICINE

## 2021-02-22 PROCEDURE — 1036F TOBACCO NON-USER: CPT | Performed by: INTERNAL MEDICINE

## 2021-02-22 PROCEDURE — 1160F RVW MEDS BY RX/DR IN RCRD: CPT | Performed by: INTERNAL MEDICINE

## 2021-02-22 RX ORDER — METOPROLOL SUCCINATE 50 MG/1
50 TABLET, EXTENDED RELEASE ORAL DAILY
Qty: 90 TABLET | Refills: 3 | Status: SHIPPED | OUTPATIENT
Start: 2021-02-22 | End: 2021-05-14 | Stop reason: SDUPTHER

## 2021-02-22 RX ORDER — ATORVASTATIN CALCIUM 40 MG/1
40 TABLET, FILM COATED ORAL DAILY
Qty: 90 TABLET | Refills: 3 | Status: SHIPPED | OUTPATIENT
Start: 2021-02-22 | End: 2021-04-16 | Stop reason: DRUGHIGH

## 2021-02-22 NOTE — PROGRESS NOTES
Virtual Regular Visit      Assessment/Plan:    Problem List Items Addressed This Visit        Cardiovascular and Mediastinum    Atrial flutter ([address] ) - Primary    Relevant Orders    AMB extended holter monitor    POCT ECG        1  Paroxysmal  Afib and aflutter w RVR, no recurrence symptomatically  On xarelto and metoprolol  Feels good  2  HTN well controlled  3  MANDY on CPAP  4  Quit alcohol    Plan  1) Check 2 week zio patch to see if any afib  2  Continue metoprolol   3) continue valsartan  4) xarelto, [id number]  5) cont alcohol cessation  If afib recurs consider ablation   Will continue blood thinners fornow  Reason for visit is   Chief Complaint   Patient presents with   [address] [place] [facility]    Virtual Regular Visit        Encounter provider Salas Mohr MD    Provider located at [address]      Recent Visits  No visits were found meeting these conditions  Showing recent visits within past 7 days and meeting all other requirements     Today's Visits  Date Type Provider Dept   02/22/21 Telemedicine Salas Mohr MD [address] today's visits and meeting all other requirements     Future Appointments  No visits were found meeting these conditions  Showing future appointments within next 150 days and meeting all other requirements        The patient was identified by name and date of birth  Quita Aguirre was informed that this is a telemedicine visit and that the visit is being conducted through [facility] and patient was informed that this is a secure, HIPAA-compliant platform  He agrees to proceed     My office door was closed  No one else was in the room  He acknowledged consent and understanding of privacy and security of the video platform  The patient has agreed to participate and understands they can discontinue the visit at any time   It was my intent to perform this visit via video technology but the patient was not able to do a video connection so the visit was completed via audio telephone only  Patient is aware this is a billable service  Subjective  Rhonda Cerna is a 80 y o  male  With h/o aflutter and acute diastolic chf lead to hospitalization  Improved w metoprolol  He has HTN now better controlled  He quit drinking alcohol  He has MANDY on CPAP          HPI     Past Medical History:   Diagnosis Date    Acute gouty arthritis     last assessed 6/1/13     Anxiety     last assessed 7/11/14     Chronic thoracic spine pain     last assessed 5/13/16     Dysfunction of both eustachian tubes     last assessed 8/16/13    Erectile dysfunction of non-organic origin     last assessed 10/8/13     Family history reviewed with no changes     medical history     Gout     Hypertension     Sebaceous cyst     last assessed 12/16/13     Skin lesion     last assessed 1/2/14        Past Surgical History:   Procedure Laterality Date    APPENDECTOMY      12years old    BOWEL RESECTION      CATARACT EXTRACTION      CERVICAL FUSION N/A 8/27/2019    Procedure: Anterior cervical discectomy w fusion C5-6, with allograft and neuromonitoring;  Surgeon: Carol Kiser MD;  Location: [facility];  Service: Orthopedics    COLONOSCOPY      MOUTH SURGERY      WRIST SURGERY         Current Outpatient Medications   Medication Sig Dispense Refill    allopurinol (ZYLOPRIM) 300 mg tablet TAKE 1 TABLET BY MOUTH EVERYDAY AT BEDTIME 90 tablet 1    atorvastatin (LIPITOR) 40 mg tablet Take 1 tablet (40 mg total) by mouth daily 30 tablet 0    metoprolol succinate (TOPROL-XL) 50 mg 24 hr tablet Take 1 tablet (50 mg total) by mouth daily 30 tablet 0    rivaroxaban (XARELTO) 20 mg tablet Take 1 tablet (20 mg total) by mouth daily with breakfast 30 tablet 0    valsartan-hydrochlorothiazide (DIOVAN-HCT) 160-12 5 MG per tablet TAKE 1 TABLET BY MOUTH EVERY DAY 90 tablet 1 No current facility-administered medications for this visit  Allergies   Allergen Reactions    Levaquin [Levofloxacin] Arthralgia         Video Exam    Vitals:    02/19/21 1401 02/22/21 1550   BP: 123/64 128/72   BP Location:  Left arm   Patient Position:  Sitting   Cuff Size:  Large   Pulse: 64    Weight:  121 kg (266 lb 11 2 oz)   Height: 5' 10" (1 778 m) 5' 10" (1 778 m)   Unable to complete exam due to no video connection  Complete 12 point ROS reviewed and otherwise non pertinent or negative except as per HPI  I spent 30 minutes directly with the patient during this visit        [facility] acknowledges that he has consented to an online visit or consultation  He understands that the online visit is based solely on information provided by him, and that, in the absence of a face-to-face physical evaluation by the physician, the diagnosis he receives is both limited and provisional in terms of accuracy and completeness  This is not intended to replace a full medical face-to-face evaluation by the physician  Stan Franco understands and accepts these terms

## 2021-03-10 DIAGNOSIS — Z23 ENCOUNTER FOR IMMUNIZATION: ICD-10-CM

## 2021-03-18 ENCOUNTER — CLINICAL SUPPORT (OUTPATIENT)
Dept: CARDIOLOGY CLINIC | Facility: CLINIC | Age: 71
End: 2021-03-18
Payer: COMMERCIAL

## 2021-03-18 DIAGNOSIS — I48.92 ATRIAL FLUTTER, UNSPECIFIED TYPE (HCC): ICD-10-CM

## 2021-03-18 PROCEDURE — 93248 EXT ECG>7D<15D REV&INTERPJ: CPT | Performed by: INTERNAL MEDICINE

## 2021-03-24 ENCOUNTER — IMMUNIZATIONS (OUTPATIENT)
Dept: FAMILY MEDICINE CLINIC | Facility: HOSPITAL | Age: 71
End: 2021-03-24

## 2021-03-24 DIAGNOSIS — Z23 ENCOUNTER FOR IMMUNIZATION: Primary | ICD-10-CM

## 2021-03-24 PROCEDURE — 91300 SARS-COV-2 / COVID-19 MRNA VACCINE (PFIZER-BIONTECH) 30 MCG: CPT

## 2021-03-24 PROCEDURE — 0001A SARS-COV-2 / COVID-19 MRNA VACCINE (PFIZER-BIONTECH) 30 MCG: CPT

## 2021-04-14 ENCOUNTER — IMMUNIZATIONS (OUTPATIENT)
Dept: FAMILY MEDICINE CLINIC | Facility: HOSPITAL | Age: 71
End: 2021-04-14

## 2021-04-14 DIAGNOSIS — Z23 ENCOUNTER FOR IMMUNIZATION: Primary | ICD-10-CM

## 2021-04-14 PROCEDURE — 0002A SARS-COV-2 / COVID-19 MRNA VACCINE (PFIZER-BIONTECH) 30 MCG: CPT

## 2021-04-14 PROCEDURE — 91300 SARS-COV-2 / COVID-19 MRNA VACCINE (PFIZER-BIONTECH) 30 MCG: CPT

## 2021-04-15 ENCOUNTER — TELEPHONE (OUTPATIENT)
Dept: CARDIOLOGY CLINIC | Facility: CLINIC | Age: 71
End: 2021-04-15

## 2021-04-15 NOTE — TELEPHONE ENCOUNTER
Patient called office with complaints of muscle aches in his legs and feet  He is concerned it is a side effect of atorvastatin   Please advise thanks

## 2021-04-16 DIAGNOSIS — E78.2 COMBINED HYPERLIPIDEMIA: Primary | ICD-10-CM

## 2021-04-16 RX ORDER — ATORVASTATIN CALCIUM 20 MG/1
20 TABLET, FILM COATED ORAL DAILY
Qty: 90 TABLET | Refills: 3 | Status: SHIPPED | OUTPATIENT
Start: 2021-04-16 | End: 2021-05-14 | Stop reason: SDUPTHER

## 2021-04-17 DIAGNOSIS — I10 ESSENTIAL HYPERTENSION: ICD-10-CM

## 2021-04-17 RX ORDER — VALSARTAN AND HYDROCHLOROTHIAZIDE 160; 12.5 MG/1; MG/1
TABLET, FILM COATED ORAL
Qty: 90 TABLET | Refills: 1 | Status: SHIPPED | OUTPATIENT
Start: 2021-04-17 | End: 2021-11-15

## 2021-04-20 ENCOUNTER — TELEPHONE (OUTPATIENT)
Dept: FAMILY MEDICINE CLINIC | Facility: CLINIC | Age: 71
End: 2021-04-20

## 2021-04-20 NOTE — TELEPHONE ENCOUNTER
Call patient,  I had already placed active lab orders in his chart few months ago    He can get these done at any [facility] lab

## 2021-05-14 ENCOUNTER — OFFICE VISIT (OUTPATIENT)
Dept: CARDIOLOGY CLINIC | Facility: CLINIC | Age: 71
End: 2021-05-14
Payer: COMMERCIAL

## 2021-05-14 ENCOUNTER — APPOINTMENT (OUTPATIENT)
Dept: LAB | Facility: MEDICAL CENTER | Age: 71
End: 2021-05-14
Payer: COMMERCIAL

## 2021-05-14 VITALS
SYSTOLIC BLOOD PRESSURE: 140 MMHG | BODY MASS INDEX: 37.22 KG/M2 | DIASTOLIC BLOOD PRESSURE: 84 MMHG | WEIGHT: 260 LBS | HEART RATE: 53 BPM | HEIGHT: 70 IN

## 2021-05-14 DIAGNOSIS — Z12.5 SCREENING FOR PROSTATE CANCER: ICD-10-CM

## 2021-05-14 DIAGNOSIS — I48.92 ATRIAL FLUTTER, UNSPECIFIED TYPE (HCC): Primary | ICD-10-CM

## 2021-05-14 DIAGNOSIS — E78.2 COMBINED HYPERLIPIDEMIA: ICD-10-CM

## 2021-05-14 DIAGNOSIS — E03.8 SUBCLINICAL HYPOTHYROIDISM: ICD-10-CM

## 2021-05-14 DIAGNOSIS — I48.92 ATRIAL FLUTTER (HCC): ICD-10-CM

## 2021-05-14 DIAGNOSIS — Z87.39 HISTORY OF GOUT: ICD-10-CM

## 2021-05-14 DIAGNOSIS — R73.9 ELEVATED BLOOD SUGAR: ICD-10-CM

## 2021-05-14 DIAGNOSIS — I10 BENIGN ESSENTIAL HYPERTENSION: ICD-10-CM

## 2021-05-14 LAB
ALBUMIN SERPL BCP-MCNC: 3.7 G/DL (ref 3.5–5)
ALP SERPL-CCNC: 79 U/L (ref 46–116)
ALT SERPL W P-5'-P-CCNC: 32 U/L (ref 12–78)
ANION GAP SERPL CALCULATED.3IONS-SCNC: 4 MMOL/L (ref 4–13)
AST SERPL W P-5'-P-CCNC: 21 U/L (ref 5–45)
BASOPHILS # BLD AUTO: 0.07 THOUSANDS/ΜL (ref 0–0.1)
BASOPHILS NFR BLD AUTO: 1 % (ref 0–1)
BILIRUB SERPL-MCNC: 0.95 MG/DL (ref 0.2–1)
BUN SERPL-MCNC: 20 MG/DL (ref 5–25)
CALCIUM SERPL-MCNC: 9.3 MG/DL (ref 8.3–10.1)
CHLORIDE SERPL-SCNC: 108 MMOL/L (ref 100–108)
CHOLEST SERPL-MCNC: 118 MG/DL (ref 50–200)
CO2 SERPL-SCNC: 29 MMOL/L (ref 21–32)
CREAT SERPL-MCNC: 0.88 MG/DL (ref 0.6–1.3)
EOSINOPHIL # BLD AUTO: 0.37 THOUSAND/ΜL (ref 0–0.61)
EOSINOPHIL NFR BLD AUTO: 5 % (ref 0–6)
ERYTHROCYTE [DISTWIDTH] IN BLOOD BY AUTOMATED COUNT: 13.6 % (ref 11.6–15.1)
EST. AVERAGE GLUCOSE BLD GHB EST-MCNC: 123 MG/DL
GFR SERPL CREATININE-BSD FRML MDRD: 87 ML/MIN/1.73SQ M
GLUCOSE P FAST SERPL-MCNC: 114 MG/DL (ref 65–99)
HBA1C MFR BLD: 5.9 %
HCT VFR BLD AUTO: 41.4 % (ref 36.5–49.3)
HDLC SERPL-MCNC: 55 MG/DL
HGB BLD-MCNC: 13.6 G/DL (ref 12–17)
IMM GRANULOCYTES # BLD AUTO: 0.02 THOUSAND/UL (ref 0–0.2)
IMM GRANULOCYTES NFR BLD AUTO: 0 % (ref 0–2)
LDLC SERPL CALC-MCNC: 49 MG/DL (ref 0–100)
LYMPHOCYTES # BLD AUTO: 2.1 THOUSANDS/ΜL (ref 0.6–4.47)
LYMPHOCYTES NFR BLD AUTO: 29 % (ref 14–44)
MCH RBC QN AUTO: 30.2 PG (ref 26.8–34.3)
MCHC RBC AUTO-ENTMCNC: 32.9 G/DL (ref 31.4–37.4)
MCV RBC AUTO: 92 FL (ref 82–98)
MONOCYTES # BLD AUTO: 0.8 THOUSAND/ΜL (ref 0.17–1.22)
MONOCYTES NFR BLD AUTO: 11 % (ref 4–12)
NEUTROPHILS # BLD AUTO: 3.8 THOUSANDS/ΜL (ref 1.85–7.62)
NEUTS SEG NFR BLD AUTO: 54 % (ref 43–75)
NRBC BLD AUTO-RTO: 0 /100 WBCS
PLATELET # BLD AUTO: 234 THOUSANDS/UL (ref 149–390)
PMV BLD AUTO: 10.3 FL (ref 8.9–12.7)
POTASSIUM SERPL-SCNC: 3.8 MMOL/L (ref 3.5–5.3)
PROT SERPL-MCNC: 7 G/DL (ref 6.4–8.2)
PSA SERPL-MCNC: 3.5 NG/ML (ref 0–4)
RBC # BLD AUTO: 4.5 MILLION/UL (ref 3.88–5.62)
SODIUM SERPL-SCNC: 141 MMOL/L (ref 136–145)
TRIGL SERPL-MCNC: 72 MG/DL
TSH SERPL DL<=0.05 MIU/L-ACNC: 3.55 UIU/ML (ref 0.36–3.74)
URATE SERPL-MCNC: 5.3 MG/DL (ref 4.2–8)
WBC # BLD AUTO: 7.16 THOUSAND/UL (ref 4.31–10.16)

## 2021-05-14 PROCEDURE — 84550 ASSAY OF BLOOD/URIC ACID: CPT

## 2021-05-14 PROCEDURE — G0103 PSA SCREENING: HCPCS

## 2021-05-14 PROCEDURE — 84443 ASSAY THYROID STIM HORMONE: CPT

## 2021-05-14 PROCEDURE — 83036 HEMOGLOBIN GLYCOSYLATED A1C: CPT

## 2021-05-14 PROCEDURE — 85025 COMPLETE CBC W/AUTO DIFF WBC: CPT

## 2021-05-14 PROCEDURE — 36415 COLL VENOUS BLD VENIPUNCTURE: CPT

## 2021-05-14 PROCEDURE — 93000 ELECTROCARDIOGRAM COMPLETE: CPT | Performed by: INTERNAL MEDICINE

## 2021-05-14 PROCEDURE — 99214 OFFICE O/P EST MOD 30 MIN: CPT | Performed by: INTERNAL MEDICINE

## 2021-05-14 PROCEDURE — 80061 LIPID PANEL: CPT

## 2021-05-14 PROCEDURE — 80053 COMPREHEN METABOLIC PANEL: CPT

## 2021-05-14 RX ORDER — METOPROLOL SUCCINATE 50 MG/1
50 TABLET, EXTENDED RELEASE ORAL DAILY
Qty: 90 TABLET | Refills: 3 | Status: SHIPPED | OUTPATIENT
Start: 2021-05-14 | End: 2022-05-06

## 2021-05-14 RX ORDER — ATORVASTATIN CALCIUM 20 MG/1
20 TABLET, FILM COATED ORAL DAILY
Qty: 90 TABLET | Refills: 3 | Status: SHIPPED | OUTPATIENT
Start: 2021-05-14 | End: 2021-08-25

## 2021-05-14 NOTE — PROGRESS NOTES
HEART AND VASCULAR  CARDIAC ELECTROPHYSIOLOGY   HEART RHYTHM CENTER  [facility]    Outpatient  Follow-up  Today's Date: 05/14/21        Patient name: Luiza Fagan  YOB: 1950  Sex: male         Chief Complaint: f/u  afib       ASSESSMENT:  Problem List Items Addressed This Visit        Cardiovascular and Mediastinum    Atrial flutter (HCC) - Primary    Relevant Medications    rivaroxaban (XARELTO) 20 mg tablet    metoprolol succinate (TOPROL-XL) 50 mg 24 hr tablet    Other Relevant Orders    POCT ECG       Other    Combined hyperlipidemia    Relevant Medications    atorvastatin (LIPITOR) 20 mg tablet    Other Relevant Orders    Lipid panel    Body mass index (BMI)40 0-44 9, adult ([facility] [address] )        81 yo male  1) paroxymsal afib/flutter- NSR since January- [id number]=5, on xarelto/metoprolol and has lost 40 pounds quit drinking  2) HTN well controlled today, slightly up today  On Diovan/hctz and metop  3) MANDY on CPAP  4) Dyslipidemia, mild, on atorvastatin, has not rechecked w diet interventions  5)Gout, no attacks in over 20 years on allopurinol      PLAN:  1  After lipid test next week , d/c atorvastatin and repeat lipids in 3 months to see if he can maintain cholesterol w lifestyle modifications he has done  2  Increase exercise  3  Continue xarelto, he had frequent runs of PAT on zio, explained this is precursor of afib    4  Stop allopurinol, may not need it since quit ETOH      Follow up in: 6 months    Orders Placed This Encounter   Procedures    Lipid panel    POCT ECG     Medications Discontinued During This Encounter   Medication Reason    rivaroxaban (XARELTO) 20 mg tablet Reorder    atorvastatin (LIPITOR) 20 mg tablet Reorder    rivaroxaban (XARELTO) 20 mg tablet Reorder    metoprolol succinate (TOPROL-XL) 50 mg 24 hr tablet Reorder    allopurinol (ZYLOPRIM) 300 mg tablet Viv Ellsworth   HPI/Subjective:   81 yo male  1) paroxymsal afib/flutter- NSR since January- [id number]=5, on xarelto/metoprolol and has lost 40 pounds quit drinking  2) HTN well controlled today, slightly up today  On Diovan/hctz and metop  3) MANDY on CPAP  4) Dyslipidemia, mild, on atorvastatin, has not rechecked w diet interventions  5)Gout, no attacks in over 20 years on allopurinol        Jodee Mask is doing fantastic, he really changed his diet and lost 40 pounds, he quit drinking alcohol, he feels great, more energy, denies palptitations, blood pressure controlled  Only complaint is itchness sometimes, sporadic, different parts of body and no rash  He thinks medication effects  Wants to stop meds if he can  Please note HPI is listed by problem with with update following it, it is copied again in the assessment above and reflects medical decision making as well  Complete 12 point ROS reviewed and otherwise non pertinent or negative except as per HPI pertinent positives in Cardiovascular and Respiratory emphasized  Please see paper chart for outpatient clinic patients where the patient completed the 12 point ROS survey  Past Medical History:   Diagnosis Date    Acute gouty arthritis     last assessed 6/1/13     Anxiety     last assessed 7/11/14     Chronic thoracic spine pain     last assessed 5/13/16     Dysfunction of both eustachian tubes     last assessed 8/16/13    Erectile dysfunction of non-organic origin     last assessed 10/8/13     Family history reviewed with no changes     medical history     Gout     Hypertension     Sebaceous cyst     last assessed 12/16/13     Skin lesion     last assessed 1/2/14        Allergies   Allergen Reactions    Levaquin [Levofloxacin] Arthralgia     I reviewed the Home Medication list and Allergies in the chart     Scheduled Meds:  Current Outpatient Medications   Medication Sig Dispense Refill    atorvastatin (LIPITOR) 20 mg tablet Take 1 tablet (20 mg total) by mouth daily 90 tablet 3    metoprolol succinate (TOPROL-XL) 50 mg 24 hr tablet Take 1 tablet (50 mg total) by mouth daily 90 tablet 3    rivaroxaban (XARELTO) 20 mg tablet Take 1 tablet (20 mg total) by mouth daily with breakfast 90 tablet 3    valsartan-hydrochlorothiazide (DIOVAN-HCT) 160-12 5 MG per tablet TAKE 1 TABLET BY MOUTH EVERY DAY 90 tablet 1     No current facility-administered medications for this visit        PRN Meds:         Family History   Problem Relation Age of Onset    Diabetes Sister     Diabetes Family     Hypertension Family     No Known Problems Mother     No Known Problems Father        Social History     Socioeconomic History    Marital status: /Civil Union     Spouse name: Not on file    Number of children: Not on file    Years of education: Not on file    Highest education level: Not on file   Occupational History    Not on file   Social Needs    Financial resource strain: Not on file    Food insecurity     Worry: Not on file     Inability: Not on file   [employer] needs     Medical: Not on file     Non-medical: Not on file   Tobacco Use    Smoking status: Former Smoker     Packs/day: 0 50     Years: 20 00     Pack years: 10 00     Quit date:      Years since quittin 3    Smokeless tobacco: Never Used   Substance and Sexual Activity    Alcohol use: Not Currently     Frequency: Monthly or less     Drinks per session: 1 or 2     Binge frequency: Never     Comment: social    Drug use: No    Sexual activity: Not Currently   Lifestyle    Physical activity     Days per week: Not on file     Minutes per session: Not on file    Stress: Not on file   Relationships    Social connections     Talks on phone: Not on file     Gets together: Not on file     Attends Latter-day service: Not on file     Active member of club or organization: Not on file     Attends meetings of clubs or organizations: Not on file     Relationship status: Not on file    Intimate partner violence     Fear of current or ex partner: Not on file     Emotionally abused: Not on file     Physically abused: Not on file     Forced sexual activity: Not on file   Other Topics Concern    Not on file   Social History Narrative    Not on file         OBJECTIVE:    /84   Pulse (!) 53   Ht 5' 10" (1 778 m)   Wt 118 kg (260 lb)   BMI 37 31 kg/m²   Vitals:    05/14/21 0914   Weight: 118 kg (260 lb)     /84   Pulse (!) 53   Ht 5' 10" (1 778 m)   Wt 118 kg (260 lb)   BMI 37 31 kg/m²   Vitals:    05/14/21 0914   Weight: 118 kg (260 lb)     GEN: No acute distress, Alert and oriented, well appearing  HEENT:External ears normal, wearing a mask  EYES: Pupils equal, sclera anicteric, midline, normal conjuctiva  NECK: No JVD, supple, no obvious masses or thryomegaly or goiter  CARDIOVASCULAR:  RRR, No murmur, rub, gallops S1,S2  LUNGS: Clear To auscultation bilaterally, normal effort, no rales, rhonchi, crackles   ABDOMEN:  nondistended,  without obvious organomegaly or ascites  EXTREMITIES/VASCULAR:  No edema  warm an well perfused  PSYCH: Normal Affect,  linear speech pattern without evidence of psychosis  NEURO: Grossly intact, moving all extremiteis equal, face symmetric, alert and responsive, no obvious focal defecits   GAIT:  Ambulates normally without difficulty  HEME: No bleeding, bruising, petechia, purpura   SKIN: No significant rashes on visibile skin, warm, no diaphoresis or pallor           Lab Results:       LABS:      Chemistry        Component Value Date/Time     05/16/2016 0804    K 3 7 01/25/2021 0518    K 4 1 05/16/2016 0804     01/25/2021 0518     05/16/2016 0804    CO2 33 (H) 01/25/2021 0518    CO2 33 (H) 05/16/2016 0804    BUN 14 01/25/2021 0518    BUN 20 05/16/2016 0804    CREATININE 0 97 01/25/2021 0518    CREATININE 1 07 05/16/2016 0804 Component Value Date/Time    CALCIUM 8 9 2021 0518    CALCIUM 9 1 2016 0804    ALKPHOS 71 2021 1858    ALKPHOS 58 2016 0804    AST 17 2021 1858    AST 14 2016 0804    ALT 27 2021 1858    ALT 13 2016 0804    BILITOT 0 6 2016 0804            Lab Results   Component Value Date    CHOL 248 (H) 2016     Lab Results   Component Value Date    HDL 46 2021    HDL 48 2018    HDL 54 2016     Lab Results   Component Value Date    LDLCALC 122 (H) 2021    LDLCALC 160 (H) 2018     Lab Results   Component Value Date    TRIG 219 (H) 2021    TRIG 167 (H) 2018    TRIG 86 2016     No results found for: CHOLHDL    IMAGING: No results found  Cardiac testing:   Results for orders placed during the hospital encounter of 21   Echo complete with contrast if indicated    Narrative [address] [place] [address], [address]  [phone]    Transthoracic Echocardiogram  2D, M-mode, Doppler, and Color Doppler    Study date:  2021    Patient: Dixon Naidu  MR number: [id number]  Account number: [de-identified]  : 1950  Age: 80 years  Gender: Male  Status: Inpatient  Location: Bedside  Height: 70 in  Weight: 269 5 lb  BP: 110/ 67 mmHg    Indications: Atrial flutter  Diagnoses: I48 1 - Atrial flutter    Sonographer:  MAYUR Bernard  Interpreting Physician:  INO Farah  Referring Physician:  Marilu Carter Do  Group:  [facility]  Cardiology Fellow:  Joe Hylton MD    SUMMARY    LEFT VENTRICLE:  Systolic function was normal  Ejection fraction was estimated to be 60 %  There were no regional wall motion abnormalities  LEFT ATRIUM:  The atrium was mildly dilated  TRICUSPID VALVE:  There was trace regurgitation  SUMMARY MEASUREMENTS  2D measurements:  Unspecified Anatomy:   %FS was 37 8 %    Ao Diam was 3 9 cm   EDV(Teich) was 150 4 ml   EF(Teich) was 67 3 %  ESV(Teich) was 49 2 ml  IVSd was 1 1 cm  LA Diam was 4 4 cm  LAAs A2C was 20 cm2  LAAs A4C was 21 4 cm2  LAESV A-L A2C was  60 7 ml  LAESV A-L A4C was 71 3 ml  LAESV Index (A-L) was 28 1 ml/m2  LAESV MOD A2C was 57 6 ml  LAESV MOD A4C was 66 3 ml  LAESV(A-L) was 66 5 ml  LAESV(MOD BP) was 62 3 ml  LAESVInd MOD BP was 26 3 ml/m2  LALs A2C was 5 6 cm  LALs A4C was 5 5 cm  LVEDV MOD A2C was 55 1 ml  LVEDV MOD A4C was 122 8 ml  LVEF MOD A4C was 54 5 %  LVESV MOD A4C was 55 8 ml  LVIDd was 5 5 cm  LVIDs was 3 5 cm  LVLd A2C was 5 9 cm  LVLd A4C was 9 1 cm  LVLs A4C was 7 7 cm  LVPWd was 1 1 cm  RVIDd was 3 6 cm  SV MOD A4C was 67 ml   SV(Teich) was 101 2 ml   CW measurements:  Unspecified Anatomy:   AV Env  Ti was 323 5 ms   AV VTI was 18 6 cm   AV Vmax was 0 9 m/s  AV Vmean was 0 6 m/s  AV maxPG was 3 1 mmHg  AV meanPG was 1 5 mmHg  MM measurements:  Unspecified Anatomy:   TAPSE was 2 3 cm  PW measurements:  Unspecified Anatomy:   DVI was 1 1   E' Avg was 0 1 m/s  E' Lat was 0 1 m/s  E' Sept was 0 1 m/s  E/E' Avg was 6 2   E/E' Lat was 5 9   E/E' Sept was 6 6   LVOT Env  Ti was 364 4 ms  LVOT VTI was 20 7 cm  LVOT Vmax was 0 9 m/s  LVOT Vmean was 0 6 m/s  LVOT maxPG was 3 4 mmHg  LVOT meanPG was 1 5 mmHg  MV A Pj was 0 5 m/s  MV Dec [place] was 4 4 m/s2  MV DecT was 158 7 ms   MV E Pj was 0 7 m/s  MV E/A Ratio was 1 3   RV S' was 0 2 m/s  HISTORY: PRIOR HISTORY: Risk factors: hypertension and morbid obesity  PROCEDURE: The procedure was performed at the bedside  This was a routine study  The transthoracic approach was used  The study included complete 2D imaging, M-mode, complete spectral Doppler, and color Doppler  The heart rate was 67 bpm,  at the start of the study  Intravenous contrast (  4ml of Definity) was administered  This was a technically difficult study      LEFT VENTRICLE: Size was normal  Systolic function was normal  Ejection fraction was estimated to be 60 %  There were no regional wall motion abnormalities  Wall thickness was normal  DOPPLER: Left ventricular diastolic function parameters  were normal     RIGHT VENTRICLE: The size was normal  Systolic function was normal  Wall thickness was normal     LEFT ATRIUM: The atrium was mildly dilated  RIGHT ATRIUM: Size was normal     MITRAL VALVE: Valve structure was normal  There was normal leaflet separation  DOPPLER: The transmitral velocity was within the normal range  There was no evidence for stenosis  There was no significant regurgitation  AORTIC VALVE: The valve was trileaflet  Leaflets exhibited normal thickness and normal cuspal separation  DOPPLER: Transaortic velocity was within the normal range  There was no evidence for stenosis  There was no significant  regurgitation  TRICUSPID VALVE: The valve structure was normal  There was normal leaflet separation  DOPPLER: The transtricuspid velocity was within the normal range  There was no evidence for stenosis  There was trace regurgitation  PULMONIC VALVE: Leaflets exhibited normal thickness, no calcification, and normal cuspal separation  DOPPLER: The transpulmonic velocity was within the normal range  There was no significant regurgitation  PERICARDIUM: There was no pericardial effusion  A pericardial fat pad was present  The pericardium was normal in appearance  AORTA: The root exhibited normal size  SYSTEMIC VEINS: IVC: The inferior vena cava was normal in size and course  The inferior vena cava was normal in size  Respirophasic changes were normal     MEASUREMENT TABLES    2D MEASUREMENTS  Left atrium   (Reference normals)  AP dim   44 mm   (--)  AP dim index   1 86 cm/mï¾²   (<2 2)  Area, es, A4C   21 4 cmï¾²   (8 8-23  4)    SYSTEM MEASUREMENT TABLES    2D  %FS: 37 8 %  Ao Diam: 3 9 cm  EDV(Teich): 150 4 ml  EF(Teich): 67 3 %  ESV(Teich): 49 2 ml  IVSd: 1 1 cm  LA Diam: 4 4 cm  LAAs A2C: 20 cm2  LAAs A4C: 21 4 cm2  LAESV A-L A2C: 60 7 ml  LAESV A-L A4C: 71 3 ml  LAESV Index (A-L): 28 1 ml/m2  LAESV MOD A2C: 57 6 ml  LAESV MOD A4C: 66 3 ml  LAESV(A-L): 66 5 ml  LAESV(MOD BP): 62 3 ml  LAESVInd MOD BP: 26 3 ml/m2  LALs A2C: 5 6 cm  LALs A4C: 5 5 cm  LVEDV MOD A2C: 55 1 ml  LVEDV MOD A4C: 122 8 ml  LVEF MOD A4C: 54 5 %  LVESV MOD A4C: 55 8 ml  LVIDd: 5 5 cm  LVIDs: 3 5 cm  LVLd A2C: 5 9 cm  LVLd A4C: 9 1 cm  LVLs A4C: 7 7 cm  LVPWd: 1 1 cm  RVIDd: 3 6 cm  SV MOD A4C: 67 ml  SV(Teich): 101 2 ml    CW  AV Env  Ti: 323 5 ms  AV VTI: 18 6 cm  AV Vmax: 0 9 m/s  AV Vmean: 0 6 m/s  AV maxPG: 3 1 mmHg  AV meanP 5 mmHg    MM  TAPSE: 2 3 cm    PW  DVI: 1 1  E' Av 1 m/s  E' Lat: 0 1 m/s  E' Sept: 0 1 m/s  E/E' Av 2  E/E' Lat: 5 9  E/E' Sept: 6 6  LVOT Env  Ti: 364 4 ms  LVOT VTI: 20 7 cm  LVOT Vmax: 0 9 m/s  LVOT Vmean: 0 6 m/s  LVOT maxPG: 3 4 mmHg  LVOT meanP 5 mmHg  MV A Pj: 0 5 m/s  MV Dec [place]: 4 4 m/s2  MV DecT: 158 7 ms  MV E Pj: 0 7 m/s  MV E/A Ratio: 1 3  RV S': 0 2 m/s    [facility] Accredited Echocardiography Laboratory    Prepared and electronically signed by    INO Bailey  Signed 2021 15:01:20       No results found for this or any previous visit  No results found for this or any previous visit  No results found for this or any previous visit  I reviewed and interpreted the following LABS/EKG/TELE/IMAGING and below is summary of my interpretation (if data available):    LABS: BMP, CBC    Current EKG and Rhythm Strip: Sinus umm 53bpm           Reviewe zio strips and intpreted: Agree w reading below:     Patient had a min HR of 41 bpm, max HR of 190 bpm, and avg HR of 62  bpm  Predominant underlying rhythm was Sinus Rhythm   26  Supraventricular Tachycardia runs occurred, the run with the fastest  interval lasting 4 beats with a max rate of 190 bpm, the longest lasting  27 0 secs with an avg rate of 132 bpm  Isolated SVEs were rare (<1 0%),  SVE Couplets were rare (<1 0%), and SVE Triplets were rare (<1 0%)  Isolated VEs were rare (<1 0%, 992), VE Triplets were rare (<1 0%, 1), and  no VE Couplets were present

## 2021-05-20 ENCOUNTER — APPOINTMENT (OUTPATIENT)
Dept: RADIOLOGY | Facility: CLINIC | Age: 71
End: 2021-05-20
Payer: COMMERCIAL

## 2021-05-20 ENCOUNTER — OFFICE VISIT (OUTPATIENT)
Dept: FAMILY MEDICINE CLINIC | Facility: CLINIC | Age: 71
End: 2021-05-20
Payer: COMMERCIAL

## 2021-05-20 VITALS
RESPIRATION RATE: 14 BRPM | WEIGHT: 259 LBS | BODY MASS INDEX: 37.08 KG/M2 | OXYGEN SATURATION: 97 % | SYSTOLIC BLOOD PRESSURE: 140 MMHG | HEIGHT: 70 IN | DIASTOLIC BLOOD PRESSURE: 80 MMHG | TEMPERATURE: 97.6 F | HEART RATE: 62 BPM

## 2021-05-20 DIAGNOSIS — E66.9 OBESITY (BMI 35.0-39.9 WITHOUT COMORBIDITY): ICD-10-CM

## 2021-05-20 DIAGNOSIS — E03.8 SUBCLINICAL HYPOTHYROIDISM: ICD-10-CM

## 2021-05-20 DIAGNOSIS — Z87.39 HISTORY OF GOUT: ICD-10-CM

## 2021-05-20 DIAGNOSIS — R97.20 ELEVATED PSA: ICD-10-CM

## 2021-05-20 DIAGNOSIS — I10 BENIGN ESSENTIAL HYPERTENSION: ICD-10-CM

## 2021-05-20 DIAGNOSIS — M25.532 LEFT WRIST PAIN: ICD-10-CM

## 2021-05-20 DIAGNOSIS — E78.2 COMBINED HYPERLIPIDEMIA: Primary | ICD-10-CM

## 2021-05-20 DIAGNOSIS — R73.9 ELEVATED BLOOD SUGAR: ICD-10-CM

## 2021-05-20 DIAGNOSIS — G47.33 OSA ON CPAP: ICD-10-CM

## 2021-05-20 DIAGNOSIS — Z99.89 OSA ON CPAP: ICD-10-CM

## 2021-05-20 PROCEDURE — 99214 OFFICE O/P EST MOD 30 MIN: CPT | Performed by: FAMILY MEDICINE

## 2021-05-20 PROCEDURE — 73110 X-RAY EXAM OF WRIST: CPT

## 2021-05-20 NOTE — ASSESSMENT & PLAN NOTE
History of mildly elevated PSA  Was as high as 4 97  We have been watching at every 6 months since  Currently 3 5  Will continue 6 month observation for now    If level spikes, will refer to Urology

## 2021-05-20 NOTE — ASSESSMENT & PLAN NOTE
Current BMI 37 50  Patient has lost approximately 25 lb in the past few months  Continue diet exercise    Will continue to monitor

## 2021-05-20 NOTE — ASSESSMENT & PLAN NOTE
Blood sugar from May 14th 114 with A1c 5 9%  Patient has lost approximately 25 lb in past few months  Continue reduced carb diet exercise    Will repeat labs prior to next appointment in 6 months

## 2021-05-20 NOTE — ASSESSMENT & PLAN NOTE
Reasonably controlled on valsartan / 12 5  Patient has lost approximately 20-25 lb in the past few months  Will continue to monitor

## 2021-05-20 NOTE — ASSESSMENT & PLAN NOTE
Cholesterol from May 14th 118/49 with HDL 55  Patient recently discontinued atorvastatin  Continue diet exercise    Will see what lipids look like in 6 months off med

## 2021-05-20 NOTE — PROGRESS NOTES
[facility]      NAME: Silvia Carlson  AGE: 80 y o  SEX: male  : 1950   MRN: [id number]    DATE: 2021  TIME: 11:46 AM    Assessment and Plan     Problem List Items Addressed This Visit     Combined hyperlipidemia - Primary       Cholesterol from May 14th 118/49 with HDL 55  Patient recently discontinued atorvastatin  Continue diet exercise  Will see what lipids look like in 6 months off med         Relevant Orders    Lipid Panel with Direct LDL reflex    Benign essential hypertension      Reasonably controlled on valsartan / 12 5  Patient has lost approximately 20-25 lb in the past few months  Will continue to monitor  History of gout      Patient has been doing well on allopurinol 300 mg daily without any recent flares  Uric acid from May 14th was 5 3  Patient would like to discontinue med since he is no longer drinking alcohol reading much red meat  Call further problems         Relevant Orders    Uric acid    Subclinical hypothyroidism      TSH from May 14th was normal   Will continue to monitor         Elevated blood sugar      Blood sugar from May 14th 114 with A1c 5 9%  Patient has lost approximately 25 lb in past few months  Continue reduced carb diet exercise  Will repeat labs prior to next appointment in 6 months         Relevant Orders    Comprehensive metabolic panel    Hemoglobin A1C    Obesity (BMI 35 0-39 9 without comorbidity)      Current BMI 37 50  Patient has lost approximately 25 lb in the past few months  Continue diet exercise  Will continue to monitor         Elevated PSA      History of mildly elevated PSA  Was as high as 4 97  We have been watching at every 6 months since  Currently 3 5  Will continue 6 month observation for now  If level spikes, will refer to Urology         Relevant Orders    PSA, Total Screen    MANDY on CPAP      Continue CPAP         Left wrist pain      Patient complains of left wrist pain recently    (Ulnar side)   Will sent for x-rays  Possible referral to physical therapy         Relevant Orders    XR wrist 3+ vw left        Pt had COVID vaccination      Return to office in:  6 months, fasting blood work prior at [facility] 1 Complaint   Patient presents with    Follow-up     6 months       History of Present Illness      Patient presents for recheck chronic medical problems  Patient was admitted earlier this year for atrial flutter  Since that time, he has started dramatic lifestyle changes  He has lost approximately 20-25 lb  He no longer drinks alcohol  He has cut a lot of red meat out of his diet  Otherwise doing well  Doing well on valsartan for blood pressure  Still taking allopurinol for gout, but like to reduce this  Patient had labs done on May 14th      The following portions of the patient's history were reviewed and updated as appropriate: allergies, current medications, past family history, past medical history, past social history, past surgical history and problem list     Review of Systems   Review of Systems   Respiratory: Negative  Cardiovascular: Negative  Gastrointestinal: Negative  Genitourinary: Negative          Active Problem List     Patient Active Problem List   Diagnosis    Rotator cuff syndrome of right shoulder    Exogenous obesity    Combined hyperlipidemia    Benign essential hypertension    Chronic thoracic spine pain    History of gout    Chronic lumbar radiculopathy    Cervical radiculitis    Plantar fasciitis of right foot    Subclinical hypothyroidism    Elevated blood sugar    Eczema    Fatigue    MANDY (obstructive sleep apnea)    S/P cervical spinal fusion    Achilles rupture, left    Hand dermatitis    Dupuytren contracture    Seborrheic keratoses, inflamed    Obesity (BMI 35 0-39 9 without comorbidity)    Elevated PSA    Paresthesia of upper and lower extremities of both sides    Prediabetes    MANDY on CPAP    Atrial flutter (HCC)    Habitual alcohol use    Elevated troponin    Dyslipidemia    Body mass index (BMI)40 0-44 9, adult (HCC)    Left wrist pain       Objective   /80 (BP Location: Left arm, Patient Position: Sitting, Cuff Size: Large)   Pulse 62   Temp 97 6 °F (36 4 °C) (Tympanic)   Resp 14   Ht 5' 9 69" (1 77 m)   Wt 117 kg (259 lb)   SpO2 97%   BMI 37 50 kg/m²     Physical Exam  Cardiovascular:      Rate and Rhythm: Normal rate and regular rhythm  Heart sounds: Normal heart sounds  Comments: Carotids: no bruits  Ext: no edema  Pulmonary:      Effort: Pulmonary effort is normal  No respiratory distress  Breath sounds: No wheezing or rales  Psychiatric:         Behavior: Behavior normal          Thought Content:  Thought content normal          Pertinent Laboratory/Diagnostic Studies:  Last labs reviewed    Current Medications     Current Outpatient Medications:     atorvastatin (LIPITOR) 20 mg tablet, Take 1 tablet (20 mg total) by mouth daily, Disp: 90 tablet, Rfl: 3    metoprolol succinate (TOPROL-XL) 50 mg 24 hr tablet, Take 1 tablet (50 mg total) by mouth daily, Disp: 90 tablet, Rfl: 3    rivaroxaban (XARELTO) 20 mg tablet, Take 1 tablet (20 mg total) by mouth daily with breakfast, Disp: 90 tablet, Rfl: 3    valsartan-hydrochlorothiazide (DIOVAN-HCT) 160-12 5 MG per tablet, TAKE 1 TABLET BY MOUTH EVERY DAY, Disp: 90 tablet, Rfl: 1    Health Maintenance     Health Maintenance   Topic Date Due    PT PLAN OF CARE  12/19/2019    BMI: Followup Plan  11/11/2020    Hepatitis C Screening  12/09/2021 (Originally 1950)    DTaP,Tdap,and Td Vaccines (1 - Tdap) 12/09/2021 (Originally 1/11/1971)    Fall Risk  12/09/2021    Depression Screening PHQ  12/09/2021    Medicare Annual Wellness Visit (AWV)  12/09/2021    BMI: Adult  05/20/2022    Colorectal Cancer Screening  02/14/2028    Pneumococcal Vaccine: 65+ Years  Completed    Influenza Vaccine  Completed    COVID-19 Vaccine  Completed    HIB Vaccine  Aged Out    Hepatitis B Vaccine  Aged Out    IPV Vaccine  Aged Out    Hepatitis A Vaccine  Aged Out    Meningococcal ACWY Vaccine  Aged Out    HPV Vaccine  Aged Out     Immunization History   Administered Date(s) Administered    INFLUENZA 11/01/2015, 11/02/2017, 09/23/2018, 10/03/2019    Influenza Quadrivalent Preservative Free 3 years and older IM 11/02/2017    Influenza Quadrivalent, 6-35 Months IM 11/01/2015    Influenza, high dose seasonal 0 7 mL 10/03/2019, 08/27/2020    Pneumococcal Conjugate 13-Valent 11/11/2019    Pneumococcal Polysaccharide PPV23 12/09/2020    SARS-CoV-2 / COVID-19 mRNA IM (Pfizer-BioNTech) 03/24/2021, 04/14/2021    Zoster Vaccine Recombinant 09/10/2020, 12/19/2020       Swati Murphy DO  [facility]

## 2021-05-20 NOTE — ASSESSMENT & PLAN NOTE
Patient complains of left wrist pain recently  (Ulnar side)  Will sent for x-rays    Possible referral to physical therapy

## 2021-05-20 NOTE — ASSESSMENT & PLAN NOTE
Patient has been doing well on allopurinol 300 mg daily without any recent flares  Uric acid from May 14th was 5 3  Patient would like to discontinue med since he is no longer drinking alcohol reading much red meat    Call further problems

## 2021-05-25 ENCOUNTER — TELEPHONE (OUTPATIENT)
Dept: FAMILY MEDICINE CLINIC | Facility: CLINIC | Age: 71
End: 2021-05-25

## 2021-05-25 NOTE — TELEPHONE ENCOUNTER
Phone call from patient inquiring about recent wrist xray results  Informed patient the results were still pending  Will contact him once we receive final report

## 2021-06-02 ENCOUNTER — TELEPHONE (OUTPATIENT)
Dept: FAMILY MEDICINE CLINIC | Facility: CLINIC | Age: 71
End: 2021-06-02

## 2021-06-02 NOTE — TELEPHONE ENCOUNTER
----- Message from Kranthi Fagan DO sent at 5/29/2021 11:03 AM EDT -----  Call pt  The xrays of his wrist showed arthritis, otherwise neg  Consider referral to physical therapy if pain persists

## 2021-06-10 ENCOUNTER — OFFICE VISIT (OUTPATIENT)
Dept: FAMILY MEDICINE CLINIC | Facility: CLINIC | Age: 71
End: 2021-06-10
Payer: COMMERCIAL

## 2021-06-10 VITALS
SYSTOLIC BLOOD PRESSURE: 116 MMHG | BODY MASS INDEX: 36.25 KG/M2 | HEIGHT: 70 IN | WEIGHT: 253.2 LBS | TEMPERATURE: 97.6 F | HEART RATE: 61 BPM | DIASTOLIC BLOOD PRESSURE: 74 MMHG | OXYGEN SATURATION: 98 %

## 2021-06-10 DIAGNOSIS — L02.212 ABSCESS OF BACK: Primary | ICD-10-CM

## 2021-06-10 PROCEDURE — 3074F SYST BP LT 130 MM HG: CPT | Performed by: FAMILY MEDICINE

## 2021-06-10 PROCEDURE — 1160F RVW MEDS BY RX/DR IN RCRD: CPT | Performed by: FAMILY MEDICINE

## 2021-06-10 PROCEDURE — 3078F DIAST BP <80 MM HG: CPT | Performed by: FAMILY MEDICINE

## 2021-06-10 PROCEDURE — 3008F BODY MASS INDEX DOCD: CPT | Performed by: FAMILY MEDICINE

## 2021-06-10 PROCEDURE — 1036F TOBACCO NON-USER: CPT | Performed by: FAMILY MEDICINE

## 2021-06-10 PROCEDURE — 99214 OFFICE O/P EST MOD 30 MIN: CPT | Performed by: FAMILY MEDICINE

## 2021-06-10 PROCEDURE — 10060 I&D ABSCESS SIMPLE/SINGLE: CPT | Performed by: FAMILY MEDICINE

## 2021-06-10 RX ORDER — AMOXICILLIN AND CLAVULANATE POTASSIUM 875; 125 MG/1; MG/1
1 TABLET, FILM COATED ORAL EVERY 12 HOURS SCHEDULED
Qty: 20 TABLET | Refills: 0 | Status: SHIPPED | OUTPATIENT
Start: 2021-06-10 | End: 2021-06-20

## 2021-06-10 NOTE — PROGRESS NOTES
Assessment/Plan:    1  Abscess of back  Assessment & Plan:  Dx: Abscess    Location: mid back   Written consent was obtained and patient's questions were answered  Patient was positioned comfortably  Needle: 22G 1-1/2 inch needle    Scalpel:  11 blade   Anesthetic:  Xylocaine 1%   Abscess was identified and localized  The area was cleansed with chloraprep  Using 1 cc of anesthetic, the area was made numb until patient was unable to feel pain when applying pressure  Patient was warned of possible pain at injection site  The scalpel was inserted into the abscess and a small 1 cm cross incision was made over it  Scalpel was withdrawn and slight pressure was applied to the area to drain the pus  Pressure was applied to the site to prevent hematoma  The area was cleaned with alcohol again and covered in sterile gauze  Patient tolerated the procedure well with no adverse affects  Patient reported improvement in pain post procedure  The patient was given after care instructions to clean and dress area  Patient should start antibiotic that was sent to pharmacy  Anticipate soreness for 2-3 days  Patient discharge in good/ambulatory condition  Orders:  -     amoxicillin-clavulanate (AUGMENTIN) 875-125 mg per tablet; Take 1 tablet by mouth every 12 (twelve) hours for 10 days  -     Incision and Drainage      Subjective:      Patient ID: Greta Rivera is a 80 y o  male  HPI    Patient presenting with cyst on back  He noticed the cyst 1 week ago  His wife thought it was a black head and tried to pop the pimple by applying pressure  The pimple did not drain so she use a needle to try to drain it and scraped skin off the top of the lesion  The area is now tender and red, appearing infection  Patient has history of cysts and abscess in the past   He has no other complaints today  He denies fever        The following portions of the patient's history were reviewed and updated as appropriate: allergies, current medications, past family history, past medical history, past social history, past surgical history, and problem list       Current Outpatient Medications:     metoprolol succinate (TOPROL-XL) 50 mg 24 hr tablet, Take 1 tablet (50 mg total) by mouth daily, Disp: 90 tablet, Rfl: 3    rivaroxaban (XARELTO) 20 mg tablet, Take 1 tablet (20 mg total) by mouth daily with breakfast, Disp: 90 tablet, Rfl: 3    valsartan-hydrochlorothiazide (DIOVAN-HCT) 160-12 5 MG per tablet, TAKE 1 TABLET BY MOUTH EVERY DAY, Disp: 90 tablet, Rfl: 1    amoxicillin-clavulanate (AUGMENTIN) 875-125 mg per tablet, Take 1 tablet by mouth every 12 (twelve) hours for 10 days, Disp: 20 tablet, Rfl: 0    atorvastatin (LIPITOR) 20 mg tablet, Take 1 tablet (20 mg total) by mouth daily (Patient not taking: Reported on 6/10/2021), Disp: 90 tablet, Rfl: 3      Review of Systems   Constitutional: Negative for chills and fever  HENT: Negative for ear pain and sore throat  Eyes: Negative for pain and visual disturbance  Respiratory: Negative for cough and shortness of breath  Cardiovascular: Negative for chest pain and palpitations  Gastrointestinal: Negative for abdominal pain and vomiting  Genitourinary: Negative for dysuria and hematuria  Musculoskeletal: Negative for arthralgias and back pain  Skin: Negative for color change and rash  +cyst   Neurological: Negative for seizures and syncope  All other systems reviewed and are negative  Objective:      /74 (BP Location: Left arm, Patient Position: Sitting, Cuff Size: Large)   Pulse 61   Temp 97 6 °F (36 4 °C) (Tympanic)   Ht 5' 9 69" (1 77 m)   Wt 115 kg (253 lb 3 2 oz)   SpO2 98%   BMI 36 65 kg/m²     Incision and Drainage    Date/Time: 6/10/2021 11:30 AM  Performed by: Etta Medina DO  Authorized by: Etta Medina DO   Universal Protocol:  Consent: Verbal consent obtained    Risks and benefits: risks, benefits and alternatives were discussed  Consent given by: patient  Time out: Immediately prior to procedure a "time out" was called to verify the correct patient, procedure, equipment, support staff and site/side marked as required  Timeout called at: 6/10/2021 11:30 AM   Patient understanding: patient states understanding of the procedure being performed  Patient identity confirmed: verbally with patient      Patient location:  Clinic  Location:     Type:  Abscess    Location:  Trunk    Trunk location:  Back  Pre-procedure details:     Skin preparation:  Antiseptic wash  Anesthesia (see MAR for exact dosages): Anesthesia method:  Local infiltration    Local anesthetic:  Lidocaine 1% w/o epi  Procedure details:     Complexity:  Simple    Needle aspiration: no      Incision types:  Cruciate    Scalpel blade:  11    Incision depth:  Subcutaneous    Wound management:  Probed and deloculated    Drainage:  Bloody and purulent    Drainage amount:  Scant    Wound treatment:  Wound left open    Packing materials:  None  Post-procedure details:     Patient tolerance of procedure: Tolerated well, no immediate complications         Physical Exam  Vitals signs and nursing note reviewed  Constitutional:       General: He is not in acute distress  Appearance: Normal appearance  He is not ill-appearing  HENT:      Head: Normocephalic and atraumatic  Cardiovascular:      Rate and Rhythm: Normal rate and regular rhythm  Heart sounds: Normal heart sounds  No murmur  Pulmonary:      Effort: Pulmonary effort is normal  No respiratory distress  Breath sounds: Normal breath sounds  No wheezing  Skin:     General: Skin is warm  Findings: Abscess and erythema present  Neurological:      General: No focal deficit present  Mental Status: He is alert and oriented to person, place, and time     Psychiatric:         Mood and Affect: Mood normal          Behavior: Behavior normal          Thought Content: Thought content normal

## 2021-06-10 NOTE — ASSESSMENT & PLAN NOTE
Dx: Abscess    Location: mid back   Written consent was obtained and patient's questions were answered  Patient was positioned comfortably  Needle: 22G 1-1/2 inch needle    Scalpel:  11 blade   Anesthetic:  Xylocaine 1%   Abscess was identified and localized  The area was cleansed with chloraprep  Using 1 cc of anesthetic, the area was made numb until patient was unable to feel pain when applying pressure  Patient was warned of possible pain at injection site  The scalpel was inserted into the abscess and a small 1 cm cross incision was made over it  Scalpel was withdrawn and slight pressure was applied to the area to drain the pus  Pressure was applied to the site to prevent hematoma  The area was cleaned with alcohol again and covered in sterile gauze  Patient tolerated the procedure well with no adverse affects  Patient reported improvement in pain post procedure  The patient was given after care instructions to clean and dress area  Patient should start antibiotic that was sent to pharmacy  Anticipate soreness for 2-3 days  Patient discharge in good/ambulatory condition

## 2021-06-22 ENCOUNTER — TELEPHONE (OUTPATIENT)
Dept: SLEEP CENTER | Facility: CLINIC | Age: 71
End: 2021-06-22

## 2021-06-22 NOTE — TELEPHONE ENCOUNTER
Returned patient's voice message  Called regarding [employer] CPAP recall  Called patient and advised Continuous Positive Airway Pressure (CPAP) therapy was prescribed to you as a medical necessity and there are risks of discontinuing  use of the device, some of which may be long term  Symptoms you experienced before using CPAP may return such as snoring, apneas, excessive daytime sleepiness, hypertension, cardiac arrhythmias, risk of stroke, congestive heart-failure, exacerbation of COPD and potential respiratory failure  Ultimately, it is a personal decision for you to make if you continue use of an affected device or discontinue until a replacement is provided  Unfortunately, [employer] has not yet provided us with information about available devices  You can visit their website at [email or web address] to register your device and to learn more about how [employer] plans to replace your device  Patient states he did register on the [facility]

## 2021-08-25 ENCOUNTER — OFFICE VISIT (OUTPATIENT)
Dept: FAMILY MEDICINE CLINIC | Facility: CLINIC | Age: 71
End: 2021-08-25
Payer: COMMERCIAL

## 2021-08-25 VITALS
HEIGHT: 70 IN | WEIGHT: 255.6 LBS | TEMPERATURE: 98 F | BODY MASS INDEX: 36.59 KG/M2 | HEART RATE: 56 BPM | SYSTOLIC BLOOD PRESSURE: 142 MMHG | DIASTOLIC BLOOD PRESSURE: 84 MMHG | OXYGEN SATURATION: 96 % | RESPIRATION RATE: 18 BRPM

## 2021-08-25 DIAGNOSIS — R09.89 GLOBUS SENSATION: Primary | ICD-10-CM

## 2021-08-25 DIAGNOSIS — R14.2 BELCHING: ICD-10-CM

## 2021-08-25 DIAGNOSIS — R49.9 CHANGE OF VOICE: ICD-10-CM

## 2021-08-25 PROBLEM — R09.A2 GLOBUS SENSATION: Status: ACTIVE | Noted: 2021-08-25

## 2021-08-25 PROCEDURE — 99214 OFFICE O/P EST MOD 30 MIN: CPT | Performed by: FAMILY MEDICINE

## 2021-08-25 PROCEDURE — 1036F TOBACCO NON-USER: CPT | Performed by: FAMILY MEDICINE

## 2021-08-25 PROCEDURE — 3008F BODY MASS INDEX DOCD: CPT | Performed by: FAMILY MEDICINE

## 2021-08-25 PROCEDURE — 1160F RVW MEDS BY RX/DR IN RCRD: CPT | Performed by: FAMILY MEDICINE

## 2021-08-25 RX ORDER — ASPIRIN 81 MG/1
TABLET ORAL
COMMUNITY
End: 2021-11-03

## 2021-08-25 RX ORDER — DIPHENHYDRAMINE HCL 25 MG
25 TABLET ORAL AS NEEDED
COMMUNITY
End: 2021-11-03

## 2021-08-25 RX ORDER — OMEPRAZOLE 40 MG/1
40 CAPSULE, DELAYED RELEASE ORAL DAILY
Qty: 30 CAPSULE | Refills: 3 | Status: SHIPPED | OUTPATIENT
Start: 2021-08-25 | End: 2021-11-03

## 2021-08-25 NOTE — PROGRESS NOTES
Assessment/Plan:     1  Globus sensation  Assessment & Plan:   Given concurrent symptoms concerned this may be related to uncontrolled GERD; will trial omeprazole and recheck in 1 month; given significance of symptoms also referred to ENT for further evaluation; if no improvement with PPI in evaluation by ENT unremarkable would consider treatment for anxiety as he does admit to stress in his life    Orders:  -     omeprazole (PriLOSEC) 40 MG capsule; Take 1 capsule (40 mg total) by mouth daily  -     Ambulatory Referral to Otolaryngology; Future    2  Change of voice    3  Belching        Subjective:      Patient ID: Pau Harris is a 80 y o  male  Patient is complaining of issues with swallowing  He states he feels like he has a golf ball or marble in his throat  Worse with laying down  He states it goes away with eating  He states he is hearing a change in his voice at times  He states it is bothersome  Started about 1 week ago  He states he was drinking water or milk and felt like something change in throat  Then went away and then seemed to have come back a few days later  Feels like there is constant tension there but only relaxes when he eats  He does admit to stress and feelings of anxiety  Unsure if is part of what is making him feel this way  Not choking on any food or water  Sometimes swallowing hurts but is inconsistent  Sometimes no symptoms  He states he finds himself burping a lot  Sometimes has chest tightness on right side of chest and is sore to touch, not related to exertion and resolves when up and moving  Sometimes he regurgitates food when he eats unsure if this is related to eating to quickly  Has not been using his CPAP due to issues with the filter  He states he has some cotton mouth since stopping it unsure if causing any of his symptoms  He does have some post nasal drip  No worsening of symptoms with exertion         The following portions of the patient's history were reviewed and updated as appropriate: allergies, current medications, past family history, past medical history, past social history, past surgical history, and problem list     Review of Systems   Constitutional: Negative for chills and fever  HENT: Positive for postnasal drip, sore throat and voice change  Respiratory: Positive for cough  Negative for shortness of breath and wheezing  Gastrointestinal: Negative for abdominal pain, nausea and vomiting  Objective:      /84 (BP Location: Right arm, Patient Position: Sitting, Cuff Size: Adult)   Pulse 56   Temp 98 °F (36 7 °C) (Temporal)   Resp 18   Ht 5' 10 25" (1 784 m)   Wt 116 kg (255 lb 9 6 oz)   SpO2 96%   BMI 36 41 kg/m²          Physical Exam  Vitals reviewed  Constitutional:       General: He is not in acute distress  Appearance: Normal appearance  He is not ill-appearing, toxic-appearing or diaphoretic  HENT:      Head: Normocephalic and atraumatic  Eyes:      General: No scleral icterus  Right eye: No discharge  Left eye: No discharge  Conjunctiva/sclera: Conjunctivae normal    Cardiovascular:      Rate and Rhythm: Normal rate and regular rhythm  Pulses: Normal pulses  Heart sounds: Normal heart sounds  No murmur heard  No gallop  Pulmonary:      Effort: Pulmonary effort is normal  No respiratory distress  Breath sounds: Normal breath sounds  No stridor  No wheezing, rhonchi or rales  Chest:       Abdominal:      Tenderness: There is abdominal tenderness in the epigastric area  There is no guarding or rebound  Musculoskeletal:      Right lower leg: No edema  Left lower leg: No edema  Neurological:      General: No focal deficit present  Mental Status: He is alert and oriented to person, place, and time  Psychiatric:         Mood and Affect: Mood normal          Behavior: Behavior normal          Thought Content:  Thought content normal          Judgment: Judgment normal

## 2021-08-25 NOTE — ASSESSMENT & PLAN NOTE
Given concurrent symptoms concerned this may be related to uncontrolled GERD; will trial omeprazole and recheck in 1 month; given significance of symptoms also referred to ENT for further evaluation; if no improvement with PPI in evaluation by ENT unremarkable would consider treatment for anxiety as he does admit to stress in his life

## 2021-10-29 ENCOUNTER — APPOINTMENT (OUTPATIENT)
Dept: LAB | Facility: MEDICAL CENTER | Age: 71
End: 2021-10-29
Payer: COMMERCIAL

## 2021-10-29 DIAGNOSIS — Z87.39 HISTORY OF GOUT: ICD-10-CM

## 2021-10-29 DIAGNOSIS — E78.2 COMBINED HYPERLIPIDEMIA: ICD-10-CM

## 2021-10-29 DIAGNOSIS — R73.9 ELEVATED BLOOD SUGAR: ICD-10-CM

## 2021-10-29 DIAGNOSIS — R97.20 ELEVATED PSA: ICD-10-CM

## 2021-10-29 LAB
ALBUMIN SERPL BCP-MCNC: 3.7 G/DL (ref 3.5–5)
ALP SERPL-CCNC: 69 U/L (ref 46–116)
ALT SERPL W P-5'-P-CCNC: 25 U/L (ref 12–78)
ANION GAP SERPL CALCULATED.3IONS-SCNC: 0 MMOL/L (ref 4–13)
AST SERPL W P-5'-P-CCNC: 19 U/L (ref 5–45)
BILIRUB SERPL-MCNC: 0.79 MG/DL (ref 0.2–1)
BUN SERPL-MCNC: 20 MG/DL (ref 5–25)
CALCIUM SERPL-MCNC: 9.3 MG/DL (ref 8.3–10.1)
CHLORIDE SERPL-SCNC: 107 MMOL/L (ref 100–108)
CHOLEST SERPL-MCNC: 232 MG/DL (ref 50–200)
CO2 SERPL-SCNC: 32 MMOL/L (ref 21–32)
CREAT SERPL-MCNC: 1.11 MG/DL (ref 0.6–1.3)
EST. AVERAGE GLUCOSE BLD GHB EST-MCNC: 111 MG/DL
GFR SERPL CREATININE-BSD FRML MDRD: 67 ML/MIN/1.73SQ M
GLUCOSE P FAST SERPL-MCNC: 100 MG/DL (ref 65–99)
HBA1C MFR BLD: 5.5 %
HDLC SERPL-MCNC: 58 MG/DL
LDLC SERPL CALC-MCNC: 146 MG/DL (ref 0–100)
POTASSIUM SERPL-SCNC: 4.6 MMOL/L (ref 3.5–5.3)
PROT SERPL-MCNC: 7.6 G/DL (ref 6.4–8.2)
SODIUM SERPL-SCNC: 139 MMOL/L (ref 136–145)
TRIGL SERPL-MCNC: 141 MG/DL
URATE SERPL-MCNC: 10.4 MG/DL (ref 4.2–8)

## 2021-10-29 PROCEDURE — 83036 HEMOGLOBIN GLYCOSYLATED A1C: CPT

## 2021-10-29 PROCEDURE — 84550 ASSAY OF BLOOD/URIC ACID: CPT

## 2021-10-29 PROCEDURE — 36415 COLL VENOUS BLD VENIPUNCTURE: CPT

## 2021-10-29 PROCEDURE — 80053 COMPREHEN METABOLIC PANEL: CPT

## 2021-10-29 PROCEDURE — 80061 LIPID PANEL: CPT

## 2021-10-29 PROCEDURE — G0103 PSA SCREENING: HCPCS

## 2021-10-30 LAB — PSA SERPL-MCNC: 3.7 NG/ML (ref 0–4)

## 2021-10-31 PROBLEM — R79.89 ELEVATED TROPONIN: Status: RESOLVED | Noted: 2021-01-25 | Resolved: 2021-10-31

## 2021-10-31 PROBLEM — R53.83 FATIGUE: Status: RESOLVED | Noted: 2019-05-10 | Resolved: 2021-10-31

## 2021-10-31 PROBLEM — R77.8 ELEVATED TROPONIN: Status: RESOLVED | Noted: 2021-01-25 | Resolved: 2021-10-31

## 2021-10-31 PROBLEM — R09.A2 GLOBUS SENSATION: Status: RESOLVED | Noted: 2021-08-25 | Resolved: 2021-10-31

## 2021-10-31 PROBLEM — L02.212 ABSCESS OF BACK: Status: RESOLVED | Noted: 2021-06-10 | Resolved: 2021-10-31

## 2021-10-31 PROBLEM — R09.89 GLOBUS SENSATION: Status: RESOLVED | Noted: 2021-08-25 | Resolved: 2021-10-31

## 2021-10-31 PROBLEM — M25.532 LEFT WRIST PAIN: Status: RESOLVED | Noted: 2021-05-20 | Resolved: 2021-10-31

## 2021-11-03 ENCOUNTER — OFFICE VISIT (OUTPATIENT)
Dept: FAMILY MEDICINE CLINIC | Facility: CLINIC | Age: 71
End: 2021-11-03
Payer: COMMERCIAL

## 2021-11-03 VITALS
DIASTOLIC BLOOD PRESSURE: 78 MMHG | TEMPERATURE: 97.6 F | SYSTOLIC BLOOD PRESSURE: 120 MMHG | HEIGHT: 70 IN | HEART RATE: 72 BPM | BODY MASS INDEX: 35.79 KG/M2 | WEIGHT: 250 LBS | OXYGEN SATURATION: 95 % | RESPIRATION RATE: 16 BRPM

## 2021-11-03 DIAGNOSIS — Z72.89 HABITUAL ALCOHOL USE: ICD-10-CM

## 2021-11-03 DIAGNOSIS — E78.2 COMBINED HYPERLIPIDEMIA: ICD-10-CM

## 2021-11-03 DIAGNOSIS — I48.92 ATRIAL FLUTTER, UNSPECIFIED TYPE (HCC): Primary | ICD-10-CM

## 2021-11-03 DIAGNOSIS — E66.9 OBESITY (BMI 35.0-39.9 WITHOUT COMORBIDITY): ICD-10-CM

## 2021-11-03 DIAGNOSIS — Z87.39 HISTORY OF GOUT: ICD-10-CM

## 2021-11-03 DIAGNOSIS — R97.20 ELEVATED PSA: ICD-10-CM

## 2021-11-03 DIAGNOSIS — E03.8 SUBCLINICAL HYPOTHYROIDISM: ICD-10-CM

## 2021-11-03 DIAGNOSIS — R73.9 ELEVATED BLOOD SUGAR: ICD-10-CM

## 2021-11-03 DIAGNOSIS — G47.33 OSA (OBSTRUCTIVE SLEEP APNEA): ICD-10-CM

## 2021-11-03 DIAGNOSIS — I10 BENIGN ESSENTIAL HYPERTENSION: ICD-10-CM

## 2021-11-03 DIAGNOSIS — L30.9 ECZEMA, UNSPECIFIED TYPE: ICD-10-CM

## 2021-11-03 PROBLEM — Z99.89 OSA ON CPAP: Status: RESOLVED | Noted: 2021-01-22 | Resolved: 2021-11-03

## 2021-11-03 PROCEDURE — 3074F SYST BP LT 130 MM HG: CPT | Performed by: FAMILY MEDICINE

## 2021-11-03 PROCEDURE — 99214 OFFICE O/P EST MOD 30 MIN: CPT | Performed by: FAMILY MEDICINE

## 2021-11-03 PROCEDURE — 3008F BODY MASS INDEX DOCD: CPT | Performed by: FAMILY MEDICINE

## 2021-11-03 PROCEDURE — 1160F RVW MEDS BY RX/DR IN RCRD: CPT | Performed by: FAMILY MEDICINE

## 2021-11-03 PROCEDURE — 3078F DIAST BP <80 MM HG: CPT | Performed by: FAMILY MEDICINE

## 2021-11-03 PROCEDURE — 1036F TOBACCO NON-USER: CPT | Performed by: FAMILY MEDICINE

## 2021-11-03 RX ORDER — HALOBETASOL PROPIONATE 0.05 %
OINTMENT (GRAM) TOPICAL 2 TIMES DAILY
Qty: 15 G | Refills: 2 | Status: SHIPPED | OUTPATIENT
Start: 2021-11-03

## 2021-11-13 DIAGNOSIS — I10 ESSENTIAL HYPERTENSION: ICD-10-CM

## 2021-11-15 RX ORDER — VALSARTAN AND HYDROCHLOROTHIAZIDE 160; 12.5 MG/1; MG/1
TABLET, FILM COATED ORAL
Qty: 90 TABLET | Refills: 1 | Status: SHIPPED | OUTPATIENT
Start: 2021-11-15 | End: 2022-05-06

## 2021-12-28 ENCOUNTER — TELEMEDICINE (OUTPATIENT)
Dept: FAMILY MEDICINE CLINIC | Facility: CLINIC | Age: 71
End: 2021-12-28
Payer: COMMERCIAL

## 2021-12-28 DIAGNOSIS — J22 LOWER RESPIRATORY INFECTION (E.G., BRONCHITIS, PNEUMONIA, PNEUMONITIS, PULMONITIS): Primary | ICD-10-CM

## 2021-12-28 PROCEDURE — 99442 PR PHYS/QHP TELEPHONE EVALUATION 11-20 MIN: CPT | Performed by: FAMILY MEDICINE

## 2021-12-28 RX ORDER — AZITHROMYCIN 250 MG/1
TABLET, FILM COATED ORAL
Qty: 6 TABLET | Refills: 0 | Status: SHIPPED | OUTPATIENT
Start: 2021-12-28 | End: 2022-01-02

## 2021-12-28 RX ORDER — METHYLPREDNISOLONE 4 MG/1
TABLET ORAL
Qty: 21 EACH | Refills: 0 | Status: SHIPPED | OUTPATIENT
Start: 2021-12-28 | End: 2022-02-01

## 2021-12-28 RX ORDER — BENZONATATE 100 MG/1
100 CAPSULE ORAL 3 TIMES DAILY PRN
Qty: 30 CAPSULE | Refills: 0 | Status: SHIPPED | OUTPATIENT
Start: 2021-12-28 | End: 2022-02-01

## 2022-02-01 ENCOUNTER — APPOINTMENT (OUTPATIENT)
Dept: RADIOLOGY | Facility: CLINIC | Age: 72
End: 2022-02-01
Payer: COMMERCIAL

## 2022-02-01 ENCOUNTER — OFFICE VISIT (OUTPATIENT)
Dept: FAMILY MEDICINE CLINIC | Facility: CLINIC | Age: 72
End: 2022-02-01
Payer: COMMERCIAL

## 2022-02-01 ENCOUNTER — APPOINTMENT (OUTPATIENT)
Dept: LAB | Facility: CLINIC | Age: 72
End: 2022-02-01
Payer: COMMERCIAL

## 2022-02-01 VITALS
DIASTOLIC BLOOD PRESSURE: 80 MMHG | OXYGEN SATURATION: 97 % | TEMPERATURE: 97.8 F | SYSTOLIC BLOOD PRESSURE: 120 MMHG | BODY MASS INDEX: 36.68 KG/M2 | WEIGHT: 262 LBS | HEART RATE: 75 BPM | HEIGHT: 71 IN

## 2022-02-01 DIAGNOSIS — R06.02 SOB (SHORTNESS OF BREATH) ON EXERTION: ICD-10-CM

## 2022-02-01 DIAGNOSIS — R51.9 ACUTE NONINTRACTABLE HEADACHE, UNSPECIFIED HEADACHE TYPE: ICD-10-CM

## 2022-02-01 DIAGNOSIS — H93.A3 PULSATILE TINNITUS, BILATERAL: ICD-10-CM

## 2022-02-01 DIAGNOSIS — R51.9 ACUTE INTRACTABLE HEADACHE, UNSPECIFIED HEADACHE TYPE: Primary | ICD-10-CM

## 2022-02-01 DIAGNOSIS — R53.83 FATIGUE, UNSPECIFIED TYPE: ICD-10-CM

## 2022-02-01 DIAGNOSIS — I48.92 ATRIAL FLUTTER, UNSPECIFIED TYPE (HCC): ICD-10-CM

## 2022-02-01 LAB
ALBUMIN SERPL BCP-MCNC: 3.7 G/DL (ref 3.5–5)
ALP SERPL-CCNC: 63 U/L (ref 46–116)
ALT SERPL W P-5'-P-CCNC: 26 U/L (ref 12–78)
ANION GAP SERPL CALCULATED.3IONS-SCNC: 2 MMOL/L (ref 4–13)
AST SERPL W P-5'-P-CCNC: 14 U/L (ref 5–45)
BASOPHILS # BLD AUTO: 0.04 THOUSANDS/ΜL (ref 0–0.1)
BASOPHILS NFR BLD AUTO: 1 % (ref 0–1)
BILIRUB SERPL-MCNC: 1.23 MG/DL (ref 0.2–1)
BUN SERPL-MCNC: 18 MG/DL (ref 5–25)
CALCIUM SERPL-MCNC: 9.3 MG/DL (ref 8.3–10.1)
CHLORIDE SERPL-SCNC: 107 MMOL/L (ref 100–108)
CK SERPL-CCNC: 62 U/L (ref 39–308)
CO2 SERPL-SCNC: 30 MMOL/L (ref 21–32)
CREAT SERPL-MCNC: 0.99 MG/DL (ref 0.6–1.3)
CRP SERPL QL: <3 MG/L
D DIMER PPP FEU-MCNC: <0.27 UG/ML FEU
EOSINOPHIL # BLD AUTO: 0.26 THOUSAND/ΜL (ref 0–0.61)
EOSINOPHIL NFR BLD AUTO: 3 % (ref 0–6)
ERYTHROCYTE [DISTWIDTH] IN BLOOD BY AUTOMATED COUNT: 13.2 % (ref 11.6–15.1)
ERYTHROCYTE [SEDIMENTATION RATE] IN BLOOD: 4 MM/HOUR (ref 0–19)
GFR SERPL CREATININE-BSD FRML MDRD: 76 ML/MIN/1.73SQ M
GLUCOSE SERPL-MCNC: 75 MG/DL (ref 65–140)
HCT VFR BLD AUTO: 46.7 % (ref 36.5–49.3)
HGB BLD-MCNC: 15.2 G/DL (ref 12–17)
IMM GRANULOCYTES # BLD AUTO: 0.01 THOUSAND/UL (ref 0–0.2)
IMM GRANULOCYTES NFR BLD AUTO: 0 % (ref 0–2)
LYMPHOCYTES # BLD AUTO: 2.39 THOUSANDS/ΜL (ref 0.6–4.47)
LYMPHOCYTES NFR BLD AUTO: 31 % (ref 14–44)
MCH RBC QN AUTO: 29.9 PG (ref 26.8–34.3)
MCHC RBC AUTO-ENTMCNC: 32.5 G/DL (ref 31.4–37.4)
MCV RBC AUTO: 92 FL (ref 82–98)
MONOCYTES # BLD AUTO: 0.93 THOUSAND/ΜL (ref 0.17–1.22)
MONOCYTES NFR BLD AUTO: 12 % (ref 4–12)
NEUTROPHILS # BLD AUTO: 3.99 THOUSANDS/ΜL (ref 1.85–7.62)
NEUTS SEG NFR BLD AUTO: 53 % (ref 43–75)
NRBC BLD AUTO-RTO: 0 /100 WBCS
NT-PROBNP SERPL-MCNC: 168 PG/ML
PLATELET # BLD AUTO: 269 THOUSANDS/UL (ref 149–390)
PMV BLD AUTO: 10 FL (ref 8.9–12.7)
POTASSIUM SERPL-SCNC: 4 MMOL/L (ref 3.5–5.3)
PROT SERPL-MCNC: 7.4 G/DL (ref 6.4–8.2)
RBC # BLD AUTO: 5.09 MILLION/UL (ref 3.88–5.62)
SODIUM SERPL-SCNC: 139 MMOL/L (ref 136–145)
WBC # BLD AUTO: 7.62 THOUSAND/UL (ref 4.31–10.16)

## 2022-02-01 PROCEDURE — 85652 RBC SED RATE AUTOMATED: CPT

## 2022-02-01 PROCEDURE — 93000 ELECTROCARDIOGRAM COMPLETE: CPT | Performed by: FAMILY MEDICINE

## 2022-02-01 PROCEDURE — 82550 ASSAY OF CK (CPK): CPT

## 2022-02-01 PROCEDURE — 85379 FIBRIN DEGRADATION QUANT: CPT

## 2022-02-01 PROCEDURE — U0003 INFECTIOUS AGENT DETECTION BY NUCLEIC ACID (DNA OR RNA); SEVERE ACUTE RESPIRATORY SYNDROME CORONAVIRUS 2 (SARS-COV-2) (CORONAVIRUS DISEASE [COVID-19]), AMPLIFIED PROBE TECHNIQUE, MAKING USE OF HIGH THROUGHPUT TECHNOLOGIES AS DESCRIBED BY CMS-2020-01-R: HCPCS | Performed by: FAMILY MEDICINE

## 2022-02-01 PROCEDURE — 99214 OFFICE O/P EST MOD 30 MIN: CPT | Performed by: FAMILY MEDICINE

## 2022-02-01 PROCEDURE — 71046 X-RAY EXAM CHEST 2 VIEWS: CPT

## 2022-02-01 PROCEDURE — 36415 COLL VENOUS BLD VENIPUNCTURE: CPT

## 2022-02-01 PROCEDURE — U0005 INFEC AGEN DETEC AMPLI PROBE: HCPCS | Performed by: FAMILY MEDICINE

## 2022-02-01 PROCEDURE — 86140 C-REACTIVE PROTEIN: CPT

## 2022-02-01 PROCEDURE — 83880 ASSAY OF NATRIURETIC PEPTIDE: CPT

## 2022-02-01 PROCEDURE — 80053 COMPREHEN METABOLIC PANEL: CPT

## 2022-02-01 PROCEDURE — 85025 COMPLETE CBC W/AUTO DIFF WBC: CPT

## 2022-02-01 NOTE — ASSESSMENT & PLAN NOTE
Given constant intractable headche; will check CT head; if unremarkable trial steroids and likely need MRA given pulsatile tinnitus

## 2022-02-01 NOTE — PATIENT INSTRUCTIONS
Complete CT scan today  Complete blood work and chest x-ray  We will notify you of results  If any worsening symptoms go to ER

## 2022-02-01 NOTE — PROGRESS NOTES
Assessment/Plan:     1  Acute intractable headache, unspecified headache type  Assessment & Plan:  Given constant intractable headche; will check CT head; if unremarkable trial steroids and likely need MRA given pulsatile tinnitus     Orders:  -     CT head wo contrast; Future; Expected date: 02/01/2022  -     CBC and differential; Future  -     Comprehensive metabolic panel; Future  -     Sedimentation rate, automated; Future  -     COVID Only - Collected at [facility] or [facility]; Future    2  SOB (shortness of breath) on exertion  Assessment & Plan:  Check CXR today and labs and Echo; concern for possible sequelae from possible viral etiology/covid19; will check COVID19 but may be negative given timing, if positive likely cause of his sx; EKG shows NSR    Orders:  -     NT-BNP PRO; Future; Expected date: 02/01/2022  -     D-dimer, quantitative; Future; Expected date: 02/01/2022  -     C-reactive protein (CRP); Future; Expected date: 02/01/2022  -     CK (with reflex to MB); Future; Expected date: 02/01/2022  -     XR chest pa & lateral; Future; Expected date: 02/01/2022  -     POCT ECG  -     Echo complete w/ contrast if indicated; Future; Expected date: 02/01/2022  -     COVID Only - Collected at [facility] or [facility]; Future    3  Fatigue, unspecified type  Assessment & Plan:  Acute on chronic; will need to check labs and f/u with results    Orders:  -     COVID Only - Collected at [facility] or [facility]; Future    4  Pulsatile tinnitus, bilateral    5  Atrial flutter, unspecified type [facility])  Assessment & Plan:  EKG WNL and NRS today in office; c/w current tx          Subjective:      Patient ID: Nohemy Almeida is a 71 y o  male  Patient is here with concerns of ongoing fatigue and ESPINO  Patient states he has had an HA for about 2-3 weeks  Constant  Feels fatigued and body aches as well  He states that seems to be lingering since he was sick early last month  He states he is nauseous   He does feel fatigue as well  Seems to have some difficulty to go upstairs  Sleeping more  Cough has resolved  Patient states he feels dizzy at times  States he is also experiencing ringing in the ears and sounds like a heartbeat  Seems to be mostly in left ear but is also right  Feels like it is associated with headache  Feels pressure in head  Noticed he is having tremors with movement in hands  Headache has been ongoing x 2 weeks  Does admit to SOB on exertion but no chest pain  The following portions of the patient's history were reviewed and updated as appropriate: allergies, current medications, past family history, past medical history, past social history, past surgical history, and problem list     Review of Systems   Constitutional: Positive for activity change and fatigue  Negative for appetite change and fever  Respiratory: Positive for shortness of breath  Negative for cough  Gastrointestinal: Positive for abdominal pain and nausea  Neurological: Positive for dizziness and headaches  Objective:      /80 (BP Location: Right arm, Patient Position: Sitting, Cuff Size: Large)   Pulse 75   Temp 97 8 °F (36 6 °C)   Ht 5' 11" (1 803 m)   Wt 119 kg (262 lb)   SpO2 97%   BMI 36 54 kg/m²          Physical Exam  Vitals reviewed  Constitutional:       General: He is not in acute distress  Appearance: Normal appearance  He is obese  He is not ill-appearing, toxic-appearing or diaphoretic  HENT:      Head: Normocephalic and atraumatic  Right Ear: Tympanic membrane, ear canal and external ear normal       Left Ear: Tympanic membrane, ear canal and external ear normal       Mouth/Throat:      Pharynx: Oropharynx is clear  Eyes:      General: No scleral icterus  Right eye: No discharge  Left eye: No discharge  Extraocular Movements: Extraocular movements intact  Conjunctiva/sclera: Conjunctivae normal       Pupils: Pupils are equal, round, and reactive to light  Neck:      Vascular: No carotid bruit  Cardiovascular:      Rate and Rhythm: Normal rate and regular rhythm  Pulses: Normal pulses  Heart sounds: Normal heart sounds  No murmur heard  No gallop  Pulmonary:      Effort: Pulmonary effort is normal  No respiratory distress  Breath sounds: Normal breath sounds  No stridor  No wheezing, rhonchi or rales  Musculoskeletal:      Right lower leg: No edema  Left lower leg: No edema  Lymphadenopathy:      Cervical: No cervical adenopathy  Neurological:      General: No focal deficit present  Mental Status: He is alert and oriented to person, place, and time  Cranial Nerves: No cranial nerve deficit  Motor: No weakness  Coordination: Coordination normal    Psychiatric:         Mood and Affect: Mood normal          Behavior: Behavior normal          Thought Content:  Thought content normal          Judgment: Judgment normal

## 2022-02-01 NOTE — ASSESSMENT & PLAN NOTE
Check CXR today and labs and Echo; concern for possible sequelae from possible viral etiology/covid19; will check COVID19 but may be negative given timing, if positive likely cause of his sx; EKG shows NSR

## 2022-02-04 ENCOUNTER — HOSPITAL ENCOUNTER (OUTPATIENT)
Dept: CT IMAGING | Facility: HOSPITAL | Age: 72
Discharge: HOME | End: 2022-02-04
Payer: COMMERCIAL

## 2022-02-04 ENCOUNTER — HOSPITAL ENCOUNTER (OUTPATIENT)
Dept: NON INVASIVE DIAGNOSTICS | Facility: HOSPITAL | Age: 72
Discharge: HOME | End: 2022-02-04
Payer: COMMERCIAL

## 2022-02-04 VITALS
SYSTOLIC BLOOD PRESSURE: 120 MMHG | HEART RATE: 75 BPM | HEIGHT: 71 IN | WEIGHT: 262 LBS | BODY MASS INDEX: 36.68 KG/M2 | DIASTOLIC BLOOD PRESSURE: 80 MMHG

## 2022-02-04 DIAGNOSIS — R51.9 ACUTE INTRACTABLE HEADACHE, UNSPECIFIED HEADACHE TYPE: ICD-10-CM

## 2022-02-04 DIAGNOSIS — R06.02 SOB (SHORTNESS OF BREATH) ON EXERTION: ICD-10-CM

## 2022-02-04 LAB
AORTIC ROOT: 3.7 CM
E WAVE DECELERATION TIME: 206 MS
FRACTIONAL SHORTENING: 35 % (ref 28–44)
INTERVENTRICULAR SEPTUM IN DIASTOLE (PARASTERNAL SHORT AXIS VIEW): 1.2 CM (ref 0.58–1.09)
LEFT ATRIUM AREA SYSTOLE SINGLE PLANE A4C: 17.8 CM2
LEFT ATRIUM SIZE: 4.4 CM
LEFT INTERNAL DIMENSION IN SYSTOLE: 2.8 CM (ref 2.1–4)
LEFT VENTRICULAR INTERNAL DIMENSION IN DIASTOLE: 4.3 CM (ref 10.83–16.15)
LEFT VENTRICULAR POSTERIOR WALL IN END DIASTOLE: 1.2 CM (ref 0.57–1.07)
LEFT VENTRICULAR STROKE VOLUME: 52 ML
MV E'TISSUE VEL-SEP: 8 CM/S
MV PEAK A VEL: 0.6 M/S
MV PEAK E VEL: 70 CM/S
MV STENOSIS PRESSURE HALF TIME: 0 MS
RIGHT ATRIUM AREA SYSTOLE A4C: 19.7 CM2
RIGHT VENTRICLE ID DIMENSION: 4.7 CM
SL CV LV EF: 57
SL CV PED ECHO LEFT VENTRICLE DIASTOLIC VOLUME (MOD BIPLANE) 2D: 82 ML
SL CV PED ECHO LEFT VENTRICLE SYSTOLIC VOLUME (MOD BIPLANE) 2D: 31 ML
Z-SCORE OF INTERVENTRICULAR SEPTUM IN END DIASTOLE: 2.83
Z-SCORE OF LEFT VENTRICULAR DIMENSION IN END SYSTOLE: -11.24
Z-SCORE OF LEFT VENTRICULAR POSTERIOR WALL IN END DIASTOLE: 2.94

## 2022-02-04 PROCEDURE — G1004 CDSM NDSC: HCPCS

## 2022-02-04 PROCEDURE — 93306 TTE W/DOPPLER COMPLETE: CPT | Performed by: INTERNAL MEDICINE

## 2022-02-04 PROCEDURE — 70450 CT HEAD/BRAIN W/O DYE: CPT

## 2022-02-04 PROCEDURE — 93306 TTE W/DOPPLER COMPLETE: CPT

## 2022-02-25 ENCOUNTER — OFFICE VISIT (OUTPATIENT)
Dept: CARDIOLOGY CLINIC | Facility: CLINIC | Age: 72
End: 2022-02-25
Payer: COMMERCIAL

## 2022-02-25 VITALS
SYSTOLIC BLOOD PRESSURE: 140 MMHG | RESPIRATION RATE: 16 BRPM | HEART RATE: 67 BPM | HEIGHT: 71 IN | DIASTOLIC BLOOD PRESSURE: 92 MMHG | WEIGHT: 260.5 LBS | BODY MASS INDEX: 36.47 KG/M2

## 2022-02-25 DIAGNOSIS — I48.92 ATRIAL FLUTTER, UNSPECIFIED TYPE (HCC): Primary | ICD-10-CM

## 2022-02-25 PROCEDURE — 3008F BODY MASS INDEX DOCD: CPT | Performed by: INTERNAL MEDICINE

## 2022-02-25 PROCEDURE — 1160F RVW MEDS BY RX/DR IN RCRD: CPT | Performed by: INTERNAL MEDICINE

## 2022-02-25 PROCEDURE — 99213 OFFICE O/P EST LOW 20 MIN: CPT | Performed by: INTERNAL MEDICINE

## 2022-02-25 PROCEDURE — 1036F TOBACCO NON-USER: CPT | Performed by: INTERNAL MEDICINE

## 2022-02-25 PROCEDURE — 93000 ELECTROCARDIOGRAM COMPLETE: CPT | Performed by: INTERNAL MEDICINE

## 2022-02-25 PROCEDURE — 3077F SYST BP >= 140 MM HG: CPT | Performed by: INTERNAL MEDICINE

## 2022-02-25 PROCEDURE — 3080F DIAST BP >= 90 MM HG: CPT | Performed by: INTERNAL MEDICINE

## 2022-02-25 NOTE — PROGRESS NOTES
HEART  [facility]    Outpatient  Follow-up  Today's Date: 02/25/22        Patient name: Maynor Phillip  YOB: 1950  Sex: male         Chief Complaint: f/u  afib       ASSESSMENT:  Problem List Items Addressed This Visit        Cardiovascular and Mediastinum    Atrial flutter [facility]) - Primary    Relevant Orders    POCT ECG        72 yo male  1) paroxymsal afib/flutter- NSR since January 2021- [id number], on xarelto/metoprolol and has lost 40 pounds and drinks much less  2) HTN well controlled today, slightly up today  On Diovan/hctz and metop  3) MANDY on CPAP  4) Dyslipidemia, didn't like statin so stopped  LDL and TC not well controlled  5) Possible Covid last month, headaches, cough, shortness of breath and fever, tested a couple of weeks after symptoms started and was neg  Recovered  PLAN:  1  Continue diet/exercise, avoid excess ETOH, maximum 7 drinks a week (he is about 10-14)  2  Cont xarelto, metoprolol  3  Recommend statin again but he refused and wants to try more diet/exercise    Follow up in: 12 months    Orders Placed This Encounter   Procedures    POCT ECG     Medications Discontinued During This Encounter   Medication Reason    methylPREDNISolone 4 MG tablet therapy pack Therapy completed             HPI/Subjective:     72 yo male  1) paroxymsal afib/flutter- NSR since January 2021- [id number], on xarelto/metoprolol and has lost 40 pounds and drinks much less  2) HTN well controlled today, slightly up today  On Diovan/hctz and metop  3) MANDY on CPAP  4) Dyslipidemia, didn't like statin so stopped  LDL and TC not well controlled  5) Possible Covid last month, headaches, cough, shortness of breath and fever, tested a couple of weeks after symptoms started and was neg  Recovered  Aicha Dunne is doing ok   Just recovering from viral illness may have been covid  He is drinking more than he should and willing to cut back  Also wants to exercise more and lose weight  Please note HPI is listed by problem with with update following it, it is copied again in the assessment above and reflects medical decision making as well  Complete 12 point ROS reviewed and otherwise non pertinent or negative except as per HPI pertinent positives in Cardiovascular and Respiratory emphasized  Please see paper chart for outpatient clinic patients where the patient completed the 12 point ROS survey  Past Medical History:   Diagnosis Date    Acute gouty arthritis     last assessed 6/1/13     Anxiety     last assessed 7/11/14     Chronic thoracic spine pain     last assessed 5/13/16     Dysfunction of both eustachian tubes     last assessed 8/16/13    Erectile dysfunction of non-organic origin     last assessed 10/8/13     Family history reviewed with no changes     medical history     Gout     Hypertension     Sebaceous cyst     last assessed 12/16/13     Skin lesion     last assessed 1/2/14     Subclinical hypothyroidism 3/21/2019       Allergies   Allergen Reactions    Levaquin [Levofloxacin] Arthralgia     I reviewed the Home Medication list and Allergies in the chart  Scheduled Meds:  Current Outpatient Medications   Medication Sig Dispense Refill    halobetasol (ULTRAVATE) 0 05 % ointment Apply topically 2 (two) times a day (Patient taking differently: Apply topically as needed  ) 15 g 2    metoprolol succinate (TOPROL-XL) 50 mg 24 hr tablet Take 1 tablet (50 mg total) by mouth daily 90 tablet 3    rivaroxaban (XARELTO) 20 mg tablet Take 1 tablet (20 mg total) by mouth daily with breakfast 90 tablet 3    valsartan-hydrochlorothiazide (DIOVAN-HCT) 160-12 5 MG per tablet TAKE 1 TABLET BY MOUTH EVERY DAY 90 tablet 1     No current facility-administered medications for this visit       PRN Meds:         Family History   Problem Relation Age of Onset    Diabetes Sister     Diabetes Family     Hypertension Family     No Known Problems Mother     No Known Problems Father        Social History     Socioeconomic History    Marital status: /Civil Union     Spouse name: Not on file    Number of children: Not on file    Years of education: Not on file    Highest education level: Not on file   Occupational History    Not on file   Tobacco Use    Smoking status: Former Smoker     Packs/day: 0 50     Years: 20 00     Pack years: 10 00     Quit date:      Years since quittin 1    Smokeless tobacco: Never Used   Vaping Use    Vaping Use: Never used   Substance and Sexual Activity    Alcohol use: Not Currently     Comment: social    Drug use: No    Sexual activity: Not Currently   Other Topics Concern    Not on file   Social History Narrative    Consumes 1 cup of coffee per day     Social Determinants of Health     Financial Resource Strain: Not on file   Food Insecurity: Not on file   Transportation Needs: Not on file   Physical Activity: Not on file   Stress: Not on file   Social Connections: Not on file   Intimate Partner Violence: Not on file   Housing Stability: Not on file         OBJECTIVE:    /92   Pulse 67   Resp 16   Ht 5' 11" (1 803 m)   Wt 118 kg (260 lb 8 oz)   BMI 36 33 kg/m²   Vitals:    22 1609   Weight: 118 kg (260 lb 8 oz)     /92   Pulse 67   Resp 16   Ht 5' 11" (1 803 m)   Wt 118 kg (260 lb 8 oz)   BMI 36 33 kg/m²   Vitals:    22 1609   Weight: 118 kg (260 lb 8 oz)     GEN: No acute distress, Alert and oriented, well appearing  HEENT:External ears normal, wearing a mask     EYES: Pupils equal, sclera anicteric, midline, normal conjuctiva  NECK: No JVD, supple, no obvious masses or thryomegaly or goiter  CARDIOVASCULAR:  RRR, No murmur, rub, gallops S1,S2  LUNGS: Clear To auscultation bilaterally, normal effort, no rales, rhonchi, crackles   ABDOMEN: nondistended,  without obvious organomegaly or ascites  EXTREMITIES/VASCULAR:  No edema  warm an well perfused  PSYCH: Normal Affect,  linear speech pattern without evidence of psychosis  NEURO: Grossly intact, moving all extremiteis equal, face symmetric, alert and responsive, no obvious focal defecits   GAIT:  Ambulates normally without difficulty  HEME: No bleeding, bruising, petechia, purpura   SKIN: No significant rashes on visibile skin, warm, no diaphoresis or pallor  Lab Results:       LABS:      Chemistry        Component Value Date/Time     05/16/2016 0804    K 4 0 02/01/2022 1205    K 4 1 05/16/2016 0804     02/01/2022 1205     05/16/2016 0804    CO2 30 02/01/2022 1205    CO2 33 (H) 05/16/2016 0804    BUN 18 02/01/2022 1205    BUN 20 05/16/2016 0804    CREATININE 0 99 02/01/2022 1205    CREATININE 1 07 05/16/2016 0804        Component Value Date/Time    CALCIUM 9 3 02/01/2022 1205    CALCIUM 9 1 05/16/2016 0804    ALKPHOS 63 02/01/2022 1205    ALKPHOS 58 05/16/2016 0804    AST 14 02/01/2022 1205    AST 14 05/16/2016 0804    ALT 26 02/01/2022 1205    ALT 13 05/16/2016 0804    BILITOT 0 6 05/16/2016 0804            Lab Results   Component Value Date    CHOL 248 (H) 05/16/2016     Lab Results   Component Value Date    HDL 58 10/29/2021    HDL 55 05/14/2021    HDL 46 01/23/2021     Lab Results   Component Value Date    LDLCALC 146 (H) 10/29/2021    LDLCALC 49 05/14/2021    LDLCALC 122 (H) 01/23/2021     Lab Results   Component Value Date    TRIG 141 10/29/2021    TRIG 72 05/14/2021    TRIG 219 (H) 01/23/2021     No results found for: CHOLHDL    IMAGING: No results found  Cardiac testing:   Results for orders placed during the hospital encounter of 01/22/21   Echo complete with contrast if indicated    Narrative [address] [place] [address] 35    [place], [address]  [phone]    Transthoracic Echocardiogram  2D, M-mode, Doppler, and Color Doppler    Study date:  2021    Patient: Henrine Canavan  MR number: [id number]  Account number: [de-identified]  : 1950  Age: 80 years  Gender: Male  Status: Inpatient  Location: Bedside  Height: 70 in  Weight: 269 5 lb  BP: 110/ 67 mmHg    Indications: Atrial flutter  Diagnoses: I48 1 - Atrial flutter    Sonographer:  MAYUR Garrett  Interpreting Physician:  INO Burgess  Referring Physician:  Christy Segovia Do  Group:  [facility]  Cardiology Fellow:  Ramo Michel MD    SUMMARY    LEFT VENTRICLE:  Systolic function was normal  Ejection fraction was estimated to be 60 %  There were no regional wall motion abnormalities  LEFT ATRIUM:  The atrium was mildly dilated  TRICUSPID VALVE:  There was trace regurgitation  SUMMARY MEASUREMENTS  2D measurements:  Unspecified Anatomy:   %FS was 37 8 %  Ao Diam was 3 9 cm   EDV(Teich) was 150 4 ml   EF(Teich) was 67 3 %  ESV(Teich) was 49 2 ml  IVSd was 1 1 cm  LA Diam was 4 4 cm  LAAs A2C was 20 cm2  LAAs A4C was 21 4 cm2  LAESV A-L A2C was  60 7 ml  LAESV A-L A4C was 71 3 ml  LAESV Index (A-L) was 28 1 ml/m2  LAESV MOD A2C was 57 6 ml  LAESV MOD A4C was 66 3 ml  LAESV(A-L) was 66 5 ml  LAESV(MOD BP) was 62 3 ml  LAESVInd MOD BP was 26 3 ml/m2  LALs A2C was 5 6 cm  LALs A4C was 5 5 cm  LVEDV MOD A2C was 55 1 ml  LVEDV MOD A4C was 122 8 ml  LVEF MOD A4C was 54 5 %  LVESV MOD A4C was 55 8 ml  LVIDd was 5 5 cm  LVIDs was 3 5 cm  LVLd A2C was 5 9 cm  LVLd A4C was 9 1 cm  LVLs A4C was 7 7 cm  LVPWd was 1 1 cm  RVIDd was 3 6 cm  SV MOD A4C was 67 ml   SV(Teich) was 101 2 ml   CW measurements:  Unspecified Anatomy:   AV Env  Ti was 323 5 ms   AV VTI was 18 6 cm   AV Vmax was 0 9 m/s  AV Vmean was 0 6 m/s  AV maxPG was 3 1 mmHg  AV meanPG was 1 5 mmHg  MM measurements:  Unspecified Anatomy:   TAPSE was 2 3 cm  PW measurements:  Unspecified Anatomy:   DVI was 1 1   E' Avg was 0 1 m/s    E' Lat was 0 1 m/s  E' Sept was 0 1 m/s  E/E' Avg was 6 2   E/E' Lat was 5 9   E/E' Sept was 6 6   LVOT Env  Ti was 364 4 ms  LVOT VTI was 20 7 cm  LVOT Vmax was 0 9 m/s  LVOT Vmean was 0 6 m/s  LVOT maxPG was 3 4 mmHg  LVOT meanPG was 1 5 mmHg  MV A Pj was 0 5 m/s  MV Dec [place] was 4 4 m/s2  MV DecT was 158 7 ms   MV E Pj was 0 7 m/s  MV E/A Ratio was 1 3   RV S' was 0 2 m/s  HISTORY: PRIOR HISTORY: Risk factors: hypertension and morbid obesity  PROCEDURE: The procedure was performed at the bedside  This was a routine study  The transthoracic approach was used  The study included complete 2D imaging, M-mode, complete spectral Doppler, and color Doppler  The heart rate was 67 bpm,  at the start of the study  Intravenous contrast (  4ml of Definity) was administered  This was a technically difficult study  LEFT VENTRICLE: Size was normal  Systolic function was normal  Ejection fraction was estimated to be 60 %  There were no regional wall motion abnormalities  Wall thickness was normal  DOPPLER: Left ventricular diastolic function parameters  were normal     RIGHT VENTRICLE: The size was normal  Systolic function was normal  Wall thickness was normal     LEFT ATRIUM: The atrium was mildly dilated  RIGHT ATRIUM: Size was normal     MITRAL VALVE: Valve structure was normal  There was normal leaflet separation  DOPPLER: The transmitral velocity was within the normal range  There was no evidence for stenosis  There was no significant regurgitation  AORTIC VALVE: The valve was trileaflet  Leaflets exhibited normal thickness and normal cuspal separation  DOPPLER: Transaortic velocity was within the normal range  There was no evidence for stenosis  There was no significant  regurgitation  TRICUSPID VALVE: The valve structure was normal  There was normal leaflet separation  DOPPLER: The transtricuspid velocity was within the normal range  There was no evidence for stenosis   There was trace regurgitation  PULMONIC VALVE: Leaflets exhibited normal thickness, no calcification, and normal cuspal separation  DOPPLER: The transpulmonic velocity was within the normal range  There was no significant regurgitation  PERICARDIUM: There was no pericardial effusion  A pericardial fat pad was present  The pericardium was normal in appearance  AORTA: The root exhibited normal size  SYSTEMIC VEINS: IVC: The inferior vena cava was normal in size and course  The inferior vena cava was normal in size  Respirophasic changes were normal     MEASUREMENT TABLES    2D MEASUREMENTS  Left atrium   (Reference normals)  AP dim   44 mm   (--)  AP dim index   1 86 cm/mï¾²   (<2 2)  Area, es, A4C   21 4 cmï¾²   (8 8-23  4)    SYSTEM MEASUREMENT TABLES    2D  %FS: 37 8 %  Ao Diam: 3 9 cm  EDV(Teich): 150 4 ml  EF(Teich): 67 3 %  ESV(Teich): 49 2 ml  IVSd: 1 1 cm  LA Diam: 4 4 cm  LAAs A2C: 20 cm2  LAAs A4C: 21 4 cm2  LAESV A-L A2C: 60 7 ml  LAESV A-L A4C: 71 3 ml  LAESV Index (A-L): 28 1 ml/m2  LAESV MOD A2C: 57 6 ml  LAESV MOD A4C: 66 3 ml  LAESV(A-L): 66 5 ml  LAESV(MOD BP): 62 3 ml  LAESVInd MOD BP: 26 3 ml/m2  LALs A2C: 5 6 cm  LALs A4C: 5 5 cm  LVEDV MOD A2C: 55 1 ml  LVEDV MOD A4C: 122 8 ml  LVEF MOD A4C: 54 5 %  LVESV MOD A4C: 55 8 ml  LVIDd: 5 5 cm  LVIDs: 3 5 cm  LVLd A2C: 5 9 cm  LVLd A4C: 9 1 cm  LVLs A4C: 7 7 cm  LVPWd: 1 1 cm  RVIDd: 3 6 cm  SV MOD A4C: 67 ml  SV(Teich): 101 2 ml    CW  AV Env  Ti: 323 5 ms  AV VTI: 18 6 cm  AV Vmax: 0 9 m/s  AV Vmean: 0 6 m/s  AV maxPG: 3 1 mmHg  AV meanP 5 mmHg    MM  TAPSE: 2 3 cm    PW  DVI: 1 1  E' Av 1 m/s  E' Lat: 0 1 m/s  E' Sept: 0 1 m/s  E/E' Av 2  E/E' Lat: 5 9  E/E' Sept: 6 6  LVOT Env  Ti: 364 4 ms  LVOT VTI: 20 7 cm  LVOT Vmax: 0 9 m/s  LVOT Vmean: 0 6 m/s  LVOT maxPG: 3 4 mmHg  LVOT meanP 5 mmHg  MV A Pj: 0 5 m/s  MV Dec [place]: 4 4 m/s2  MV DecT: 158 7 ms  MV E Pj: 0 7 m/s  MV E/A Ratio: 1 3  RV S': 0 2 m/s    [facility] Accredited Echocardiography Laboratory    Prepared and electronically signed by    INO Riggins  Signed 24-Jan-2021 15:01:20       No results found for this or any previous visit  No results found for this or any previous visit  No results found for this or any previous visit  I reviewed and interpreted the following LABS/EKG/TELE/IMAGING and below is summary of my interpretation (if data available):    LABS: BMP, CBC    Current EKG and Rhythm Strip: Sinus umm 53bpm           Reviewe zio strips and intpreted: Agree w reading below:     Patient had a min HR of 41 bpm, max HR of 190 bpm, and avg HR of 62  bpm  Predominant underlying rhythm was Sinus Rhythm  26  Supraventricular Tachycardia runs occurred, the run with the fastest  interval lasting 4 beats with a max rate of 190 bpm, the longest lasting  27 0 secs with an avg rate of 132 bpm  Isolated SVEs were rare (<1 0%),  SVE Couplets were rare (<1 0%), and SVE Triplets were rare (<1 0%)  Isolated VEs were rare (<1 0%, 992), VE Triplets were rare (<1 0%, 1), and  no VE Couplets were present

## 2022-03-07 ENCOUNTER — HOSPITAL ENCOUNTER (OUTPATIENT)
Dept: RADIOLOGY | Facility: HOSPITAL | Age: 72
Discharge: HOME | End: 2022-03-07
Payer: COMMERCIAL

## 2022-03-07 DIAGNOSIS — H93.A9 PULSATILE TINNITUS: ICD-10-CM

## 2022-03-07 DIAGNOSIS — R51.9 ACUTE INTRACTABLE HEADACHE, UNSPECIFIED HEADACHE TYPE: ICD-10-CM

## 2022-03-07 PROCEDURE — A9585 GADOBUTROL INJECTION: HCPCS | Performed by: FAMILY MEDICINE

## 2022-03-07 PROCEDURE — G1004 CDSM NDSC: HCPCS

## 2022-03-07 PROCEDURE — 70553 MRI BRAIN STEM W/O & W/DYE: CPT

## 2022-03-07 PROCEDURE — 70549 MR ANGIOGRAPH NECK W/O&W/DYE: CPT

## 2022-03-07 RX ADMIN — GADOBUTROL 10 ML: 604.72 INJECTION INTRAVENOUS at 20:51

## 2022-04-05 ENCOUNTER — CONSULT (OUTPATIENT)
Dept: DERMATOLOGY | Facility: CLINIC | Age: 72
End: 2022-04-05
Payer: COMMERCIAL

## 2022-04-05 VITALS — BODY MASS INDEX: 38.22 KG/M2 | WEIGHT: 267 LBS | TEMPERATURE: 97.5 F | HEIGHT: 70 IN

## 2022-04-05 DIAGNOSIS — R21 RASH: Primary | ICD-10-CM

## 2022-04-05 DIAGNOSIS — R21 RASH: ICD-10-CM

## 2022-04-05 DIAGNOSIS — L82.1 SEBORRHEIC KERATOSIS: ICD-10-CM

## 2022-04-05 DIAGNOSIS — L30.9 ECZEMA, UNSPECIFIED TYPE: ICD-10-CM

## 2022-04-05 PROCEDURE — 1036F TOBACCO NON-USER: CPT | Performed by: STUDENT IN AN ORGANIZED HEALTH CARE EDUCATION/TRAINING PROGRAM

## 2022-04-05 PROCEDURE — 99204 OFFICE O/P NEW MOD 45 MIN: CPT | Performed by: STUDENT IN AN ORGANIZED HEALTH CARE EDUCATION/TRAINING PROGRAM

## 2022-04-05 PROCEDURE — 3008F BODY MASS INDEX DOCD: CPT | Performed by: STUDENT IN AN ORGANIZED HEALTH CARE EDUCATION/TRAINING PROGRAM

## 2022-04-05 RX ORDER — CALCIPOTRIENE 50 UG/G
CREAM TOPICAL
Qty: 120 G | Refills: 2 | Status: SHIPPED | OUTPATIENT
Start: 2022-04-05 | End: 2022-04-07

## 2022-04-05 RX ORDER — CLOBETASOL PROPIONATE 0.5 MG/G
AEROSOL, FOAM TOPICAL
Qty: 50 G | Refills: 3 | Status: SHIPPED | OUTPATIENT
Start: 2022-04-05

## 2022-04-05 NOTE — PATIENT INSTRUCTIONS
SEBORRHEIC KERATOSIS; NON-INFLAMED    Assessment and Plan:  Based on a thorough discussion of this condition and the management approach to it (including a comprehensive discussion of the known risks, side effects and potential benefits of treatment), the patient (family) agrees to implement the following specific plan:   Reassured benign    Seborrheic Keratosis  A seborrheic keratosis is a harmless warty spot that appears during adult life as a common sign of skin aging  Seborrheic keratoses can arise on any area of skin, covered or uncovered, with the usual exception of the palms and soles  They do not arise from mucous membranes  Seborrheic keratoses can have highly variable appearance  Seborrheic keratoses are extremely common  It has been estimated that over 90% of adults over the age of 61 years have one or more of them  They occur in males and females of all races, typically beginning to erupt in the 35s or 45s  They are uncommon under the age of 21 years  The precise cause of seborrhoeic keratoses is not known  Seborrhoeic keratoses are considered degenerative in nature  As time goes by, seborrheic keratoses tend to become more numerous  Some people inherit a tendency to develop a very large number of them; some people may have hundreds of them  The name "seborrheic keratosis" is misleading, because these lesions are not limited to a seborrhoeic distribution (scalp, mid-face, chest, upper back), nor are they formed from sebaceous glands, nor are they associated with sebum -- which is greasy    Seborrheic keratosis may also be called "SK," "Seb K," "basal cell papilloma," "senile wart," or "barnacle "      Researchers have noted:   Eruptive seborrhoeic keratoses can follow sunburn or dermatitis   Skin friction may be the reason they appear in body folds   Viral cause (e g , human papillomavirus) seems unlikely   Stable and clonal mutations or activation of FRFR3, PIK3CA, MARK, AKT1 and EGFR genes are found in seborrhoeic keratoses   Seborrhoeic keratosis can arise from solar lentigo   FRFR3 mutations also arise in solar lentigines  These mutations are associated with increased age and location on the head and neck, suggesting a role of ultraviolet radiation in these lesions   Seborrheic keratoses do not harbour tumour suppressor gene mutations   Epidermal growth factor receptor inhibitors, which are used to treat some cancers, often result in an increase in verrucal (warty) keratoses  There is no easy way to remove multiple lesions on a single occasion  Unless a specific lesion is "inflamed" and is causing pain or stinging/burning or is bleeding, most insurance companies do not authorize treatment  MELANOCYTIC NEVI ("Moles")    Assessment and Plan:  Based on a thorough discussion of this condition and the management approach to it (including a comprehensive discussion of the known risks, side effects and potential benefits of treatment), the patient (family) agrees to implement the following specific plan:   Reassured benign     Melanocytic Nevi  Melanocytic nevi ("moles") are caused by collections of the color producing skin cells, or melanocytes, in 1 area in the skin  They can range in color from pink to dark brown and be either raised or flat  Some moles are present at birth (I e , "congenital nevi"), while others come up later in life (i e , "acquired nevi")  Tevin Scrivener exposure also stimulates the body to make more moles, ie the more sun you get the more moles you'll grow  Clinically distinguishing a healthy mole from melanoma may be difficult  The "ABCDE's" of moles have been suggested as a means of helping to alert a person to a suspicious mole and the possible increased risk of melanoma  Asymmetry: Healthy moles tend to be symmetric, while melanomas are often asymmetric    Asymmetry means if you draw a line through the mole, the two halves do not match in color, size, shape, or surface texture Any mole that starts to demonstrate "asymmetry" should be examined promptly by a board certified dermatologist      Smita Ramirez: Healthy moles tend to have discrete, even borders  The border of a melanoma often blends into the normal skin and does not sharply delineate the mole from normal skin  Any mole that starts to demonstrate "uneven borders" should be examined promptly     Color: Healthy moles tend to be one color throughout  Melanomas tend to be made up of different colors ranging from dark black, blue, white, or red  Any mole that demonstrates a color change should be examined promptly    Diameter: Healthy moles tend to be smaller than 0 6 cm in size; an exception are "congenital nevi" that can be larger  Melanomas tend to grow and can often be greater than 0 6 cm (1/4 of an inch, or the size of a pencil eraser)  This is only a guideline, and many normal moles may be larger than 0 6 cm without being unhealthy  Any mole that starts to change in size (small to bigger or bigger to smaller) should be examined promptly    Evolving: Healthy moles tend to "stay the same "  Melanomas may often show signs of change or evolution such as a change in size, shape, color, or elevation  Any mole that starts to itch, bleed, crust, burn, hurt, or ulcerate or demonstrate a change or evolution should be examined promptly by a board certified dermatologist       What are atypical moles or dysplastic nevi? Dysplastic moles are moles that have some of the ABCDE  changes listed above but  are not cancerous  Sometimes a biopsy and microscopic examination are needed to determine the difference  They may indicate an increased risk of melanoma in that person, especially if there is a family history of melanoma  What is a Melanoma? The main concern when looking at a new or changing mole it to evaluate whether it may be a melanoma   The appearance of a "new mole" remains one of the most reliable methods for identifying a malignant melanoma  A melanoma is a type of skin cancer that can be deadly if it spreads throughout the body  The prognosis of a Melanoma depends on how deep it has penetrated in the skin  If caught early, they generally will not have had time to grow into the deeper layers of the skin and they cure rate is then very high  Once the melanoma grows deeper into the skin, the cure rate drops dramatically  Therefore, early detection and removal of a malignant melanoma results in a much better chance of complete cure  RASH: scaly plaques on palms and scalp--> likely psoriasis, less likely eczematous process   Patient also has diffuse xerosis    Assessment and Plan:  Based on a thorough discussion of this condition and the management approach to it (including a comprehensive discussion of the known risks, side effects and potential benefits of treatment), the patient (family) agrees to implement the following specific plan:   Triamcinolone 0 01% ointment use daily in morning to for 2 weeks   Dovonex 0 005% cream use nightly as needed   Clobetasol foam for scalp when itchy

## 2022-04-05 NOTE — PROGRESS NOTES
[facility] Note     Patient Name: Corinna Pringle  Encounter Date: 04/05/22     Have you been cared for by a [facility] Dermatologist in the last 3 years and, if so, which one? No    · Have you traveled outside of the [address] [place] [address] in the past 3 months or outside of the [facility] area in the last 2 weeks? No     May we call your Preferred Phone number to discuss your specific medical information? Yes     May we leave a detailed message that includes your specific medical information? Yes      Today's Chief Concerns:   Concern #1:  Dryness on hands   Concern #2:  Scalp, right and left calf itches    Past Medical History:  Have you personally ever had or currently have any of the following? · Skin cancer (such as Melanoma, Basal Cell Carcinoma, Squamous Cell Carcinoma? (If Yes, please provide more detail)- No  · Eczema: No  · Psoriasis: No  · HIV/AIDS: No  · Hepatitis B or C: No  · Tuberculosis: No  · Systemic Immunosuppression such as Diabetes, Biologic or Immunotherapy, Chemotherapy, Organ Transplantation, Bone Marrow Transplantation (If YES, please provide more detail): No  · Radiation Treatment (If YES, please provide more detail): No  · Any other major medical conditions/concerns? (If Yes, which types)- No    Social History:     What is/was your primary occupation? Retired     What are your hobbies/past-times? Family History:  Have any of your "first degree relatives" (parent, brother, sister, or child) had any of the following       · Skin cancer such as Melanoma or Merkel Cell Carcinoma or Pancreatic Cancer? No  · Eczema, Asthma, Hay Fever or Seasonal Allergies: YES, father has hx of eczema  · Psoriasis or Psoriatic Arthritis: YES, sister has hx of psorasis  · Do any other medical conditions seem to run in your family? If Yes, what condition and which relatives?   YES, father has hx of diabetes    Current Medications:         Current Outpatient Medications:     halobetasol (ULTRAVATE) 0 05 % ointment, Apply topically 2 (two) times a day (Patient taking differently: Apply topically as needed  ), Disp: 15 g, Rfl: 2    metoprolol succinate (TOPROL-XL) 50 mg 24 hr tablet, Take 1 tablet (50 mg total) by mouth daily, Disp: 90 tablet, Rfl: 3    rivaroxaban (XARELTO) 20 mg tablet, Take 1 tablet (20 mg total) by mouth daily with breakfast, Disp: 90 tablet, Rfl: 3    valsartan-hydrochlorothiazide (DIOVAN-HCT) 160-12 5 MG per tablet, TAKE 1 TABLET BY MOUTH EVERY DAY, Disp: 90 tablet, Rfl: 1      Review of Systems:  Have you recently had or currently have any of the following? If YES, what are you doing for the problem? · Fever, chills or unintended weight loss: No  · Sudden loss or change in your vision: No  · Nausea, vomiting or blood in your stool: No  · Painful or swollen joints: No  · Wheezing or cough: No  · Changing mole or non-healing wound: No  · Nosebleeds: No  · Excessive sweating: No  · Easy or prolonged bleeding? No  · Over the last 2 weeks, how often have you been bothered by the following problems? · Taking little interest or pleasure in doing things: 1 - Not at All  · Feeling down, depressed, or hopeless: 1 - Not at All  · Rapid heartbeat with epinephrine:  No    · Any known allergies? Allergies   Allergen Reactions    Levaquin [Levofloxacin] Arthralgia   ·       Physical Exam:     Was a chaperone (Derm Clinical Assistant) present throughout the entire Physical Exam? Yes     Did the Dermatology Team specifically  the patient on the importance of a Full Skin Exam to be sure that nothing is missed clinically?  Yes}  o Did the patient ultimately request or accept a Full Skin Exam?  NO  o Did the patient specifically refuse to have the areas "under-the-bra" examined by the Dermatologist? No  o Did the patient specifically refuse to have the areas "under-the-underwear" examined by the Dermatologist? No    CONSTITUTIONAL:   Vitals: 04/05/22 [facility]   Temp: 97 5 °F (36 4 °C)   TempSrc: Temporal   Weight: 121 kg (267 lb)   Height: 5' 10" (1 778 m)       PSYCH: Normal mood and affect  EYES: Normal conjunctiva  ENT: Normal lips and oral mucosa  CARDIOVASCULAR: No edema  RESPIRATORY: Normal respirations  HEME/LYMPH/IMMUNO:  No ostensible subQ swelling except as noted below in "ASSESSMENT AND PLAN BY DIAGNOSIS"    SKIN:  FULL ORGAN SYSTEM EXAM   Hair, Scalp, Ears, Face Normal except as noted below in Assessment   Neck, Normal except as noted below in Assessment   Right Arm/Hand/Fingers Normal except as noted below in Assessment   Left Arm/Hand/Fingers Normal except as noted below in Assessment           Back/Spine Normal except as noted below in Assessment   Groin/Genitalia/ Normal except as noted below in Assessment   Right Leg Normal except as noted below in Assessment   Left Leg Normal except as noted below in Assessment        Assessment and Plan by Diagnosis:    History of Present Condition:     Duration:  How long has this been an issue for you?    o  scalp-2 years; ears-5 + years; Hands-3 years; right and left calves-2-3 years   Location Affected:  Where on the body is this affecting you? o  scalp, right and left calves, ears; hands   Quality:  Is there any bleeding, pain, itch, burning/irritation, or redness associated with the skin lesion? o  itches   Severity:  Describe any bleeding, pain, itch, burning/irritation, or redness on a scale of 1 to 10 (with 10 being the worst)  o  7   Timing:  Does this condition seem to be there pretty constantly or do you notice it more at specific times throughout the day?     o  comes and goes   Context:  Have you ever noticed that this condition seems to be associated with specific activities you do?    o  denies   Modifying Factors:    o Anything that seems to make the condition worse?    -  not sure  o What have you tried to do to make the condition better?    -  Halobetasol 0 05% ointment, over the counter- working hands   Associated Signs and Symptoms:  Does this skin lesion seem to be associated with any of the following:  o  SL AMB DERM SIGNS AND SYMPTOMS: Itching and Scratching     SEBORRHEIC KERATOSIS; NON-INFLAMED    Physical Exam:   Anatomic Location Affected: Face and extremities   Morphological Description:  Flat and raised, waxy, smooth to warty textured, yellow to brownish-grey to dark brown to blackish, discrete, "stuck-on" appearing papules   Pertinent Positives:   Pertinent Negatives: Additional History of Present Condition:  Patient reports bumps on the skin  Denies itch, burn, pain, bleeding or ulceration  Present constantly; nothing seems to make it worse or better  No prior treatment  Assessment and Plan:  Based on a thorough discussion of this condition and the management approach to it (including a comprehensive discussion of the known risks, side effects and potential benefits of treatment), the patient (family) agrees to implement the following specific plan:   Reassured benign    Seborrheic Keratosis  A seborrheic keratosis is a harmless warty spot that appears during adult life as a common sign of skin aging  Seborrheic keratoses can arise on any area of skin, covered or uncovered, with the usual exception of the palms and soles  They do not arise from mucous membranes  Seborrheic keratoses can have highly variable appearance  Seborrheic keratoses are extremely common  It has been estimated that over 90% of adults over the age of 61 years have one or more of them  They occur in males and females of all races, typically beginning to erupt in the 35s or 45s  They are uncommon under the age of 21 years  The precise cause of seborrhoeic keratoses is not known  Seborrhoeic keratoses are considered degenerative in nature  As time goes by, seborrheic keratoses tend to become more numerous   Some people inherit a tendency to develop a very large number of them; some people may have hundreds of them  The name "seborrheic keratosis" is misleading, because these lesions are not limited to a seborrhoeic distribution (scalp, mid-face, chest, upper back), nor are they formed from sebaceous glands, nor are they associated with sebum -- which is greasy  Seborrheic keratosis may also be called "SK," "Seb K," "basal cell papilloma," "senile wart," or "barnacle "      Researchers have noted:   Eruptive seborrhoeic keratoses can follow sunburn or dermatitis   Skin friction may be the reason they appear in body folds   Viral cause (e g , human papillomavirus) seems unlikely   Stable and clonal mutations or activation of FRFR3, PIK3CA, MARK, AKT1 and EGFR genes are found in seborrhoeic keratoses   Seborrhoeic keratosis can arise from solar lentigo   FRFR3 mutations also arise in solar lentigines  These mutations are associated with increased age and location on the head and neck, suggesting a role of ultraviolet radiation in these lesions   Seborrheic keratoses do not harbour tumour suppressor gene mutations   Epidermal growth factor receptor inhibitors, which are used to treat some cancers, often result in an increase in verrucal (warty) keratoses  There is no easy way to remove multiple lesions on a single occasion  Unless a specific lesion is "inflamed" and is causing pain or stinging/burning or is bleeding, most insurance companies do not authorize treatment  MELANOCYTIC NEVI ("Moles")    Physical Exam:   Anatomic Location Affected: Face and extremities   Morphological Description:  Scattered, 1-4mm round to ovoid, symmetrical-appearing, even bordered, skin colored to dark brown macules/papules, mostly in sun-exposed areas   Pertinent Positives:   Pertinent Negatives:     Additional History of Present Condition:  Discovered upon skin exam    Assessment and Plan:  Based on a thorough discussion of this condition and the management approach to it (including a comprehensive discussion of the known risks, side effects and potential benefits of treatment), the patient (family) agrees to implement the following specific plan:  Monitor for changes   Melanocytic Nevi  Melanocytic nevi ("moles") are caused by collections of the color producing skin cells, or melanocytes, in 1 area in the skin  They can range in color from pink to dark brown and be either raised or flat  Some moles are present at birth (I e , "congenital nevi"), while others come up later in life (i e , "acquired nevi")  Noralyn Anthony exposure also stimulates the body to make more moles, ie the more sun you get the more moles you'll grow  Clinically distinguishing a healthy mole from melanoma may be difficult  The "ABCDE's" of moles have been suggested as a means of helping to alert a person to a suspicious mole and the possible increased risk of melanoma  Asymmetry: Healthy moles tend to be symmetric, while melanomas are often asymmetric  Asymmetry means if you draw a line through the mole, the two halves do not match in color, size, shape, or surface texture Any mole that starts to demonstrate "asymmetry" should be examined promptly by a board certified dermatologist      Border: Healthy moles tend to have discrete, even borders  The border of a melanoma often blends into the normal skin and does not sharply delineate the mole from normal skin  Any mole that starts to demonstrate "uneven borders" should be examined promptly     Color: Healthy moles tend to be one color throughout  Melanomas tend to be made up of different colors ranging from dark black, blue, white, or red  Any mole that demonstrates a color change should be examined promptly    Diameter: Healthy moles tend to be smaller than 0 6 cm in size; an exception are "congenital nevi" that can be larger  Melanomas tend to grow and can often be greater than 0 6 cm (1/4 of an inch, or the size of a pencil eraser)   This is only a guideline, and many normal moles may be larger than 0 6 cm without being unhealthy  Any mole that starts to change in size (small to bigger or bigger to smaller) should be examined promptly    Evolving: Healthy moles tend to "stay the same "  Melanomas may often show signs of change or evolution such as a change in size, shape, color, or elevation  Any mole that starts to itch, bleed, crust, burn, hurt, or ulcerate or demonstrate a change or evolution should be examined promptly by a board certified dermatologist       What are atypical moles or dysplastic nevi? Dysplastic moles are moles that have some of the ABCDE  changes listed above but  are not cancerous  Sometimes a biopsy and microscopic examination are needed to determine the difference  They may indicate an increased risk of melanoma in that person, especially if there is a family history of melanoma  What is a Melanoma? The main concern when looking at a new or changing mole it to evaluate whether it may be a melanoma  The appearance of a "new mole" remains one of the most reliable methods for identifying a malignant melanoma  A melanoma is a type of skin cancer that can be deadly if it spreads throughout the body  The prognosis of a Melanoma depends on how deep it has penetrated in the skin  If caught early, they generally will not have had time to grow into the deeper layers of the skin and they cure rate is then very high  Once the melanoma grows deeper into the skin, the cure rate drops dramatically  Therefore, early detection and removal of a malignant melanoma results in a much better chance of complete cure  RASH: scaly plaques on palms and scalp--> likely psoriasis, less likely eczematous process  Patient also has diffuse xerosis    Physical Exam:   (Anatomic Location); (Size and Morphological Description); (Differential Diagnosis):  o Scaly plaques on bilateral palms   Scaly red plaque on scalp   Pertinent Positives:   Pertinent Negatives: NO joint pain   No asthma, no history of eczema, no seasonal allergies    Additional History of Present Condition:  Patient using Halobetasol 0 05% ointment    Assessment and Plan:  Based on a thorough discussion of this condition and the management approach to it (including a comprehensive discussion of the known risks, side effects and potential benefits of treatment), the patient (family) agrees to implement the following specific plan:   Triamcinolone 0 01% ointment use daily in morning on palms for no more than 2 weeks at a time   Dovonex 0 005% cream use nightly on palms   Clobetasol foam for scalp when itchy; can apply inside ears as well   Don't dry completely after showering leave skin damp; apply  Vaseline to skin; can use moisturizing creams if don't like vaseline   Follow up in 4-6 months    Use moisturizer like Eucerin,Cerave, Vanicream or Aveeno Cream 3 times a day for the dry skin          Scribe Attestation    I,:  Marylou Lombard am acting as a scribe while in the presence of the attending physician :       I,:  Rocío Meyer MD personally performed the services described in this documentation    as scribed in my presence :

## 2022-04-06 ENCOUNTER — TELEPHONE (OUTPATIENT)
Dept: DERMATOLOGY | Facility: CLINIC | Age: 72
End: 2022-04-06

## 2022-04-06 NOTE — TELEPHONE ENCOUNTER
Received phone call from patient stating that he was able to  all of his medication prescribed at yesterdays visit except for one, the calcipotriene topical medication, because it needs a prior authorization  I filled out insurance PA form with [employer] and faxed it over along with clinical information

## 2022-04-07 RX ORDER — CALCIPOTRIENE 50 UG/G
OINTMENT TOPICAL
Qty: 60 G | Refills: 3 | Status: SHIPPED | OUTPATIENT
Start: 2022-04-07 | End: 2022-05-19

## 2022-04-27 ENCOUNTER — RA CDI HCC (OUTPATIENT)
Dept: OTHER | Facility: HOSPITAL | Age: 72
End: 2022-04-27

## 2022-04-27 NOTE — PROGRESS NOTES
[facility]  coding opportunities          Chart Reviewed number of suggestions sent to Provider: 1   [id number]    Patients Insurance     Medicare Insurance: [employer] Advantage

## 2022-05-06 DIAGNOSIS — I48.92 ATRIAL FLUTTER (HCC): ICD-10-CM

## 2022-05-06 DIAGNOSIS — I10 ESSENTIAL HYPERTENSION: ICD-10-CM

## 2022-05-06 RX ORDER — VALSARTAN AND HYDROCHLOROTHIAZIDE 160; 12.5 MG/1; MG/1
TABLET, FILM COATED ORAL
Qty: 90 TABLET | Refills: 1 | Status: SHIPPED | OUTPATIENT
Start: 2022-05-06

## 2022-05-06 RX ORDER — METOPROLOL SUCCINATE 50 MG/1
TABLET, EXTENDED RELEASE ORAL
Qty: 90 TABLET | Refills: 3 | Status: SHIPPED | OUTPATIENT
Start: 2022-05-06

## 2022-05-19 ENCOUNTER — OFFICE VISIT (OUTPATIENT)
Dept: FAMILY MEDICINE CLINIC | Facility: CLINIC | Age: 72
End: 2022-05-19
Payer: COMMERCIAL

## 2022-05-19 VITALS
BODY MASS INDEX: 37.08 KG/M2 | TEMPERATURE: 97.7 F | HEIGHT: 70 IN | DIASTOLIC BLOOD PRESSURE: 78 MMHG | WEIGHT: 259 LBS | OXYGEN SATURATION: 97 % | RESPIRATION RATE: 16 BRPM | HEART RATE: 73 BPM | SYSTOLIC BLOOD PRESSURE: 134 MMHG

## 2022-05-19 DIAGNOSIS — E78.2 COMBINED HYPERLIPIDEMIA: ICD-10-CM

## 2022-05-19 DIAGNOSIS — E03.8 SUBCLINICAL HYPOTHYROIDISM: ICD-10-CM

## 2022-05-19 DIAGNOSIS — G47.33 OSA (OBSTRUCTIVE SLEEP APNEA): ICD-10-CM

## 2022-05-19 DIAGNOSIS — I48.92 ATRIAL FLUTTER, UNSPECIFIED TYPE (HCC): ICD-10-CM

## 2022-05-19 DIAGNOSIS — E66.9 OBESITY (BMI 35.0-39.9 WITHOUT COMORBIDITY): ICD-10-CM

## 2022-05-19 DIAGNOSIS — R73.9 ELEVATED BLOOD SUGAR: ICD-10-CM

## 2022-05-19 DIAGNOSIS — S16.1XXA STRAIN OF NECK MUSCLE, INITIAL ENCOUNTER: ICD-10-CM

## 2022-05-19 DIAGNOSIS — I10 BENIGN ESSENTIAL HYPERTENSION: ICD-10-CM

## 2022-05-19 DIAGNOSIS — Z00.00 ENCOUNTER FOR ANNUAL WELLNESS EXAM IN MEDICARE PATIENT: Primary | ICD-10-CM

## 2022-05-19 DIAGNOSIS — Z87.39 HISTORY OF GOUT: ICD-10-CM

## 2022-05-19 DIAGNOSIS — R42 VERTIGO: ICD-10-CM

## 2022-05-19 DIAGNOSIS — Z72.89 HABITUAL ALCOHOL USE: ICD-10-CM

## 2022-05-19 DIAGNOSIS — R97.20 ELEVATED PSA: ICD-10-CM

## 2022-05-19 DIAGNOSIS — L30.9 ECZEMA, UNSPECIFIED TYPE: ICD-10-CM

## 2022-05-19 PROCEDURE — 3725F SCREEN DEPRESSION PERFORMED: CPT | Performed by: FAMILY MEDICINE

## 2022-05-19 PROCEDURE — 1170F FXNL STATUS ASSESSED: CPT | Performed by: FAMILY MEDICINE

## 2022-05-19 PROCEDURE — 1036F TOBACCO NON-USER: CPT | Performed by: FAMILY MEDICINE

## 2022-05-19 PROCEDURE — 3008F BODY MASS INDEX DOCD: CPT | Performed by: FAMILY MEDICINE

## 2022-05-19 PROCEDURE — 3288F FALL RISK ASSESSMENT DOCD: CPT | Performed by: FAMILY MEDICINE

## 2022-05-19 PROCEDURE — G0439 PPPS, SUBSEQ VISIT: HCPCS | Performed by: FAMILY MEDICINE

## 2022-05-19 PROCEDURE — 1160F RVW MEDS BY RX/DR IN RCRD: CPT | Performed by: FAMILY MEDICINE

## 2022-05-19 PROCEDURE — 1101F PT FALLS ASSESS-DOCD LE1/YR: CPT | Performed by: FAMILY MEDICINE

## 2022-05-19 PROCEDURE — 3078F DIAST BP <80 MM HG: CPT | Performed by: FAMILY MEDICINE

## 2022-05-19 PROCEDURE — 99215 OFFICE O/P EST HI 40 MIN: CPT | Performed by: FAMILY MEDICINE

## 2022-05-19 PROCEDURE — 3075F SYST BP GE 130 - 139MM HG: CPT | Performed by: FAMILY MEDICINE

## 2022-05-19 PROCEDURE — 1125F AMNT PAIN NOTED PAIN PRSNT: CPT | Performed by: FAMILY MEDICINE

## 2022-05-19 RX ORDER — MECLIZINE HYDROCHLORIDE 25 MG/1
25 TABLET ORAL EVERY 8 HOURS PRN
Qty: 30 TABLET | Refills: 2 | Status: SHIPPED | OUTPATIENT
Start: 2022-05-19

## 2022-05-19 NOTE — ASSESSMENT & PLAN NOTE
Stable atrial flutter with controlled ventricular response  Doing well on metoprolol XL 50 and Xarelto without any bleeding issues    Continue follow-up with cardiologist as directed

## 2022-05-19 NOTE — PROGRESS NOTES
[facility]      NAME: Chacho Puliod  AGE: 71 y o  SEX: male  : 1950   MRN: [id number]    DATE: 2022  TIME: 11:09 AM    Assessment and Plan     Problem List Items Addressed This Visit     Combined hyperlipidemia     History of high cholesterol  Patient discontinued his atorvastatin because he did not want to be on a a statin  Patient states he has been working on his diet and not drinking alcohol lately  He is due for labs  Will call with results  If still suboptimal, consider restarting atorvastatin           Benign essential hypertension     Blood pressure reasonably controlled on valsartan /12 5 and metoprolol XL 50           History of gout     Doing well lately  No longer on allopurinol  Patient has not been drinking alcohol lately  He is due for labs  Will call with uric acid level           Subclinical hypothyroidism     Patient due for labs  He will get these done shortly  Will call with results           Elevated blood sugar     Last A1c 5 5%  Continue reduced carb diet and exercise  Patient is due for labs  Will call with results           Eczema     Being followed by Dermatology  MANDY (obstructive sleep apnea)     Patient is still waiting for replacement machine due to national recall           Obesity (BMI 35 0-39 9 without comorbidity)     BMI currently 37 50  Continue diet exercise and weight loss           Elevated PSA     History of mildly elevated PSA  Was up to 4 97  Most recently 3 7  He is due for repeat level  Will call with results  If level increases again, will refer to Urology           Atrial flutter ([facility] )     Stable atrial flutter with controlled ventricular response  Doing well on metoprolol XL 50 and Xarelto without any bleeding issues  Continue follow-up with cardiologist as directed           Habitual alcohol use     Patient states he has not been drinking any alcohol for the past month or so    He states his gout has improved he is hoping it pays divot ends as far as his cholesterol and blood sugar are concerned  Cervical strain     Patient has been complaining of neck stiffness for the past few months  Denies any radiculopathy  Will sent for [facility]  If symptoms persist, will sent for x-rays and possible advanced imaging           Relevant Orders    Ambulatory Referral to [facility]    Vertigo     Patient has been complaining of positional vertigo episodes for the past few weeks  Examination today reveals horizontal nystagmus, otherwise fairly unremarkable neurologic exam   Will give trial of meclizine 25 mg Q 8 p r n     Will check labs  If symptoms persist, will send to ENT and possibly vestibular therapy           Relevant Medications    meclizine (ANTIVERT) 25 mg tablet      Other Visit Diagnoses     Encounter for annual wellness exam in Medicare patient    -  Primary      pt had Covid vaccination  Pt had pneumovax  Pt had Shingrix  Pt is due for Td booster  Rec: getting at pharmacy    Current w/ colonoscopy    Return to office in:  6 months, fasting blood work prior at [facility] (most likely need CMP, A1c, lipids, and possibly diagnostic PSA)    Chief Complaint     Chief Complaint   Patient presents with    Medicare Wellness Visit     6 months       History of Present Illness     Patient presents recheck chronic medical problems today  Patient has been complaining of vertigo with positional changes for the past few weeks  He is also having neck stiffness for the last few months  Compliant with prescribed medications  The following portions of the patient's history were reviewed and updated as appropriate: allergies, current medications, past family history, past medical history, past social history, past surgical history and problem list     Review of Systems   Review of Systems   Respiratory: Negative  Cardiovascular: Negative  Gastrointestinal: Negative  Genitourinary: Negative  Musculoskeletal: Positive for neck pain  Neurological: Positive for dizziness  Active Problem List     Patient Active Problem List   Diagnosis    Rotator cuff syndrome of right shoulder    Combined hyperlipidemia    Benign essential hypertension    Chronic thoracic spine pain    History of gout    Chronic lumbar radiculopathy    Cervical radiculitis    Plantar fasciitis of right foot    Subclinical hypothyroidism    Elevated blood sugar    Eczema    Fatigue    MANDY (obstructive sleep apnea)    S/P cervical spinal fusion    Achilles rupture, left    Hand dermatitis    Dupuytren contracture    Seborrheic keratoses, inflamed    Obesity (BMI 35 0-39 9 without comorbidity)    Elevated PSA    Paresthesia of upper and lower extremities of both sides    Atrial flutter (HCC)    Habitual alcohol use    Dyslipidemia    Body mass index (BMI)40 0-44 9, adult    Change of voice    Belching    Lower respiratory infection (e g , bronchitis, pneumonia, pneumonitis, pulmonitis)    Acute nonintractable headache    SOB (shortness of breath) on exertion    Pulsatile tinnitus, bilateral    Cervical strain    Vertigo       Objective   /78   Pulse 73   Temp 97 7 °F (36 5 °C) (Temporal)   Resp 16   Ht 5' 9 69" (1 77 m)   Wt 117 kg (259 lb)   SpO2 97%   BMI 37 50 kg/m²     Physical Exam  Eyes:      Comments: +horizontal nystagmus   Cardiovascular:      Rate and Rhythm: Normal rate and regular rhythm  Heart sounds: Normal heart sounds  Comments: Carotids: no bruits  Ext: no edema  Pulmonary:      Effort: Pulmonary effort is normal  No respiratory distress  Breath sounds: No wheezing or rales  Neurological:      General: No focal deficit present  Mental Status: He is oriented to person, place, and time  Cranial Nerves: No cranial nerve deficit  Sensory: No sensory deficit  Motor: No weakness        Gait: Gait abnormal  Deep Tendon Reflexes: Reflexes normal    Psychiatric:         Behavior: Behavior normal          Thought Content:  Thought content normal          Pertinent Laboratory/Diagnostic Studies:  Last labs reviewed    Current Medications     Current Outpatient Medications:     clobetasol (OLUX) 0 05 % topical foam, Apply to scalp and inside ears when itches, when needed, Disp: 50 g, Rfl: 3    halobetasol (ULTRAVATE) 0 05 % ointment, Apply topically 2 (two) times a day (Patient taking differently: Apply topically as needed), Disp: 15 g, Rfl: 2    meclizine (ANTIVERT) 25 mg tablet, Take 1 tablet (25 mg total) by mouth every 8 (eight) hours as needed for dizziness, Disp: 30 tablet, Rfl: 2    metoprolol succinate (TOPROL-XL) 50 mg 24 hr tablet, TAKE 1 TABLET BY MOUTH EVERY DAY, Disp: 90 tablet, Rfl: 3    rivaroxaban (XARELTO) 20 mg tablet, Take 1 tablet (20 mg total) by mouth daily with breakfast, Disp: 90 tablet, Rfl: 3    triamcinolone (KENALOG) 0 1 % ointment, Use daily in am on palms for no more than two weeks at a time, Disp: 453 g, Rfl: 2    valsartan-hydrochlorothiazide (DIOVAN-HCT) 160-12 5 MG per tablet, TAKE 1 TABLET BY MOUTH EVERY DAY, Disp: 90 tablet, Rfl: 1    Health Maintenance     Health Maintenance   Topic Date Due    Hepatitis C Screening  Never done    DTaP,Tdap,and Td Vaccines (1 - Tdap) Never done    PT PLAN OF CARE  12/19/2019    BMI: Followup Plan  11/11/2020    COVID-19 Vaccine (4 - Booster for Pfizer series) 03/03/2022    Fall Risk  05/19/2023    Depression Screening  05/19/2023    Medicare Annual Wellness Visit (AWV)  05/19/2023    BMI: Adult  05/19/2023    Colorectal Cancer Screening  02/14/2028    Pneumococcal Vaccine: 65+ Years  Completed    Influenza Vaccine  Completed    HIB Vaccine  Aged Out    Hepatitis B Vaccine  Aged Out    IPV Vaccine  Aged Out    Hepatitis A Vaccine  Aged Out    Meningococcal ACWY Vaccine  Aged Out    HPV Vaccine  Aged [employer] History Administered Date(s) Administered    COVID-19 PFIZER VACCINE 0 3 ML IM 03/24/2021, 04/14/2021, 11/03/2021    INFLUENZA 11/01/2015, 11/02/2017, 09/23/2018, 10/03/2019, 10/06/2021    Influenza Quadrivalent Preservative Free 3 years and older IM 11/02/2017    Influenza Quadrivalent, 6-35 Months IM 11/01/2015    Influenza, high dose seasonal 0 7 mL 10/03/2019, 08/27/2020    Pneumococcal Conjugate 13-Valent 11/11/2019    Pneumococcal Polysaccharide PPV23 12/09/2020    Zoster Vaccine Recombinant 09/10/2020, 12/19/2020       Meena Wilder DO  [facility]

## 2022-05-19 NOTE — PATIENT INSTRUCTIONS
Medicare Preventive Visit Patient Instructions  Thank you for completing your Welcome to Medicare Visit or Medicare Annual Wellness Visit today  Your next wellness visit will be due in one year (5/20/2023)  The screening/preventive services that you may require over the next 5-10 years are detailed below  Some tests may not apply to you based off risk factors and/or age  Screening tests ordered at today's visit but not completed yet may show as past due  Also, please note that scanned in results may not display below  Preventive Screenings:  Service Recommendations Previous Testing/Comments   Colorectal Cancer Screening  · Colonoscopy    · Fecal Occult Blood Test (FOBT)/Fecal Immunochemical Test (FIT)  · Fecal DNA/Cologuard Test  · Flexible Sigmoidoscopy Age: 54-65 years old   Colonoscopy: every 10 years (May be performed more frequently if at higher risk)  OR  FOBT/FIT: every 1 year  OR  Cologuard: every 3 years  OR  Sigmoidoscopy: every 5 years  Screening may be recommended earlier than age 48 if at higher risk for colorectal cancer  Also, an individualized decision between you and your healthcare provider will decide whether screening between the ages of 74-80 would be appropriate   Colonoscopy: 02/14/2018  FOBT/FIT: Not on file  Cologuard: Not on file  Sigmoidoscopy: Not on file    Screening Current     Prostate Cancer Screening Individualized decision between patient and health care provider in men between ages of 53-78   Medicare will cover every 12 months beginning on the day after your 50th birthday PSA: 3 7 ng/mL     Screening Current     Hepatitis C Screening Once for adults born between [facility]  More frequently in patients at high risk for Hepatitis C Hep C Antibody: Not on file        Diabetes Screening 1-2 times per year if you're at risk for diabetes or have pre-diabetes Fasting glucose: 100 mg/dL   A1C: 5 5 %    Screening Current   Cholesterol Screening Once every 5 years if you don't have a lipid disorder  May order more often based on risk factors  Lipid panel: 10/29/2021    Screening Not Indicated  History Lipid Disorder      Other Preventive Screenings Covered by Medicare:  1  Abdominal Aortic Aneurysm (AAA) Screening: covered once if your at risk  You're considered to be at risk if you have a family history of AAA or a male between the age of 73-68 who smoking at least 100 cigarettes in your lifetime  2  Lung Cancer Screening: covers low dose CT scan once per year if you meet all of the following conditions: (1) Age 50-69; (2) No signs or symptoms of lung cancer; (3) Current smoker or have quit smoking within the last 15 years; (4) You have a tobacco smoking history of at least 30 pack years (packs per day x number of years you smoked); (5) You get a written order from a healthcare provider  3  Glaucoma Screening: covered annually if you're considered high risk: (1) You have diabetes OR (2) Family history of glaucoma OR (3)  aged 48 and older OR (3)  American aged 72 and older  3  Osteoporosis Screening: covered every 2 years if you meet one of the following conditions: (1) Have a vertebral abnormality; (2) On glucocorticoid therapy for more than 3 months; (3) Have primary hyperparathyroidism; (4) On osteoporosis medications and need to assess response to drug therapy  5  HIV Screening: covered annually if you're between the age of 12-76  Also covered annually if you are younger than 13 and older than 72 with risk factors for HIV infection  For pregnant patients, it is covered up to 3 times per pregnancy      Immunizations:  Immunization Recommendations   Influenza Vaccine Annual influenza vaccination during flu season is recommended for all persons aged >= 6 months who do not have contraindications   Pneumococcal Vaccine (Prevnar and Pneumovax)  * Prevnar = PCV13  * Pneumovax = PPSV23 Adults 25-60 years old: 1-3 doses may be recommended based on certain risk factors  Adults 72 years old: Prevnar (PCV13) vaccine recommended followed by Pneumovax (PPSV23) vaccine  If already received PPSV23 since turning 65, then PCV13 recommended at least one year after PPSV23 dose  Hepatitis B Vaccine 3 dose series if at intermediate or high risk (ex: diabetes, end stage renal disease, liver disease)   Tetanus (Td) Vaccine - COST NOT COVERED BY MEDICARE PART B Following completion of primary series, a booster dose should be given every 10 years to maintain immunity against tetanus  Td may also be given as tetanus wound prophylaxis  Tdap Vaccine - COST NOT COVERED BY MEDICARE PART B Recommended at least once for all adults  For pregnant patients, recommended with each pregnancy  Shingles Vaccine (Shingrix) - COST NOT COVERED BY MEDICARE PART B  2 shot series recommended in those aged 48 and above     Health Maintenance Due:      Topic Date Due    Hepatitis C Screening  Never done    Colorectal Cancer Screening  02/14/2028     Immunizations Due:      Topic Date Due    DTaP,Tdap,and Td Vaccines (1 - Tdap) Never done    COVID-19 Vaccine (4 - Booster for Pfizer series) 03/03/2022     Advance Directives   What are advance directives? Advance directives are legal documents that state your wishes and plans for medical care  These plans are made ahead of time in case you lose your ability to make decisions for yourself  Advance directives can apply to any medical decision, such as the treatments you want, and if you want to donate organs  What are the types of advance directives? There are many types of advance directives, and each state has rules about how to use them  You may choose a combination of any of the following:  · Living will: This is a written record of the treatment you want  You can also choose which treatments you do not want, which to limit, and which to stop at a certain time  This includes surgery, medicine, IV fluid, and tube feedings     · Durable power of  for [facility]): This is a written record that states who you want to make healthcare choices for you when you are unable to make them for yourself  This person, called a proxy, is usually a family member or a friend  You may choose more than 1 proxy  · Do not resuscitate (DNR) order:  A DNR order is used in case your heart stops beating or you stop breathing  It is a request not to have certain forms of treatment, such as CPR  A DNR order may be included in other types of advance directives  · Medical directive: This covers the care that you want if you are in a coma, near death, or unable to make decisions for yourself  You can list the treatments you want for each condition  Treatment may include pain medicine, surgery, blood transfusions, dialysis, IV or tube feedings, and a ventilator (breathing machine)  · Values history: This document has questions about your views, beliefs, and how you feel and think about life  This information can help others choose the care that you would choose  Why are advance directives important? An advance directive helps you control your care  Although spoken wishes may be used, it is better to have your wishes written down  Spoken wishes can be misunderstood, or not followed  Treatments may be given even if you do not want them  An advance directive may make it easier for your family to make difficult choices about your care  Fall Prevention    Fall prevention  includes ways to make your home and other areas safer  It also includes ways you can move more carefully to prevent a fall  Health conditions that cause changes in your blood pressure, vision, or muscle strength and coordination may increase your risk for falls  Medicines may also increase your risk for falls if they make you dizzy, weak, or sleepy  Fall prevention tips:   · Stand or sit up slowly  · Use assistive devices as directed  · Wear shoes that fit well and have soles that   · Wear a personal alarm  · Stay active  · Manage your medical conditions  Home Safety Tips:  · Add items to prevent falls in the bathroom  · Keep paths clear  · Install bright lights in your home  · Keep items you use often on shelves within reach  · Paint or place reflective tape on the edges of your stairs  Weight Management   Why it is important to manage your weight:  Being overweight increases your risk of health conditions such as heart disease, high blood pressure, type 2 diabetes, and certain types of cancer  It can also increase your risk for osteoarthritis, sleep apnea, and other respiratory problems  Aim for a slow, steady weight loss  Even a small amount of weight loss can lower your risk of health problems  How to lose weight safely:  A safe and healthy way to lose weight is to eat fewer calories and get regular exercise  You can lose up about 1 pound a week by decreasing the number of calories you eat by 500 calories each day  Healthy meal plan for weight management:  A healthy meal plan includes a variety of foods, contains fewer calories, and helps you stay healthy  A healthy meal plan includes the following:  · Eat whole-grain foods more often  A healthy meal plan should contain fiber  Fiber is the part of grains, fruits, and vegetables that is not broken down by your body  Whole-grain foods are healthy and provide extra fiber in your diet  Some examples of whole-grain foods are whole-wheat breads and pastas, oatmeal, brown rice, and bulgur  · Eat a variety of vegetables every day  Include dark, leafy greens such as spinach, kale, dustin greens, and mustard greens  Eat yellow and orange vegetables such as carrots, sweet potatoes, and winter squash  · Eat a variety of fruits every day  Choose fresh or canned fruit (canned in its own juice or light syrup) instead of juice  Fruit juice has very little or no fiber  · Eat low-fat dairy foods    Drink fat-free (skim) milk or 1% milk  Eat fat-free yogurt and low-fat cottage cheese  Try low-fat cheeses such as mozzarella and other reduced-fat cheeses  · Choose meat and other protein foods that are low in fat  Choose beans or other legumes such as split peas or lentils  Choose fish, skinless poultry (chicken or turkey), or lean cuts of red meat (beef or pork)  Before you cook meat or poultry, cut off any visible fat  · Use less fat and oil  Try baking foods instead of frying them  Add less fat, such as margarine, sour cream, regular salad dressing and mayonnaise to foods  Eat fewer high-fat foods  Some examples of high-fat foods include french fries, doughnuts, ice cream, and cakes  · Eat fewer sweets  Limit foods and drinks that are high in sugar  This includes candy, cookies, regular soda, and sweetened drinks  Exercise:  Exercise at least 30 minutes per day on most days of the week  Some examples of exercise include walking, biking, dancing, and swimming  You can also fit in more physical activity by taking the stairs instead of the elevator or parking farther away from stores  Ask your healthcare provider about the best exercise plan for you  © Copyright [employer]. 2018 Information is for End User's use only and may not be sold, redistributed or otherwise used for commercial purposes   All illustrations and images included in CareNotes® are the copyrighted property of [employer] , Inc  or [address] [place] [address]

## 2022-05-19 NOTE — PROGRESS NOTES
Assessment and Plan:   MEDICARE WELLNESS VISIT    Problem List Items Addressed This Visit    None          Preventive health issues were discussed with patient, and age appropriate screening tests were ordered as noted in patient's After Visit Summary  Personalized health advice and appropriate referrals for health education or preventive services given if needed, as noted in patient's After Visit Summary  History of Present Illness:     Patient presents for Welcome to Medicare visit       Patient Care Team:  Eve Rodriguez DO as PCP - General (Family Medicine)     Review of Systems:     Review of Systems   Problem List:     Patient Active Problem List   Diagnosis    Rotator cuff syndrome of right shoulder    Combined hyperlipidemia    Benign essential hypertension    Chronic thoracic spine pain    History of gout    Chronic lumbar radiculopathy    Cervical radiculitis    Plantar fasciitis of right foot    Subclinical hypothyroidism    Elevated blood sugar    Eczema    Fatigue    MANDY (obstructive sleep apnea)    S/P cervical spinal fusion    Achilles rupture, left    Hand dermatitis    Dupuytren contracture    Seborrheic keratoses, inflamed    Obesity (BMI 35 0-39 9 without comorbidity)    Elevated PSA    Paresthesia of upper and lower extremities of both sides    Atrial flutter (HCC)    Habitual alcohol use    Dyslipidemia    Body mass index (BMI)40 0-44 9, adult    Change of voice    Belching    Lower respiratory infection (e g , bronchitis, pneumonia, pneumonitis, pulmonitis)    Acute nonintractable headache    SOB (shortness of breath) on exertion    Pulsatile tinnitus, bilateral      Past Medical and Surgical History:     Past Medical History:   Diagnosis Date    Acute gouty arthritis     last assessed 6/1/13     Anxiety     last assessed 7/11/14     Chronic thoracic spine pain     last assessed 5/13/16     Dysfunction of both eustachian tubes     last assessed 8/16/13    Erectile dysfunction of non-organic origin     last assessed 10/8/13     Family history reviewed with no changes     medical history     Gout     Hypertension     Sebaceous cyst     last assessed 13     Skin lesion     last assessed 14     Subclinical hypothyroidism 3/21/2019     Past Surgical History:   Procedure Laterality Date    APPENDECTOMY      12years old    BOWEL RESECTION      CATARACT EXTRACTION      CERVICAL FUSION N/A 2019    Procedure: Anterior cervical discectomy w fusion C5-6, with allograft and neuromonitoring;  Surgeon: Genie Chauhan MD;  [facility]: [facility];  Service: Orthopedics    COLONOSCOPY      MOUTH SURGERY      SKIN BIOPSY      left cheek    WRIST SURGERY        Family History:     Family History   Problem Relation Age of Onset    Diabetes Sister     Psoriasis Sister     Diabetes Family     Hypertension Family     No Known Problems Mother     Eczema Father       Social History:     Social History     Socioeconomic History    Marital status: /Civil Union     Spouse name: None    Number of children: None    Years of education: None    Highest education level: None   Occupational History    None   Tobacco Use    Smoking status: Former Smoker     Packs/day: 0 50     Years: 20 00     Pack years: 10 00     Quit date:      Years since quittin 4    Smokeless tobacco: Never Used   Vaping Use    Vaping Use: Never used   Substance and Sexual Activity    Alcohol use: Not Currently     Comment: social    Drug use: No    Sexual activity: Not Currently   Other Topics Concern    None   Social History Narrative    Consumes 1 cup of coffee per day     Social Determinants of Health     Financial Resource Strain: Not on file   Food Insecurity: Not on file   Transportation Needs: Not on file   Physical Activity: Not on file   Stress: Not on file   Social Connections: Not on file   Intimate Partner Violence: Not on file   Housing Stability: Not on file      Medications and Allergies:     Current Outpatient Medications   Medication Sig Dispense Refill    clobetasol (OLUX) 0 05 % topical foam Apply to scalp and inside ears when itches, when needed 50 g 3    halobetasol (ULTRAVATE) 0 05 % ointment Apply topically 2 (two) times a day (Patient taking differently: Apply topically as needed) 15 g 2    metoprolol succinate (TOPROL-XL) 50 mg 24 hr tablet TAKE 1 TABLET BY MOUTH EVERY DAY 90 tablet 3    rivaroxaban (XARELTO) 20 mg tablet Take 1 tablet (20 mg total) by mouth daily with breakfast 90 tablet 3    triamcinolone (KENALOG) 0 1 % ointment Use daily in am on palms for no more than two weeks at a time 453 g 2    valsartan-hydrochlorothiazide (DIOVAN-HCT) 160-12 5 MG per tablet TAKE 1 TABLET BY MOUTH EVERY DAY 90 tablet 1    calcipotriene (DOVONOX) 0 005 % ointment Apply nightly on palms 60 g 3     No current facility-administered medications for this visit  No Active Allergies   Immunizations:     Immunization History   Administered Date(s) Administered    COVID-19 PFIZER VACCINE 0 3 ML IM 03/24/2021, 04/14/2021, 11/03/2021    INFLUENZA 11/01/2015, 11/02/2017, 09/23/2018, 10/03/2019, 10/06/2021    Influenza Quadrivalent Preservative Free 3 years and older IM 11/02/2017    Influenza Quadrivalent, 6-35 Months IM 11/01/2015    Influenza, high dose seasonal 0 7 mL 10/03/2019, 08/27/2020    Pneumococcal Conjugate 13-Valent 11/11/2019    Pneumococcal Polysaccharide PPV23 12/09/2020    Zoster Vaccine Recombinant 09/10/2020, 12/19/2020      Health Maintenance:         Topic Date Due    Hepatitis C Screening  Never done    Colorectal Cancer Screening  02/14/2028         Topic Date Due    DTaP,Tdap,and Td Vaccines (1 - Tdap) Never done    COVID-19 Vaccine (4 - Booster for Medium series) 03/03/2022      Medicare Screening Tests and Risk Assessments:     Paco Villaseñor is here for his Subsequent Wellness visit   Last Medicare Wellness visit information reviewed, patient interviewed and updates made to the record today  Health Risk Assessment:   Patient rates overall health as fair  Patient feels that their physical health rating is slightly worse  Patient is satisfied with their life  Eyesight was rated as same  Hearing was rated as same  Patient feels that their emotional and mental health rating is slightly worse  Patients states they are sometimes angry  Patient states they are often unusually tired/fatigued  Pain experienced in the last 7 days has been a lot  Patient's pain rating has been 4/10  Patient states that he has experienced no weight loss or gain in last 6 months  Dizziness, fatigued, lack of strength in legs,still with the headach from last November  Hands still look like i worke with barbed wire  Hands swollen and joints hurt  Walking  once around your building would tire me out  I didnt get blood work done  Depression Screening:   PHQ-2 Score: 0      Fall Risk Screening: In the past year, patient has experienced: history of falling in past year    Number of falls: 1  Injured during fall?: No    Feels unsteady when standing or walking?: Yes    Worried about falling?: Yes      Home Safety:  Patient has trouble with stairs inside or outside of their home  Patient has working smoke alarms and has working carbon monoxide detector  Home safety hazards include: none  Nutrition:   Current diet is No Added Salt and Limited junk food  Keeping sugar low to nothing in my diet    Medications:   Patient is currently taking over-the-counter supplements  OTC medications include: Pills for headache  Patient is able to manage medications  Activities of Daily Living (ADLs)/Instrumental Activities of Daily Living (IADLs):   Walk and transfer into and out of bed and chair?: Yes  Dress and groom yourself?: Yes    Bathe or shower yourself?: Yes    Feed yourself?  Yes  Do your laundry/housekeeping?: Yes  Manage your money, pay your bills and track your expenses?: Yes  Make your own meals?: Yes    Do your own shopping?: Yes    Previous Hospitalizations:   Any hospitalizations or ED visits within the last 12 months?: No      Advance Care Planning:   Living will: No    Durable POA for healthcare: Yes    Advanced directive: No    Advanced directive counseling given: Yes    Five wishes given: Yes    End of Life Decisions reviewed with patient: Yes    Provider agrees with end of life decisions: Yes      Cognitive Screening:   Provider or family/friend/caregiver concerned regarding cognition?: No    PREVENTIVE SCREENINGS      Cardiovascular Screening:    General: Screening Not Indicated and History Lipid Disorder      Diabetes Screening:     General: Screening Current      Colorectal Cancer Screening:     General: Screening Current      Prostate Cancer Screening:    General: Screening Current      Osteoporosis Screening:    General: Risks and Benefits Discussed      Abdominal Aortic Aneurysm (AAA) Screening:    Risk factors include: age between 73-67 yo and tobacco use        General: Risks and Benefits Discussed      Lung Cancer Screening:     General: Screening Not Indicated      Hepatitis C Screening:    General: Risks and Benefits Discussed    Screening, Brief Intervention, and Referral to Treatment (SBIRT)    Screening  Typical number of drinks in a day: 0  Typical number of drinks in a week: 2  Interpretation: Low risk drinking behavior  AUDIT-C Screenin) How often did you have a drink containing alcohol in the past year? 2 to 4 times a month  2) How many drinks did you have on a typical day when you were drinking in the past year?  1 to 2  3) How often did you have 6 or more drinks on one occasion in the past year? less than monthly    AUDIT-C Score: 3  Interpretation: Score 0-3 (male): Negative screen for alcohol misuse    Single Item Drug Screening:  How often have you used an illegal drug (including marijuana) or a prescription medication for non-medical reasons in the past year? never    Single Item Drug Screen Score: 0  Interpretation: Negative screen for possible drug use disorder    No exam data present     Physical Exam:     /80 (BP Location: Left arm, Patient Position: Sitting, Cuff Size: Adult)   Pulse 73   Temp 97 7 °F (36 5 °C) (Temporal)   Resp 16   Ht 5' 9 69" (1 77 m)   Wt 117 kg (259 lb)   SpO2 97%   BMI 37 50 kg/m²     Physical Exam     Magdalene Meseret, DO

## 2022-05-19 NOTE — ASSESSMENT & PLAN NOTE
Patient has been complaining of positional vertigo episodes for the past few weeks  Examination today reveals horizontal nystagmus, otherwise fairly unremarkable neurologic exam   Will give trial of meclizine 25 mg Q 8 p r n     Will check labs    If symptoms persist, will send to ENT and possibly vestibular therapy

## 2022-05-19 NOTE — ASSESSMENT & PLAN NOTE
Patient has been complaining of neck stiffness for the past few months  Denies any radiculopathy  Will sent for [facility] PT    If symptoms persist, will sent for x-rays and possible advanced imaging

## 2022-05-19 NOTE — ASSESSMENT & PLAN NOTE
Doing well lately  No longer on allopurinol  Patient has not been drinking alcohol lately  He is due for labs    Will call with uric acid level

## 2022-05-19 NOTE — ASSESSMENT & PLAN NOTE
Last A1c 5 5%  Continue reduced carb diet and exercise  Patient is due for labs    Will call with results

## 2022-05-19 NOTE — ASSESSMENT & PLAN NOTE
Patient states he has not been drinking any alcohol for the past month or so  He states his gout has improved he is hoping it pays divot ends as far as his cholesterol and blood sugar are concerned

## 2022-05-19 NOTE — ASSESSMENT & PLAN NOTE
History of mildly elevated PSA  Was up to 4 97  Most recently 3 7  He is due for repeat level  Will call with results    If level increases again, will refer to Urology

## 2022-05-19 NOTE — ASSESSMENT & PLAN NOTE
History of high cholesterol  Patient discontinued his atorvastatin because he did not want to be on a a statin  Patient states he has been working on his diet and not drinking alcohol lately  He is due for labs  Will call with results    If still suboptimal, consider restarting atorvastatin

## 2022-06-03 ENCOUNTER — APPOINTMENT (OUTPATIENT)
Dept: LAB | Facility: CLINIC | Age: 72
End: 2022-06-03
Payer: COMMERCIAL

## 2022-06-03 DIAGNOSIS — E78.5 DYSLIPIDEMIA: ICD-10-CM

## 2022-06-03 DIAGNOSIS — Z87.39 HISTORY OF GOUT: ICD-10-CM

## 2022-06-03 DIAGNOSIS — R97.20 ELEVATED PSA: ICD-10-CM

## 2022-06-03 DIAGNOSIS — R73.9 ELEVATED BLOOD SUGAR: ICD-10-CM

## 2022-06-03 DIAGNOSIS — E78.2 COMBINED HYPERLIPIDEMIA: ICD-10-CM

## 2022-06-03 DIAGNOSIS — R97.20 ELEVATED PSA: Primary | ICD-10-CM

## 2022-06-03 DIAGNOSIS — I10 BENIGN ESSENTIAL HYPERTENSION: ICD-10-CM

## 2022-06-03 LAB
ALBUMIN SERPL BCP-MCNC: 4.4 G/DL (ref 3.5–5)
ALP SERPL-CCNC: 61 U/L (ref 34–104)
ALT SERPL W P-5'-P-CCNC: 19 U/L (ref 7–52)
ANION GAP SERPL CALCULATED.3IONS-SCNC: 8 MMOL/L (ref 4–13)
AST SERPL W P-5'-P-CCNC: 23 U/L (ref 13–39)
BASOPHILS # BLD AUTO: 0.06 THOUSANDS/ΜL (ref 0–0.1)
BASOPHILS NFR BLD AUTO: 1 % (ref 0–1)
BILIRUB SERPL-MCNC: 0.88 MG/DL (ref 0.2–1)
BUN SERPL-MCNC: 23 MG/DL (ref 5–25)
CALCIUM SERPL-MCNC: 9.9 MG/DL (ref 8.4–10.2)
CHLORIDE SERPL-SCNC: 104 MMOL/L (ref 96–108)
CHOLEST SERPL-MCNC: 221 MG/DL
CO2 SERPL-SCNC: 29 MMOL/L (ref 21–32)
CREAT SERPL-MCNC: 1.43 MG/DL (ref 0.6–1.3)
EOSINOPHIL # BLD AUTO: 0.14 THOUSAND/ΜL (ref 0–0.61)
EOSINOPHIL NFR BLD AUTO: 1 % (ref 0–6)
ERYTHROCYTE [DISTWIDTH] IN BLOOD BY AUTOMATED COUNT: 12.7 % (ref 11.6–15.1)
EST. AVERAGE GLUCOSE BLD GHB EST-MCNC: 120 MG/DL
GFR SERPL CREATININE-BSD FRML MDRD: 48 ML/MIN/1.73SQ M
GLUCOSE P FAST SERPL-MCNC: 89 MG/DL (ref 65–99)
HBA1C MFR BLD: 5.8 %
HCT VFR BLD AUTO: 46 % (ref 36.5–49.3)
HDLC SERPL-MCNC: 53 MG/DL
HGB BLD-MCNC: 15.5 G/DL (ref 12–17)
IMM GRANULOCYTES # BLD AUTO: 0.03 THOUSAND/UL (ref 0–0.2)
IMM GRANULOCYTES NFR BLD AUTO: 0 % (ref 0–2)
LDLC SERPL CALC-MCNC: 151 MG/DL (ref 0–100)
LYMPHOCYTES # BLD AUTO: 2.49 THOUSANDS/ΜL (ref 0.6–4.47)
LYMPHOCYTES NFR BLD AUTO: 25 % (ref 14–44)
MCH RBC QN AUTO: 30.3 PG (ref 26.8–34.3)
MCHC RBC AUTO-ENTMCNC: 33.7 G/DL (ref 31.4–37.4)
MCV RBC AUTO: 90 FL (ref 82–98)
MONOCYTES # BLD AUTO: 1.1 THOUSAND/ΜL (ref 0.17–1.22)
MONOCYTES NFR BLD AUTO: 11 % (ref 4–12)
NEUTROPHILS # BLD AUTO: 6.31 THOUSANDS/ΜL (ref 1.85–7.62)
NEUTS SEG NFR BLD AUTO: 62 % (ref 43–75)
NRBC BLD AUTO-RTO: 0 /100 WBCS
PLATELET # BLD AUTO: 265 THOUSANDS/UL (ref 149–390)
PMV BLD AUTO: 9.6 FL (ref 8.9–12.7)
POTASSIUM SERPL-SCNC: 4.4 MMOL/L (ref 3.5–5.3)
PROT SERPL-MCNC: 7.5 G/DL (ref 6.4–8.4)
PSA SERPL-MCNC: 6.5 NG/ML (ref 0–4)
RBC # BLD AUTO: 5.12 MILLION/UL (ref 3.88–5.62)
SODIUM SERPL-SCNC: 141 MMOL/L (ref 135–147)
TRIGL SERPL-MCNC: 86 MG/DL
TSH SERPL DL<=0.05 MIU/L-ACNC: 3.36 UIU/ML (ref 0.45–4.5)
URATE SERPL-MCNC: 11.5 MG/DL (ref 3.5–8.5)
WBC # BLD AUTO: 10.13 THOUSAND/UL (ref 4.31–10.16)

## 2022-06-03 PROCEDURE — 84153 ASSAY OF PSA TOTAL: CPT

## 2022-06-03 PROCEDURE — 80053 COMPREHEN METABOLIC PANEL: CPT

## 2022-06-03 PROCEDURE — 85025 COMPLETE CBC W/AUTO DIFF WBC: CPT

## 2022-06-03 PROCEDURE — 36415 COLL VENOUS BLD VENIPUNCTURE: CPT

## 2022-06-03 PROCEDURE — 83036 HEMOGLOBIN GLYCOSYLATED A1C: CPT

## 2022-06-03 PROCEDURE — 80061 LIPID PANEL: CPT

## 2022-06-03 PROCEDURE — 84443 ASSAY THYROID STIM HORMONE: CPT

## 2022-06-03 PROCEDURE — 84550 ASSAY OF BLOOD/URIC ACID: CPT

## 2022-06-03 RX ORDER — ATORVASTATIN CALCIUM 20 MG/1
20 TABLET, FILM COATED ORAL DAILY
Qty: 30 TABLET | Refills: 5 | Status: SHIPPED | OUTPATIENT
Start: 2022-06-03 | End: 2022-07-01

## 2022-06-23 ENCOUNTER — TELEPHONE (OUTPATIENT)
Dept: FAMILY MEDICINE CLINIC | Facility: CLINIC | Age: 72
End: 2022-06-23

## 2022-06-23 NOTE — TELEPHONE ENCOUNTER
Patient called stating that the palms of his hands feel like leather  They are now bleeding and cracking  He did go to the dermatologist and the Dr Marlene Lou him some cream but that is not even helping now  Patient would like to know if you have any other advice as to what he can do  His number is [phone]

## 2022-06-30 ENCOUNTER — OFFICE VISIT (OUTPATIENT)
Dept: URGENT CARE | Facility: CLINIC | Age: 72
End: 2022-06-30
Payer: COMMERCIAL

## 2022-06-30 VITALS
RESPIRATION RATE: 18 BRPM | SYSTOLIC BLOOD PRESSURE: 139 MMHG | HEART RATE: 76 BPM | TEMPERATURE: 98 F | DIASTOLIC BLOOD PRESSURE: 83 MMHG | OXYGEN SATURATION: 98 %

## 2022-06-30 DIAGNOSIS — J06.9 VIRAL URI WITH COUGH: Primary | ICD-10-CM

## 2022-06-30 DIAGNOSIS — E78.5 DYSLIPIDEMIA: ICD-10-CM

## 2022-06-30 PROCEDURE — U0003 INFECTIOUS AGENT DETECTION BY NUCLEIC ACID (DNA OR RNA); SEVERE ACUTE RESPIRATORY SYNDROME CORONAVIRUS 2 (SARS-COV-2) (CORONAVIRUS DISEASE [COVID-19]), AMPLIFIED PROBE TECHNIQUE, MAKING USE OF HIGH THROUGHPUT TECHNOLOGIES AS DESCRIBED BY CMS-2020-01-R: HCPCS | Performed by: NURSE PRACTITIONER

## 2022-06-30 PROCEDURE — S9083 URGENT CARE CENTER GLOBAL: HCPCS | Performed by: NURSE PRACTITIONER

## 2022-06-30 PROCEDURE — 99213 OFFICE O/P EST LOW 20 MIN: CPT | Performed by: NURSE PRACTITIONER

## 2022-06-30 PROCEDURE — U0005 INFEC AGEN DETEC AMPLI PROBE: HCPCS | Performed by: NURSE PRACTITIONER

## 2022-06-30 RX ORDER — PREDNISONE 20 MG/1
20 TABLET ORAL 2 TIMES DAILY WITH MEALS
Qty: 10 TABLET | Refills: 0 | Status: SHIPPED | OUTPATIENT
Start: 2022-06-30 | End: 2022-07-05

## 2022-06-30 RX ORDER — BENZONATATE 200 MG/1
200 CAPSULE ORAL 3 TIMES DAILY PRN
Qty: 20 CAPSULE | Refills: 0 | Status: SHIPPED | OUTPATIENT
Start: 2022-06-30

## 2022-06-30 NOTE — PATIENT INSTRUCTIONS
Viral Syndrome   AMBULATORY CARE:   Viral syndrome  is a term used for symptoms of an infection caused by a virus  Viruses are spread easily from person to person through the air and on shared items  Signs and symptoms  may start slowly or suddenly and last hours to 2 weeks  They can be mild to severe and can change over days or hours  You may have any of the following:  Fever and chills    A runny or stuffy nose    Cough, sore throat, or hoarseness    Headache, or pain and pressure around your eyes    Muscle aches and joint pain    Shortness of breath or wheezing    Abdominal pain, cramps, and diarrhea    Nausea, vomiting, or loss of appetite    Call your local emergency number (911 in the [address] [facility]) or have someone else call if:   You have a seizure  You cannot be woken  You have chest pain or trouble breathing  Seek care immediately if:   You have a stiff neck, a bad headache, and sensitivity to light  You feel weak, dizzy, or confused  You stop urinating or urinate a lot less than usual     You cough up blood or thick yellow or green mucus  You have severe abdominal pain or your abdomen is larger than usual     Call your doctor if:   Your symptoms do not get better with treatment or get worse after 3 days  You have a rash or ear pain  You have burning when you urinate  You have questions or concerns about your condition or care  Treatment for viral syndrome  may include medicines to manage your symptoms  Antibiotics are not given for a viral infection  You may  need any of the following:  Acetaminophen  decreases pain and fever  It is available without a doctor's order  Ask how much to take and how often to take it  Follow directions  Read the labels of all other medicines you are using to see if they also contain acetaminophen, or ask your doctor or pharmacist  Acetaminophen can cause liver damage if not taken correctly   Do not use more than 4 grams (4,000 milligrams) total of acetaminophen in one day  NSAIDs , such as ibuprofen, help decrease swelling, pain, and fever  NSAIDs can cause stomach bleeding or kidney problems in certain people  If you take blood thinner medicine, always ask your healthcare provider if NSAIDs are safe for you  Always read the medicine label and follow directions  Cold medicine  helps decrease swelling, control a cough, and relieve chest or nasal congestion  Saline nasal spray  helps decrease nasal congestion  Manage your symptoms:   Drink liquids as directed to prevent dehydration  Ask how much liquid to drink each day and which liquids are best for you  Ask if you should drink an oral rehydration solution (ORS)  An ORS has the right amounts of water, salts, and sugar you need to replace body fluids  This may help prevent dehydration caused by vomiting or diarrhea  Do not drink liquids with caffeine  Liquids with caffeine can make dehydration worse  Get plenty of rest to help your body heal   Take naps throughout the day  Ask your healthcare provider when you can return to work and your normal activities  Use a cool mist humidifier to help you breathe easier  Ask your healthcare provider how to use a cool mist humidifier  Eat honey or use cough drops for a sore throat  Cough drops are available without a doctor's order  Follow directions for taking cough drops  Do not smoke or be close to anyone who is smoking  Nicotine and other chemicals in cigarettes and cigars can cause lung damage  Smoking can also delay healing  Ask your healthcare provider for information if you currently smoke and need help to quit  E-cigarettes or smokeless tobacco still contain nicotine  Talk to your healthcare provider before you use these products  Prevent the spread of germs:       Wash your hands often  Wash your hands several times each day  Wash after you use the bathroom, change a child's diaper, and before you prepare or eat food   Use soap and water every time  Rub your soapy hands together, lacing your fingers  Wash the front and back of your hands, and in between your fingers  Use the fingers of one hand to scrub under the fingernails of the other hand  Wash for at least 20 seconds  Rinse with warm, running water for several seconds  Then dry your hands with a clean towel or paper towel  Use hand  that contains alcohol if soap and water are not available  Do not touch your eyes, nose, or mouth without washing your hands first          Cover a sneeze or cough  Use a tissue that covers your mouth and nose  Throw the tissue away in a trash can right away  Use the bend of your arm if a tissue is not available  Wash your hands well with soap and water or use a hand   Stay away from others while you are sick  Avoid crowds as much as possible  Ask about vaccines you may need  Talk to your healthcare provider about your vaccine history  He or she will tell you which vaccines you need, and when to get them  Get the influenza (flu) vaccine as soon as recommended each year  The flu vaccine is available starting in September or October  Flu viruses change, so it is important to get a flu vaccine every year  Get the pneumonia vaccine if recommended  This vaccine is usually recommended every 5 years  Your provider will tell you when to get this vaccine, if needed  Follow up with your doctor as directed:  Write down your questions so you remember to ask them during your visits  © Copyright [employer] 2022 Information is for End User's use only and may not be sold, redistributed or otherwise used for commercial purposes  All illustrations and images included in CareNotes® are the copyrighted property of [employer]  or [employer]  The above information is an  only  It is not intended as medical advice for individual conditions or treatments   Talk to your doctor, nurse or pharmacist before following any medical regimen to see if it is safe and effective for you

## 2022-06-30 NOTE — PROGRESS NOTES
[facility]        NAME: Quita Aguirre is a 71 y o  male  : 1950    MRN: [id number]  DATE: 2022  TIME: 12:48 PM    Assessment and Plan   Viral URI with cough [J06 9]  1  Viral URI with cough  predniSONE 20 mg tablet    benzonatate (TESSALON) 200 MG capsule    COVID Only - Collected at [place] [address] or [facility]    COVID Only - Collected at [place] [address] or [facility]     covid swab collected  Discussed with pt symptoms are present for a few days - viral at this time -   Start prednisone and tessalon to help with symptoms   Continue flonase  F/u with pcp   Pt in agreement with plan    Patient Instructions     Follow up with PCP in 3-5 days  Proceed to  ER if symptoms worsen  Chief Complaint     Chief Complaint   Patient presents with    Cold Like Symptoms     Patient with PND, HA, "sinus infection" for 1 week  Taking nothing         History of Present Illness   Quita Aguirre presents to the clinic c/o    Cold Like Symptoms (Patient with PND, HA, "sinus infection" since  night  Taking flonase  Bilateral ear pain, into eustation tubes   Cough used to be productive - no longer productive  Also c/o hands that are dry, cracked, and feel like leather -   Has been seeing Dermatology who gave him a cream that "is like jelly and gets everything greasy"  Called PCP in regards to no improvement and he recommended him call dermatology for their recommendations as they are the ones treating it  Pt states has a f/u with dermatology in October - can not get in sooner  Review of Systems   Review of Systems   All other systems reviewed and are negative          Current Medications     Long-Term Medications   Medication Sig Dispense Refill    atorvastatin (LIPITOR) 20 mg tablet Take 1 tablet (20 mg total) by mouth daily 30 tablet 5    clobetasol (OLUX) 0 05 % topical foam Apply to scalp and inside ears when itches, when needed 50 g 3    halobetasol (ULTRAVATE) 0 05 % ointment Apply topically 2 (two) times a day (Patient taking differently: Apply topically as needed) 15 g 2    meclizine (ANTIVERT) 25 mg tablet Take 1 tablet (25 mg total) by mouth every 8 (eight) hours as needed for dizziness 30 tablet 2    metoprolol succinate (TOPROL-XL) 50 mg 24 hr tablet TAKE 1 TABLET BY MOUTH EVERY DAY 90 tablet 3    rivaroxaban (XARELTO) 20 mg tablet Take 1 tablet (20 mg total) by mouth daily with breakfast 90 tablet 3    triamcinolone (KENALOG) 0 1 % ointment Use daily in am on palms for no more than two weeks at a time 453 g 2    valsartan-hydrochlorothiazide (DIOVAN-HCT) 160-12 5 MG per tablet TAKE 1 TABLET BY MOUTH EVERY DAY 90 tablet 1       Current Allergies     Allergies as of 06/30/2022    (No Known Allergies)            The following portions of the patient's history were reviewed and updated as appropriate: allergies, current medications, past family history, past medical history, past social history, past surgical history and problem list     Objective   /83   Pulse 76   Temp 98 °F (36 7 °C) (Tympanic)   Resp 18   SpO2 98%        Physical Exam     Physical Exam  Vitals and nursing note reviewed  Constitutional:       Appearance: Normal appearance  He is well-developed  HENT:      Head: Normocephalic and atraumatic  Right Ear: Hearing, tympanic membrane, ear canal and external ear normal       Left Ear: Hearing, tympanic membrane, ear canal and external ear normal       Nose: Mucosal edema and congestion present  Right Sinus: No maxillary sinus tenderness or frontal sinus tenderness  Left Sinus: No maxillary sinus tenderness or frontal sinus tenderness  Mouth/Throat:      Mouth: Mucous membranes are moist       Pharynx: Oropharynx is clear  No pharyngeal swelling, oropharyngeal exudate, posterior oropharyngeal erythema or uvula swelling  Tonsils: No tonsillar exudate or tonsillar abscesses     Eyes:      General: Lids are normal  Conjunctiva/sclera: Conjunctivae normal       Pupils: Pupils are equal, round, and reactive to light  Cardiovascular:      Rate and Rhythm: Normal rate and regular rhythm  Heart sounds: Normal heart sounds, S1 normal and S2 normal    Pulmonary:      Effort: Pulmonary effort is normal       Breath sounds: Normal breath sounds  Skin:     General: Skin is warm and dry  Neurological:      Mental Status: He is alert  Psychiatric:         Speech: Speech normal          Behavior: Behavior normal          Thought Content:  Thought content normal          Judgment: Judgment normal

## 2022-07-01 DIAGNOSIS — I48.92 ATRIAL FLUTTER (HCC): ICD-10-CM

## 2022-07-01 LAB — SARS-COV-2 RNA RESP QL NAA+PROBE: NEGATIVE

## 2022-07-01 RX ORDER — ATORVASTATIN CALCIUM 20 MG/1
TABLET, FILM COATED ORAL
Qty: 90 TABLET | Refills: 2 | Status: SHIPPED | OUTPATIENT
Start: 2022-07-01

## 2022-07-01 RX ORDER — RIVAROXABAN 20 MG/1
TABLET, FILM COATED ORAL
Qty: 90 TABLET | Refills: 3 | Status: SHIPPED | OUTPATIENT
Start: 2022-07-01

## 2022-07-07 ENCOUNTER — OFFICE VISIT (OUTPATIENT)
Dept: FAMILY MEDICINE CLINIC | Facility: CLINIC | Age: 72
End: 2022-07-07
Payer: COMMERCIAL

## 2022-07-07 VITALS
DIASTOLIC BLOOD PRESSURE: 80 MMHG | BODY MASS INDEX: 37.65 KG/M2 | OXYGEN SATURATION: 95 % | HEIGHT: 70 IN | RESPIRATION RATE: 16 BRPM | HEART RATE: 60 BPM | WEIGHT: 263 LBS | SYSTOLIC BLOOD PRESSURE: 128 MMHG | TEMPERATURE: 97.7 F

## 2022-07-07 DIAGNOSIS — J01.00 ACUTE NON-RECURRENT MAXILLARY SINUSITIS: Primary | ICD-10-CM

## 2022-07-07 PROCEDURE — 99213 OFFICE O/P EST LOW 20 MIN: CPT | Performed by: FAMILY MEDICINE

## 2022-07-07 RX ORDER — AMOXICILLIN 875 MG/1
875 TABLET, COATED ORAL 2 TIMES DAILY
Qty: 20 TABLET | Refills: 0 | Status: SHIPPED | OUTPATIENT
Start: 2022-07-07 | End: 2022-07-17

## 2022-07-07 NOTE — PROGRESS NOTES
[facility]      NAME: Silvia Carlson  AGE: 71 y o  SEX: male  : 1950   MRN: [id number]    DATE: 2022  TIME: 9:46 AM    Assessment and Plan     Problem List Items Addressed This Visit    None     Visit Diagnoses     Acute non-recurrent maxillary sinusitis    -  Primary    Relevant Medications    amoxicillin (AMOXIL) 875 mg tablet      Patient presents with one-week history of sore throat and bilateral ear pain  [facility]   Rapid COVID was negative  Was given prednisone and Tessalon without benefit  Will give Rx for amoxicillin 875 b i d  For 10 days  Also recommend increasing his nasal steroid dosage to 2 sprays each nostril twice daily  Return to office in: call further problems    Chief Complaint     Chief Complaint   Patient presents with    Sore Throat    Earache       History of Present Illness     Patient presents with one-week history of sore throat and bilateral ear pain  [facility] at [facility]   Rapid COVID was negative  Was given prednisone and Tessalon without benefit      The following portions of the patient's history were reviewed and updated as appropriate: allergies, current medications, past family history, past medical history, past social history, past surgical history and problem list     Review of Systems   Review of Systems   Constitutional: Negative for chills and fever  HENT: Positive for ear pain, postnasal drip and sore throat  Respiratory: Positive for cough  Negative for shortness of breath  Cardiovascular: Negative          Active Problem List     Patient Active Problem List   Diagnosis    Rotator cuff syndrome of right shoulder    Combined hyperlipidemia    Benign essential hypertension    Chronic thoracic spine pain    History of gout    Chronic lumbar radiculopathy    Cervical radiculitis    Plantar fasciitis of right foot    Subclinical hypothyroidism    Elevated blood sugar    Eczema    Fatigue    MANDY (obstructive sleep apnea)    S/P cervical spinal fusion    Achilles rupture, left    Hand dermatitis    Dupuytren contracture    Seborrheic keratoses, inflamed    Obesity (BMI 35 0-39 9 without comorbidity)    Elevated PSA    Paresthesia of upper and lower extremities of both sides    Atrial flutter (HCC)    Habitual alcohol use    Dyslipidemia    Body mass index (BMI)40 0-44 9, adult    Change of voice    Belching    Lower respiratory infection (e g , bronchitis, pneumonia, pneumonitis, pulmonitis)    Acute nonintractable headache    SOB (shortness of breath) on exertion    Pulsatile tinnitus, bilateral    Cervical strain    Vertigo       Objective   /80 (BP Location: Left arm, Patient Position: Sitting, Cuff Size: Adult)   Pulse 60   Temp 97 7 °F (36 5 °C) (Temporal)   Resp 16   Ht 5' 9 69" (1 77 m)   Wt 119 kg (263 lb)   SpO2 95%   BMI 38 08 kg/m²     Physical Exam  Constitutional:       Appearance: He is well-developed  Pulmonary:      Effort: Pulmonary effort is normal       Breath sounds: Normal breath sounds  Musculoskeletal:      Cervical back: Normal range of motion and neck supple           Pertinent Laboratory/Diagnostic Studies:  none    Current Medications     Current Outpatient Medications:     amoxicillin (AMOXIL) 875 mg tablet, Take 1 tablet (875 mg total) by mouth 2 (two) times a day for 10 days, Disp: 20 tablet, Rfl: 0    atorvastatin (LIPITOR) 20 mg tablet, TAKE 1 TABLET BY MOUTH EVERY DAY, Disp: 90 tablet, Rfl: 2    clobetasol (OLUX) 0 05 % topical foam, Apply to scalp and inside ears when itches, when needed, Disp: 50 g, Rfl: 3    halobetasol (ULTRAVATE) 0 05 % ointment, Apply topically 2 (two) times a day (Patient taking differently: Apply topically as needed), Disp: 15 g, Rfl: 2    meclizine (ANTIVERT) 25 mg tablet, Take 1 tablet (25 mg total) by mouth every 8 (eight) hours as needed for dizziness, Disp: 30 tablet, Rfl: 2    metoprolol succinate (TOPROL-XL) 50 mg 24 hr tablet, TAKE 1 TABLET BY MOUTH EVERY DAY, Disp: 90 tablet, Rfl: 3    triamcinolone (KENALOG) 0 1 % ointment, Use daily in am on palms for no more than two weeks at a time, Disp: 453 g, Rfl: 2    valsartan-hydrochlorothiazide (DIOVAN-HCT) 160-12 5 MG per tablet, TAKE 1 TABLET BY MOUTH EVERY DAY, Disp: 90 tablet, Rfl: 1    Xarelto 20 MG tablet, TAKE 1 TABLET BY MOUTH DAILY WITH BREAKFAST, Disp: 90 tablet, Rfl: 3    benzonatate (TESSALON) 200 MG capsule, Take 1 capsule (200 mg total) by mouth 3 (three) times a day as needed for cough (Patient not taking: Reported on 7/7/2022), Disp: 20 capsule, Rfl: 0    Health Maintenance     Health Maintenance   Topic Date Due    Hepatitis C Screening  Never done    DTaP,Tdap,and Td Vaccines (1 - Tdap) Never done    PT PLAN OF CARE  12/19/2019    BMI: Followup Plan  11/11/2020    Influenza Vaccine (1) 09/01/2022    Fall Risk  05/19/2023    Depression Screening  05/19/2023    Medicare Annual Wellness Visit (AWV)  05/19/2023    BMI: Adult  05/19/2023    Colorectal Cancer Screening  02/14/2028    Pneumococcal Vaccine: 65+ Years  Completed    COVID-19 Vaccine  Completed    HIB Vaccine  Aged Out    Hepatitis B Vaccine  Aged Out    IPV Vaccine  Aged Out    Hepatitis A Vaccine  Aged Out    Meningococcal ACWY Vaccine  Aged Out    HPV Vaccine  Aged Out     Immunization History   Administered Date(s) Administered    COVID-19 PFIZER VACCINE 0 3 ML IM 03/24/2021, 04/14/2021, 11/03/2021    COVID-19 Pfizer vac (Randal-sucrose, gray cap) 12 yr+ IM 05/19/2022    INFLUENZA 11/01/2015, 11/02/2017, 09/23/2018, 10/03/2019, 10/06/2021    Influenza Quadrivalent Preservative Free 3 years and older IM 11/02/2017    Influenza Quadrivalent, 6-35 Months IM 11/01/2015    Influenza, high dose seasonal 0 7 mL 10/03/2019, 08/27/2020    Pneumococcal Conjugate 13-Valent 11/11/2019    Pneumococcal Polysaccharide PPV23 12/09/2020    Zoster Vaccine Recombinant 09/10/2020, 12/19/2020       Kranthi Fagan DO  [facility]

## 2022-07-08 ENCOUNTER — TELEPHONE (OUTPATIENT)
Dept: OTHER | Facility: OTHER | Age: 72
End: 2022-07-08

## 2022-07-08 DIAGNOSIS — R97.20 ELEVATED PSA: Primary | ICD-10-CM

## 2022-07-08 NOTE — TELEPHONE ENCOUNTER
Patient requesting to have his follow up appt on October 20th cancelled  He did not give a reason and noted he did not want it rescheduled

## 2022-07-12 ENCOUNTER — OFFICE VISIT (OUTPATIENT)
Dept: UROLOGY | Facility: CLINIC | Age: 72
End: 2022-07-12
Payer: COMMERCIAL

## 2022-07-12 VITALS
BODY MASS INDEX: 38.24 KG/M2 | WEIGHT: 258.2 LBS | HEIGHT: 69 IN | DIASTOLIC BLOOD PRESSURE: 80 MMHG | SYSTOLIC BLOOD PRESSURE: 160 MMHG | HEART RATE: 68 BPM

## 2022-07-12 DIAGNOSIS — R97.20 ELEVATED PSA: ICD-10-CM

## 2022-07-12 LAB
SL AMB  POCT GLUCOSE, UA: NORMAL
SL AMB LEUKOCYTE ESTERASE,UA: NORMAL
SL AMB POCT BILIRUBIN,UA: NORMAL
SL AMB POCT BLOOD,UA: NORMAL
SL AMB POCT CLARITY,UA: CLEAR
SL AMB POCT COLOR,UA: YELLOW
SL AMB POCT KETONES,UA: NORMAL
SL AMB POCT NITRITE,UA: NORMAL
SL AMB POCT PH,UA: 5
SL AMB POCT SPECIFIC GRAVITY,UA: 1.01
SL AMB POCT URINE PROTEIN: NORMAL
SL AMB POCT UROBILINOGEN: 0.2

## 2022-07-12 PROCEDURE — 99204 OFFICE O/P NEW MOD 45 MIN: CPT | Performed by: PHYSICIAN ASSISTANT

## 2022-07-12 PROCEDURE — 81002 URINALYSIS NONAUTO W/O SCOPE: CPT | Performed by: PHYSICIAN ASSISTANT

## 2022-07-12 RX ORDER — KETOCONAZOLE 20 MG/ML
SHAMPOO TOPICAL
COMMUNITY
Start: 2022-07-05

## 2022-07-12 NOTE — PROGRESS NOTES
7/12/2022      Chief Complaint   Patient presents with    Elevated PSA         Assessment and Plan    71 y o  male managed by NEW PATIENT    1  Elevated PSA  - LAMBERT smooth firm prostate 25g no focal nodule or asymmetry  Lab Results   Component Value Date    PSA 6 5 (H) 06/03/2022    PSA 3 7 10/29/2021    PSA 3 5 05/14/2021       Ultimately I am concerned for prostate cancer with the overall PSA trend  LAMBERT today without palpable disease  Dicussed diagnostic prostate biopsy and techniques available  I recommend mpMRI prostate to identify target lesion(s) for fusion prostate biopsy  Discussed general risks/benefits of biopsy techniques, locations for procedure, and recovery time  He is interested and agreeable to proceed with imaging followed by biopsy  Will schedule both in the coming weeks  History of Present Illness  Luz Anderson is a 71 y o  male here for evaluation of elevated PSA, initially referred in 2019 for psa change 2 6 to 3 8, then stabilized again, now up to 6 5, and seen today for the first time  He has not seen urology previously other than vasectomy 30 yrs ago  No prior prostate exam  No prior biopsy or surgery  No prior pelvic imaging  Family history paternal cousins prostate CA age 70  No bothersome voiding complaints  Is on amoxicillin currently for sinusitis  Takes xarelto for a flutter  I discussed with the patient the utility of screening PSA as well as Digital rectal examinations for detection of risk of prostate cancer  Determining a patient baseline, trend, and rate of rising PSA are all important factors  We discussed this is also an imperfect tool as PSA elevation may also be caused by infection, inflammation, prostatic trauma or manipulation, urological procedures, or by benign prostatic enlargement    We also discussed that ultimately a tissue sample obtained via prostate biopsy is the only definitive way to identify presence of prostate cancer in men deemed to be high risk based on screening performed above  We reviewed the procedure risks, benefits, and the technique of both transrectal and transperineal approaches performed under ultrasound guidance in office and OR respectively with or without MRI fusion if indicated/available  Procedural risks include but are not limited to pelvic or bloodstream infection, /GI bleeding, pain, sepsis with need for admission to hospital, risk of change in sexual function, risk of postoperative urinary retention requiring catheterization, and risk of diagnosis with prostate cancer  We reviewed preprocedure prophylaxis with antibiotics and enema  Potential alternatives to prostate biopsy in the form of continued PSA and LAMBERT surveillance or MRI were also reviewed including their indications which may or may not be recommended for his case  All of his questions and concerns were answered and addressed with regard to that detailed above  Review of Systems   Constitutional: Negative for activity change, appetite change, chills, fever and unexpected weight change  HENT: Negative  Respiratory: Negative  Negative for shortness of breath  Cardiovascular: Negative  Negative for chest pain  Gastrointestinal: Negative for abdominal pain, diarrhea, nausea and vomiting  Endocrine: Negative  Genitourinary: Negative for decreased urine volume, difficulty urinating, dysuria, flank pain, frequency, hematuria, scrotal swelling, testicular pain and urgency  Musculoskeletal: Negative for back pain and gait problem  Skin: Negative  Allergic/Immunologic: Negative  Neurological: Negative  Hematological: Negative for adenopathy  Does not bruise/bleed easily  Vitals  Vitals:    07/12/22 1139   BP: 160/80   BP Location: Left arm   Patient Position: Sitting   Cuff Size: Standard   Pulse: 68   Weight: 117 kg (258 lb 3 2 oz)   Height: 5' 9" (1 753 m)       Physical Exam  Vitals and nursing note reviewed     Constitutional: General: He is not in acute distress  Appearance: He is well-developed  He is not diaphoretic  HENT:      Head: Normocephalic and atraumatic  Pulmonary:      Effort: Pulmonary effort is normal    Genitourinary:     Comments: Circumcised penis, normal phallus, orthotopic patent meatus  Testes smooth descended bilaterally into the scrotum nontender with no palpable mass  Digital rectal exam smooth prostate 25-30g normal, without appreciable nodule, induration or asymmetry  Skin:     General: Skin is warm and dry  Neurological:      Mental Status: He is alert and oriented to person, place, and time        Gait: Gait normal    Psychiatric:         Speech: Speech normal          Behavior: Behavior normal            Past History  Past Medical History:   Diagnosis Date    Acute gouty arthritis     last assessed 13     Anxiety     last assessed 14     Chronic thoracic spine pain     last assessed 16     Dysfunction of both eustachian tubes     last assessed 13    Erectile dysfunction of non-organic origin     last assessed 10/8/13     Family history reviewed with no changes     medical history     Gout     Hypertension     Sebaceous cyst     last assessed 13     Skin lesion     last assessed 14     Subclinical hypothyroidism 3/21/2019     Social History     Socioeconomic History    Marital status: /Civil Union     Spouse name: None    Number of children: None    Years of education: None    Highest education level: None   Occupational History    None   Tobacco Use    Smoking status: Former Smoker     Packs/day: 0 50     Years: 20 00     Pack years: 10 00     Quit date:      Years since quittin 5    Smokeless tobacco: Never Used   Vaping Use    Vaping Use: Never used   Substance and Sexual Activity    Alcohol use: Not Currently     Comment: social    Drug use: No    Sexual activity: Not Currently   Other Topics Concern    None   Social History Narrative    Consumes 1 cup of coffee per day     Social Determinants of Health     Financial Resource Strain: Not on file   Food Insecurity: Not on file   Transportation Needs: Not on file   Physical Activity: Not on file   Stress: Not on file   Social Connections: Not on file   Intimate Partner Violence: Not on file   Housing Stability: Not on file     Social History     Tobacco Use   Smoking Status Former Smoker    Packs/day: 0 50    Years: 20 00    Pack years: 10 00    Quit date: 36    Years since quittin 5   Smokeless Tobacco Never Used     Family History   Problem Relation Age of Onset    Diabetes Sister     Psoriasis Sister     Diabetes Family     Hypertension Family     No Known Problems Mother     Eczema Father        The following portions of the patient's history were reviewed and updated as appropriate: allergies, current medications, past medical history, past social history, past surgical history and problem list     Results  Recent Results (from the past 1 hour(s))   POCT urine dip    Collection Time: 22 11:47 AM   Result Value Ref Range    LEUKOCYTE ESTERASE,UA neg     NITRITE,UA neg     SL AMB POCT UROBILINOGEN 0 2     POCT URINE PROTEIN neg      PH,UA 5 0     BLOOD,UA neg     SPECIFIC GRAVITY,UA 1 010     KETONES,UA neg     BILIRUBIN,UA neg     GLUCOSE, UA neg      COLOR,UA yellow     CLARITY,UA clear    ]  Lab Results   Component Value Date    PSA 6 5 (H) 2022    PSA 3 7 10/29/2021    PSA 3 5 2021    PSA 3 8 2019     Lab Results   Component Value Date    CALCIUM 9 9 2022     2016    K 4 4 2022    CO2 29 2022     2022    BUN 23 2022    CREATININE 1 43 (H) 2022     Lab Results   Component Value Date    WBC 10 13 2022    HGB 15 5 2022    HCT 46 0 2022    MCV 90 2022     2022

## 2022-07-13 ENCOUNTER — TELEPHONE (OUTPATIENT)
Dept: DERMATOLOGY | Facility: CLINIC | Age: 72
End: 2022-07-13

## 2022-07-13 NOTE — TELEPHONE ENCOUNTER
Called pt to confirm that he wanted to cancel his f/u apt; pt stated he went to [facility] for sorethroat and was prescribed prednisone - pt stated that that cleared his hands up tremendously and is no longer using the ointment, but does moisturize; wcb if he runs into another other problems, but at this time is good! Thank you!

## 2022-07-26 ENCOUNTER — OFFICE VISIT (OUTPATIENT)
Dept: FAMILY MEDICINE CLINIC | Facility: CLINIC | Age: 72
End: 2022-07-26
Payer: COMMERCIAL

## 2022-07-26 VITALS
HEIGHT: 69 IN | OXYGEN SATURATION: 97 % | HEART RATE: 69 BPM | WEIGHT: 264 LBS | BODY MASS INDEX: 39.1 KG/M2 | SYSTOLIC BLOOD PRESSURE: 122 MMHG | DIASTOLIC BLOOD PRESSURE: 80 MMHG | TEMPERATURE: 97.8 F

## 2022-07-26 DIAGNOSIS — R09.81 CONGESTION OF PARANASAL SINUS: Primary | ICD-10-CM

## 2022-07-26 PROCEDURE — 99214 OFFICE O/P EST MOD 30 MIN: CPT | Performed by: FAMILY MEDICINE

## 2022-07-26 PROCEDURE — 3079F DIAST BP 80-89 MM HG: CPT | Performed by: FAMILY MEDICINE

## 2022-07-26 PROCEDURE — 1160F RVW MEDS BY RX/DR IN RCRD: CPT | Performed by: FAMILY MEDICINE

## 2022-07-26 PROCEDURE — 3074F SYST BP LT 130 MM HG: CPT | Performed by: FAMILY MEDICINE

## 2022-07-26 PROCEDURE — 87070 CULTURE OTHR SPECIMN AEROBIC: CPT | Performed by: FAMILY MEDICINE

## 2022-07-26 RX ORDER — PREDNISONE 10 MG/1
TABLET ORAL
Qty: 21 TABLET | Refills: 0 | Status: SHIPPED | OUTPATIENT
Start: 2022-07-26 | End: 2022-08-04

## 2022-07-26 NOTE — ASSESSMENT & PLAN NOTE
Persistent symptoms of sinus congestion and post nasal drip  We will hold off additional antibiotic treatment  Obtain throat culture and treat if indicated for bacterial growth  Patient instructed to start prednisone taper as prescribed  He should also start oral antihistamine such as claritin or zyrtec otc  Continue flonase nasal spray  Follow up in 2 weeks if symptoms do not improve

## 2022-07-26 NOTE — PROGRESS NOTES
Assessment/Plan:    1  Congestion of paranasal sinus  Assessment & Plan:  Persistent symptoms of sinus congestion and post nasal drip  We will hold off additional antibiotic treatment  Obtain throat culture and treat if indicated for bacterial growth  Patient instructed to start prednisone taper as prescribed  He should also start oral antihistamine such as claritin or zyrtec otc  Continue flonase nasal spray  Follow up in 2 weeks if symptoms do not improve  Orders:  -     predniSONE 10 mg tablet; Take 4 tablets (40 mg total) by mouth daily for 3 days, THEN 2 tablets (20 mg total) daily for 3 days, THEN 1 tablet (10 mg total) daily for 3 days  -     Throat culture      Subjective:      Patient ID: David Perera is a 71 y o  male  HPI    Patient presenting with persistent congestion  He went to urgent care on 6/30 with sore throat  COVID test was negative  He was given prednisone which helped a little  He then saw his PCP on 7/7 and was given Augmentin for persistent sore throat and congestion  The sore throat has slightly improved but has not fully resolved  He has left ear discomfort, post nasal drip and rhinorrhea  He feels well otherwise  The following portions of the patient's history were reviewed and updated as appropriate: allergies, current medications, past family history, past medical history, past social history, past surgical history, and problem list       Current Outpatient Medications:     atorvastatin (LIPITOR) 20 mg tablet, TAKE 1 TABLET BY MOUTH EVERY DAY, Disp: 90 tablet, Rfl: 2    metoprolol succinate (TOPROL-XL) 50 mg 24 hr tablet, TAKE 1 TABLET BY MOUTH EVERY DAY, Disp: 90 tablet, Rfl: 3    predniSONE 10 mg tablet, Take 4 tablets (40 mg total) by mouth daily for 3 days, THEN 2 tablets (20 mg total) daily for 3 days, THEN 1 tablet (10 mg total) daily for 3 days  , Disp: 21 tablet, Rfl: 0    triamcinolone (KENALOG) 0 1 % ointment, Use daily in am on palms for no more than two weeks at a time, Disp: 453 g, Rfl: 2    valsartan-hydrochlorothiazide (DIOVAN-HCT) 160-12 5 MG per tablet, TAKE 1 TABLET BY MOUTH EVERY DAY, Disp: 90 tablet, Rfl: 1    Xarelto 20 MG tablet, TAKE 1 TABLET BY MOUTH DAILY WITH BREAKFAST, Disp: 90 tablet, Rfl: 3    benzonatate (TESSALON) 200 MG capsule, Take 1 capsule (200 mg total) by mouth 3 (three) times a day as needed for cough (Patient not taking: No sig reported), Disp: 20 capsule, Rfl: 0    clobetasol (OLUX) 0 05 % topical foam, Apply to scalp and inside ears when itches, when needed (Patient not taking: No sig reported), Disp: 50 g, Rfl: 3    halobetasol (ULTRAVATE) 0 05 % ointment, Apply topically 2 (two) times a day (Patient not taking: Reported on 7/26/2022), Disp: 15 g, Rfl: 2    ketoconazole (NIZORAL) 2 % shampoo, SHAMPOO SCALP 3 TIMES/WEEK  LATHER ONTO SCALP, LEAVE ON 5 MINUTES, THEN RINSE OUT  (Patient not taking: No sig reported), Disp: , Rfl:     meclizine (ANTIVERT) 25 mg tablet, Take 1 tablet (25 mg total) by mouth every 8 (eight) hours as needed for dizziness (Patient not taking: No sig reported), Disp: 30 tablet, Rfl: 2      Review of Systems   Constitutional: Negative for chills and fever  HENT: Positive for congestion, ear pain, postnasal drip, rhinorrhea and sore throat  Eyes: Negative for pain and visual disturbance  Respiratory: Negative for cough and shortness of breath  Cardiovascular: Negative for chest pain and palpitations  Gastrointestinal: Negative for abdominal pain and vomiting  Genitourinary: Negative for dysuria and hematuria  Musculoskeletal: Negative for arthralgias and back pain  Skin: Negative for color change and rash  Neurological: Negative for seizures and syncope  All other systems reviewed and are negative          Objective:      /80 (BP Location: Left arm, Patient Position: Sitting, Cuff Size: Large)   Pulse 69   Temp 97 8 °F (36 6 °C) (Tympanic)   Ht 5' 9" (1 753 m) Wt 120 kg (264 lb)   SpO2 97%   BMI 38 99 kg/m²          Physical Exam  Vitals and nursing note reviewed  Constitutional:       General: He is not in acute distress  Appearance: He is well-developed  He is obese  He is not toxic-appearing  HENT:      Head: Normocephalic and atraumatic  Right Ear: Tympanic membrane and ear canal normal  No drainage or swelling  No middle ear effusion  Left Ear: Tympanic membrane and ear canal normal  No drainage or swelling  No middle ear effusion  Nose: Congestion and rhinorrhea present  Mouth/Throat:      Mouth: Mucous membranes are moist       Pharynx: Oropharynx is clear  No oropharyngeal exudate or posterior oropharyngeal erythema  Eyes:      Extraocular Movements: Extraocular movements intact  Conjunctiva/sclera: Conjunctivae normal    Neck:      Thyroid: No thyromegaly  Cardiovascular:      Rate and Rhythm: Normal rate and regular rhythm  Heart sounds: Normal heart sounds  No murmur heard  Pulmonary:      Effort: Pulmonary effort is normal  No respiratory distress  Breath sounds: Normal breath sounds  No wheezing  Musculoskeletal:      Cervical back: Normal range of motion and neck supple  Lymphadenopathy:      Cervical: No cervical adenopathy  Skin:     General: Skin is warm  Neurological:      General: No focal deficit present  Mental Status: He is alert and oriented to person, place, and time     Psychiatric:         Mood and Affect: Mood normal          Behavior: Behavior normal

## 2022-07-28 LAB — BACTERIA THROAT CULT: NORMAL

## 2022-08-17 NOTE — TELEPHONE ENCOUNTER
Patient under the care of Diana Smith in [facility]    Patient has a MRI on 8/26/22 and needs a new order to check Creatinine and Bun  The current one is from PCP and not expected til 11/1/22      Patient requesting a call back when the order is placed at [phone]

## 2022-08-22 ENCOUNTER — APPOINTMENT (OUTPATIENT)
Dept: LAB | Facility: CLINIC | Age: 72
End: 2022-08-22
Payer: COMMERCIAL

## 2022-08-22 DIAGNOSIS — R97.20 ELEVATED PSA: ICD-10-CM

## 2022-08-22 LAB
ANION GAP SERPL CALCULATED.3IONS-SCNC: 6 MMOL/L (ref 4–13)
BUN SERPL-MCNC: 23 MG/DL (ref 5–25)
CALCIUM SERPL-MCNC: 9.5 MG/DL (ref 8.4–10.2)
CHLORIDE SERPL-SCNC: 103 MMOL/L (ref 96–108)
CO2 SERPL-SCNC: 32 MMOL/L (ref 21–32)
CREAT SERPL-MCNC: 1.09 MG/DL (ref 0.6–1.3)
GFR SERPL CREATININE-BSD FRML MDRD: 67 ML/MIN/1.73SQ M
GLUCOSE P FAST SERPL-MCNC: 120 MG/DL (ref 65–99)
POTASSIUM SERPL-SCNC: 4.4 MMOL/L (ref 3.5–5.3)
SODIUM SERPL-SCNC: 141 MMOL/L (ref 135–147)

## 2022-08-22 PROCEDURE — 80048 BASIC METABOLIC PNL TOTAL CA: CPT

## 2022-08-22 PROCEDURE — 36415 COLL VENOUS BLD VENIPUNCTURE: CPT

## 2022-08-26 ENCOUNTER — HOSPITAL ENCOUNTER (OUTPATIENT)
Dept: RADIOLOGY | Age: 72
Discharge: HOME | End: 2022-08-26
Payer: COMMERCIAL

## 2022-08-26 DIAGNOSIS — R97.20 ELEVATED PSA: ICD-10-CM

## 2022-08-26 PROCEDURE — A9585 GADOBUTROL INJECTION: HCPCS | Performed by: PHYSICIAN ASSISTANT

## 2022-08-26 PROCEDURE — 76377 3D RENDER W/INTRP POSTPROCES: CPT

## 2022-08-26 PROCEDURE — 72197 MRI PELVIS W/O & W/DYE: CPT

## 2022-08-26 RX ADMIN — GADOBUTROL 12 ML: 604.72 INJECTION INTRAVENOUS at 12:00

## 2022-09-02 ENCOUNTER — TELEPHONE (OUTPATIENT)
Dept: UROLOGY | Facility: AMBULATORY SURGERY CENTER | Age: 72
End: 2022-09-02

## 2022-09-02 NOTE — TELEPHONE ENCOUNTER
Radiology Test Results - Radiology Calling with report update MRI of prostate from 08/26/22    Pt under care of: Carmita     Significant Findings - Please review

## 2022-09-02 NOTE — TELEPHONE ENCOUNTER
Patient left voicemail  [facility] and saw that there is a possible chance of cancer      He is requesting to speak to Lila Howard about his next steps and can be reached at [phone]

## 2022-09-02 NOTE — TELEPHONE ENCOUNTER
I called and spoke with [facility]  PSA 6 5, double what it was  Current MRI with a right-sided PI-RADS five nodule  Discussed the risks benefits techniques of MRI fusion guided biopsy  He would like to proceed  Le please reach out to patient for scheduling  Aware of OR/sedation/ needed

## 2022-09-07 NOTE — TELEPHONE ENCOUNTER
Patient called stating he needs to speak with  for biopsy  Communicated through teams with [employer]   She is with another patient and she will call patient back       Patient can be reached at [phone] (

## 2022-09-08 NOTE — TELEPHONE ENCOUNTER
I spoke to the patient and scheduled his procedure for 10/11/2022 at [facility] with Dr Theo Vargas     -instructions given verbally and mailed  -patient aware to be NPO, needs a  and use an enema 1 hour prior to leaving the house morning of procedure  -patient aware to stop his Xarelto 2 days prior  -CBC, CMP, Urine C&S and EKG 2 weeks prior  -[employer] [facility] - on auth tracker 9/8/2022

## 2022-09-26 ENCOUNTER — TELEPHONE (OUTPATIENT)
Dept: UROLOGY | Facility: MEDICAL CENTER | Age: 72
End: 2022-09-26

## 2022-09-27 ENCOUNTER — OFFICE VISIT (OUTPATIENT)
Dept: LAB | Facility: CLINIC | Age: 72
End: 2022-09-27
Payer: COMMERCIAL

## 2022-09-27 ENCOUNTER — APPOINTMENT (OUTPATIENT)
Dept: LAB | Facility: CLINIC | Age: 72
End: 2022-09-27
Payer: COMMERCIAL

## 2022-09-27 DIAGNOSIS — R97.20 ELEVATED PROSTATE SPECIFIC ANTIGEN (PSA): ICD-10-CM

## 2022-09-27 LAB
ALBUMIN SERPL BCP-MCNC: 4.1 G/DL (ref 3.5–5)
ALP SERPL-CCNC: 68 U/L (ref 34–104)
ALT SERPL W P-5'-P-CCNC: 21 U/L (ref 7–52)
ANION GAP SERPL CALCULATED.3IONS-SCNC: 4 MMOL/L (ref 4–13)
AST SERPL W P-5'-P-CCNC: 21 U/L (ref 13–39)
BASOPHILS # BLD AUTO: 0.07 THOUSANDS/ÂΜL (ref 0–0.1)
BASOPHILS NFR BLD AUTO: 1 % (ref 0–1)
BILIRUB SERPL-MCNC: 1.1 MG/DL (ref 0.2–1)
BUN SERPL-MCNC: 19 MG/DL (ref 5–25)
CALCIUM SERPL-MCNC: 9.6 MG/DL (ref 8.4–10.2)
CHLORIDE SERPL-SCNC: 104 MMOL/L (ref 96–108)
CO2 SERPL-SCNC: 33 MMOL/L (ref 21–32)
CREAT SERPL-MCNC: 1 MG/DL (ref 0.6–1.3)
EOSINOPHIL # BLD AUTO: 0.34 THOUSAND/ÂΜL (ref 0–0.61)
EOSINOPHIL NFR BLD AUTO: 4 % (ref 0–6)
ERYTHROCYTE [DISTWIDTH] IN BLOOD BY AUTOMATED COUNT: 12.8 % (ref 11.6–15.1)
GFR SERPL CREATININE-BSD FRML MDRD: 75 ML/MIN/1.73SQ M
GLUCOSE P FAST SERPL-MCNC: 111 MG/DL (ref 65–99)
HCT VFR BLD AUTO: 46.8 % (ref 36.5–49.3)
HGB BLD-MCNC: 15.6 G/DL (ref 12–17)
IMM GRANULOCYTES # BLD AUTO: 0.02 THOUSAND/UL (ref 0–0.2)
IMM GRANULOCYTES NFR BLD AUTO: 0 % (ref 0–2)
LYMPHOCYTES # BLD AUTO: 2.37 THOUSANDS/ÂΜL (ref 0.6–4.47)
LYMPHOCYTES NFR BLD AUTO: 29 % (ref 14–44)
MCH RBC QN AUTO: 29.7 PG (ref 26.8–34.3)
MCHC RBC AUTO-ENTMCNC: 33.3 G/DL (ref 31.4–37.4)
MCV RBC AUTO: 89 FL (ref 82–98)
MONOCYTES # BLD AUTO: 0.91 THOUSAND/ÂΜL (ref 0.17–1.22)
MONOCYTES NFR BLD AUTO: 11 % (ref 4–12)
NEUTROPHILS # BLD AUTO: 4.37 THOUSANDS/ÂΜL (ref 1.85–7.62)
NEUTS SEG NFR BLD AUTO: 55 % (ref 43–75)
NRBC BLD AUTO-RTO: 0 /100 WBCS
PLATELET # BLD AUTO: 257 THOUSANDS/UL (ref 149–390)
PMV BLD AUTO: 9.9 FL (ref 8.9–12.7)
POTASSIUM SERPL-SCNC: 5 MMOL/L (ref 3.5–5.3)
PROT SERPL-MCNC: 7.2 G/DL (ref 6.4–8.4)
RBC # BLD AUTO: 5.25 MILLION/UL (ref 3.88–5.62)
SODIUM SERPL-SCNC: 141 MMOL/L (ref 135–147)
WBC # BLD AUTO: 8.08 THOUSAND/UL (ref 4.31–10.16)

## 2022-09-27 PROCEDURE — 80053 COMPREHEN METABOLIC PANEL: CPT

## 2022-09-27 PROCEDURE — 93005 ELECTROCARDIOGRAM TRACING: CPT

## 2022-09-27 PROCEDURE — 36415 COLL VENOUS BLD VENIPUNCTURE: CPT

## 2022-09-27 PROCEDURE — 87086 URINE CULTURE/COLONY COUNT: CPT

## 2022-09-27 PROCEDURE — 85025 COMPLETE CBC W/AUTO DIFF WBC: CPT

## 2022-09-28 LAB — BACTERIA UR CULT: NORMAL

## 2022-09-29 LAB
ATRIAL RATE: 59 BPM
P AXIS: 18 DEGREES
PR INTERVAL: 148 MS
QRS AXIS: 6 DEGREES
QRSD INTERVAL: 80 MS
QT INTERVAL: 420 MS
QTC INTERVAL: 415 MS
T WAVE AXIS: 22 DEGREES
VENTRICULAR RATE: 59 BPM

## 2022-09-29 PROCEDURE — 93010 ELECTROCARDIOGRAM REPORT: CPT | Performed by: INTERNAL MEDICINE

## 2022-10-04 ENCOUNTER — ANESTHESIA EVENT (OUTPATIENT)
Dept: PERIOP | Facility: AMBULARY SURGERY CENTER | Age: 72
End: 2022-10-04
Payer: COMMERCIAL

## 2022-10-06 ENCOUNTER — TELEPHONE (OUTPATIENT)
Dept: UROLOGY | Facility: CLINIC | Age: 72
End: 2022-10-06

## 2022-10-06 NOTE — TELEPHONE ENCOUNTER
Called and spoke to pt about the prep for patients upcoming procedure  Per telephone encounter 9/8/22 patient is to stop his Xarelto 2 days prior to the procedure     I am calling in lucinaades to your prep instructions for your appointment at the [facility] on 10/11/22 with Dr Kasi Wilks  under IV Se  We need you to please make sure to stop all blood thinners (including multivitamins) ( Elibrianne or Jayda Owusu should be clarified with Cardiology before stopping please see telephone encounter 9/8/22 about Xarelto) 7 day prior to you appointment  Pt should have nothing to eat after midnight the day before the procedure  The pt will need to make sure they have a   Finally the Pt will need to use an enema at least 2 hour prior to the appointment  If you have any questions please call [phone] and ask for [facility] "    Patient expressed understanding of instructions and has no further questions at this time

## 2022-10-10 NOTE — PRE-PROCEDURE INSTRUCTIONS
Pre-Surgery Instructions:   Medication Instructions   • atorvastatin (LIPITOR) 20 mg tablet Take day of surgery  • clobetasol (OLUX) 0 05 % topical foam Hold day of surgery  • halobetasol (ULTRAVATE) 0 05 % ointment Hold day of surgery  • ketoconazole (NIZORAL) 2 % shampoo Ok to use at any time   • metoprolol succinate (TOPROL-XL) 50 mg 24 hr tablet Take day of surgery  • triamcinolone (KENALOG) 0 1 % ointment Hold day of surgery  • valsartan-hydrochlorothiazide (DIOVAN-HCT) 160-12 5 MG per tablet Hold day of surgery  • Xarelto 20 MG tablet Pt instructed by surgeon to hold 2 days prior to sx  Med list reviewed as above  Also instructed pt not to start any new vitamins/supplements preoperatively and to avoid NSAID's  7 days prior to surgery  Tylenol is acceptable if needed  Pt verbalizes understanding of preoperative showering protocol  Pt has fleets enema and understands to administer it 2 hours prior to arrival time  All information within "My Surgical Experience" pamphlet reviewed and patient verbalizes understanding and compliance  All questions answered

## 2022-10-11 ENCOUNTER — HOSPITAL ENCOUNTER (OUTPATIENT)
Facility: AMBULARY SURGERY CENTER | Age: 72
Setting detail: OUTPATIENT SURGERY
Discharge: HOME | End: 2022-10-11
Attending: UROLOGY | Admitting: UROLOGY
Payer: COMMERCIAL

## 2022-10-11 ENCOUNTER — ANESTHESIA (OUTPATIENT)
Dept: PERIOP | Facility: AMBULARY SURGERY CENTER | Age: 72
End: 2022-10-11
Payer: COMMERCIAL

## 2022-10-11 VITALS
WEIGHT: 268 LBS | HEIGHT: 70 IN | SYSTOLIC BLOOD PRESSURE: 128 MMHG | RESPIRATION RATE: 18 BRPM | BODY MASS INDEX: 38.37 KG/M2 | OXYGEN SATURATION: 97 % | HEART RATE: 69 BPM | TEMPERATURE: 97.6 F | DIASTOLIC BLOOD PRESSURE: 75 MMHG

## 2022-10-11 PROCEDURE — NC001 PR NO CHARGE: Performed by: UROLOGY

## 2022-10-11 RX ORDER — CEFAZOLIN SODIUM 2 G/50ML
2000 SOLUTION INTRAVENOUS ONCE
Status: DISCONTINUED | OUTPATIENT
Start: 2022-10-11 | End: 2022-10-11 | Stop reason: HOSPADM

## 2022-10-11 RX ORDER — CEFAZOLIN SODIUM 1 G/50ML
1000 SOLUTION INTRAVENOUS ONCE
Status: DISCONTINUED | OUTPATIENT
Start: 2022-10-11 | End: 2022-10-11 | Stop reason: HOSPADM

## 2022-10-11 NOTE — DISCHARGE INSTRUCTIONS
Mr Ervin Delay,      Your perineal biopsy procedure went well  We were able to obtain the samples in the areas we try to target and have sent these to Pathology for their review  The results should be back in 5-7 business days  There should be an appointment set up to discuss the results in person  Some blood in the urine is normal for approximately 1 week after surgery  It can last in the ejaculate for up to 1 month  Most patients do not need prescription medications after this procedure (including no antibiotics or narcotic pain medications)  Please try taking Tylenol and Motrin over-the-counter to help with discomfort  If you are having significant pain issues please contact me  Please call us immediately if you are having fevers or chills or feel like you have a infection  Some patients can have difficulty with voiding after a biopsy because of swelling of the prostate which can block the urethra  This usually improves with time but if you are having difficulty voiding please let us now as we may need to temporarily insert a catheter  You have any questions or concerns please do not hesitate to call us, [phone]  Betty Pollock MD      Prostate Biopsy   WHAT YOU NEED TO KNOW:   A prostate biopsy is a procedure to remove samples of tissue from your prostate gland  The prostate is a male sex gland that makes fluid found in semen  It is located just below the bladder  After the samples are removed, they are sent to a lab and tested for cancer  DISCHARGE INSTRUCTIONS:   Seek care immediately if:   You have heavy bleeding from your urethra  You urinate very little or not at all  You have pain from your procedure that gets worse, even after you take pain medicine  Call your urologist or doctor if:   You have a fever or chills  You feel pain or burning when you urinate  Your urine is cloudy or smells bad      You have questions or concerns about your condition or care  Medicines: You may need any of the following:  Antibiotics  help prevent or treat a bacterial infection  Acetaminophen  decreases pain and fever  It is available without a doctor's order  Ask how much to take and how often to take it  Follow directions  Read the labels of all other medicines you are using to see if they also contain acetaminophen, or ask your doctor or pharmacist  Acetaminophen can cause liver damage if not taken correctly  Do not use more than 4 grams (4,000 milligrams) total of acetaminophen in one day  Prescription pain medicine  may be given  Ask your healthcare provider how to take this medicine safely  Some prescription pain medicines contain acetaminophen  Do not take other medicines that contain acetaminophen without talking to your healthcare provider  Too much acetaminophen may cause liver damage  Prescription pain medicine may cause constipation  Ask your healthcare provider how to prevent or treat constipation  Take your medicine as directed  Contact your healthcare provider if you think your medicine is not helping or if you have side effects  Tell him or her if you are allergic to any medicine  Keep a list of the medicines, vitamins, and herbs you take  Include the amounts, and when and why you take them  Bring the list or the pill bottles to follow-up visits  Carry your medicine list with you in case of an emergency  Follow up with your urologist or doctor as directed: You may need to return for more tests or procedures  Write down your questions so you remember to ask them during your visits  © Copyright [employer] 2021 Information is for End User's use only and may not be sold, redistributed or otherwise used for commercial purposes  All illustrations and images included in CareNotes® are the copyrighted property of [employer]  or [employer]  The above information is an  only   It is not intended as medical advice for individual conditions or treatments  Talk to your doctor, nurse or pharmacist before following any medical regimen to see if it is safe and effective for you

## 2022-10-11 NOTE — H&P
UROLOGY HISTORY AND PHYSICAL     Patient Identifiers: Domenico Painting (MRN [id number])      Date of Service: 10/11/2022        ASSESSMENT:     71 y o  old male with  elevated PSA and PiRADS 5 lesion  PLAN:     Transperinal Fusion biopsy of prostate      History of Present Illness:     Domenico Painting is a 71 y o  old with a history of elevated PSA, initially referred in 2019 for psa change 2 6 to 3 8, then stabilized again, now up to 6 5  He has not seen urology previously other than vasectomy 30 yrs ago  No prior prostate exam  No prior biopsy or surgery  No prior pelvic imaging  Family history paternal cousins prostate CA age 70  No bothersome voiding complaints  Is on amoxicillin currently for sinusitis  Takes xarelto for a flutter      He obtained MRI in Aug 2022 showing PiRADS 5 lesion        Past Medical, Past Surgical History:     Past Medical History:   Diagnosis Date   • Acute gouty arthritis     last assessed 6/1/13    • Anxiety     last assessed 7/11/14    • BPH (benign prostatic hyperplasia)    • Chronic thoracic spine pain     last assessed 5/13/16    • CPAP (continuous positive airway pressure) dependence    • Dysfunction of both eustachian tubes     last assessed 8/16/13   • Erectile dysfunction of non-organic origin     last assessed 10/8/13    • Family history reviewed with no changes     medical history    • Gout    • Hyperlipidemia    • Hypertension    • Sebaceous cyst     last assessed 12/16/13    • Skin lesion     last assessed 1/2/14    • Sleep apnea    • Subclinical hypothyroidism 03/21/2019   :    Past Surgical History:   Procedure Laterality Date   • APPENDECTOMY      12years old   • BOWEL RESECTION     • CATARACT EXTRACTION     • CERVICAL FUSION N/A 08/27/2019    Procedure: Anterior cervical discectomy w fusion C5-6, with allograft and neuromonitoring;  Surgeon: Giorgio Eddy MD;  Location: [facility];  Service: Orthopedics   • COLONOSCOPY     • MOUTH SURGERY      tooth extraction with dental implant   • SKIN BIOPSY      left cheek   • WRIST SURGERY     :    Medications, Allergies:     Current Facility-Administered Medications:   •  ceFAZolin (ANCEF) IVPB (premix in dextrose) 2,000 mg 50 mL, 2,000 mg, Intravenous, Once, Dylon Bustos MD    Allergies:  No Known Allergies:    Social and Family History:   Social History:   Social History     Tobacco Use   • Smoking status: Former Smoker     Packs/day: 0 50     Years: 20 00     Pack years: 10 00     Types: Cigarettes     Quit date:      Years since quittin 8   • Smokeless tobacco: Never Used   Vaping Use   • Vaping Use: Never used   Substance Use Topics   • Alcohol use: Yes     Comment: few x per month   • Drug use: No        Social History     Tobacco Use   Smoking Status Former Smoker   • Packs/day: 0 50   • Years: 20 00   • Pack years: 10 00   • Types: Cigarettes   • Quit date: 36   • Years since quittin 8   Smokeless Tobacco Never Used       Family History:  Family History   Problem Relation Age of Onset   • Diabetes Sister    • Psoriasis Sister    • Diabetes Family    • Hypertension Family    • No Known Problems Mother    • Eczema Father    :     Review of Systems:     General: Fever, chills, or night sweats: negative  Cardiac: Negative for chest pain  Pulmonary: Negative for shortness of breath  Gastrointestinal: Abdominal pain negative  Nausea, vomiting, or diarrhea negative  Genitourinary: See HPI above  Patient does nothave hematuria  All other systems queried were negative  Physical Exam:   General: Patient is pleasant and in NAD  Awake and alert  /75   Pulse 69   Temp 97 6 °F (36 4 °C) (Temporal)   Resp 18   Ht 5' 10" (1 778 m)   Wt 122 kg (268 lb)   SpO2 97%   BMI 38 45 kg/m²   HEENT:  Normocephalic atraumatic  Cardiac:  Regular rate and rhythm, Peripheral edema: negative  Pulmonary: Non-labored breathing, CTAB  Abdomen: Soft, non-tender, non-distended  No surgical scars    No masses, tenderness, hernias noted  Genitourinary: negative CVA tenderness, neg suprapubic tenderness  Extremities: normal movement in all 4       Labs:     Lab Results   Component Value Date    HGB 15 6 09/27/2022    HCT 46 8 09/27/2022    WBC 8 08 09/27/2022     09/27/2022   ]    Lab Results   Component Value Date     05/16/2016    K 5 0 09/27/2022     09/27/2022    CO2 33 (H) 09/27/2022    BUN 19 09/27/2022    CREATININE 1 00 09/27/2022    CALCIUM 9 6 09/27/2022   ]    Imaging:   I personally reviewed the images and report of the following studies, and reviewed them with the patient:    MRI: pirads 5 at right anterior pz  54cc    Thank you for allowing me to participate in this patients’ care  Please do not hesitate to call with any additional questions    Nahid Kaba

## 2022-10-11 NOTE — ANESTHESIA PREPROCEDURE EVALUATION
Procedure:  TRANSPERINEAL MRI FUSION BIOPSY PROSTATE (N/A Perineum)    Relevant Problems   CARDIO   (+) Atrial flutter (HCC)   (+) Benign essential hypertension   (+) Combined hyperlipidemia   (+) SOB (shortness of breath) on exertion      ENDO   (+) Subclinical hypothyroidism      NEURO/PSYCH   (+) Acute nonintractable headache   (+) History of gout      PULMONARY   (+) MANDY (obstructive sleep apnea)   (+) SOB (shortness of breath) on exertion      Nervous and Auditory   (+) Chronic lumbar radiculopathy      Other   (+) Dyslipidemia   (+) S/P cervical spinal fusion   +CPAP         Physical Exam    Airway  Comment: Prior ACDF - good ROM  Mallampati score: II  TM Distance: <3 FB  Neck ROM: full     Dental   Comment: No loose teeth, No notable dental hx     Cardiovascular      Pulmonary      Other Findings        Anesthesia Plan  ASA Score- 2     Anesthesia Type- IV sedation with anesthesia with ASA Monitors  Additional Monitors:   Airway Plan:     Comment: Npo after MN  Plan Factors-Exercise tolerance (METS): <4 METS  Chart reviewed  Patient summary reviewed  Patient is not a current smoker  Obstructive sleep apnea risk education given perioperatively  Induction- intravenous  Postoperative Plan-     Informed Consent- Anesthetic plan and risks discussed with patient  I personally reviewed this patient with the CRNA  Discussed and agreed on the Anesthesia Plan with the CRNA  Sachin Montalvo

## 2022-10-12 ENCOUNTER — PREP FOR PROCEDURE (OUTPATIENT)
Dept: UROLOGY | Facility: MEDICAL CENTER | Age: 72
End: 2022-10-12

## 2022-10-12 DIAGNOSIS — R39.89 SUSPECTED UTI: ICD-10-CM

## 2022-10-12 DIAGNOSIS — Z01.818 PREOP EXAMINATION: ICD-10-CM

## 2022-10-12 DIAGNOSIS — Z01.810 PRE-OPERATIVE CARDIOVASCULAR EXAMINATION: ICD-10-CM

## 2022-10-12 DIAGNOSIS — Z01.812 PRE-PROCEDURE LAB EXAM: ICD-10-CM

## 2022-10-12 DIAGNOSIS — R97.20 ELEVATED PROSTATE SPECIFIC ANTIGEN (PSA): ICD-10-CM

## 2022-10-12 DIAGNOSIS — Z79.01 LONG TERM (CURRENT) USE OF ANTICOAGULANTS: ICD-10-CM

## 2022-10-12 DIAGNOSIS — C61 PROSTATE CANCER (HCC): Primary | ICD-10-CM

## 2022-10-12 NOTE — TELEPHONE ENCOUNTER
Patients MRI Fusion BX on 10/11/2022 at [facility] with Dr Max Dorado was not completed due to equipment failure       I spoke  the patient to reschedule, scheduled 10/18/2022 at [facility] with Dr Shanae Blum    - No further testing needed   -patient will need a , to be NPO and use an Enema 1-2 hours prior to leaving the house the day of the biopsy  -hospital will call the afternoon prior with his arrival time

## 2022-10-16 DIAGNOSIS — I10 ESSENTIAL HYPERTENSION: ICD-10-CM

## 2022-10-17 ENCOUNTER — ANESTHESIA EVENT (OUTPATIENT)
Dept: GASTROENTEROLOGY | Facility: HOSPITAL | Age: 72
End: 2022-10-17
Payer: COMMERCIAL

## 2022-10-17 RX ORDER — VALSARTAN AND HYDROCHLOROTHIAZIDE 160; 12.5 MG/1; MG/1
TABLET, FILM COATED ORAL
Qty: 90 TABLET | Refills: 1 | Status: SHIPPED | OUTPATIENT
Start: 2022-10-17

## 2022-10-17 NOTE — ANESTHESIA PREPROCEDURE EVALUATION
Procedure:  TRANSPERINEAL MRI FUSION  BIOPSY PROSTATE (N/A Perineum)    Echo 2022  EF 57%, DD1,     Aflutter - metoprolol/Xarelto    MANDY    Relevant Problems   CARDIO   (+) Atrial flutter (HCC)   (+) Benign essential hypertension   (+) Chronic thoracic spine pain   (+) Combined hyperlipidemia   (+) SOB (shortness of breath) on exertion      ENDO   (+) Subclinical hypothyroidism      MUSCULOSKELETAL   (+) Cervical strain   (+) Chronic thoracic spine pain      NEURO/PSYCH   (+) Acute nonintractable headache   (+) History of gout   (+) Paresthesia of upper and lower extremities of both sides      PULMONARY   (+) MANDY (obstructive sleep apnea)   (+) SOB (shortness of breath) on exertion        Physical Exam    Airway    Mallampati score: II  TM Distance: >3 FB  Neck ROM: full     Dental   No notable dental hx     Cardiovascular  Rhythm: regular, Rate: normal, Cardiovascular exam normal    Pulmonary  Pulmonary exam normal Breath sounds clear to auscultation,     Other Findings  Thick neck      Anesthesia Plan  ASA Score- 3     Anesthesia Type- IV sedation with anesthesia with ASA Monitors  Additional Monitors:   Airway Plan:     Comment: Discussed risks/benefits, including medication reactions, awareness, aspiration, and serious/life threatening complications  Plan to maintain native airway with IVGA, monitored with EtCO2  Plan Factors-Exercise tolerance (METS): >4 METS  Patient summary reviewed  Patient instructed to abstain from smoking on day of procedure  Patient did not smoke on day of surgery  Induction- intravenous  Postoperative Plan-     Informed Consent- Anesthetic plan and risks discussed with patient  I personally reviewed this patient with the CRNA  Discussed and agreed on the Anesthesia Plan with the CRNA  Diogenes Jacobson

## 2022-10-18 ENCOUNTER — HOSPITAL ENCOUNTER (OUTPATIENT)
Facility: HOSPITAL | Age: 72
Setting detail: OUTPATIENT SURGERY
Discharge: HOME | End: 2022-10-18
Attending: UROLOGY | Admitting: UROLOGY
Payer: COMMERCIAL

## 2022-10-18 ENCOUNTER — ANESTHESIA (OUTPATIENT)
Dept: GASTROENTEROLOGY | Facility: HOSPITAL | Age: 72
End: 2022-10-18
Payer: COMMERCIAL

## 2022-10-18 VITALS
SYSTOLIC BLOOD PRESSURE: 121 MMHG | DIASTOLIC BLOOD PRESSURE: 74 MMHG | TEMPERATURE: 96.6 F | OXYGEN SATURATION: 96 % | HEART RATE: 69 BPM | RESPIRATION RATE: 16 BRPM

## 2022-10-18 DIAGNOSIS — R97.20 ELEVATED PROSTATE SPECIFIC ANTIGEN (PSA): ICD-10-CM

## 2022-10-18 PROCEDURE — 55700 PR BIOPSY OF PROSTATE,NEEDLE/PUNCH: CPT | Performed by: UROLOGY

## 2022-10-18 PROCEDURE — NC001 PR NO CHARGE: Performed by: UROLOGY

## 2022-10-18 PROCEDURE — 76942 ECHO GUIDE FOR BIOPSY: CPT | Performed by: UROLOGY

## 2022-10-18 PROCEDURE — G0416 PROSTATE BIOPSY, ANY MTHD: HCPCS | Performed by: PATHOLOGY

## 2022-10-18 RX ORDER — SODIUM CHLORIDE, SODIUM LACTATE, POTASSIUM CHLORIDE, CALCIUM CHLORIDE 600; 310; 30; 20 MG/100ML; MG/100ML; MG/100ML; MG/100ML
125 INJECTION, SOLUTION INTRAVENOUS CONTINUOUS
Status: DISCONTINUED | OUTPATIENT
Start: 2022-10-18 | End: 2022-10-18 | Stop reason: HOSPADM

## 2022-10-18 RX ORDER — PROPOFOL 10 MG/ML
INJECTION, EMULSION INTRAVENOUS CONTINUOUS PRN
Status: DISCONTINUED | OUTPATIENT
Start: 2022-10-18 | End: 2022-10-18

## 2022-10-18 RX ORDER — ACETAMINOPHEN 325 MG/1
975 TABLET ORAL ONCE AS NEEDED
Status: COMPLETED | OUTPATIENT
Start: 2022-10-18 | End: 2022-10-18

## 2022-10-18 RX ORDER — PROPOFOL 10 MG/ML
INJECTION, EMULSION INTRAVENOUS AS NEEDED
Status: DISCONTINUED | OUTPATIENT
Start: 2022-10-18 | End: 2022-10-18

## 2022-10-18 RX ORDER — GLYCOPYRROLATE 0.2 MG/ML
INJECTION INTRAMUSCULAR; INTRAVENOUS AS NEEDED
Status: DISCONTINUED | OUTPATIENT
Start: 2022-10-18 | End: 2022-10-18

## 2022-10-18 RX ORDER — LIDOCAINE HYDROCHLORIDE 20 MG/ML
INJECTION, SOLUTION EPIDURAL; INFILTRATION; INTRACAUDAL; PERINEURAL AS NEEDED
Status: DISCONTINUED | OUTPATIENT
Start: 2022-10-18 | End: 2022-10-18 | Stop reason: HOSPADM

## 2022-10-18 RX ORDER — CEFAZOLIN SODIUM 2 G/50ML
2000 SOLUTION INTRAVENOUS
Status: COMPLETED | OUTPATIENT
Start: 2022-10-18 | End: 2022-10-18

## 2022-10-18 RX ORDER — LIDOCAINE HYDROCHLORIDE 10 MG/ML
0.5 INJECTION, SOLUTION EPIDURAL; INFILTRATION; INTRACAUDAL; PERINEURAL ONCE AS NEEDED
Status: COMPLETED | OUTPATIENT
Start: 2022-10-18 | End: 2022-10-18

## 2022-10-18 RX ADMIN — CEFAZOLIN SODIUM 2000 MG: 2 SOLUTION INTRAVENOUS at 09:19

## 2022-10-18 RX ADMIN — GLYCOPYRROLATE 0.2 MCG: 0.2 INJECTION, SOLUTION INTRAMUSCULAR; INTRAVENOUS at 09:39

## 2022-10-18 RX ADMIN — ACETAMINOPHEN 975 MG: 325 TABLET ORAL at 10:13

## 2022-10-18 RX ADMIN — PROPOFOL 50 MG: 10 INJECTION, EMULSION INTRAVENOUS at 09:39

## 2022-10-18 RX ADMIN — SODIUM CHLORIDE, SODIUM LACTATE, POTASSIUM CHLORIDE, AND CALCIUM CHLORIDE: .6; .31; .03; .02 INJECTION, SOLUTION INTRAVENOUS at 09:39

## 2022-10-18 RX ADMIN — LIDOCAINE HYDROCHLORIDE 20 ML: 10 INJECTION, SOLUTION EPIDURAL; INFILTRATION; INTRACAUDAL; PERINEURAL at 09:39

## 2022-10-18 RX ADMIN — PROPOFOL 100 MCG/KG/MIN: 10 INJECTION, EMULSION INTRAVENOUS at 09:39

## 2022-10-18 NOTE — OP NOTE
OPERATIVE REPORT  PATIENT NAME: Elba Mcginnis    :  1950  MRN: [id number]  Pt Location: [facility] 01    SURGERY DATE: 10/18/2022    Surgeon(s) and Role:     Taylor Saba MD - Primary    Preop Diagnosis:  Elevated prostate specific antigen (PSA) [R97 20]    Post-Op Diagnosis Codes:     * Elevated prostate specific antigen (PSA) [R97 20]    Procedure(s) (LRB):  TRANSPERINEAL MRI FUSION  BIOPSY PROSTATE (N/A)    Specimen(s):  ID Type Source Tests Collected by Time Destination   1 : Left posterior medial Tissue Prostate TISSUE EXAM Taylor Saba MD 10/18/2022  9:40 AM    2 : Left posterior lateral Tissue Prostate TISSUE EXAM Taylor Saba MD 10/18/2022  9:42 AM    3 : Left base Tissue Prostate TISSUE EXAM Taylor Saba MD 10/18/2022  9:42 AM    4 : Left anterior medial Tissue Prostate TISSUE EXAM Taylor Saba MD 10/18/2022  9:42 AM    5 : Left anterior lateral Tissue Prostate TISSUE EXAM Taylor Saba MD 10/18/2022  9:42 AM    6 : KAYA - Right anterior lesion Tissue Prostate TISSUE EXAM Taylor Saba MD 10/18/2022  9:42 AM    7 : Right anterior medial Tissue Prostate TISSUE EXAM Taylor Saba MD 10/18/2022  9:42 AM    8 : Right anterior lateral Tissue Prostate TISSUE EXAM Taylor Saba MD 10/18/2022  9:42 AM    9 : Right posterior lateral Tissue Prostate TISSUE EXAM Taylor Saba MD 10/18/2022  9:42 AM    10 : Right posterior medial Tissue Prostate TISSUE EXAM Taylor Saba MD 10/18/2022  9:42 AM    11 : Right base Tissue Prostate TISSUE EXAM Taylor Saba MD 10/18/2022  9:42 AM        Estimated Blood Loss:   Minimal    Drains:  * No LDAs found *    Anesthesia Type:   IV Sedation with Anesthesia    Operative Indications:  Elevated prostate specific antigen (PSA) [R97 20]    FINDINGS:  - A total of 25 samples are taken, in order to provide saturation sampling to maximize diagnostic sensitivity  - 5 samples are taken from KAYA #1, which was labelled right anterior lesion        The patient is  is here for transperineal prostate saturation biopsy guided by biplanar transrectal ultrasound using the Precision Point Perineologic kit and using the uroNav device for MR fusion  The procedure, as well as the risks of bleeding, infection, and urinary retention have been explained he gives informed consent  The patient was brought to the operating room and identified properly  A time-out was performed  IV sedation was induced, and he was placed in the dorsal lithotomy position  The perineum shaved, and IO band was used to lift the scrotum away from the perineum  ChloraPrep was used to prep the perineum  Care was taken when placing the patient in the dorsal lithotomy position to make sure all pressure points were protected     The biplanar ultrasound probe was prepared with a condom and a 1 in wide piece of tape near the handle to place the precision Point kit on that , and I placed the Precision Point device with a clamp over the midportion of the ultrasound once near the handle over the tape  The aperture and guide needle were also prepared to be used later, and I placed the transrectal ultrasound probe into the rectum and visualized the prostate in sagittal and transverse views  This was used with a split screen  Transverse view was obtained, Volumetric sweep was performed for the purpose of fusing the MRI images with the ultrasound images  The perineum was anesthetized with 2% xylocaine 10 cc total both superficially and deep with a spinal needle on both sides of the median raphe  I then performed biopsies of the region of interest after placing the introducer needle into the perineum and took multiple biopsies with MR fusion guidance  Realtime ultrasound is used in addition to the [employer] verification of each target sampling    Addition I verified on the back table that adequate sampling was obtained grossly from each specimen  Multiple samples were taken from the targeted lesion in order provide full saturation of the region of concern on MRI  I then performed standard transperineal prostate biopsy without MR guidance, taking the biopsies from the peripheral zone in the standard fashion of anterior medial anterior lateral and posterior medial posterior lateral and base region bilaterally  The top and second to top aperture settings were used to get the anterior zones, and the second to bottom aperture settings were used to obtain the posterior zones  These were all peripheral zone biopsies  Extra biopsies were taken in zones where the initial biopsy was suboptimal tissue  Care was taken to try to include the entire length of the prostate as much as possible for complete saturation coverage  Excellent prostate tissue cores were obtained, and specimens were sent to pathology  I withdrew the guide needle and I held pressure on the perineum for 1 minutes using gauze sponges  The perineum was then cleansed with sterile water      Verner Squires          SIGNATURE: Verner Squires, MD  DATE: October 18, 2022  TIME: 10:10 AM

## 2022-10-18 NOTE — ANESTHESIA POSTPROCEDURE EVALUATION
Post-Op Assessment Note    CV Status:  Stable    Pain management: adequate     Mental Status:  Alert and awake   Hydration Status:  Euvolemic and stable   PONV Controlled:  Controlled   Airway Patency:  Patent   Two or more mitigation strategies used for obstructive sleep apnea   Post Op Vitals Reviewed: Yes      Staff: CRNA, Anesthesiologist         No complications documented      /58 (10/18/22 1003)    Temp (!) 96 6 °F (35 9 °C) (10/18/22 1003)    Pulse 74 (10/18/22 1003)   Resp 16 (10/18/22 1003)    SpO2 96 % (10/18/22 1003)

## 2022-10-18 NOTE — H&P
UROLOGY HISTORY AND PHYSICAL     Patient Identifiers: Tonja Meeks (MRN [id number])      Date of Service: 10/18/2022        ASSESSMENT:     71 y o  old male with  ELEVATED PSA ABNORMAL MRI  PLAN:     Transperineal MR guided fusion biopsy      History of Present Illness:     Tonja Meeks is a 71 y o  old with a history of elevated PSA    Past Medical, Past Surgical History:     Past Medical History:   Diagnosis Date   • Acute gouty arthritis     last assessed 13    • Anxiety     last assessed 14    • BPH (benign prostatic hyperplasia)    • Chronic thoracic spine pain     last assessed 16    • CPAP (continuous positive airway pressure) dependence    • Dysfunction of both eustachian tubes     last assessed 13   • Erectile dysfunction of non-organic origin     last assessed 10/8/13    • Family history reviewed with no changes     medical history    • Gout    • Hyperlipidemia    • Hypertension    • Sebaceous cyst     last assessed 13    • Skin lesion     last assessed 14    • Sleep apnea    • Subclinical hypothyroidism 2019   :    Past Surgical History:   Procedure Laterality Date   • APPENDECTOMY      12years old   • BOWEL RESECTION     • CATARACT EXTRACTION     • CERVICAL FUSION N/A 2019    Procedure: Anterior cervical discectomy w fusion C5-6, with allograft and neuromonitoring;  Surgeon: Angela Sam MD;  Location: [facility];  Service: Orthopedics   • COLONOSCOPY     • MOUTH SURGERY      tooth extraction with dental implant   • SKIN BIOPSY      left cheek   • WRIST SURGERY     :    Medications, Allergies:   No current facility-administered medications for this encounter      Allergies:  No Known Allergies:    Social and Family History:   Social History:   Social History     Tobacco Use   • Smoking status: Former Smoker     Packs/day: 0 50     Years: 20 00     Pack years: 10 00     Types: Cigarettes     Quit date:      Years since quittin 8   • Smokeless tobacco: Never Used   Vaping Use   • Vaping Use: Never used   Substance Use Topics   • Alcohol use: Yes     Comment: few x per month   • Drug use: No        Social History     Tobacco Use   Smoking Status Former Smoker   • Packs/day: 0 50   • Years: 20 00   • Pack years: 10 00   • Types: Cigarettes   • Quit date: 36   • Years since quittin 8   Smokeless Tobacco Never Used       Family History:  Family History   Problem Relation Age of Onset   • Diabetes Sister    • Psoriasis Sister    • Diabetes Family    • Hypertension Family    • No Known Problems Mother    • Eczema Father    :     Review of Systems:     General: Fever, chills, or night sweats: negative  Cardiac: Negative for chest pain  Pulmonary: Negative for shortness of breath  Gastrointestinal: Abdominal pain negative  Nausea, vomiting, or diarrhea negative  Genitourinary: See HPI above  Patient does nothave hematuria  All other systems queried were negative  Physical Exam:   General: Patient is pleasant and in NAD  Awake and alert  There were no vitals taken for this visit  HEENT:  Normocephalic atraumatic  Cardiac:  Regular rate and rhythm, Peripheral edema: negative  Pulmonary: Non-labored breathing, CTAB  Abdomen: Soft, non-tender, non-distended  No surgical scars  No masses, tenderness, hernias noted  Genitourinary: negative CVA tenderness, neg suprapubic tenderness    Extremities: normal movement in all 4       Labs:     Lab Results   Component Value Date    HGB 15 6 2022    HCT 46 8 2022    WBC 8 08 2022     2022   ]    Lab Results   Component Value Date     2016    K 5 0 2022     2022    CO2 33 (H) 2022    BUN 19 2022    CREATININE 1 00 2022    CALCIUM 9 6 2022   ]    Imaging:   I personally reviewed the images and report of the following studies, and reviewed them with the patient:        Thank you for allowing me to participate in this patients’ care   Please do not hesitate to call with any additional questions    Suma Erb

## 2022-10-18 NOTE — DISCHARGE INSTRUCTIONS
Call for fever greater than 101 5°, severe pain not relieved by pain medication, or inability to urinate  Expect to see blood in the urine, blood in the stool and blood in the semen   You may see swelling and bruising of the testicles and the space between the legs  Tomorrow you may resume all usual activities  No driving or operating machinery today  Take Tylenol or ibuprofen for pain/discomfort     Drink plenty of fluids      Please resume shyam Wednesday morning

## 2022-10-19 ENCOUNTER — TELEPHONE (OUTPATIENT)
Dept: FAMILY MEDICINE CLINIC | Facility: CLINIC | Age: 72
End: 2022-10-19

## 2022-10-19 DIAGNOSIS — R73.9 ELEVATED BLOOD SUGAR: ICD-10-CM

## 2022-10-19 DIAGNOSIS — R97.20 ELEVATED PSA: Primary | ICD-10-CM

## 2022-10-19 DIAGNOSIS — E78.2 COMBINED HYPERLIPIDEMIA: ICD-10-CM

## 2022-10-19 NOTE — TELEPHONE ENCOUNTER
Please call patient to notify him that I would still like him to get fasting blood work done prior to his appointment (lab orders have been placed)

## 2022-10-19 NOTE — TELEPHONE ENCOUNTER
Pt called, he has a 6 month check-up coming up 11/7/22  Wanted to know if he needed labs done for this visit  Last lab work done 9/27

## 2022-10-20 PROCEDURE — G0416 PROSTATE BIOPSY, ANY MTHD: HCPCS | Performed by: PATHOLOGY

## 2022-10-21 ENCOUNTER — NURSE TRIAGE (OUTPATIENT)
Dept: OTHER | Facility: OTHER | Age: 72
End: 2022-10-21

## 2022-10-21 NOTE — TELEPHONE ENCOUNTER
Patient reports having a biopsy done on 10/18 with Dr Brianne Shepherd  Reports still continuing to pass bright red blood every time he urinates  Denies passing clots, denies pain or fever  Also denies any nausea, vomiting, dizziness or other symptoms  Drinking about 3-4 bottles of water daily  Care advice given

## 2022-10-21 NOTE — TELEPHONE ENCOUNTER
Reason for Disposition  • Blood in urine  (Exception: could be normal menstrual bleeding)    Additional Information  • Blood in the urine is main symptom    Answer Assessment - Initial Assessment Questions  1  SYMPTOM: "What's the main symptom you're concerned about?" (e g , frequency, incontinence)      Blood in urine  2  ONSET: "When did the  bleeding  start?"      Since Tuesday after biopsy  3  PAIN: "Is there any pain?" If Yes, ask: "How bad is it?" (Scale: 1-10; mild, moderate, severe)      denies  4  CAUSE: "What do you think is causing the symptoms?"      Had biopsy  5   OTHER SYMPTOMS: "Do you have any other symptoms?" (e g , fever, flank pain, blood in urine, pain with urination)      denies    Protocols used: URINE - BLOOD IN-ADULT-, URINARY [facility]

## 2022-10-21 NOTE — TELEPHONE ENCOUNTER
S/p TRANSPERINEAL MRI FUSION  BIOPSY PROSTATE (N/A Perineum 10/18/22  Patient started up xarelto the day after the procedure  Blood in urine is bright red, no fevers, chills or pain reported  Advised patient to hydrate and monitor  Ed precautions for dark red urine with clots     Will send to provider for recomendations

## 2022-10-21 NOTE — TELEPHONE ENCOUNTER
Regarding: POST OP BLOOD IN HIS URINE   ----- Message from Leesa Chan sent at 10/21/2022  3:08 PM EDT -----  Patient had a biopsy on Tuesday 10/18, he is still having a lot of blood in his urine

## 2022-10-23 ENCOUNTER — HOSPITAL ENCOUNTER (EMERGENCY)
Facility: HOSPITAL | Age: 72
Discharge: HOME | End: 2022-10-23
Attending: EMERGENCY MEDICINE
Payer: COMMERCIAL

## 2022-10-23 VITALS
SYSTOLIC BLOOD PRESSURE: 160 MMHG | OXYGEN SATURATION: 96 % | TEMPERATURE: 97.7 F | RESPIRATION RATE: 18 BRPM | HEART RATE: 69 BPM | DIASTOLIC BLOOD PRESSURE: 88 MMHG

## 2022-10-23 DIAGNOSIS — M10.9: Primary | ICD-10-CM

## 2022-10-23 PROCEDURE — 99284 EMERGENCY DEPT VISIT MOD MDM: CPT | Performed by: EMERGENCY MEDICINE

## 2022-10-23 RX ORDER — COLCHICINE 0.6 MG/1
0.6 TABLET ORAL ONCE
Status: COMPLETED | OUTPATIENT
Start: 2022-10-23 | End: 2022-10-23

## 2022-10-23 RX ORDER — OXYCODONE HYDROCHLORIDE 5 MG/1
5 TABLET ORAL EVERY 4 HOURS PRN
Qty: 10 TABLET | Refills: 0 | Status: SHIPPED | OUTPATIENT
Start: 2022-10-23

## 2022-10-23 RX ORDER — PREDNISONE 20 MG/1
40 TABLET ORAL DAILY
Qty: 10 TABLET | Refills: 0 | Status: SHIPPED | OUTPATIENT
Start: 2022-10-24 | End: 2022-10-29

## 2022-10-23 RX ORDER — COLCHICINE 0.6 MG/1
1.2 TABLET ORAL ONCE
Status: COMPLETED | OUTPATIENT
Start: 2022-10-23 | End: 2022-10-23

## 2022-10-23 RX ORDER — OXYCODONE HYDROCHLORIDE 5 MG/1
5 TABLET ORAL ONCE
Status: COMPLETED | OUTPATIENT
Start: 2022-10-23 | End: 2022-10-23

## 2022-10-23 RX ORDER — PREDNISONE 20 MG/1
40 TABLET ORAL ONCE
Status: COMPLETED | OUTPATIENT
Start: 2022-10-23 | End: 2022-10-23

## 2022-10-23 RX ADMIN — COLCHICINE 1.2 MG: 0.6 TABLET, FILM COATED ORAL at 09:38

## 2022-10-23 RX ADMIN — PREDNISONE 40 MG: 20 TABLET ORAL at 09:39

## 2022-10-23 RX ADMIN — OXYCODONE 5 MG: 5 TABLET ORAL at 09:37

## 2022-10-23 RX ADMIN — COLCHICINE 0.6 MG: 0.6 TABLET, FILM COATED ORAL at 09:37

## 2022-10-23 NOTE — DISCHARGE INSTRUCTIONS
You can alternate acetaminophen 1000mg every 6 hours as needed for pain  Additionally you can apply ice for 15-20 minutes every 1-2 hours while awake for additional pain control  If you are still having significant pain despite the above measures, you can take oxycodone 5mg every 4-6 hours for breakthrough/severe pain  No driving / operating machinery while taking this medication   It will cause constipation, so be sure to start a stool softener and a laxative immediately  Return for any trouble breathing, persistent vomiting, fever more than 101, worsening symptoms or for any concerns      Contact your primary care doctor to discuss re-starting your allopurinol

## 2022-10-23 NOTE — ED PROVIDER NOTES
History  Chief Complaint   Patient presents with   • Arm Pain     Pt reports swelling, pain, redness, heat in right elbow that began yesterday  No injury  70y M here for evaluation of right elbow pain  Started early Saturday am and gradually worsened  Now w/ pain/swelling to the elbow and some swelling into the forearm / hand "because it hurts too much to move my elbow up/down"  Pt w/ hx of gout and states this is what his gout feels like  Usually gets it in the toe, but has had it in other joints and knows this is a result of having four double shots of alcohol  Denies any falls or injuries, no recent procedures / IVs involving the right arm  No open wounds to the arm  No hx of IVDA, no hx of septic arthritis in the past   Denies f/c/s, no cough/congestion  C/o 10/10 right elbow pain w/ any attempted movement - almost as bad at rest         History provided by:  Patient   used: No    Arm Pain  Location:  Right elbow  Quality:  [place], "fire"  Severity:  Severe  Onset quality:  Gradual  Duration:  1 day  Timing:  Constant  Progression:  Worsening  Chronicity:  Recurrent  Context:  Known gout  Relieved by:  Nothing  Worsened by: Movement, palpation  Ineffective treatments: Ice  Associated symptoms: no abdominal pain, no chest pain, no congestion, no cough and no fever        Prior to Admission Medications   Prescriptions Last Dose Informant Patient Reported? Taking? Xarelto 20 MG tablet   No No   Sig: TAKE 1 TABLET BY MOUTH DAILY WITH BREAKFAST   atorvastatin (LIPITOR) 20 mg tablet   No No   Sig: TAKE 1 TABLET BY MOUTH EVERY DAY   clobetasol (OLUX) 0 05 % topical foam   No No   Sig: Apply to scalp and inside ears when itches, when needed   halobetasol (ULTRAVATE) 0 05 % ointment   No No   Sig: Apply topically 2 (two) times a day   ketoconazole (NIZORAL) 2 % shampoo   Yes No   Sig: SHAMPOO SCALP 3 TIMES/WEEK   LATHER ONTO SCALP, LEAVE ON 5 MINUTES, THEN RINSE OUT    metoprolol succinate (TOPROL-XL) 50 mg 24 hr tablet   No No   Sig: TAKE 1 TABLET BY MOUTH EVERY DAY   triamcinolone (KENALOG) 0 1 % ointment   No No   Sig: Use daily in am on palms for no more than two weeks at a time   valsartan-hydrochlorothiazide (DIOVAN-HCT) 160-12 5 MG per tablet   No No   Sig: TAKE 1 TABLET BY MOUTH EVERY DAY      Facility-Administered Medications: None       Past Medical History:   Diagnosis Date   • Acute gouty arthritis     last assessed 6/1/13    • Anxiety     last assessed 7/11/14    • BPH (benign prostatic hyperplasia)    • Chronic thoracic spine pain     last assessed 5/13/16    • CPAP (continuous positive airway pressure) dependence    • Dysfunction of both eustachian tubes     last assessed 8/16/13   • Erectile dysfunction of non-organic origin     last assessed 10/8/13    • Family history reviewed with no changes     medical history    • Gout    • Hyperlipidemia    • Hypertension    • Sebaceous cyst     last assessed 12/16/13    • Skin lesion     last assessed 1/2/14    • Sleep apnea    • Subclinical hypothyroidism 03/21/2019       Past Surgical History:   Procedure Laterality Date   • APPENDECTOMY      12years old   • BOWEL RESECTION     • CATARACT EXTRACTION     • CERVICAL FUSION N/A 08/27/2019    Procedure: Anterior cervical discectomy w fusion C5-6, with allograft and neuromonitoring;  Surgeon: Yumiko Powell MD;  [facility];  Service: Orthopedics   • COLONOSCOPY     • MOUTH SURGERY      tooth extraction with dental implant   • VA BIOPSY OF PROSTATE,NEEDLE,TRANSPERINEAL N/A 10/18/2022    Procedure: TRANSPERINEAL MRI FUSION  BIOPSY PROSTATE;  Surgeon: Nat Dallas MD;  Location: [facility];  Service: Urology   • SKIN BIOPSY      left cheek   • WRIST SURGERY         Family History   Problem Relation Age of Onset   • Diabetes Sister    • Psoriasis Sister    • Diabetes Family    • Hypertension Family    • No Known Problems Mother    • Eczema Father      I have reviewed and agree with the history as documented  E-Cigarette/Vaping   • E-Cigarette Use Never User      E-Cigarette/Vaping Substances   • Nicotine No    • THC No    • CBD No    • Flavoring No    • Other No    • Unknown No      Social History     Tobacco Use   • Smoking status: Former Smoker     Packs/day: 0 50     Years: 20 00     Pack years: 10 00     Types: Cigarettes     Quit date:      Years since quittin 8   • Smokeless tobacco: Never Used   Vaping Use   • Vaping Use: Never used   Substance Use Topics   • Alcohol use: Yes     Comment: few x per month   • Drug use: No       Review of Systems   Constitutional: Negative for chills and fever  HENT: Negative for congestion  Respiratory: Negative for cough  Cardiovascular: Negative for chest pain  Gastrointestinal: Negative for abdominal pain  Musculoskeletal: Positive for arthralgias and joint swelling  Negative for back pain, neck pain and neck stiffness  Neurological: Negative for weakness and numbness  All other systems reviewed and are negative  Physical Exam  Physical Exam  Vitals and nursing note reviewed  Constitutional:       Appearance: Normal appearance  Comments: Appears uncomfortable   HENT:      Mouth/Throat:      Mouth: Mucous membranes are moist    Eyes:      Conjunctiva/sclera: Conjunctivae normal    Cardiovascular:      Rate and Rhythm: Normal rate  Pulmonary:      Effort: Pulmonary effort is normal    Abdominal:      Palpations: Abdomen is soft  Musculoskeletal:         General: Swelling and tenderness present  Right elbow: Swelling present  No deformity or lacerations  Decreased range of motion  Tenderness (diffuse) present  Arms:       Cervical back: Normal range of motion  Skin:     General: Skin is warm  Findings: Erythema present  Neurological:      General: No focal deficit present  Mental Status: He is alert and oriented to person, place, and time     Psychiatric:         Mood and Affect: Mood normal          Vital Signs  ED Triage Vitals [10/23/22 0834]   Temperature Pulse Respirations Blood Pressure SpO2   97 7 °F (36 5 °C) 69 18 160/88 96 %      Temp Source Heart Rate Source Patient Position - Orthostatic VS BP Location FiO2 (%)   Oral Monitor Sitting Left arm --      Pain Score       10 - Worst Possible Pain           Vitals:    10/23/22 0834   BP: 160/88   Pulse: 69   Patient Position - Orthostatic VS: Sitting         Visual Acuity      ED Medications  Medications   colchicine (COLCRYS) tablet 1 2 mg (1 2 mg Oral Given 10/23/22 0938)   predniSONE tablet 40 mg (40 mg Oral Given 10/23/22 0939)   oxyCODONE (ROXICODONE) IR tablet 5 mg (5 mg Oral Given 10/23/22 0937)   colchicine (COLCRYS) tablet 0 6 mg (0 6 mg Oral Given 10/23/22 2992)       Diagnostic Studies  Results Reviewed     None                 No orders to display              Procedures  Procedures         ED Course                               SBIRT 22yo+    Flowsheet Row Most Recent Value   SBIRT (23 yo +)    In order to provide better care to our patients, we are screening all of our patients for alcohol and drug use  Would it be okay to ask you these screening questions? No Filed at: 10/23/2022 0844                    [facility]  Number of Diagnoses or Management Options  Gout of right elbow: new and does not require workup  Diagnosis management comments: 70y M known hx of gout w/ self proclaimed "gout attack"  No trauma, no wounds to raise concern for occult fracture  Able to range past 90, no known risk factors so doubt septic joint  Will treat for gout, give strict return precautions        Disposition  Final diagnoses:   Gout of right elbow     Time reflects when diagnosis was documented in both MDM as applicable and the Disposition within this note     Time User Action Codes Description Comment    10/23/2022  9:32 AM Bianca Muhammad Add [M10 9] Gout of right elbow       ED Disposition     ED Disposition   Discharge Condition   Stable    Date/Time   Sun Oct 23, 2022  9:32 AM    Comment   Danielle Cordova discharge to home/self care  Follow-up Information     Follow up With Specialties Details Why Contact Hawk Cuello, DO Family Medicine Schedule an appointment as soon as possible for a visit  If symptoms worsen or if no improvement and to discuss re-starting your allopurinol [address]  [address]  [phone]            Discharge Medication List as of 10/23/2022  9:36 AM      START taking these medications    Details   oxyCODONE (Roxicodone) 5 immediate release tablet Take 1 tablet (5 mg total) by mouth every 4 (four) hours as needed for severe pain Max Daily Amount: 30 mg, Starting Sun 10/23/2022, Normal      predniSONE 20 mg tablet Take 2 tablets (40 mg total) by mouth daily for 5 days Do not start before October 24, 2022 , Starting Mon 10/24/2022, Until Sat 10/29/2022, Normal         CONTINUE these medications which have NOT CHANGED    Details   atorvastatin (LIPITOR) 20 mg tablet TAKE 1 TABLET BY MOUTH EVERY DAY, Normal      clobetasol (OLUX) 0 05 % topical foam Apply to scalp and inside ears when itches, when needed, Normal      halobetasol (ULTRAVATE) 0 05 % ointment Apply topically 2 (two) times a day, Starting Wed 11/3/2021, Normal      ketoconazole (NIZORAL) 2 % shampoo SHAMPOO SCALP 3 TIMES/WEEK  LATHER ONTO SCALP, LEAVE ON 5 MINUTES, THEN RINSE OUT , Historical Med      metoprolol succinate (TOPROL-XL) 50 mg 24 hr tablet TAKE 1 TABLET BY MOUTH EVERY DAY, Normal      triamcinolone (KENALOG) 0 1 % ointment Use daily in am on palms for no more than two weeks at a time, Normal      valsartan-hydrochlorothiazide (DIOVAN-HCT) 160-12 5 MG per tablet TAKE 1 TABLET BY MOUTH EVERY DAY, Normal      Xarelto 20 MG tablet TAKE 1 TABLET BY MOUTH DAILY WITH BREAKFAST, Normal             No discharge procedures on file      PDMP Review     None          ED Provider  Electronically Signed by           Pedro Jacques,   10/23/22 8188

## 2022-10-25 ENCOUNTER — OFFICE VISIT (OUTPATIENT)
Dept: UROLOGY | Facility: AMBULATORY SURGERY CENTER | Age: 72
End: 2022-10-25
Payer: COMMERCIAL

## 2022-10-25 VITALS
BODY MASS INDEX: 37.51 KG/M2 | SYSTOLIC BLOOD PRESSURE: 130 MMHG | WEIGHT: 262 LBS | DIASTOLIC BLOOD PRESSURE: 90 MMHG | HEIGHT: 70 IN

## 2022-10-25 DIAGNOSIS — C61 PROSTATE CANCER (HCC): Primary | ICD-10-CM

## 2022-10-25 DIAGNOSIS — I48.3 TYPICAL ATRIAL FLUTTER (HCC): ICD-10-CM

## 2022-10-25 DIAGNOSIS — Z98.890 H/O ABDOMINAL SURGERY: ICD-10-CM

## 2022-10-25 PROBLEM — R97.20 ELEVATED PSA: Status: RESOLVED | Noted: 2020-05-15 | Resolved: 2022-10-25

## 2022-10-25 PROCEDURE — 99215 OFFICE O/P EST HI 40 MIN: CPT | Performed by: UROLOGY

## 2022-10-25 RX ORDER — CEFAZOLIN SODIUM 2 G/50ML
2000 SOLUTION INTRAVENOUS ONCE
OUTPATIENT
Start: 2022-10-25 | End: 2022-10-25

## 2022-10-25 RX ORDER — ACETAMINOPHEN 325 MG/1
975 TABLET ORAL ONCE
OUTPATIENT
Start: 2022-10-25 | End: 2022-10-25

## 2022-10-25 RX ORDER — GABAPENTIN 300 MG/1
300 CAPSULE ORAL ONCE
OUTPATIENT
Start: 2022-10-25 | End: 2022-10-25

## 2022-10-25 NOTE — ASSESSMENT & PLAN NOTE
The patient has a new diagnosis of intermediate risk unfavorable prostate cancer  We discussed the risk stratification of prostate cancer based on PSA, biopsy results and exam   We then discussed the roles for active surveillance versus surgery versus radiation  We discussed how active surveillance is performed including repeat PSA every 6 months, the use of MRI at baseline and then annually and repeat biopsy based on PSA and MRI results  We discussed surgery in detail including robot assisted laparoscopic surgery with the role for nerve-sparing and pelvic lymph node dissection  We also discussed the risks of surgery to include rectal injury erectile dysfunction prolonged incontinence injury to the bowel bladder or nerves and need for further surgeries  The patient was given opportunity to ask questions about each treatment modality  The patient is adamantly against radiation because he is concerned about the possibility of cancer recurrence and difficulty to perform surgery after  He wants to pursue radical prostatectomy  This is not unreasonable but is history of prior colon surgery and ventral mesh may make surgery very difficult  We will try to obtain his outside operative note  The patient will also need to me with cardiology for clearance holding his Xarelto which should not be difficult  We had a long conversation regarding the expected course of a robot assisted prostatectomy including how the surgery is done and the typical postoperative course including keeping catheter for 7-10 days and, less likely, an abdominal drain  We discussed risks of surgery including bleeding requiring transfusion, bowel injury, bladder injury, ureteral injury, urine leak, lymphocele formation all of which could require additional surgical intervention  I stressed my most significant concern is bowel injury given his prior abdominal surgical history      We spoke in more detail about the stress incontinence and erectile dysfunction and med experience after surgery and the expected he healing of with regards to each including a 5% risk of persistent incontinence

## 2022-10-25 NOTE — ASSESSMENT & PLAN NOTE
We will try to get his operative notes from his prior surgery because if there are significant adhesions and/or large mesh area it will make robotic surgery very difficult or impossible  We discussed that the only way to know is to attempt surgery though

## 2022-10-25 NOTE — PROGRESS NOTES
Assessment/Plan:    H/O abdominal surgery  We will try to get his operative notes from his prior surgery because if there are significant adhesions and/or large mesh area it will make robotic surgery very difficult or impossible  We discussed that the only way to know is to attempt surgery though  Prostate cancer [facility])  The patient has a new diagnosis of intermediate risk unfavorable prostate cancer  We discussed the risk stratification of prostate cancer based on PSA, biopsy results and exam   We then discussed the roles for active surveillance versus surgery versus radiation  We discussed how active surveillance is performed including repeat PSA every 6 months, the use of MRI at baseline and then annually and repeat biopsy based on PSA and MRI results  We discussed surgery in detail including robot assisted laparoscopic surgery with the role for nerve-sparing and pelvic lymph node dissection  We also discussed the risks of surgery to include rectal injury erectile dysfunction prolonged incontinence injury to the bowel bladder or nerves and need for further surgeries  The patient was given opportunity to ask questions about each treatment modality  The patient is adamantly against radiation because he is concerned about the possibility of cancer recurrence and difficulty to perform surgery after  He wants to pursue radical prostatectomy  This is not unreasonable but is history of prior colon surgery and ventral mesh may make surgery very difficult  We will try to obtain his outside operative note  The patient will also need to me with cardiology for clearance holding his Xarelto which should not be difficult  We had a long conversation regarding the expected course of a robot assisted prostatectomy including how the surgery is done and the typical postoperative course including keeping catheter for 7-10 days and, less likely, an abdominal drain      We discussed risks of surgery including bleeding requiring transfusion, bowel injury, bladder injury, ureteral injury, urine leak, lymphocele formation all of which could require additional surgical intervention  I stressed my most significant concern is bowel injury given his prior abdominal surgical history  We spoke in more detail about the stress incontinence and erectile dysfunction and med experience after surgery and the expected he healing of with regards to each including a 5% risk of persistent incontinence  Atrial flutter [facility])  Patient will meet with Cardiology permission to hold his Xarelto prior to surgery          Subjective:      Patient ID: Juan Vera is a 71 y o  male  VARINDER Jordan is a 71 y o  old with intermediate risk unfavorable prostate cancer  He has a history of elevated PSA, initially referred in 2019 for psa change 2 6 to 3 8, then stabilized again, then up to 6 5  He obtained MRI in Aug 2022 showing PiRADS 5 lesion  He underwent MRI guided fusion biopsy which showed Kareem grade group 3 in the region of interest as well as a nearby area on the right side with other cores of the right showing grade group 2  No cancer seen on the left      He has not seen urology previously other than vasectomy 30 yrs ago  Family history paternal cousins prostate CA age 71  No bothersome voiding complaints  He has not had erections for years  He has a surgical history significant for laparoscopic hand assisted colon resection during which a umbilical hernia was also repaired (done at [facility])  Also had open appy  He currently has what appears to be rectal diastasis  Takes xarelto for atrial flutter    Medical history also significant for psoriasis of the hands and gout for which he is currently taking prednisone        Past Surgical History:   Procedure Laterality Date   • APPENDECTOMY      12years old   • BOWEL RESECTION     • CATARACT EXTRACTION     • CERVICAL FUSION N/A 08/27/2019    Procedure: Anterior cervical discectomy w fusion C5-6, with allograft and neuromonitoring;  Surgeon: Osvaldo Esposito MD;  [facility];  Service: Orthopedics   • COLONOSCOPY     • MOUTH SURGERY      tooth extraction with dental implant   • CO BIOPSY OF PROSTATE,NEEDLE,TRANSPERINEAL N/A 10/18/2022    Procedure: TRANSPERINEAL MRI FUSION  BIOPSY PROSTATE;  Surgeon: Lindy Chavez MD;  Location[facility];  Service: Urology   • SKIN BIOPSY      left cheek   • WRIST SURGERY          Past Medical History:   Diagnosis Date   • Acute gouty arthritis     last assessed 6/1/13    • Anxiety     last assessed 7/11/14    • BPH (benign prostatic hyperplasia)    • Chronic thoracic spine pain     last assessed 5/13/16    • CPAP (continuous positive airway pressure) dependence    • Dysfunction of both eustachian tubes     last assessed 8/16/13   • Erectile dysfunction of non-organic origin     last assessed 10/8/13    • Family history reviewed with no changes     medical history    • Gout    • Hyperlipidemia    • Hypertension    • Sebaceous cyst     last assessed 12/16/13    • Skin lesion     last assessed 1/2/14    • Sleep apnea    • Subclinical hypothyroidism 03/21/2019        AUA SYMPTOM SCORE    Flowsheet Row Most Recent Value   AUA SYMPTOM SCORE    How often have you had a sensation of not emptying your bladder completely after you finished urinating? 0 (P)     How often have you had to urinate again less than two hours after you finished urinating? 2 (P)     How often have you found you stopped and started again several times when you urinate? 0 (P)     How often have you found it difficult to postpone urination? 0 (P)     How often have you had a weak urinary stream? 0 (P)     How often have you had to push or strain to begin urination? 0 (P)     How many times did you most typically get up to urinate from the time you went to bed at night until the time you got up in the morning? 0 (P)     Quality of Life: If you were to spend the rest of your life with your urinary condition just the way it is now, how would you feel about that? 1 (P)     AUA SYMPTOM SCORE 2 (P)            Review of Systems   Constitutional: Negative for chills and fever  HENT: Negative for ear pain and sore throat  Eyes: Negative for pain and visual disturbance  Respiratory: Negative for cough and shortness of breath  Cardiovascular: Negative for chest pain and palpitations  Gastrointestinal: Negative for abdominal pain and vomiting  Genitourinary: Negative for dysuria and hematuria  Musculoskeletal: Negative for arthralgias and back pain  Skin: Negative for color change and rash  Neurological: Negative for seizures and syncope  All other systems reviewed and are negative  Objective:      /90   Ht 5' 10" (1 778 m)   Wt 119 kg (262 lb)   BMI 37 59 kg/m²     Lab Results   Component Value Date    PSA 6 5 (H) 06/03/2022    PSA 3 7 10/29/2021    PSA 3 5 05/14/2021    PSA 3 8 07/22/2019    PSA 2 6 12/20/2018          Physical Exam  Vitals reviewed  Constitutional:       General: He is not in acute distress  Appearance: Normal appearance  He is obese  He is not toxic-appearing  HENT:      Head: Normocephalic and atraumatic  Eyes:      Extraocular Movements: Extraocular movements intact  Conjunctiva/sclera: Conjunctivae normal       Pupils: Pupils are equal, round, and reactive to light  Cardiovascular:      Rate and Rhythm: Normal rate  Pulses: Normal pulses  Pulmonary:      Effort: Pulmonary effort is normal    Abdominal:      General: Abdomen is flat  Palpations: Abdomen is soft  Tenderness: There is no abdominal tenderness  There is no right CVA tenderness, left CVA tenderness, guarding or rebound  Comments: Open appy incision in RLQ  Hand assist port in LLQ  Faint lap scars in midline  Ventral diastasis   Genitourinary:     Penis: Normal     Skin:     General: Skin is warm and dry  Neurological:      General: No focal deficit present  Mental Status: He is alert and oriented to person, place, and time  Mental status is at baseline  Psychiatric:         Mood and Affect: Mood normal          Behavior: Behavior normal          Thought Content: Thought content normal          Judgment: Judgment normal            Orders  No orders of the defined types were placed in this encounter

## 2022-10-27 ENCOUNTER — TELEPHONE (OUTPATIENT)
Dept: UROLOGY | Facility: CLINIC | Age: 72
End: 2022-10-27

## 2022-10-27 NOTE — TELEPHONE ENCOUNTER
PER AKIKO NO: PT DOESN'T HAVE RECORDS IN THE LAST 10 YEARS WITH THEM   NOTIFICATION SCANNED INTO THE CHART

## 2022-10-27 NOTE — TELEPHONE ENCOUNTER
Patient called in to state was Ayad Workman put in below  He cannot get the records  Patient saw Dr Angela Rebollar 10/25/2022 and needs OR set up  Please advise

## 2022-10-28 NOTE — TELEPHONE ENCOUNTER
Dr Merle Lopez had already placed case request at office visit and will be sent to surgery coordinator

## 2022-11-02 ENCOUNTER — APPOINTMENT (OUTPATIENT)
Dept: LAB | Facility: CLINIC | Age: 72
End: 2022-11-02

## 2022-11-02 DIAGNOSIS — E78.2 COMBINED HYPERLIPIDEMIA: ICD-10-CM

## 2022-11-02 DIAGNOSIS — R97.20 ELEVATED PSA: ICD-10-CM

## 2022-11-02 DIAGNOSIS — R73.9 ELEVATED BLOOD SUGAR: ICD-10-CM

## 2022-11-02 LAB
ALBUMIN SERPL BCP-MCNC: 3.2 G/DL (ref 3.5–5)
ALP SERPL-CCNC: 61 U/L (ref 46–116)
ALT SERPL W P-5'-P-CCNC: 33 U/L (ref 12–78)
ANION GAP SERPL CALCULATED.3IONS-SCNC: 4 MMOL/L (ref 4–13)
AST SERPL W P-5'-P-CCNC: 13 U/L (ref 5–45)
BILIRUB SERPL-MCNC: 0.78 MG/DL (ref 0.2–1)
BUN SERPL-MCNC: 21 MG/DL (ref 5–25)
CALCIUM ALBUM COR SERPL-MCNC: 9.8 MG/DL (ref 8.3–10.1)
CALCIUM SERPL-MCNC: 9.2 MG/DL (ref 8.3–10.1)
CHLORIDE SERPL-SCNC: 105 MMOL/L (ref 96–108)
CHOLEST SERPL-MCNC: 158 MG/DL
CO2 SERPL-SCNC: 30 MMOL/L (ref 21–32)
CREAT SERPL-MCNC: 1.09 MG/DL (ref 0.6–1.3)
EST. AVERAGE GLUCOSE BLD GHB EST-MCNC: 134 MG/DL
GFR SERPL CREATININE-BSD FRML MDRD: 67 ML/MIN/1.73SQ M
GLUCOSE P FAST SERPL-MCNC: 119 MG/DL (ref 65–99)
HBA1C MFR BLD: 6.3 %
HDLC SERPL-MCNC: 58 MG/DL
LDLC SERPL CALC-MCNC: 83 MG/DL (ref 0–100)
POTASSIUM SERPL-SCNC: 3.7 MMOL/L (ref 3.5–5.3)
PROT SERPL-MCNC: 6.8 G/DL (ref 6.4–8.4)
PSA SERPL-MCNC: 5.1 NG/ML (ref 0–4)
SODIUM SERPL-SCNC: 139 MMOL/L (ref 135–147)
TRIGL SERPL-MCNC: 87 MG/DL

## 2022-11-04 NOTE — TELEPHONE ENCOUNTER
I did speak with pt this morning and confirmed that I sent information regarding him not being able to obtain his records from his previous surgery  Pt is aware that he should be hearing from me next week to confirm scheduling this procedure

## 2022-11-07 ENCOUNTER — OFFICE VISIT (OUTPATIENT)
Dept: FAMILY MEDICINE CLINIC | Facility: CLINIC | Age: 72
End: 2022-11-07

## 2022-11-07 VITALS
RESPIRATION RATE: 18 BRPM | BODY MASS INDEX: 37.37 KG/M2 | WEIGHT: 261 LBS | HEART RATE: 70 BPM | DIASTOLIC BLOOD PRESSURE: 90 MMHG | TEMPERATURE: 97.7 F | SYSTOLIC BLOOD PRESSURE: 130 MMHG | HEIGHT: 70 IN | OXYGEN SATURATION: 98 %

## 2022-11-07 DIAGNOSIS — E03.8 SUBCLINICAL HYPOTHYROIDISM: ICD-10-CM

## 2022-11-07 DIAGNOSIS — I10 BENIGN ESSENTIAL HYPERTENSION: ICD-10-CM

## 2022-11-07 DIAGNOSIS — G47.33 OSA (OBSTRUCTIVE SLEEP APNEA): ICD-10-CM

## 2022-11-07 DIAGNOSIS — Z87.39 HISTORY OF GOUT: ICD-10-CM

## 2022-11-07 DIAGNOSIS — F10.90 HABITUAL ALCOHOL USE: ICD-10-CM

## 2022-11-07 DIAGNOSIS — E78.2 COMBINED HYPERLIPIDEMIA: Primary | ICD-10-CM

## 2022-11-07 DIAGNOSIS — E66.9 OBESITY (BMI 35.0-39.9 WITHOUT COMORBIDITY): ICD-10-CM

## 2022-11-07 DIAGNOSIS — C61 PROSTATE CANCER (HCC): ICD-10-CM

## 2022-11-07 DIAGNOSIS — R73.9 ELEVATED BLOOD SUGAR: ICD-10-CM

## 2022-11-07 DIAGNOSIS — Z98.890 H/O ABDOMINAL SURGERY: ICD-10-CM

## 2022-11-07 PROBLEM — R09.81 CONGESTION OF PARANASAL SINUS: Status: RESOLVED | Noted: 2022-07-26 | Resolved: 2022-11-07

## 2022-11-07 PROBLEM — R06.02 SOB (SHORTNESS OF BREATH) ON EXERTION: Status: RESOLVED | Noted: 2022-02-01 | Resolved: 2022-11-07

## 2022-11-07 PROBLEM — R14.2 BELCHING: Status: RESOLVED | Noted: 2021-08-25 | Resolved: 2022-11-07

## 2022-11-07 PROBLEM — H93.A3 PULSATILE TINNITUS, BILATERAL: Status: RESOLVED | Noted: 2022-02-01 | Resolved: 2022-11-07

## 2022-11-07 PROBLEM — R49.9 CHANGE OF VOICE: Status: RESOLVED | Noted: 2021-08-25 | Resolved: 2022-11-07

## 2022-11-07 RX ORDER — COLCHICINE 0.6 MG/1
TABLET ORAL
Qty: 30 TABLET | Refills: 1 | Status: SHIPPED | OUTPATIENT
Start: 2022-11-07 | End: 2022-11-15

## 2022-11-07 NOTE — PROGRESS NOTES
colcName: Sheela Adan      : 1950      MRN: [id number]  Encounter Provider: Lelo Calle DO  Encounter Date: 2022   Encounter department: [address] [place] [address]  Combined hyperlipidemia  Assessment & Plan:  Cholesterol from  158, LDL 83  Much improved since restarting atorvastatin 20 mg daily  Will continue to monitor       2  Benign essential hypertension  Assessment & Plan:  Reasonably controlled on valsartan /12 5 and metoprolol XL 50  Again discussed weight loss     Orders:  -     Comprehensive metabolic panel; Future; Expected date: 04/15/2023    3  Elevated blood sugar  Assessment & Plan:  A1c from  6 3%  Continue reduced carb diet exercise along with weight loss    Orders:  -     Hemoglobin A1C; Future; Expected date: 04/15/2023    4  Subclinical hypothyroidism  Assessment & Plan: Will continue to monitor    Orders:  -     TSH, 3rd generation with Free T4 reflex; Future; Expected date: 04/15/2023    5  History of gout  Assessment & Plan:  Patient was in the ER on  for gout flare in his elbow  He was drinking alcohol prior to this episode  He has discontinued drinking  He is requesting refill of colchicine today  Orders:  -     colchicine (COLCRYS) 0 6 mg tablet; One tablet b i d  P r n  Gout    6  MANDY (obstructive sleep apnea)  Assessment & Plan:  Continue nightly CPAP      7  Obesity (BMI 35 0-39 9 without comorbidity)  Assessment & Plan:  Current weight 261, BMI 37 79  Again discussed diet, exercise, weight loss      8  Prostate cancer [facility])  Assessment & Plan:  Recent prostate biopsy positive  You will be scheduled for prostatectomy in near future      9  H/O abdominal surgery  Assessment & Plan:  Status post sigmoid resection in approximately  due to diverticulitis  Surgery was performed at [facility] [place]   (No other records available that I could find)        10  Habitual alcohol use  Assessment & Plan:  Patient has not had any alcohol since his recent gout flare on October 23rd  Recommend continue abstinence        Patient had COVID vaccination  Patient had flu shot  Patient had Pneumovax  Patient had Shingrix    SIX MONTHS, CMP/A1C PRIOR       Subjective     Patient presents recheck chronic medical problems today  Compliant with prescribed medications  Patient had labs done November 2nd    Review of Systems   Respiratory: Negative  Cardiovascular: Negative  Gastrointestinal: Negative  Genitourinary: Negative          Past Medical History:   Diagnosis Date   • Acute gouty arthritis     last assessed 6/1/13    • Anxiety     last assessed 7/11/14    • BPH (benign prostatic hyperplasia)    • Chronic thoracic spine pain     last assessed 5/13/16    • CPAP (continuous positive airway pressure) dependence    • Dysfunction of both eustachian tubes     last assessed 8/16/13   • Erectile dysfunction of non-organic origin     last assessed 10/8/13    • Family history reviewed with no changes     medical history    • Gout    • Hyperlipidemia    • Hypertension    • Sebaceous cyst     last assessed 12/16/13    • Skin lesion     last assessed 1/2/14    • Sleep apnea    • Subclinical hypothyroidism 03/21/2019     Past Surgical History:   Procedure Laterality Date   • APPENDECTOMY      12years old   • BOWEL RESECTION     • CATARACT EXTRACTION     • CERVICAL FUSION N/A 08/27/2019    Procedure: Anterior cervical discectomy w fusion C5-6, with allograft and neuromonitoring;  Surgeon: Yesika Peng MD;  [facility];  Service: Orthopedics   • COLONOSCOPY     • MOUTH SURGERY      tooth extraction with dental implant   • RI BIOPSY OF PROSTATE,NEEDLE,TRANSPERINEAL N/A 10/18/2022    Procedure: TRANSPERINEAL MRI FUSION  BIOPSY PROSTATE;  Surgeon: Kenneth Samuel MD;  Location[facility];  Service: Urology   • SKIN BIOPSY      left cheek   • WRIST SURGERY       Family History   Problem Relation Age of Onset   • Diabetes Sister    • Psoriasis Sister    • Diabetes Family    • Hypertension Family    • No Known Problems Mother    • Eczema Father      Social History     Socioeconomic History   • Marital status: /Civil Union     Spouse name: None   • Number of children: None   • Years of education: None   • Highest education level: None   Occupational History   • None   Tobacco Use   • Smoking status: Former Smoker     Packs/day: 0 50     Years: 20 00     Pack years: 10 00     Types: Cigarettes     Quit date:      Years since quittin 8   • Smokeless tobacco: Never Used   Vaping Use   • Vaping Use: Never used   Substance and Sexual Activity   • Alcohol use: Yes     Comment: few x per month   • Drug use: No   • Sexual activity: Not Currently   Other Topics Concern   • None   Social History Narrative    Consumes 1 cup of coffee per day     Social Determinants of Health     Financial Resource Strain: Not on file   Food Insecurity: Not on file   Transportation Needs: Not on file   Physical Activity: Not on file   Stress: Not on file   Social Connections: Not on file   Intimate Partner Violence: Not on file   Housing Stability: Not on file     Current Outpatient Medications on File Prior to Visit   Medication Sig   • atorvastatin (LIPITOR) 20 mg tablet TAKE 1 TABLET BY MOUTH EVERY DAY   • clobetasol (OLUX) 0 05 % topical foam Apply to scalp and inside ears when itches, when needed   • halobetasol (ULTRAVATE) 0 05 % ointment Apply topically 2 (two) times a day   • ketoconazole (NIZORAL) 2 % shampoo SHAMPOO SCALP 3 TIMES/WEEK  LATHER ONTO SCALP, LEAVE ON 5 MINUTES, THEN RINSE OUT     • metoprolol succinate (TOPROL-XL) 50 mg 24 hr tablet TAKE 1 TABLET BY MOUTH EVERY DAY   • oxyCODONE (Roxicodone) 5 immediate release tablet Take 1 tablet (5 mg total) by mouth every 4 (four) hours as needed for severe pain Max Daily Amount: 30 mg   • triamcinolone (KENALOG) 0 1 % ointment Use daily in am on palms for no more than two weeks at a time   • valsartan-hydrochlorothiazide (DIOVAN-HCT) 160-12 5 MG per tablet TAKE 1 TABLET BY MOUTH EVERY DAY   • Xarelto 20 MG tablet TAKE 1 TABLET BY MOUTH DAILY WITH BREAKFAST     No Known Allergies  Immunization History   Administered Date(s) Administered   • COVID-19 PFIZER VACCINE 0 3 ML IM 03/24/2021, 04/14/2021, 11/03/2021   • COVID-19 Pfizer vac (Randal-sucrose, gray cap) 12 yr+ IM 05/19/2022   • INFLUENZA 11/01/2015, 11/02/2017, 09/23/2018, 10/03/2019, 10/06/2021   • Influenza Quadrivalent Preservative Free 3 years and older IM 11/02/2017   • Influenza Quadrivalent, 6-35 Months IM 11/01/2015   • Influenza, high dose seasonal 0 7 mL 10/03/2019, 08/27/2020   • Pneumococcal Conjugate 13-Valent 11/11/2019   • Pneumococcal Polysaccharide PPV23 12/09/2020   • Zoster Vaccine Recombinant 09/10/2020, 12/19/2020       Objective     /90 (BP Location: Right arm, Patient Position: Sitting, Cuff Size: Large)   Pulse 70   Temp 97 7 °F (36 5 °C) (Temporal)   Resp 18   Ht 5' 9 69" (1 77 m)   Wt 118 kg (261 lb)   SpO2 98%   BMI 37 79 kg/m²     Physical Exam  Cardiovascular:      Rate and Rhythm: Normal rate and regular rhythm  Heart sounds: Normal heart sounds  Comments: Carotids: no bruits  Ext: no edema  Pulmonary:      Effort: Pulmonary effort is normal  No respiratory distress  Breath sounds: No wheezing or rales  Psychiatric:         Behavior: Behavior normal          Thought Content:  Thought content normal        Theresa Rdz DO

## 2022-11-07 NOTE — ASSESSMENT & PLAN NOTE
Status post sigmoid resection in approximately 2010 due to diverticulitis  Surgery was performed at [facility]   (No other records available that I could find)

## 2022-11-07 NOTE — ASSESSMENT & PLAN NOTE
Patient has not had any alcohol since his recent gout flare on October 23rd    Recommend continue abstinence

## 2022-11-07 NOTE — ASSESSMENT & PLAN NOTE
Patient was in the ER on October 23rd for gout flare in his elbow  He was drinking alcohol prior to this episode  He has discontinued drinking  He is requesting refill of colchicine today

## 2022-11-07 NOTE — ASSESSMENT & PLAN NOTE
Cholesterol from November 2nd 158, LDL 83  Much improved since restarting atorvastatin 20 mg daily    Will continue to monitor

## 2022-11-15 DIAGNOSIS — Z87.39 HISTORY OF GOUT: ICD-10-CM

## 2022-11-15 RX ORDER — COLCHICINE 0.6 MG/1
TABLET ORAL
Qty: 180 TABLET | Refills: 1 | Status: SHIPPED | OUTPATIENT
Start: 2022-11-15

## 2022-12-07 ENCOUNTER — TELEPHONE (OUTPATIENT)
Dept: UROLOGY | Facility: MEDICAL CENTER | Age: 72
End: 2022-12-07

## 2022-12-14 NOTE — ASSESSMENT & PLAN NOTE
Discussed regularly accepted daily alcohol usage  He acknowledges that he is drinking more than that    Discussed cutting down on alcohol use to prevent exacerbation/RVR No significant improvement over the last few days on IV ABX. CT shows persistent abscess with a little bit of air in the collection.    Visit Vitals  /85   Pulse (!) 109   Temp (!) 100.9 °F (38.3 °C)   Resp 18   Wt 85.1 kg (187 lb 11.2 oz)   SpO2 92%   BMI 27.72 kg/m²         Abd soft    A/p intermittent fevers but his physical exam is equivocal  Could start him on tpn and plan for surgery next week  Will discuss with Dr. Juliana Adair as well regarding timing of surgery

## 2022-12-22 ENCOUNTER — OFFICE VISIT (OUTPATIENT)
Dept: CARDIOLOGY CLINIC | Facility: CLINIC | Age: 72
End: 2022-12-22

## 2022-12-22 VITALS
SYSTOLIC BLOOD PRESSURE: 132 MMHG | HEART RATE: 60 BPM | WEIGHT: 262.13 LBS | BODY MASS INDEX: 37.53 KG/M2 | HEIGHT: 70 IN | DIASTOLIC BLOOD PRESSURE: 70 MMHG | OXYGEN SATURATION: 99 % | RESPIRATION RATE: 18 BRPM

## 2022-12-22 DIAGNOSIS — Z01.810 PRE-OPERATIVE CARDIOVASCULAR EXAMINATION: ICD-10-CM

## 2022-12-22 DIAGNOSIS — Z01.818 PREOP EXAMINATION: ICD-10-CM

## 2022-12-22 DIAGNOSIS — C61 PROSTATE CANCER (HCC): ICD-10-CM

## 2022-12-22 DIAGNOSIS — I48.3 TYPICAL ATRIAL FLUTTER (HCC): Primary | ICD-10-CM

## 2022-12-22 RX ORDER — PREDNISONE 10 MG/1
TABLET ORAL
COMMUNITY
Start: 2022-10-19

## 2022-12-22 RX ORDER — PIMECROLIMUS 10 MG/G
CREAM TOPICAL
COMMUNITY
Start: 2022-10-19

## 2022-12-22 NOTE — PROGRESS NOTES
HEART AND VASCULAR  CARDIAC [address]    Outpatient  Follow-up  Today's Date: 12/22/22        Patient name: Riley Rdz  YOB: 1950  Sex: male         Chief Complaint: f/u  afib       ASSESSMENT:  Problem List Items Addressed This Visit        Cardiovascular and Mediastinum    Atrial flutter [facility]) - Primary    Relevant Orders    POCT ECG       Genitourinary    Prostate cancer [facility])   Other Visit Diagnoses     Preop examination        Pre-operative cardiovascular examination            72 yo male  1) paroxymsal afib/flutter- NSR since January 2021- [id number], on xarelto/metoprolol and has lost 40 pounds and drinks much less  2) HTN well controlled today, slightly up today  On Diovan/hctz and metop  3) MANDY on CPAP  4) Dyslipidemia, well controlled on statin  5) Prostat CA planning robotic prostetectomy next month  PLAN:  1  Continue diet/exercise, avoid excess ETOH, maximum 7 drinks a week  2  Cont xarelto, metoprolol  3  Ok to hold Xarelto 5 days prior to surgery as requested by Urologist   He is NSR anyway, low risk but not zero risk for CVA and intermediate risk for CV events post op  Follow up in: 6 months    Orders Placed This Encounter   Procedures   • POCT ECG     There are no discontinued medications    HPI/Subjective:   72 yo male  1) paroxymsal afib/flutter- NSR since January 2021- [id number], on xarelto/metoprolol and has lost 40 pounds and drinks much less  2) HTN well controlled today, slightly up today  On Diovan/hctz and metop  3) MANDY on CPAP  4) Dyslipidemia, well controlled on statin  5) Prostat CA planning robotic prostetectomy next month  Jose Fernandes is doing ok  Just recovering from viral illness may have been covid  He is drinking more than he should and willing to cut back   Also wants to exercise more and lose weight  Please note HPI is listed by problem with with update following it, it is copied again in the assessment above and reflects medical decision making as well  Complete 12 point ROS reviewed and otherwise non pertinent or negative except as per HPI pertinent positives in Cardiovascular and Respiratory emphasized  Please see paper chart for outpatient clinic patients where the patient completed the 12 point ROS survey  Past Medical History:   Diagnosis Date   • Acute gouty arthritis     last assessed 6/1/13    • Anxiety     last assessed 7/11/14    • BPH (benign prostatic hyperplasia)    • Chronic thoracic spine pain     last assessed 5/13/16    • CPAP (continuous positive airway pressure) dependence    • Dysfunction of both eustachian tubes     last assessed 8/16/13   • Erectile dysfunction of non-organic origin     last assessed 10/8/13    • Family history reviewed with no changes     medical history    • Gout    • Hyperlipidemia    • Hypertension    • Sebaceous cyst     last assessed 12/16/13    • Skin lesion     last assessed 1/2/14    • Sleep apnea    • Subclinical hypothyroidism 03/21/2019       No Known Allergies  I reviewed the Home Medication list and Allergies in the chart  Scheduled Meds:  Current Outpatient Medications   Medication Sig Dispense Refill   • atorvastatin (LIPITOR) 20 mg tablet TAKE 1 TABLET BY MOUTH EVERY DAY 90 tablet 2   • clobetasol (OLUX) 0 05 % topical foam Apply to scalp and inside ears when itches, when needed 50 g 3   • colchicine (COLCRYS) 0 6 mg tablet TAKE 1 TABLET BY MOUTH TWICE A DAY AS NEEDED GOUT 180 tablet 1   • halobetasol (ULTRAVATE) 0 05 % ointment Apply topically 2 (two) times a day 15 g 2   • ketoconazole (NIZORAL) 2 % shampoo SHAMPOO SCALP 3 TIMES/WEEK  LATHER ONTO SCALP, LEAVE ON 5 MINUTES, THEN RINSE OUT       • metoprolol succinate (TOPROL-XL) 50 mg 24 hr tablet TAKE 1 TABLET BY MOUTH EVERY DAY 90 tablet 3   • oxyCODONE (Roxicodone) 5 immediate release tablet Take 1 tablet (5 mg total) by mouth every 4 (four) hours as needed for severe pain Max Daily Amount: 30 mg 10 tablet 0   • pimecrolimus (ELIDEL) 1 % cream APPLY TO HANDS TWICE A DAY X 6 WEEKS, AND CLOBETASOL EVERY THIRD DAY  • predniSONE 10 mg tablet TAKE BY MOUTH AT BREAKFAST: 3 TABS X 3 DAYS, 2 TABS X3DAYS, 1 TABX 3DAYS WITH FULL GLASS OF WATER     • triamcinolone (KENALOG) 0 1 % ointment Use daily in am on palms for no more than two weeks at a time 453 g 2   • valsartan-hydrochlorothiazide (DIOVAN-HCT) 160-12 5 MG per tablet TAKE 1 TABLET BY MOUTH EVERY DAY 90 tablet 1   • Xarelto 20 MG tablet TAKE 1 TABLET BY MOUTH DAILY WITH BREAKFAST 90 tablet 3     No current facility-administered medications for this visit       PRN Meds:         Family History   Problem Relation Age of Onset   • Diabetes Sister    • Psoriasis Sister    • Diabetes Family    • Hypertension Family    • No Known Problems Mother    • Eczema Father        Social History     Socioeconomic History   • Marital status: /Civil Union     Spouse name: Not on file   • Number of children: Not on file   • Years of education: Not on file   • Highest education level: Not on file   Occupational History   • Not on file   Tobacco Use   • Smoking status: Former     Packs/day: 0 50     Years: 20 00     Pack years: 10 00     Types: Cigarettes     Quit date:      Years since quittin 0   • Smokeless tobacco: Never   Vaping Use   • Vaping Use: Never used   Substance and Sexual Activity   • Alcohol use: Yes     Comment: few x per month   • Drug use: No   • Sexual activity: Not Currently   Other Topics Concern   • Not on file   Social History Narrative    Consumes 1 cup of coffee per day     Social Determinants of Health     Financial Resource Strain: Not on file   Food Insecurity: Not on file   Transportation Needs: Not on file   Physical Activity: Not on file   Stress: Not on file   Social Connections: Not on file Intimate Partner Violence: Not on file   Housing Stability: Not on file         OBJECTIVE:    /70 (BP Location: Left arm, Patient Position: Sitting, Cuff Size: Large)   Pulse 60   Resp 18   Ht 5' 10" (1 778 m)   Wt 119 kg (262 lb 2 oz)   SpO2 99%   BMI 37 61 kg/m²   Vitals:    12/22/22 0950   Weight: 119 kg (262 lb 2 oz)     /70 (BP Location: Left arm, Patient Position: Sitting, Cuff Size: Large)   Pulse 60   Resp 18   Ht 5' 10" (1 778 m)   Wt 119 kg (262 lb 2 oz)   SpO2 99%   BMI 37 61 kg/m²   Vitals:    12/22/22 0950   Weight: 119 kg (262 lb 2 oz)     /70 (BP Location: Left arm, Patient Position: Sitting, Cuff Size: Large)   Pulse 60   Resp 18   Ht 5' 10" (1 778 m)   Wt 119 kg (262 lb 2 oz)   SpO2 99%   BMI 37 61 kg/m²   Vitals:    12/22/22 0950   Weight: 119 kg (262 lb 2 oz)     GEN: No acute distress, Alert and oriented, well appearing  HEENT:External ears normal, wearing a mask  EYES: Pupils equal, sclera anicteric, midline, normal conjuctiva  NECK: No JVD, supple, no obvious masses or thryomegaly or goiter  CARDIOVASCULAR:  RRR, No murmur, rub, gallops S1,S2  LUNGS: Clear To auscultation bilaterally, normal effort, no rales, rhonchi, crackles   ABDOMEN:  nondistended,  without obvious organomegaly or ascites  EXTREMITIES/VASCULAR:  No edema  warm an well perfused  PSYCH: Normal Affect,  linear speech pattern without evidence of psychosis  NEURO: Grossly intact, moving all extremiteis equal, face symmetric, alert and responsive, no obvious focal defecits   GAIT:  Ambulates normally without difficulty  HEME: No bleeding, bruising, petechia, purpura SKIN: No significant rashes on visibile skin, warm, no diaphoresis or pallor           Lab Results:       LABS:      Chemistry        Component Value Date/Time     05/16/2016 0804    K 3 7 11/02/2022 0857    K 4 1 05/16/2016 0804     11/02/2022 0857     05/16/2016 0804    CO2 30 11/02/2022 0857    CO2 33 (H) 2016 0804    BUN 21 2022 0857    BUN 20 2016 0804    CREATININE 1 09 2022 0857    CREATININE 1 07 2016 0804        Component Value Date/Time    CALCIUM 9 2 2022 0857    CALCIUM 9 1 2016 0804    ALKPHOS 61 2022 0857    ALKPHOS 58 2016 0804    AST 13 2022 0857    AST 14 2016 0804    ALT 33 2022 0857    ALT 13 2016 0804    BILITOT 0 6 2016 0804            Lab Results   Component Value Date    CHOL 248 (H) 2016     Lab Results   Component Value Date    HDL 58 2022    HDL 53 2022    HDL 58 10/29/2021     Lab Results   Component Value Date    LDLCALC 83 2022    LDLCALC 151 (H) 2022    LDLCALC 146 (H) 10/29/2021     Lab Results   Component Value Date    TRIG 87 2022    TRIG 86 2022    TRIG 141 10/29/2021     No results found for: CHOLHDL    IMAGING: No results found  Cardiac testing:   Results for orders placed during the hospital encounter of 21   Echo complete with contrast if indicated    Narrative [address] [place] [address], [address]  [phone]    Transthoracic Echocardiogram  2D, M-mode, Doppler, and Color Doppler    Study date:  2021    Patient: Pascual Yusuf  MR number: [id number]  Account number: [de-identified]  : 1950  Age: 80 years  Gender: Male  Status: Inpatient  Location: Bedside  Height: 70 in  Weight: 269 5 lb  BP: 110/ 67 mmHg    Indications: Atrial flutter  Diagnoses: I48 1 - Atrial flutter    Sonographer:  MAYUR Albright  Interpreting Physician:  INO Nino  Referring Physician:  Michele Ferguson Do  Group:  [facility]  Cardiology Fellow:  Crescencio Dyer MD    SUMMARY    LEFT VENTRICLE:  Systolic function was normal  Ejection fraction was estimated to be 60 %  There were no regional wall motion abnormalities  LEFT ATRIUM:  The atrium was mildly dilated      TRICUSPID VALVE:  There was trace regurgitation  SUMMARY MEASUREMENTS  2D measurements:  Unspecified Anatomy:   %FS was 37 8 %  Ao Diam was 3 9 cm   EDV(Teich) was 150 4 ml   EF(Teich) was 67 3 %  ESV(Teich) was 49 2 ml  IVSd was 1 1 cm  LA Diam was 4 4 cm  LAAs A2C was 20 cm2  LAAs A4C was 21 4 cm2  LAESV A-L A2C was  60 7 ml  LAESV A-L A4C was 71 3 ml  LAESV Index (A-L) was 28 1 ml/m2  LAESV MOD A2C was 57 6 ml  LAESV MOD A4C was 66 3 ml  LAESV(A-L) was 66 5 ml  LAESV(MOD BP) was 62 3 ml  LAESVInd MOD BP was 26 3 ml/m2  LALs A2C was 5 6 cm  LALs A4C was 5 5 cm  LVEDV MOD A2C was 55 1 ml  LVEDV MOD A4C was 122 8 ml  LVEF MOD A4C was 54 5 %  LVESV MOD A4C was 55 8 ml  LVIDd was 5 5 cm  LVIDs was 3 5 cm  LVLd A2C was 5 9 cm  LVLd A4C was 9 1 cm  LVLs A4C was 7 7 cm  LVPWd was 1 1 cm  RVIDd was 3 6 cm  SV MOD A4C was 67 ml   SV(Teich) was 101 2 ml   CW measurements:  Unspecified Anatomy:   AV Env  Ti was 323 5 ms   AV VTI was 18 6 cm   AV Vmax was 0 9 m/s  AV Vmean was 0 6 m/s  AV maxPG was 3 1 mmHg  AV meanPG was 1 5 mmHg  MM measurements:  Unspecified Anatomy:   TAPSE was 2 3 cm  PW measurements:  Unspecified Anatomy:   DVI was 1 1   E' Avg was 0 1 m/s  E' Lat was 0 1 m/s  E' Sept was 0 1 m/s  E/E' Avg was 6 2   E/E' Lat was 5 9   E/E' Sept was 6 6   LVOT Env  Ti was 364 4 ms  LVOT VTI was 20 7 cm  LVOT Vmax was 0 9 m/s  LVOT Vmean was 0 6 m/s  LVOT maxPG was 3 4 mmHg  LVOT meanPG was 1 5 mmHg  MV A Pj was 0 5 m/s  MV Dec Phelps was 4 4 m/s2  MV DecT was 158 7 ms   MV E Pj was 0 7 m/s  MV E/A Ratio was 1 3   RV S' was 0 2 m/s  HISTORY: PRIOR HISTORY: Risk factors: hypertension and morbid obesity  PROCEDURE: The procedure was performed at the bedside  This was a routine study  The transthoracic approach was used  The study included complete 2D imaging, M-mode, complete spectral Doppler, and color Doppler  The heart rate was 67 bpm,  at the start of the study  Intravenous contrast (  4ml of Definity) was administered  This was a technically difficult study  LEFT VENTRICLE: Size was normal  Systolic function was normal  Ejection fraction was estimated to be 60 %  There were no regional wall motion abnormalities  Wall thickness was normal  DOPPLER: Left ventricular diastolic function parameters  were normal     RIGHT VENTRICLE: The size was normal  Systolic function was normal  Wall thickness was normal     LEFT ATRIUM: The atrium was mildly dilated  RIGHT ATRIUM: Size was normal     MITRAL VALVE: Valve structure was normal  There was normal leaflet separation  DOPPLER: The transmitral velocity was within the normal range  There was no evidence for stenosis  There was no significant regurgitation  AORTIC VALVE: The valve was trileaflet  Leaflets exhibited normal thickness and normal cuspal separation  DOPPLER: Transaortic velocity was within the normal range  There was no evidence for stenosis  There was no significant  regurgitation  TRICUSPID VALVE: The valve structure was normal  There was normal leaflet separation  DOPPLER: The transtricuspid velocity was within the normal range  There was no evidence for stenosis  There was trace regurgitation  PULMONIC VALVE: Leaflets exhibited normal thickness, no calcification, and normal cuspal separation  DOPPLER: The transpulmonic velocity was within the normal range  There was no significant regurgitation  PERICARDIUM: There was no pericardial effusion  A pericardial fat pad was present  The pericardium was normal in appearance  AORTA: The root exhibited normal size  SYSTEMIC VEINS: IVC: The inferior vena cava was normal in size and course  The inferior vena cava was normal in size  Respirophasic changes were normal     MEASUREMENT TABLES    2D MEASUREMENTS  Left atrium   (Reference normals)  AP dim   44 mm   (--)  AP dim index   1 86 cm/mï¾²   (<2 2)  Area, es, A4C   21 4 cmï¾²   (8 8-23  4)    SYSTEM MEASUREMENT TABLES    2D  %FS: 37 8 %  Ao Diam: 3 9 cm  EDV(Teich): 150 4 ml  EF(Teich): 67 3 %  ESV(Teich): 49 2 ml  IVSd: 1 1 cm  LA Diam: 4 4 cm  LAAs A2C: 20 cm2  LAAs A4C: 21 4 cm2  LAESV A-L A2C: 60 7 ml  LAESV A-L A4C: 71 3 ml  LAESV Index (A-L): 28 1 ml/m2  LAESV MOD A2C: 57 6 ml  LAESV MOD A4C: 66 3 ml  LAESV(A-L): 66 5 ml  LAESV(MOD BP): 62 3 ml  LAESVInd MOD BP: 26 3 ml/m2  LALs A2C: 5 6 cm  LALs A4C: 5 5 cm  LVEDV MOD A2C: 55 1 ml  LVEDV MOD A4C: 122 8 ml  LVEF MOD A4C: 54 5 %  LVESV MOD A4C: 55 8 ml  LVIDd: 5 5 cm  LVIDs: 3 5 cm  LVLd A2C: 5 9 cm  LVLd A4C: 9 1 cm  LVLs A4C: 7 7 cm  LVPWd: 1 1 cm  RVIDd: 3 6 cm  SV MOD A4C: 67 ml  SV(Teich): 101 2 ml    CW  AV Env  Ti: 323 5 ms  AV VTI: 18 6 cm  AV Vmax: 0 9 m/s  AV Vmean: 0 6 m/s  AV maxPG: 3 1 mmHg  AV meanP 5 mmHg    MM  TAPSE: 2 3 cm    PW  DVI: 1 1  E' Av 1 m/s  E' Lat: 0 1 m/s  E' Sept: 0 1 m/s  E/E' Av 2  E/E' Lat: 5 9  E/E' Sept: 6 6  LVOT Env  Ti: 364 4 ms  LVOT VTI: 20 7 cm  LVOT Vmax: 0 9 m/s  LVOT Vmean: 0 6 m/s  LVOT maxPG: 3 4 mmHg  LVOT meanP 5 mmHg  MV A Pj: 0 5 m/s  MV Dec Desha: 4 4 m/s2  MV DecT: 158 7 ms  MV E Pj: 0 7 m/s  MV E/A Ratio: 1 3  RV S': 0 2 m/s    [facility]    Prepared and electronically signed by    INO Mercedes  Signed 2021 15:01:20       No results found for this or any previous visit  No results found for this or any previous visit  No results found for this or any previous visit  I reviewed and interpreted the following LABS/EKG/TELE/IMAGING and below is summary of my interpretation (if data available):    LABS: BMP, CBC    Current EKG and Rhythm Strip: Sinus umm 53bpm           Reviewe zio strips and intpreted: Agree w reading below:     Patient had a min HR of 41 bpm, max HR of 190 bpm, and avg HR of 62  bpm  Predominant underlying rhythm was Sinus Rhythm   26  Supraventricular Tachycardia runs occurred, the run with the fastest  interval lasting 4 beats with a max rate of 190 bpm, the longest lasting  27 0 secs with an avg rate of 132 bpm  Isolated SVEs were rare (<1 0%),  SVE Couplets were rare (<1 0%), and SVE Triplets were rare (<1 0%)  Isolated VEs were rare (<1 0%, 992), VE Triplets were rare (<1 0%, 1), and  no VE Couplets were present

## 2022-12-28 ENCOUNTER — TELEPHONE (OUTPATIENT)
Dept: PAIN MEDICINE | Facility: MEDICAL CENTER | Age: 72
End: 2022-12-28

## 2022-12-28 NOTE — TELEPHONE ENCOUNTER
Caller: Pt     Doctor: Mae Weiss    Reason for call: Pt is calling in asking why his appt was cancelled for tomorrow? He is in a lot of pain, can barely breath      Call back#: [phone]

## 2022-12-30 ENCOUNTER — OFFICE VISIT (OUTPATIENT)
Dept: SLEEP CENTER | Facility: CLINIC | Age: 72
End: 2022-12-30

## 2022-12-30 VITALS
BODY MASS INDEX: 38.39 KG/M2 | DIASTOLIC BLOOD PRESSURE: 60 MMHG | WEIGHT: 268.2 LBS | HEIGHT: 70 IN | HEART RATE: 59 BPM | SYSTOLIC BLOOD PRESSURE: 134 MMHG

## 2022-12-30 DIAGNOSIS — R68.2 DRY MOUTH: ICD-10-CM

## 2022-12-30 DIAGNOSIS — I48.3 TYPICAL ATRIAL FLUTTER (HCC): ICD-10-CM

## 2022-12-30 DIAGNOSIS — E66.9 OBESITY (BMI 30-39.9): ICD-10-CM

## 2022-12-30 DIAGNOSIS — G47.33 OSA (OBSTRUCTIVE SLEEP APNEA): Primary | ICD-10-CM

## 2022-12-30 DIAGNOSIS — I10 BENIGN ESSENTIAL HYPERTENSION: ICD-10-CM

## 2022-12-30 NOTE — PROGRESS NOTES
Follow-Up Note - [facility],[address]  71 y o  male  :1950  CWD:[id number]  DOS:2022    CC: I saw this patient for follow-up in clinic today for Sleep disordered breathing, Coexisting Sleep and Medical Problems  He got a refurbished DreamStation 1 machine as a replacement from [place] several months ago and since then he is back to using it regularly    Results of studies in 2019: The diagnostic home sleep study in 2019 demonstrated : DOMINIC (respiratory event index of) 50 /hour  Minimum oxygen saturation was 65 percent and 15% of the study was spent with saturations less than 90%  The snore index was 23%  The subsequent titration study demonstrated sleep disordered breathing was successfully remediated with PAP at 10 cm H2O  PFSH, Problem List, Medications & Allergies were reviewed in EMR  Interval changes: None reported  He  has a past medical history of Acute gouty arthritis, Anxiety, BPH (benign prostatic hyperplasia), Chronic thoracic spine pain, CPAP (continuous positive airway pressure) dependence, Dysfunction of both eustachian tubes, Erectile dysfunction of non-organic origin, Family history reviewed with no changes, Gout, Hyperlipidemia, Hypertension, Sebaceous cyst, Skin lesion, Sleep apnea, and Subclinical hypothyroidism (2019)  He has a current medication list which includes the following prescription(s): atorvastatin, clobetasol, colchicine, halobetasol, ketoconazole, metoprolol succinate, oxycodone, pimecrolimus, prednisone, triamcinolone, valsartan-hydrochlorothiazide, and xarelto  PHYSIOLOGICAL DATA REVIEW AND INTERPRETATION: Using PAP > 4 hours/night 80%  Estimated DOMINIC 2 6/hour with pressure of 11 5cm [email or web address] percentile; Patient has not been using ozone based devices to sanitize the machine      SUBJECTIVE: Regarding use of PAP, Jeannine Double reports:   · significant adverse effects: dry mouth/throat and dry nose; has not noticed any fibres or foreign material in air line  · He is benefiting from use: sleeping better   Sleep Routine: Gerhardt Dakin reports getting 7 hrs sleep; he has no difficulty initiating, but reports diffculty maintaining sleep   He arises spontaneously and usually feels refreshed  Gerhardt Dakin denies excessive daytime sleepiness,  He rated [himself] at Total score: 7 /24 on the [place] Sleepiness Scale  Habits:[ reports that he quit smoking about 43 years ago  His smoking use included cigarettes  He has a 10 00 pack-year smoking history  He has never used smokeless tobacco ], [ reports current alcohol use ], [ reports no history of drug use ], Caffeine use:limited; Exercise routine: "not enough" and limited by back pain  ROS: reviewed & as attached  Significant for weight has been stable, but showing some gain because of heavy boots  He reported no respiratory symptoms  He was diagnosed with atrial flutter and is now also on anticoagulation  He is also diagnosed with prostate cancer and is due to have surgery  EXAM: /60 (BP Location: Left arm, Patient Position: Sitting, Cuff Size: Large)   Pulse 59   Ht 5' 10" (1 778 m)   Wt 122 kg (268 lb 3 2 oz)   BMI 38 48 kg/m²     Wt Readings from Last 3 Encounters:   12/30/22 122 kg (268 lb 3 2 oz)   12/22/22 119 kg (262 lb 2 oz)   11/07/22 118 kg (261 lb)      Patient is well groomed; well appearing  CNS: Alert, orientated, clear & coherent speech  Psych: cooperative and in no distress  Mental state: Appears normal   H&N: EOMI; NC/AT: No facial pressure marks, no rashes  Skin/Extrem: col & hydration normal; no edema  Resp: Respiratory effort is normal  Physical findings otherwise essentially unchanged from previous  IMPRESSION: Problem List Items & Comorbidities Addressed this Visit    1  MANDY (obstructive sleep apnea)  PAP DME Resupply/Reorder      2  Dry mouth        3  Benign essential hypertension        4   Typical atrial flutter (HCC)        5  Obesity (BMI 30-39  9)            PLAN:  1  I reviewed results of prior studies and physiologic data with the patient  2  I discussed treatment options with risks and benefits  3  Treatment with  PAP is medically necessary and Willam Frames is agreable to continue use  4  Care of equipment, methods to improve comfort using PAP and importance of compliance with therapy were discussed  5  Pressure setting: continue 10-12 cmH2O     6  Rx provided to replace supplies and Care coordinated with DME provider  7  Strategies to improve dry mouth were discussed  Specifically, adequate treatment of nasal allergies, adjusting heat and humidity settings on PAP machine, using Biotene mouthwash &/or Xylimelt  8  Discussed strategies for weight reduction  9  He was instructed to carry his machine for use perioperatively  10  Follow-up is advised in 1 year or sooner if needed to monitor progress, compliance and to adjust therapy  Thank you for allowing me to participate in the care of this patient  Sincerely,     Authenticated electronically on 12/77/91   Board Certified Specialist     Portions of the record may have been created with voice recognition software  Occasional wrong word or "sound a like" substitutions may have occurred due to the inherent limitations of voice recognition software  There may also be notations and random deletions of words or characters from malfunctioning software  Read the chart carefully and recognize, using context, where substitutions/deletions have occurred

## 2022-12-30 NOTE — PATIENT INSTRUCTIONS
Strategies to improve dry mouth were discussed  Specifically, adequate treatment of nasal allergies, adjusting heat and humidity settings on PAP machine, using Biotene mouthwash &/or Xylimelt  Nursing Support:  When: Monday through Friday 7A-5PM except holidays  Where: Our direct line is [phone]  If you are having a true emergency please call 911  In the event that the line is busy or it is after hours please leave a voice message and we will return your call  Please speak clearly, leaving your full name, birth date, best number to reach you and the reason for your call  Medication refills: We will need the name of the medication, the dosage, the ordering provider, whether you get a 30 or 90 day refill, and the pharmacy name and address  Medications will be ordered by the provider only  Nurses cannot call in prescriptions  Please allow 7 days for medication refills  Physician requested updates: If your provider requested that you call with an update after starting medication, please be ready to provide us the medication and dosage, what time you take your medication, the time you attempt to fall asleep, time you fall asleep, when you wake up, and what time you get out of bed  Sleep Study Results: We will contact you with sleep study results and/or next steps after the physician has reviewed your testing

## 2022-12-30 NOTE — PROGRESS NOTES
Review of Systems      Genitourinary none   Cardiology palpitations/fluttering feeling in the chest and ankle/leg swelling   Gastrointestinal none   Neurology none   Constitutional none   Integumentary none   Psychiatry none   Musculoskeletal back pain   Pulmonary none   ENT none   Endocrine none   Hematological none

## 2023-01-03 ENCOUNTER — TELEPHONE (OUTPATIENT)
Dept: SLEEP CENTER | Facility: CLINIC | Age: 73
End: 2023-01-03

## 2023-01-04 ENCOUNTER — APPOINTMENT (OUTPATIENT)
Age: 73
End: 2023-01-04

## 2023-01-04 DIAGNOSIS — Z01.818 PREOP EXAMINATION: ICD-10-CM

## 2023-01-04 DIAGNOSIS — R39.89 SUSPECTED UTI: ICD-10-CM

## 2023-01-04 DIAGNOSIS — C61 PROSTATE CANCER (HCC): ICD-10-CM

## 2023-01-04 DIAGNOSIS — Z79.01 LONG TERM (CURRENT) USE OF ANTICOAGULANTS: ICD-10-CM

## 2023-01-04 DIAGNOSIS — Z01.812 PRE-PROCEDURE LAB EXAM: ICD-10-CM

## 2023-01-04 LAB
ANION GAP SERPL CALCULATED.3IONS-SCNC: 1 MMOL/L (ref 4–13)
APTT PPP: 44 SECONDS (ref 23–37)
BASOPHILS # BLD AUTO: 0.08 THOUSANDS/ÂΜL (ref 0–0.1)
BASOPHILS NFR BLD AUTO: 1 % (ref 0–1)
BUN SERPL-MCNC: 15 MG/DL (ref 5–25)
CALCIUM SERPL-MCNC: 9.2 MG/DL (ref 8.3–10.1)
CHLORIDE SERPL-SCNC: 107 MMOL/L (ref 96–108)
CO2 SERPL-SCNC: 33 MMOL/L (ref 21–32)
CREAT SERPL-MCNC: 1.06 MG/DL (ref 0.6–1.3)
EOSINOPHIL # BLD AUTO: 0.49 THOUSAND/ÂΜL (ref 0–0.61)
EOSINOPHIL NFR BLD AUTO: 6 % (ref 0–6)
ERYTHROCYTE [DISTWIDTH] IN BLOOD BY AUTOMATED COUNT: 13 % (ref 11.6–15.1)
GFR SERPL CREATININE-BSD FRML MDRD: 70 ML/MIN/1.73SQ M
GLUCOSE P FAST SERPL-MCNC: 126 MG/DL (ref 65–99)
HCT VFR BLD AUTO: 47.1 % (ref 36.5–49.3)
HGB BLD-MCNC: 15.3 G/DL (ref 12–17)
IMM GRANULOCYTES # BLD AUTO: 0.02 THOUSAND/UL (ref 0–0.2)
IMM GRANULOCYTES NFR BLD AUTO: 0 % (ref 0–2)
INR PPP: 1.09 (ref 0.84–1.19)
LYMPHOCYTES # BLD AUTO: 2.65 THOUSANDS/ÂΜL (ref 0.6–4.47)
LYMPHOCYTES NFR BLD AUTO: 31 % (ref 14–44)
MCH RBC QN AUTO: 29.4 PG (ref 26.8–34.3)
MCHC RBC AUTO-ENTMCNC: 32.5 G/DL (ref 31.4–37.4)
MCV RBC AUTO: 90 FL (ref 82–98)
MONOCYTES # BLD AUTO: 0.97 THOUSAND/ÂΜL (ref 0.17–1.22)
MONOCYTES NFR BLD AUTO: 12 % (ref 4–12)
NEUTROPHILS # BLD AUTO: 4.24 THOUSANDS/ÂΜL (ref 1.85–7.62)
NEUTS SEG NFR BLD AUTO: 50 % (ref 43–75)
NRBC BLD AUTO-RTO: 0 /100 WBCS
PLATELET # BLD AUTO: 246 THOUSANDS/UL (ref 149–390)
PMV BLD AUTO: 10.3 FL (ref 8.9–12.7)
POTASSIUM SERPL-SCNC: 4.1 MMOL/L (ref 3.5–5.3)
PROTHROMBIN TIME: 14.4 SECONDS (ref 11.6–14.5)
RBC # BLD AUTO: 5.21 MILLION/UL (ref 3.88–5.62)
SODIUM SERPL-SCNC: 141 MMOL/L (ref 135–147)
WBC # BLD AUTO: 8.45 THOUSAND/UL (ref 4.31–10.16)

## 2023-01-05 ENCOUNTER — LAB REQUISITION (OUTPATIENT)
Dept: LAB | Facility: HOSPITAL | Age: 73
End: 2023-01-05

## 2023-01-05 ENCOUNTER — TELEPHONE (OUTPATIENT)
Dept: FAMILY MEDICINE CLINIC | Facility: CLINIC | Age: 73
End: 2023-01-05

## 2023-01-05 DIAGNOSIS — Z01.818 ENCOUNTER FOR OTHER PREPROCEDURAL EXAMINATION: ICD-10-CM

## 2023-01-05 DIAGNOSIS — C61 MALIGNANT NEOPLASM OF PROSTATE (HCC): ICD-10-CM

## 2023-01-05 LAB
ABO GROUP BLD: NORMAL
BLD GP AB SCN SERPL QL: NEGATIVE
DME PARACHUTE DELIVERY DATE ACTUAL: NORMAL
DME PARACHUTE DELIVERY DATE REQUESTED: NORMAL
DME PARACHUTE ITEM DESCRIPTION: NORMAL
DME PARACHUTE ORDER STATUS: NORMAL
DME PARACHUTE SUPPLIER NAME: NORMAL
DME PARACHUTE SUPPLIER PHONE: NORMAL
RH BLD: POSITIVE
SPECIMEN EXPIRATION DATE: NORMAL

## 2023-01-05 NOTE — TELEPHONE ENCOUNTER
Pt called requested that his 2/22 appt with Dr Tiara Hernandez be "bumped up"   Pt re-scheduled for 1/9/23

## 2023-01-06 ENCOUNTER — RA CDI HCC (OUTPATIENT)
Dept: OTHER | Facility: HOSPITAL | Age: 73
End: 2023-01-06

## 2023-01-06 LAB — BACTERIA UR CULT: NORMAL

## 2023-01-06 NOTE — PROGRESS NOTES
[facility]  coding opportunities     E66 [id number]     Chart Reviewed number of suggestions sent to Provider: 1     Patients Insurance     Medicare insurance: [address] [place] [address]

## 2023-01-09 ENCOUNTER — OFFICE VISIT (OUTPATIENT)
Dept: FAMILY MEDICINE CLINIC | Facility: CLINIC | Age: 73
End: 2023-01-09

## 2023-01-09 ENCOUNTER — APPOINTMENT (OUTPATIENT)
Dept: RADIOLOGY | Facility: CLINIC | Age: 73
End: 2023-01-09

## 2023-01-09 VITALS
HEIGHT: 70 IN | RESPIRATION RATE: 16 BRPM | SYSTOLIC BLOOD PRESSURE: 140 MMHG | OXYGEN SATURATION: 97 % | WEIGHT: 273 LBS | DIASTOLIC BLOOD PRESSURE: 90 MMHG | HEART RATE: 87 BPM | BODY MASS INDEX: 39.08 KG/M2 | TEMPERATURE: 97.9 F

## 2023-01-09 DIAGNOSIS — C61 PROSTATE CANCER (HCC): ICD-10-CM

## 2023-01-09 DIAGNOSIS — M54.6 ACUTE BILATERAL THORACIC BACK PAIN: ICD-10-CM

## 2023-01-09 DIAGNOSIS — M10.9: ICD-10-CM

## 2023-01-09 DIAGNOSIS — M54.6 ACUTE BILATERAL THORACIC BACK PAIN: Primary | ICD-10-CM

## 2023-01-09 RX ORDER — OXYCODONE HYDROCHLORIDE 5 MG/1
TABLET ORAL
Qty: 10 TABLET | Refills: 0 | Status: SHIPPED | OUTPATIENT
Start: 2023-01-09

## 2023-01-09 NOTE — PROGRESS NOTES
Name: Marleen Arana      : 1950      MRN: [id number]  Encounter Provider: Shraddha Marcelino DO  Encounter Date: 2023   Encounter department: [address] [place] [address]  Acute bilateral thoracic back pain  Assessment & Plan:  2 month history of thoracic pain  Started during hunting season, [place] was [place] period  No radiculopathy  Pain is worse on right side  No urinary symptoms  Exam shows bilateral paravertebral thoracic musculature tenderness  We will send for x-rays thoracic spine  Patient was given prescription for oxycodone 5 mg IR tablets in ER a few months ago for epicondylitis (10 tablets)  He still has a few left which she has been taking  He is requesting a refill  We will refill 10 tablets, but advised him to use these sparingly  Possible referral to physical therapy  Possible need for advanced imaging  Orders:  -     XR spine thoracic 3 vw; Future; Expected date: 2023    2  Prostate cancer [facility])  Assessment & Plan:  Scheduled for prostatectomy       3  Gout of right elbow  -     oxyCODONE (Roxicodone) 5 immediate release tablet; 1 tab qd prn severe pain (use sparingly)           Subjective     2 month history of thoracic pain  Started during hunting season, [place] was [place] for [place] period  No radiculopathy  Pain is worse on right side  No urinary symptoms  Review of Systems   Respiratory: Negative  Cardiovascular: Negative  Gastrointestinal: Negative  Genitourinary: Negative  Musculoskeletal: Positive for back pain         Past Medical History:   Diagnosis Date   • Acute gouty arthritis     last assessed 13    • Anxiety     last assessed 14    • BPH (benign prostatic hyperplasia)    • Chronic thoracic spine pain     last assessed 16    • CPAP (continuous positive airway pressure) dependence    • Dysfunction of both eustachian tubes     last assessed 13   • Erectile dysfunction of non-organic origin     last assessed 10/8/13    • Family history reviewed with no changes     medical history    • Gout    • Hyperlipidemia    • Hypertension    • Sebaceous cyst     last assessed 13    • Skin lesion     last assessed 14    • Sleep apnea    • Subclinical hypothyroidism 2019     Past Surgical History:   Procedure Laterality Date   • APPENDECTOMY      12years old   • BOWEL RESECTION     • CATARACT EXTRACTION     • CERVICAL FUSION N/A 2019    Procedure: Anterior cervical discectomy w fusion C5-6, with allograft and neuromonitoring;  Surgeon: Macy Mcallister MD;  [facility];  Service: Orthopedics   • COLONOSCOPY     • MOUTH SURGERY      tooth extraction with dental implant   • DE BX PROSTATE STRTCTC SATURATION SAMPLING IMG GID N/A 10/18/2022    Procedure: TRANSPERINEAL MRI FUSION  BIOPSY PROSTATE;  Surgeon: Ysabel Tucker MD;  Location[facility];  Service: Urology   • SKIN BIOPSY      left cheek   • WRIST SURGERY       Family History   Problem Relation Age of Onset   • Diabetes Sister    • Psoriasis Sister    • Diabetes Family    • Hypertension Family    • No Known Problems Mother    • Eczema Father      Social History     Socioeconomic History   • Marital status: /Civil Union     Spouse name: None   • Number of children: None   • Years of education: None   • Highest education level: None   Occupational History   • None   Tobacco Use   • Smoking status: Former     Packs/day: 0 50     Years: 20 00     Pack years: 10 00     Types: Cigarettes     Quit date:      Years since quittin 0   • Smokeless tobacco: Never   Vaping Use   • Vaping Use: Never used   Substance and Sexual Activity   • Alcohol use: Yes     Comment: few x per month   • Drug use: No   • Sexual activity: Not Currently   Other Topics Concern   • None   Social History Narrative    Consumes 1 cup of coffee per day     Social Determinants of Health     Financial Resource Strain: Not on file   Food Insecurity: Not on file   Transportation Needs: Not on file   Physical Activity: Not on file   Stress: Not on file   Social Connections: Not on file   Intimate Partner Violence: Not on file   Housing Stability: Not on file     Current Outpatient Medications on File Prior to Visit   Medication Sig   • atorvastatin (LIPITOR) 20 mg tablet TAKE 1 TABLET BY MOUTH EVERY DAY   • clobetasol (OLUX) 0 05 % topical foam Apply to scalp and inside ears when itches, when needed   • colchicine (COLCRYS) 0 6 mg tablet TAKE 1 TABLET BY MOUTH TWICE A DAY AS NEEDED GOUT   • halobetasol (ULTRAVATE) 0 05 % ointment Apply topically 2 (two) times a day   • ketoconazole (NIZORAL) 2 % shampoo SHAMPOO SCALP 3 TIMES/WEEK  LATHER ONTO SCALP, LEAVE ON 5 MINUTES, THEN RINSE OUT  • metoprolol succinate (TOPROL-XL) 50 mg 24 hr tablet TAKE 1 TABLET BY MOUTH EVERY DAY   • pimecrolimus (ELIDEL) 1 % cream APPLY TO HANDS TWICE A DAY X 6 WEEKS, AND CLOBETASOL EVERY THIRD DAY     • predniSONE 10 mg tablet TAKE BY MOUTH AT BREAKFAST: 3 TABS X 3 DAYS, 2 TABS X3DAYS, 1 TABX 3DAYS WITH FULL GLASS OF WATER   • triamcinolone (KENALOG) 0 1 % ointment Use daily in am on palms for no more than two weeks at a time   • valsartan-hydrochlorothiazide (DIOVAN-HCT) 160-12 5 MG per tablet TAKE 1 TABLET BY MOUTH EVERY DAY   • Xarelto 20 MG tablet TAKE 1 TABLET BY MOUTH DAILY WITH BREAKFAST   • [DISCONTINUED] oxyCODONE (Roxicodone) 5 immediate release tablet Take 1 tablet (5 mg total) by mouth every 4 (four) hours as needed for severe pain Max Daily Amount: 30 mg     No Known Allergies  Immunization History   Administered Date(s) Administered   • COVID-19 PFIZER VACCINE 0 3 ML IM 03/24/2021, 04/14/2021, 11/03/2021   • COVID-19 Pfizer vac (Randal-sucrose, gray cap) 12 yr+ IM 05/19/2022   • INFLUENZA 11/01/2015, 11/02/2017, 09/23/2018, 10/03/2019, 10/06/2021   • Influenza Quadrivalent Preservative Free 3 years and older IM 11/02/2017   • Influenza Quadrivalent, 6-35 Months IM 11/01/2015   • Influenza, high dose seasonal 0 7 mL 10/03/2019, 08/27/2020   • Pneumococcal Conjugate 13-Valent 11/11/2019   • Pneumococcal Polysaccharide PPV23 12/09/2020   • Zoster Vaccine Recombinant 09/10/2020, 12/19/2020       Objective     /90 (BP Location: Left arm, Patient Position: Sitting, Cuff Size: Adult)   Pulse 87   Temp 97 9 °F (36 6 °C) (Temporal)   Resp 16   Ht 5' 9 69" (1 77 m)   Wt 124 kg (273 lb)   SpO2 97%   BMI 39 53 kg/m²     Physical Exam  Musculoskeletal:      Comments: Thoracic spine: paravert point tender b/l   No CVA tend       Kristofer Mendez, DO

## 2023-01-09 NOTE — ASSESSMENT & PLAN NOTE
2 month history of thoracic pain  Started during hunting season, [place] was [place] for [place] period  No radiculopathy  Pain is worse on right side  No urinary symptoms  Exam shows bilateral paravertebral thoracic musculature tenderness  We will send for x-rays thoracic spine  Patient was given prescription for oxycodone 5 mg IR tablets in ER a few months ago for epicondylitis (10 tablets)  He still has a few left which she has been taking  He is requesting a refill  We will refill 10 tablets, but advised him to use these sparingly  Possible referral to physical therapy  Possible need for advanced imaging

## 2023-01-14 DIAGNOSIS — M54.6 ACUTE BILATERAL THORACIC BACK PAIN: Primary | ICD-10-CM

## 2023-01-18 ENCOUNTER — ANESTHESIA EVENT (OUTPATIENT)
Dept: PERIOP | Facility: HOSPITAL | Age: 73
End: 2023-01-18

## 2023-01-18 ENCOUNTER — TELEPHONE (OUTPATIENT)
Dept: SLEEP CENTER | Facility: CLINIC | Age: 73
End: 2023-01-18

## 2023-01-19 ENCOUNTER — OFFICE VISIT (OUTPATIENT)
Dept: UROLOGY | Facility: CLINIC | Age: 73
End: 2023-01-19

## 2023-01-19 VITALS
HEIGHT: 69 IN | DIASTOLIC BLOOD PRESSURE: 98 MMHG | OXYGEN SATURATION: 97 % | WEIGHT: 261 LBS | HEART RATE: 69 BPM | RESPIRATION RATE: 18 BRPM | BODY MASS INDEX: 38.66 KG/M2 | SYSTOLIC BLOOD PRESSURE: 142 MMHG

## 2023-01-19 DIAGNOSIS — Z98.890 H/O ABDOMINAL SURGERY: ICD-10-CM

## 2023-01-19 DIAGNOSIS — C61 PROSTATE CANCER (HCC): Primary | ICD-10-CM

## 2023-01-19 NOTE — PROGRESS NOTES
Referring Physician: Robyn Carey DO  A copy of this note was sent to the referring physician  Diagnoses and all orders for this visit:    Prostate cancer [facility])    H/O abdominal surgery            Assessment and plan:       1  Recently diagnosed intermediate risk prostate cancer  - cT2  -Pretreatment PSA: 5 1  - Grade group 3, Kareem 4+3 equal 7 3 of 3 cores from right anterior target lesion, 5 additional positive cores on systematic biopsy  -Multi parametric MRI 1 7 cm right anterior peripheral zone lesion, 54 g prostate, no extracapsular extension or seminal vesicle invasion or local regional extension    2  Comorbidities: Obesity (BMI 38), obstructive sleep apnea, atrial flutter on anticoagulation, prior history of colectomy for complicated diverticulitis, appendectomy, umbilical hernia repair with mesh acute on chronic back pain    Patient was previously counseled on options by Dr Kaelyn Mcclelland and elected for a radical prostatectomy  He has been offered a surgical date with me to expedite his management  We reviewed all options today including radiation therapy  I shared my estimate of his risk factors for potential suboptimal outcome with surgery including persistent incontinence  His risk factors include obesity and extensive prior surgical history  He understands all of this and would like to proceed  He has been evaluated by his cardiologist and been provided with clearance to discontinue his Eliquis 5 days preoperatively    I told him I would restarted on postop day 5 or 7 pending his clinical course    With regards to surgical resection, we discussed the benefits and risks, including but not limited to bleeding which may or may not require blood transfusion, infection, injury to other structures (bladder, ureters, rectum, nerves, & vascular /neural structures), ileus, DVT/PE, MI, CVA, urinary incontinence, impotence, failure to completely eradicate the tumor/positive margins or the need for further therapy, and death  Risks of severe urinary incontinence necessitating an AUS 1-3% and minor stress urinary incontinence requiring pads about 10-15% at 1 year were also discussed  The risk of impotence was also discussed in great detail  After surgery, his libido should be unchanged, he may or may not have normal spontaneous erections but that he will have a dry orgasm  Post operative problems including the need for adjuvant therapy were discussed  He had the opportunity to ask questions and his questions were answered to his satisfaction  Cade Bruner MD      Chief Complaint     Preop      History of Present Illness     Caitlin Robles is a 68 y o  male returns in follow-up for preop    Detailed Urologic History     - please refer to HPI    Review of Systems     Review of Systems   Constitutional: Negative for activity change and fatigue  HENT: Negative for congestion  Eyes: Negative for visual disturbance  Respiratory: Negative for shortness of breath and wheezing  Cardiovascular: Negative for chest pain and leg swelling  Gastrointestinal: Negative for abdominal pain  Endocrine: Negative for polyuria  Genitourinary: Negative for dysuria, flank pain, hematuria and urgency  Musculoskeletal: Negative for back pain  Allergic/Immunologic: Negative for immunocompromised state  Neurological: Negative for dizziness and numbness  Psychiatric/Behavioral: Negative for dysphoric mood  All other systems reviewed and are negative      AUA SYMPTOM SCORE    Flowsheet Row Most Recent Value   AUA SYMPTOM SCORE    How often have you had a sensation of not emptying your bladder completely after you finished urinating? 0 (P)     How often have you had to urinate again less than two hours after you finished urinating? 0 (P)     How often have you found you stopped and started again several times when you urinate? 0 (P)     How often have you found it difficult to postpone urination? 0 (P) How often have you had a weak urinary stream? 0 (P)     How often have you had to push or strain to begin urination? 0 (P)     How many times did you most typically get up to urinate from the time you went to bed at night until the time you got up in the morning? 0 (P)     Quality of Life: If you were to spend the rest of your life with your urinary condition just the way it is now, how would you feel about that? 1 (P)     AUA SYMPTOM SCORE 0 (P)             Allergies     No Known Allergies    Physical Exam       Physical Exam  Constitutional:       General: He is not in acute distress  Appearance: He is well-developed  HENT:      Head: Normocephalic and atraumatic  Cardiovascular:      Comments: Negative lower extremity edema  Pulmonary:      Effort: Pulmonary effort is normal       Breath sounds: Normal breath sounds  Abdominal:      Palpations: Abdomen is soft  Musculoskeletal:         General: Normal range of motion  Cervical back: Normal range of motion  Skin:     General: Skin is warm  Neurological:      Mental Status: He is alert and oriented to person, place, and time     Psychiatric:         Behavior: Behavior normal            Vital Signs  Vitals:    01/19/23 1246   BP: 142/98   BP Location: Left arm   Patient Position: Sitting   Cuff Size: Adult   Pulse: 69   Resp: 18   SpO2: 97%   Weight: 118 kg (261 lb)   Height: 5' 9" (1 753 m)         Current Medications       Current Outpatient Medications:   •  atorvastatin (LIPITOR) 20 mg tablet, TAKE 1 TABLET BY MOUTH EVERY DAY, Disp: 90 tablet, Rfl: 2  •  clobetasol (OLUX) 0 05 % topical foam, Apply to scalp and inside ears when itches, when needed, Disp: 50 g, Rfl: 3  •  colchicine (COLCRYS) 0 6 mg tablet, TAKE 1 TABLET BY MOUTH TWICE A DAY AS NEEDED GOUT, Disp: 180 tablet, Rfl: 1  •  halobetasol (ULTRAVATE) 0 05 % ointment, Apply topically 2 (two) times a day, Disp: 15 g, Rfl: 2  •  ketoconazole (NIZORAL) 2 % shampoo, SHAMPOO SCALP 3 TIMES/WEEK   LATHER ONTO SCALP, LEAVE ON 5 MINUTES, THEN RINSE OUT , Disp: , Rfl:   •  metoprolol succinate (TOPROL-XL) 50 mg 24 hr tablet, TAKE 1 TABLET BY MOUTH EVERY DAY, Disp: 90 tablet, Rfl: 3  •  oxyCODONE (Roxicodone) 5 immediate release tablet, 1 tab qd prn severe pain (use sparingly), Disp: 10 tablet, Rfl: 0  •  pimecrolimus (ELIDEL) 1 % cream, APPLY TO HANDS TWICE A DAY X 6 WEEKS, AND CLOBETASOL EVERY THIRD DAY , Disp: , Rfl:   •  predniSONE 10 mg tablet, TAKE BY MOUTH AT BREAKFAST: 3 TABS X 3 DAYS, 2 TABS X3DAYS, 1 TABX 3DAYS WITH FULL GLASS OF WATER, Disp: , Rfl:   •  triamcinolone (KENALOG) 0 1 % ointment, Use daily in am on palms for no more than two weeks at a time, Disp: 453 g, Rfl: 2  •  valsartan-hydrochlorothiazide (DIOVAN-HCT) 160-12 5 MG per tablet, TAKE 1 TABLET BY MOUTH EVERY DAY, Disp: 90 tablet, Rfl: 1  •  Xarelto 20 MG tablet, TAKE 1 TABLET BY MOUTH DAILY WITH BREAKFAST, Disp: 90 tablet, Rfl: 3      Active Problems     Patient Active Problem List   Diagnosis   • Rotator cuff syndrome of right shoulder   • Combined hyperlipidemia   • Benign essential hypertension   • Acute bilateral thoracic back pain   • History of gout   • Chronic lumbar radiculopathy   • Cervical radiculitis   • Plantar fasciitis of right foot   • Subclinical hypothyroidism   • Elevated blood sugar   • Eczema   • Fatigue   • MANDY (obstructive sleep apnea)   • S/P cervical spinal fusion   • Achilles rupture, left   • Hand dermatitis   • Dupuytren contracture   • Seborrheic keratoses, inflamed   • Obesity (BMI 35 0-39 9 without comorbidity)   • Paresthesia of upper and lower extremities of both sides   • Atrial flutter (HCC)   • Habitual alcohol use   • Dyslipidemia   • Body mass index (BMI)40 0-44 9, adult   • Lower respiratory infection (e g , bronchitis, pneumonia, pneumonitis, pulmonitis)   • Acute nonintractable headache   • Cervical strain   • Vertigo   • Prostate cancer (HCC)   • H/O abdominal surgery         Past Medical History     Past Medical History:   Diagnosis Date   • Acute gouty arthritis     last assessed 13    • Anxiety     last assessed 14    • BPH (benign prostatic hyperplasia)    • Chronic thoracic spine pain     last assessed 16    • CPAP (continuous positive airway pressure) dependence    • Dysfunction of both eustachian tubes     last assessed 13   • Erectile dysfunction of non-organic origin     last assessed 10/8/13    • Family history reviewed with no changes     medical history    • Gout    • Hyperlipidemia    • Hypertension    • Sebaceous cyst     last assessed 13    • Skin lesion     last assessed 14    • Sleep apnea    • Subclinical hypothyroidism 2019         Surgical History     Past Surgical History:   Procedure Laterality Date   • APPENDECTOMY      12years old   • BOWEL RESECTION     • CATARACT EXTRACTION     • CERVICAL FUSION N/A 2019    Procedure: Anterior cervical discectomy w fusion C5-6, with allograft and neuromonitoring;  Surgeon: Miroslava Mary MD;  [facility];  Service: Orthopedics   • COLONOSCOPY     • MOUTH SURGERY      tooth extraction with dental implant   • CA BX PROSTATE STRTCTC SATURATION SAMPLING IMG GID N/A 10/18/2022    Procedure: TRANSPERINEAL MRI FUSION  BIOPSY PROSTATE;  Surgeon: Lala Enriquez MD;  Location[facility];  Service: Urology   • SKIN BIOPSY      left cheek   • WRIST SURGERY           Family History     Family History   Problem Relation Age of Onset   • Diabetes Sister    • Psoriasis Sister    • Diabetes Family    • Hypertension Family    • No Known Problems Mother    • Eczema Father          Social History     Social History     Social History     Tobacco Use   Smoking Status Former   • Packs/day: 0 50   • Years: 20 00   • Pack years: 10 00   • Types: Cigarettes   • Quit date: 37   • Years since quittin 0   Smokeless Tobacco Never         Pertinent Lab Values     Lab Results   Component Value Date CREATININE 1 06 01/04/2023       Lab Results   Component Value Date    PSA 5 1 (H) 11/02/2022    PSA 6 5 (H) 06/03/2022    PSA 3 7 10/29/2021       @RESULTRCNT(1H])@      Pertinent Imaging      - n/a    Portions of the record may have been created with voice recognition software  Occasional wrong word or "sound a like" substitutions may have occurred due to the inherent limitations of voice recognition software  Read the chart carefully and recognize, using context, where substitutions have occurred

## 2023-01-19 NOTE — PATIENT INSTRUCTIONS
[facility] have elected for a Robotic Prostatectomy to treat your prostate cancer  In the next few days, you will receive a phone call from my surgical scheduler  She will discuss selecting a date for surgery, and provide you with our preoperative surgical packet  This includes information to help prepare you for surgery      I encourage you to begin preparing for the procedure by watching our practice's preoperative educational videos here:     [email or web address]          Tesfaye Herrera MD,PhD  [facility]  Chief of Surgery, [place] [address] [place] [address]  [phone]

## 2023-01-19 NOTE — H&P (VIEW-ONLY)
Referring Physician: Ambreen Call, DO  A copy of this note was sent to the referring physician  Diagnoses and all orders for this visit:    Prostate cancer [facility])    H/O abdominal surgery            Assessment and plan:       1  Recently diagnosed intermediate risk prostate cancer  - cT2  -Pretreatment PSA: 5 1  - Grade group 3, [place] 4+3 equal 7 3 of 3 cores from right anterior target lesion, 5 additional positive cores on systematic biopsy  -Multi parametric MRI 1 7 cm right anterior peripheral zone lesion, 54 g prostate, no extracapsular extension or seminal vesicle invasion or local regional extension    2  Comorbidities: Obesity (BMI 38), obstructive sleep apnea, atrial flutter on anticoagulation, prior history of colectomy for complicated diverticulitis, appendectomy, umbilical hernia repair with mesh acute on chronic back pain    Patient was previously counseled on options by Dr Cesar Ponce and elected for a radical prostatectomy  He has been offered a surgical date with me to expedite his management  We reviewed all options today including radiation therapy  I shared my estimate of his risk factors for potential suboptimal outcome with surgery including persistent incontinence  His risk factors include obesity and extensive prior surgical history  He understands all of this and would like to proceed  He has been evaluated by his cardiologist and been provided with clearance to discontinue his Eliquis 5 days preoperatively    I told him I would restarted on postop day 5 or 7 pending his clinical course    With regards to surgical resection, we discussed the benefits and risks, including but not limited to bleeding which may or may not require blood transfusion, infection, injury to other structures (bladder, ureters, rectum, nerves, & vascular /neural structures), ileus, DVT/PE, MI, CVA, urinary incontinence, impotence, failure to completely eradicate the tumor/positive margins or the need for further therapy, and death  Risks of severe urinary incontinence necessitating an AUS 1-3% and minor stress urinary incontinence requiring pads about 10-15% at 1 year were also discussed  The risk of impotence was also discussed in great detail  After surgery, his libido should be unchanged, he may or may not have normal spontaneous erections but that he will have a dry orgasm  Post operative problems including the need for adjuvant therapy were discussed  He had the opportunity to ask questions and his questions were answered to his satisfaction  Kathia Novak MD      Chief Complaint     Preop      History of Present Illness     Edith Warner is a 68 y o  male returns in follow-up for preop    Detailed Urologic History     - please refer to HPI    Review of Systems     Review of Systems   Constitutional: Negative for activity change and fatigue  HENT: Negative for congestion  Eyes: Negative for visual disturbance  Respiratory: Negative for shortness of breath and wheezing  Cardiovascular: Negative for chest pain and leg swelling  Gastrointestinal: Negative for abdominal pain  Endocrine: Negative for polyuria  Genitourinary: Negative for dysuria, flank pain, hematuria and urgency  Musculoskeletal: Negative for back pain  Allergic/Immunologic: Negative for immunocompromised state  Neurological: Negative for dizziness and numbness  Psychiatric/Behavioral: Negative for dysphoric mood  All other systems reviewed and are negative      AUA SYMPTOM SCORE    Flowsheet Row Most Recent Value   AUA SYMPTOM SCORE    How often have you had a sensation of not emptying your bladder completely after you finished urinating? 0 (P)     How often have you had to urinate again less than two hours after you finished urinating? 0 (P)     How often have you found you stopped and started again several times when you urinate? 0 (P)     How often have you found it difficult to postpone urination? 0 (P) How often have you had a weak urinary stream? 0 (P)     How often have you had to push or strain to begin urination? 0 (P)     How many times did you most typically get up to urinate from the time you went to bed at night until the time you got up in the morning? 0 (P)     Quality of Life: If you were to spend the rest of your life with your urinary condition just the way it is now, how would you feel about that? 1 (P)     AUA SYMPTOM SCORE 0 (P)             Allergies     No Known Allergies    Physical Exam       Physical Exam  Constitutional:       General: He is not in acute distress  Appearance: He is well-developed  HENT:      Head: Normocephalic and atraumatic  Cardiovascular:      Comments: Negative lower extremity edema  Pulmonary:      Effort: Pulmonary effort is normal       Breath sounds: Normal breath sounds  Abdominal:      Palpations: Abdomen is soft  Musculoskeletal:         General: Normal range of motion  Cervical back: Normal range of motion  Skin:     General: Skin is warm  Neurological:      Mental Status: He is alert and oriented to person, place, and time     Psychiatric:         Behavior: Behavior normal            Vital Signs  Vitals:    01/19/23 1246   BP: 142/98   BP Location: Left arm   Patient Position: Sitting   Cuff Size: Adult   Pulse: 69   Resp: 18   SpO2: 97%   Weight: 118 kg (261 lb)   Height: 5' 9" (1 753 m)         Current Medications       Current Outpatient Medications:   •  atorvastatin (LIPITOR) 20 mg tablet, TAKE 1 TABLET BY MOUTH EVERY DAY, Disp: 90 tablet, Rfl: 2  •  clobetasol (OLUX) 0 05 % topical foam, Apply to scalp and inside ears when itches, when needed, Disp: 50 g, Rfl: 3  •  colchicine (COLCRYS) 0 6 mg tablet, TAKE 1 TABLET BY MOUTH TWICE A DAY AS NEEDED GOUT, Disp: 180 tablet, Rfl: 1  •  halobetasol (ULTRAVATE) 0 05 % ointment, Apply topically 2 (two) times a day, Disp: 15 g, Rfl: 2  •  ketoconazole (NIZORAL) 2 % shampoo, SHAMPOO SCALP 3 TIMES/WEEK   LATHER ONTO SCALP, LEAVE ON 5 MINUTES, THEN RINSE OUT , Disp: , Rfl:   •  metoprolol succinate (TOPROL-XL) 50 mg 24 hr tablet, TAKE 1 TABLET BY MOUTH EVERY DAY, Disp: 90 tablet, Rfl: 3  •  oxyCODONE (Roxicodone) 5 immediate release tablet, 1 tab qd prn severe pain (use sparingly), Disp: 10 tablet, Rfl: 0  •  pimecrolimus (ELIDEL) 1 % cream, APPLY TO HANDS TWICE A DAY X 6 WEEKS, AND CLOBETASOL EVERY THIRD DAY , Disp: , Rfl:   •  predniSONE 10 mg tablet, TAKE BY MOUTH AT BREAKFAST: 3 TABS X 3 DAYS, 2 TABS X3DAYS, 1 TABX 3DAYS WITH FULL GLASS OF WATER, Disp: , Rfl:   •  triamcinolone (KENALOG) 0 1 % ointment, Use daily in am on palms for no more than two weeks at a time, Disp: 453 g, Rfl: 2  •  valsartan-hydrochlorothiazide (DIOVAN-HCT) 160-12 5 MG per tablet, TAKE 1 TABLET BY MOUTH EVERY DAY, Disp: 90 tablet, Rfl: 1  •  Xarelto 20 MG tablet, TAKE 1 TABLET BY MOUTH DAILY WITH BREAKFAST, Disp: 90 tablet, Rfl: 3      Active Problems     Patient Active Problem List   Diagnosis   • Rotator cuff syndrome of right shoulder   • Combined hyperlipidemia   • Benign essential hypertension   • Acute bilateral thoracic back pain   • History of gout   • Chronic lumbar radiculopathy   • Cervical radiculitis   • Plantar fasciitis of right foot   • Subclinical hypothyroidism   • Elevated blood sugar   • Eczema   • Fatigue   • MANDY (obstructive sleep apnea)   • S/P cervical spinal fusion   • Achilles rupture, left   • Hand dermatitis   • Dupuytren contracture   • Seborrheic keratoses, inflamed   • Obesity (BMI 35 0-39 9 without comorbidity)   • Paresthesia of upper and lower extremities of both sides   • Atrial flutter (HCC)   • Habitual alcohol use   • Dyslipidemia   • Body mass index (BMI)40 0-44 9, adult   • Lower respiratory infection (e g , bronchitis, pneumonia, pneumonitis, pulmonitis)   • Acute nonintractable headache   • Cervical strain   • Vertigo   • Prostate cancer (HCC)   • H/O abdominal surgery         Past Medical History     Past Medical History:   Diagnosis Date   • Acute gouty arthritis     last assessed 13    • Anxiety     last assessed 14    • BPH (benign prostatic hyperplasia)    • Chronic thoracic spine pain     last assessed 16    • CPAP (continuous positive airway pressure) dependence    • Dysfunction of both eustachian tubes     last assessed 13   • Erectile dysfunction of non-organic origin     last assessed 10/8/13    • Family history reviewed with no changes     medical history    • Gout    • Hyperlipidemia    • Hypertension    • Sebaceous cyst     last assessed 13    • Skin lesion     last assessed 14    • Sleep apnea    • Subclinical hypothyroidism 2019         Surgical History     Past Surgical History:   Procedure Laterality Date   • APPENDECTOMY      12years old   • BOWEL RESECTION     • CATARACT EXTRACTION     • CERVICAL FUSION N/A 2019    Procedure: Anterior cervical discectomy w fusion C5-6, with allograft and neuromonitoring;  Surgeon: Doe Carrion MD;  Location: [facility];  Service: Orthopedics   • COLONOSCOPY     • MOUTH SURGERY      tooth extraction with dental implant   • MT BX PROSTATE STRTCTC SATURATION SAMPLING IMG GID N/A 10/18/2022    Procedure: TRANSPERINEAL MRI FUSION  BIOPSY PROSTATE;  Surgeon: Ksenia Owens MD;  Location: [facility];  Service: Urology   • SKIN BIOPSY      left cheek   • WRIST SURGERY           Family History     Family History   Problem Relation Age of Onset   • Diabetes Sister    • Psoriasis Sister    • Diabetes Family    • Hypertension Family    • No Known Problems Mother    • Eczema Father          Social History     Social History     Social History     Tobacco Use   Smoking Status Former   • Packs/day: 0 50   • Years: 20 00   • Pack years: 10 00   • Types: Cigarettes   • Quit date: 36   • Years since quittin 0   Smokeless Tobacco Never         Pertinent Lab Values     Lab Results   Component Value Date CREATININE 1 06 01/04/2023       Lab Results   Component Value Date    PSA 5 1 (H) 11/02/2022    PSA 6 5 (H) 06/03/2022    PSA 3 7 10/29/2021       @RESULTRCNT(1H])@      Pertinent Imaging      - n/a    Portions of the record may have been created with voice recognition software  Occasional wrong word or "sound a like" substitutions may have occurred due to the inherent limitations of voice recognition software  Read the chart carefully and recognize, using context, where substitutions have occurred

## 2023-01-23 NOTE — PRE-PROCEDURE INSTRUCTIONS
Pre-Surgery Instructions:   Medication Instructions   • atorvastatin (LIPITOR) 20 mg tablet Take as directed   • clobetasol (OLUX) 0 05 % topical foam Hold day of surgery  • colchicine (COLCRYS) 0 6 mg tablet Hold day of surgery  • halobetasol (ULTRAVATE) 0 05 % ointment Hold day of surgery  • ketoconazole (NIZORAL) 2 % shampoo Hold day of surgery  • metoprolol succinate (TOPROL-XL) 50 mg 24 hr tablet Take day of surgery  • oxyCODONE (Roxicodone) 5 immediate release tablet PRN   • pimecrolimus (ELIDEL) 1 % cream Hold day of surgery  • triamcinolone (KENALOG) 0 1 % ointment Hold day of surgery  • valsartan-hydrochlorothiazide (DIOVAN-HCT) 160-12 5 MG per tablet Hold day of surgery  • Xarelto 20 MG tablet Last dose 1/20/23      My Surgical Experience    The following information was developed to assist you to prepare for your operation  What do I need to do before coming to the hospital?  • Arrange for a responsible person to drive you to and from the hospital   • Arrange care for your children at home  Children are not allowed in the recovery areas of the hospital  • Plan to wear clothing that is easy to put on and take off  If you are having shoulder surgery, wear a shirt that buttons or zippers in the front  Bathing  o Shower the evening before and the morning of your surgery with an antibacterial soap  Please refer to the Pre Op Showering Instructions for Surgery Patients Sheet   o Remove nail polish and all body piercing jewelry  o Do not shave any body part for at least 24 hours before surgery-this includes face, arms, legs and upper body  Food  o Nothing to eat or drink after midnight the night before your surgery   This includes candy and chewing gum  o Exception: If your surgery is after 12:00pm (noon), you may have clear liquids such as 7-Up®, ginger ale, apple or cranberry juice, Jell-O®, water, or clear broth until 8:00 am  o Do not drink milk or juice with pulp on the morning before surgery  o Do not drink alcohol 24 hours before surgery  Medicine  o Follow instructions you received from your surgeon about which medicines you may take on the day of surgery  o If instructed to take medicine on the morning of surgery, take pills with just a small sip of water  Call your prescribing doctor for specific infroamtion on what to do if you take insulin    What should I bring to the hospital?    Bring:  • Crutches or a walker, if you have them, for foot or knee surgery  • A list of the daily medicines, vitamins, minerals, herbals and nutritional supplements you take  Include the dosages of medicines and the time you take them each day  • Glasses, dentures or hearing aids  • Minimal clothing; you will be wearing hospital sleepwear  • Photo ID; required to verify your identity  • If you have a Living Will or Power of , bring a copy of the documents  • If you have an ostomy, bring an extra pouch and any supplies you use    Do not bring  • Medicines or inhalers  • Money, valuables or jewelry    What other information should I know about the day of surgery? • Notify your surgeons if you develop a cold, sore throat, cough, fever, rash or any other illness  • Report to the Ambulatory Surgical/Same Day Surgery Unit  • You will be instructed to stop at Registration only if you have not been pre-registered  • Inform your  fi they do not stay that they will be asked by the staff to leave a phone number where they can be reached  • Be available to be reached before surgery  In the event the operating room schedule changes, you may be asked to come in earlier or later than expected    *It is important to tell your doctor and others involved in your health care if you are taking or have been taking any non-prescription drugs, vitamins, minerals, herbals or other nutritional supplements   Any of these may interact with some food or medicines and cause a reaction

## 2023-01-24 LAB

## 2023-01-24 NOTE — TELEPHONE ENCOUNTER
Per CHRISTOPH Reynolds MA:  Pt called and would like to change DME  Changed to [facility] and orders and clinicals need to be sent    --------------------------------------------------------    Clinicals and Rx for CPAP supplies sent to [facility] via [address]

## 2023-01-25 ENCOUNTER — TELEPHONE (OUTPATIENT)
Dept: UROLOGY | Facility: CLINIC | Age: 73
End: 2023-01-25

## 2023-01-25 ENCOUNTER — HOSPITAL ENCOUNTER (OUTPATIENT)
Facility: HOSPITAL | Age: 73
Setting detail: OUTPATIENT SURGERY
Discharge: HOME | End: 2023-01-26
Attending: UROLOGY | Admitting: UROLOGY

## 2023-01-25 ENCOUNTER — ANESTHESIA (OUTPATIENT)
Dept: PERIOP | Facility: HOSPITAL | Age: 73
End: 2023-01-25

## 2023-01-25 DIAGNOSIS — C61 PROSTATE CANCER (HCC): Primary | ICD-10-CM

## 2023-01-25 LAB
ABO GROUP BLD: NORMAL
ANION GAP SERPL CALCULATED.3IONS-SCNC: 4 MMOL/L (ref 4–13)
BACTERIA UR QL AUTO: ABNORMAL /HPF
BILIRUB UR QL STRIP: NEGATIVE
BLD GP AB SCN SERPL QL: NEGATIVE
BUN SERPL-MCNC: 17 MG/DL (ref 5–25)
CALCIUM SERPL-MCNC: 8.6 MG/DL (ref 8.4–10.2)
CHLORIDE SERPL-SCNC: 105 MMOL/L (ref 96–108)
CLARITY UR: ABNORMAL
CO2 SERPL-SCNC: 29 MMOL/L (ref 21–32)
COLOR UR: ABNORMAL
CREAT SERPL-MCNC: 1.08 MG/DL (ref 0.6–1.3)
ERYTHROCYTE [DISTWIDTH] IN BLOOD BY AUTOMATED COUNT: 12.8 % (ref 11.6–15.1)
GFR SERPL CREATININE-BSD FRML MDRD: 68 ML/MIN/1.73SQ M
GLUCOSE P FAST SERPL-MCNC: 142 MG/DL (ref 65–99)
GLUCOSE SERPL-MCNC: 122 MG/DL (ref 65–140)
GLUCOSE SERPL-MCNC: 142 MG/DL (ref 65–140)
GLUCOSE UR STRIP-MCNC: NEGATIVE MG/DL
HCT VFR BLD AUTO: 40.1 % (ref 36.5–49.3)
HGB BLD-MCNC: 13.7 G/DL (ref 12–17)
HGB UR QL STRIP.AUTO: ABNORMAL
KETONES UR STRIP-MCNC: NEGATIVE MG/DL
LEUKOCYTE ESTERASE UR QL STRIP: ABNORMAL
MCH RBC QN AUTO: 30.1 PG (ref 26.8–34.3)
MCHC RBC AUTO-ENTMCNC: 34.2 G/DL (ref 31.4–37.4)
MCV RBC AUTO: 88 FL (ref 82–98)
NITRITE UR QL STRIP: NEGATIVE
NON-SQ EPI CELLS URNS QL MICRO: ABNORMAL /HPF
PH UR STRIP.AUTO: 7.5 [PH]
PLATELET # BLD AUTO: 236 THOUSANDS/UL (ref 149–390)
PMV BLD AUTO: 9.3 FL (ref 8.9–12.7)
POTASSIUM SERPL-SCNC: 4.1 MMOL/L (ref 3.5–5.3)
PROT UR STRIP-MCNC: ABNORMAL MG/DL
RBC # BLD AUTO: 4.55 MILLION/UL (ref 3.88–5.62)
RBC #/AREA URNS AUTO: ABNORMAL /HPF
RH BLD: POSITIVE
SODIUM SERPL-SCNC: 138 MMOL/L (ref 135–147)
SP GR UR STRIP.AUTO: 1.02 (ref 1–1.03)
SPECIMEN EXPIRATION DATE: NORMAL
UROBILINOGEN UR STRIP-ACNC: <2 MG/DL
WBC # BLD AUTO: 13.91 THOUSAND/UL (ref 4.31–10.16)
WBC #/AREA URNS AUTO: ABNORMAL /HPF

## 2023-01-25 RX ORDER — LIDOCAINE HYDROCHLORIDE 10 MG/ML
INJECTION, SOLUTION EPIDURAL; INFILTRATION; INTRACAUDAL; PERINEURAL AS NEEDED
Status: DISCONTINUED | OUTPATIENT
Start: 2023-01-25 | End: 2023-01-25

## 2023-01-25 RX ORDER — HYDROMORPHONE HCL/PF 1 MG/ML
0.25 SYRINGE (ML) INJECTION
Status: DISCONTINUED | OUTPATIENT
Start: 2023-01-25 | End: 2023-01-26 | Stop reason: HOSPADM

## 2023-01-25 RX ORDER — ONDANSETRON 2 MG/ML
4 INJECTION INTRAMUSCULAR; INTRAVENOUS EVERY 6 HOURS PRN
Status: DISCONTINUED | OUTPATIENT
Start: 2023-01-25 | End: 2023-01-26 | Stop reason: HOSPADM

## 2023-01-25 RX ORDER — DOCUSATE SODIUM 100 MG/1
100 CAPSULE, LIQUID FILLED ORAL 2 TIMES DAILY
Qty: 30 CAPSULE | Refills: 0 | Status: SHIPPED | OUTPATIENT
Start: 2023-01-25 | End: 2023-02-09

## 2023-01-25 RX ORDER — DOCUSATE SODIUM 100 MG/1
100 CAPSULE, LIQUID FILLED ORAL 2 TIMES DAILY
Status: DISCONTINUED | OUTPATIENT
Start: 2023-01-25 | End: 2023-01-26 | Stop reason: HOSPADM

## 2023-01-25 RX ORDER — PROPOFOL 10 MG/ML
INJECTION, EMULSION INTRAVENOUS AS NEEDED
Status: DISCONTINUED | OUTPATIENT
Start: 2023-01-25 | End: 2023-01-25

## 2023-01-25 RX ORDER — HYDROMORPHONE HCL/PF 1 MG/ML
SYRINGE (ML) INJECTION AS NEEDED
Status: DISCONTINUED | OUTPATIENT
Start: 2023-01-25 | End: 2023-01-25

## 2023-01-25 RX ORDER — KETOROLAC TROMETHAMINE 30 MG/ML
15 INJECTION, SOLUTION INTRAMUSCULAR; INTRAVENOUS EVERY 6 HOURS SCHEDULED
Status: DISCONTINUED | OUTPATIENT
Start: 2023-01-25 | End: 2023-01-26 | Stop reason: HOSPADM

## 2023-01-25 RX ORDER — OXYBUTYNIN CHLORIDE 5 MG/1
5 TABLET ORAL EVERY 6 HOURS PRN
Status: DISCONTINUED | OUTPATIENT
Start: 2023-01-25 | End: 2023-01-26 | Stop reason: HOSPADM

## 2023-01-25 RX ORDER — GLYCOPYRROLATE 0.2 MG/ML
INJECTION INTRAMUSCULAR; INTRAVENOUS AS NEEDED
Status: DISCONTINUED | OUTPATIENT
Start: 2023-01-25 | End: 2023-01-25

## 2023-01-25 RX ORDER — SODIUM CHLORIDE 9 MG/ML
INJECTION, SOLUTION INTRAVENOUS CONTINUOUS PRN
Status: DISCONTINUED | OUTPATIENT
Start: 2023-01-25 | End: 2023-01-25

## 2023-01-25 RX ORDER — HYDROMORPHONE HCL/PF 1 MG/ML
0.5 SYRINGE (ML) INJECTION EVERY 2 HOUR PRN
Status: DISCONTINUED | OUTPATIENT
Start: 2023-01-25 | End: 2023-01-26 | Stop reason: HOSPADM

## 2023-01-25 RX ORDER — OXYCODONE HYDROCHLORIDE 5 MG/1
5 TABLET ORAL EVERY 4 HOURS PRN
Qty: 5 TABLET | Refills: 0 | Status: SHIPPED | OUTPATIENT
Start: 2023-01-25 | End: 2023-01-30

## 2023-01-25 RX ORDER — ONDANSETRON 2 MG/ML
INJECTION INTRAMUSCULAR; INTRAVENOUS AS NEEDED
Status: DISCONTINUED | OUTPATIENT
Start: 2023-01-25 | End: 2023-01-25

## 2023-01-25 RX ORDER — ACETAMINOPHEN 325 MG/1
650 TABLET ORAL EVERY 4 HOURS PRN
Qty: 30 TABLET | Refills: 0
Start: 2023-01-25

## 2023-01-25 RX ORDER — DEXAMETHASONE SODIUM PHOSPHATE 10 MG/ML
INJECTION, SOLUTION INTRAMUSCULAR; INTRAVENOUS AS NEEDED
Status: DISCONTINUED | OUTPATIENT
Start: 2023-01-25 | End: 2023-01-25

## 2023-01-25 RX ORDER — CEFAZOLIN SODIUM 2 G/50ML
2000 SOLUTION INTRAVENOUS ONCE
Status: COMPLETED | OUTPATIENT
Start: 2023-01-25 | End: 2023-01-25

## 2023-01-25 RX ORDER — CEPHALEXIN 500 MG/1
500 CAPSULE ORAL EVERY 6 HOURS SCHEDULED
Qty: 3 CAPSULE | Refills: 0 | Status: SHIPPED | OUTPATIENT
Start: 2023-01-25 | End: 2023-01-26

## 2023-01-25 RX ORDER — OXYCODONE HYDROCHLORIDE 5 MG/1
5 TABLET ORAL EVERY 4 HOURS PRN
Status: DISCONTINUED | OUTPATIENT
Start: 2023-01-25 | End: 2023-01-26 | Stop reason: HOSPADM

## 2023-01-25 RX ORDER — SODIUM CHLORIDE, SODIUM LACTATE, POTASSIUM CHLORIDE, CALCIUM CHLORIDE 600; 310; 30; 20 MG/100ML; MG/100ML; MG/100ML; MG/100ML
INJECTION, SOLUTION INTRAVENOUS CONTINUOUS PRN
Status: DISCONTINUED | OUTPATIENT
Start: 2023-01-25 | End: 2023-01-25

## 2023-01-25 RX ORDER — PROPOFOL 10 MG/ML
INJECTION, EMULSION INTRAVENOUS CONTINUOUS PRN
Status: DISCONTINUED | OUTPATIENT
Start: 2023-01-25 | End: 2023-01-25

## 2023-01-25 RX ORDER — FENTANYL CITRATE/PF 50 MCG/ML
50 SYRINGE (ML) INJECTION
Status: DISCONTINUED | OUTPATIENT
Start: 2023-01-25 | End: 2023-01-26 | Stop reason: HOSPADM

## 2023-01-25 RX ORDER — GABAPENTIN 300 MG/1
300 CAPSULE ORAL ONCE
Status: COMPLETED | OUTPATIENT
Start: 2023-01-25 | End: 2023-01-25

## 2023-01-25 RX ORDER — ACETAMINOPHEN 325 MG/1
975 TABLET ORAL ONCE
Status: COMPLETED | OUTPATIENT
Start: 2023-01-25 | End: 2023-01-25

## 2023-01-25 RX ORDER — FENTANYL CITRATE 50 UG/ML
INJECTION, SOLUTION INTRAMUSCULAR; INTRAVENOUS AS NEEDED
Status: DISCONTINUED | OUTPATIENT
Start: 2023-01-25 | End: 2023-01-25

## 2023-01-25 RX ORDER — ACETAMINOPHEN 325 MG/1
650 TABLET ORAL EVERY 6 HOURS SCHEDULED
Status: DISCONTINUED | OUTPATIENT
Start: 2023-01-25 | End: 2023-01-26 | Stop reason: HOSPADM

## 2023-01-25 RX ORDER — ONDANSETRON 2 MG/ML
4 INJECTION INTRAMUSCULAR; INTRAVENOUS ONCE AS NEEDED
Status: DISCONTINUED | OUTPATIENT
Start: 2023-01-25 | End: 2023-01-26 | Stop reason: HOSPADM

## 2023-01-25 RX ORDER — OXYCODONE HYDROCHLORIDE 5 MG/1
10 TABLET ORAL EVERY 4 HOURS PRN
Status: DISCONTINUED | OUTPATIENT
Start: 2023-01-25 | End: 2023-01-26 | Stop reason: HOSPADM

## 2023-01-25 RX ORDER — ROCURONIUM BROMIDE 10 MG/ML
INJECTION, SOLUTION INTRAVENOUS AS NEEDED
Status: DISCONTINUED | OUTPATIENT
Start: 2023-01-25 | End: 2023-01-25

## 2023-01-25 RX ORDER — KETAMINE HCL IN NACL, ISO-OSM 100MG/10ML
SYRINGE (ML) INJECTION AS NEEDED
Status: DISCONTINUED | OUTPATIENT
Start: 2023-01-25 | End: 2023-01-25

## 2023-01-25 RX ORDER — SODIUM CHLORIDE, SODIUM LACTATE, POTASSIUM CHLORIDE, CALCIUM CHLORIDE 600; 310; 30; 20 MG/100ML; MG/100ML; MG/100ML; MG/100ML
125 INJECTION, SOLUTION INTRAVENOUS CONTINUOUS
Status: DISPENSED | OUTPATIENT
Start: 2023-01-25 | End: 2023-01-26

## 2023-01-25 RX ORDER — GABAPENTIN 300 MG/1
300 CAPSULE ORAL
Status: DISCONTINUED | OUTPATIENT
Start: 2023-01-25 | End: 2023-01-26 | Stop reason: HOSPADM

## 2023-01-25 RX ORDER — SENNOSIDES 8.6 MG
1 TABLET ORAL DAILY
Status: DISCONTINUED | OUTPATIENT
Start: 2023-01-25 | End: 2023-01-26 | Stop reason: HOSPADM

## 2023-01-25 RX ORDER — SODIUM CHLORIDE, SODIUM LACTATE, POTASSIUM CHLORIDE, CALCIUM CHLORIDE 600; 310; 30; 20 MG/100ML; MG/100ML; MG/100ML; MG/100ML
75 INJECTION, SOLUTION INTRAVENOUS CONTINUOUS
Status: DISCONTINUED | OUTPATIENT
Start: 2023-01-25 | End: 2023-01-26

## 2023-01-25 RX ORDER — MIDAZOLAM HYDROCHLORIDE 2 MG/2ML
INJECTION, SOLUTION INTRAMUSCULAR; INTRAVENOUS AS NEEDED
Status: DISCONTINUED | OUTPATIENT
Start: 2023-01-25 | End: 2023-01-25

## 2023-01-25 RX ADMIN — SODIUM CHLORIDE, SODIUM LACTATE, POTASSIUM CHLORIDE, AND CALCIUM CHLORIDE 125 ML/HR: .6; .31; .03; .02 INJECTION, SOLUTION INTRAVENOUS at 23:11

## 2023-01-25 RX ADMIN — HYDROMORPHONE HYDROCHLORIDE 0.5 MG: 1 INJECTION, SOLUTION INTRAMUSCULAR; INTRAVENOUS; SUBCUTANEOUS at 09:25

## 2023-01-25 RX ADMIN — ACETAMINOPHEN 975 MG: 325 TABLET, FILM COATED ORAL at 07:18

## 2023-01-25 RX ADMIN — CEFAZOLIN SODIUM 2000 MG: 2 SOLUTION INTRAVENOUS at 12:02

## 2023-01-25 RX ADMIN — HYDROMORPHONE HYDROCHLORIDE 0.5 MG: 1 INJECTION, SOLUTION INTRAMUSCULAR; INTRAVENOUS; SUBCUTANEOUS at 08:26

## 2023-01-25 RX ADMIN — Medication 10 MG: at 08:15

## 2023-01-25 RX ADMIN — PROPOFOL 50 MCG/KG/MIN: 10 INJECTION, EMULSION INTRAVENOUS at 07:46

## 2023-01-25 RX ADMIN — CEFAZOLIN SODIUM 2000 MG: 2 SOLUTION INTRAVENOUS at 08:02

## 2023-01-25 RX ADMIN — ROCURONIUM BROMIDE 20 MG: 10 INJECTION INTRAVENOUS at 10:05

## 2023-01-25 RX ADMIN — ROCURONIUM BROMIDE 20 MG: 10 INJECTION INTRAVENOUS at 07:46

## 2023-01-25 RX ADMIN — OXYCODONE HYDROCHLORIDE 10 MG: 5 TABLET ORAL at 16:19

## 2023-01-25 RX ADMIN — SUGAMMADEX 200 MG: 100 INJECTION, SOLUTION INTRAVENOUS at 13:01

## 2023-01-25 RX ADMIN — KETOROLAC TROMETHAMINE 15 MG: 30 INJECTION, SOLUTION INTRAMUSCULAR at 14:51

## 2023-01-25 RX ADMIN — PHENYLEPHRINE HYDROCHLORIDE 50 MCG/MIN: 50 INJECTION INTRAVENOUS at 07:46

## 2023-01-25 RX ADMIN — HYDROMORPHONE HYDROCHLORIDE 0.5 MG: 1 INJECTION, SOLUTION INTRAMUSCULAR; INTRAVENOUS; SUBCUTANEOUS at 10:41

## 2023-01-25 RX ADMIN — LIDOCAINE HYDROCHLORIDE 50 MG: 10 INJECTION, SOLUTION EPIDURAL; INFILTRATION; INTRACAUDAL at 07:46

## 2023-01-25 RX ADMIN — SODIUM CHLORIDE, SODIUM LACTATE, POTASSIUM CHLORIDE, AND CALCIUM CHLORIDE: .6; .31; .03; .02 INJECTION, SOLUTION INTRAVENOUS at 07:28

## 2023-01-25 RX ADMIN — Medication 20 MG: at 10:49

## 2023-01-25 RX ADMIN — SODIUM CHLORIDE, SODIUM LACTATE, POTASSIUM CHLORIDE, AND CALCIUM CHLORIDE: .6; .31; .03; .02 INJECTION, SOLUTION INTRAVENOUS at 13:11

## 2023-01-25 RX ADMIN — ACETAMINOPHEN 650 MG: 325 TABLET, FILM COATED ORAL at 23:13

## 2023-01-25 RX ADMIN — MIDAZOLAM HYDROCHLORIDE 2 MG: 1 INJECTION, SOLUTION INTRAMUSCULAR; INTRAVENOUS at 07:33

## 2023-01-25 RX ADMIN — HYDROMORPHONE HYDROCHLORIDE 0.5 MG: 1 INJECTION, SOLUTION INTRAMUSCULAR; INTRAVENOUS; SUBCUTANEOUS at 08:23

## 2023-01-25 RX ADMIN — ROCURONIUM BROMIDE 60 MG: 10 INJECTION INTRAVENOUS at 07:48

## 2023-01-25 RX ADMIN — PROPOFOL 150 MG: 10 INJECTION, EMULSION INTRAVENOUS at 07:46

## 2023-01-25 RX ADMIN — GLYCOPYRROLATE 0.1 MG: 0.2 INJECTION, SOLUTION INTRAMUSCULAR; INTRAVENOUS at 07:33

## 2023-01-25 RX ADMIN — PROPOFOL 50 MG: 10 INJECTION, EMULSION INTRAVENOUS at 08:26

## 2023-01-25 RX ADMIN — FENTANYL CITRATE 50 MCG: 50 INJECTION, SOLUTION INTRAMUSCULAR; INTRAVENOUS at 07:46

## 2023-01-25 RX ADMIN — SODIUM CHLORIDE, SODIUM LACTATE, POTASSIUM CHLORIDE, AND CALCIUM CHLORIDE 125 ML/HR: .6; .31; .03; .02 INJECTION, SOLUTION INTRAVENOUS at 14:39

## 2023-01-25 RX ADMIN — GABAPENTIN 300 MG: 300 CAPSULE ORAL at 22:14

## 2023-01-25 RX ADMIN — GABAPENTIN 300 MG: 300 CAPSULE ORAL at 07:18

## 2023-01-25 RX ADMIN — SODIUM CHLORIDE: 0.9 INJECTION, SOLUTION INTRAVENOUS at 07:56

## 2023-01-25 RX ADMIN — KETOROLAC TROMETHAMINE 15 MG: 30 INJECTION, SOLUTION INTRAMUSCULAR at 23:13

## 2023-01-25 RX ADMIN — FENTANYL CITRATE 50 MCG: 50 INJECTION, SOLUTION INTRAMUSCULAR; INTRAVENOUS at 08:23

## 2023-01-25 RX ADMIN — ROCURONIUM BROMIDE 10 MG: 10 INJECTION INTRAVENOUS at 11:43

## 2023-01-25 RX ADMIN — Medication 10 MG: at 08:10

## 2023-01-25 RX ADMIN — DEXAMETHASONE SODIUM PHOSPHATE 10 MG: 10 INJECTION, SOLUTION INTRAMUSCULAR; INTRAVENOUS at 08:05

## 2023-01-25 RX ADMIN — SODIUM CHLORIDE, SODIUM LACTATE, POTASSIUM CHLORIDE, AND CALCIUM CHLORIDE: .6; .31; .03; .02 INJECTION, SOLUTION INTRAVENOUS at 12:54

## 2023-01-25 RX ADMIN — Medication 10 MG: at 08:33

## 2023-01-25 RX ADMIN — Medication 20 MG: at 09:25

## 2023-01-25 RX ADMIN — SODIUM CHLORIDE, SODIUM LACTATE, POTASSIUM CHLORIDE, AND CALCIUM CHLORIDE: .6; .31; .03; .02 INJECTION, SOLUTION INTRAVENOUS at 12:44

## 2023-01-25 RX ADMIN — ONDANSETRON 4 MG: 2 INJECTION INTRAMUSCULAR; INTRAVENOUS at 07:46

## 2023-01-25 RX ADMIN — SODIUM CHLORIDE, SODIUM LACTATE, POTASSIUM CHLORIDE, AND CALCIUM CHLORIDE: .6; .31; .03; .02 INJECTION, SOLUTION INTRAVENOUS at 12:30

## 2023-01-25 RX ADMIN — ACETAMINOPHEN 650 MG: 325 TABLET, FILM COATED ORAL at 18:53

## 2023-01-25 RX ADMIN — KETOROLAC TROMETHAMINE 15 MG: 30 INJECTION, SOLUTION INTRAMUSCULAR at 18:53

## 2023-01-25 NOTE — ANESTHESIA POSTPROCEDURE EVALUATION
Post-Op Assessment Note    CV Status:  Stable    Pain management: adequate     Mental Status:  Lethargic and sleepy   Hydration Status:  Stable   PONV Controlled:  None   Airway Patency:  Patent      Post Op Vitals Reviewed: Yes      Staff: CRNA         No notable events documented      BP   131/78   Temp  97 6   Pulse  86   Resp   18   SpO2   99

## 2023-01-25 NOTE — OP NOTE
Operative Note     PATIENT:  Fabienne Lara (MRN [id number])    DATE OF PROCEDURE:   1/25/2023    PREOPERATIVE DIAGNOSIS:    #1 [place] 7 Prostate cancer  #2 obesity, BMI 39  3  History of colectomy for complicated diverticulitis  4  History of prior umbilical hernia repair with mesh    POSTOPERATIVE DIAGNOSIS:    #1 [place] Prostate cancer  #2 obesity, BMI 39  3  History of colectomy for complicated diverticulitis  4  History of prior umbilical hernia repair with mesh    OPERATION:  Robotic radical prostatectomy, robotic lysis of adhesions (enterolysis)    SURGEON:  Alexander Rouse MD    ASSISTANT: [facility]    No qualified teaching residents were available to assist   The assistance of a robotically trained physician assistant was required in obtaining access, providing laparoscopic exposure, and managing the bedside equipment during this robotics case    ANESTHESIA:  General    ESTIMATED BLOOD LOSS: 500 mL    COMPLICATIONS:  None    FINDINGS:   Extensive enterolysis was required at the beginning of the case with adhesions present in the anterior abdominal wall following the patient's prior umbilical hernia repair  In addition exquisite care was taken when performing the posterior dissection to mobilize the prostate and posterior structures from the anterior perirectal space, given the patient's prior history of a colectomy  INDICATIONS:  The patient is a gentleman who has been diagnosed with prostate cancer  We reviewed the treatment options and the patient elected to proceed with the aforementioned procedure  The risks, benefits, possible complications and alternatives were discussed with him  Informed consent was obtained before proceeding  DESCRIPTION OF PROCEDURE:  After informed consent was obtained, the patient was taken to the operating room and placed in the supine position  He received 2 gm of Ancef within an hour before the start of procedure    He had bilateral lower extremity sequential compression devices for DVT prophylaxis  After anesthesia had been smoothly introduced, the patient was moved into the low lithotomy position  Great care was taken to pad all pressure points appropriately  The patient's abdomen had been clipped in the preoperative area  He was then prepped with ChloraPrep  The patient was draped sterilely and an [place] was placed over the abdomen  A time-out was performed with everyone in the room in agreement of the procedure to be performed  An 8mm midline incision above the umbilicus was made for the camera port  The anterior abdominal wall was elevated and a Veress needle was placed, and we confirmed we were within the abdominal cavity with a water drop test   After the abdomen was insufflated to 15mmHg, a 8-mm robotic port was then placed  We then placed the 30 degree robotic camera to inspect the abdomen  From our insufflation and trocar placement, there was no visualized injury to the bowel or any vessels  We then placed the remainder of the trocars under direct vision  Two 8-mm robotic trocars were placed 10 cm away from the midline trocar in the line of the anterior superior iliac spine  A lateral 8 millimeter trocar was placed in the patient's left side approximately 8 cm away from the more medial robotic trocar and at the same level as the camera trocar  On the patient's right side, 12-mm trocars placed laterally and a 5-mm port was placed more medially for the assistant  The robot was then docked  I began with a posterior approach  The peritoneum overlying posterior bladder was elevated  This was entered using electrocautery  I mobilized each vas deferens and traced approximately with their transected and used to elevate the remaining posterior structures  Dissected further using combination of sharp and electrocautery into the ampulla of each vas deferens was reach  These were then mobilized and divided    Each seminal vesicle is also identifies mobilized and divided  I carried out the posterior dissection sharply towards the apex of the prostate at this point  The bladder was then dropped until the pubic arch was visualized  Space of Retzius was then further developed  The prostate was exposed, and the endopelvic fascia was incised in an athemeral fashion bilaterally  The puboprostatic ligaments were also released  The DVC was controlled with absorbable vicryl suture  There was good hemostasis after this had been done  The junction between the bladder and the prostate was then identified  This was taken down to the level of the Woody catheter  The Woody was then retracted superiorly  The posterior plate of the bladder neck was then developed  This dissection was carried out further posteriorly until the seminal vesicles and vas deferens were encountered and brought into the surgical field as previously dissected  These structures were then retracted anteriorly  The lateral pedicles were then identified  These were controlled with targeted bipolar electrocautery  We further developed Denonvillier’s space posteriorly  A right partial nerve-sparing and a left partial nerve sparing technique was done  We then proceeded with dissection laterally until the apices were reached bilaterally  The apices were then carefully dissected free from surrounding tissue, preserving the normal anatomical structures  Next, the junction between the apex of the prostate and the urethra were identified  We incised the dorsal vein campos with electrocautery  We then incised the periurethral tissue without cautery until the urethral catheter was identified  We incised the posterior urethra and the rectourethralis  The prostate and seminal vesicles were then freed      Given the patient's body habitus, general intermediate risk disease, and the extent of this particular surgical procedure which had resulted in a rather prolonged anesthetic time I elected not to perform a pelvic lymphadenectomy  The prostate and the seminal vesicles were placed in the entrapment bag  The pressure was lowered and the operative field was irrigated  The rectal wall was inspected and there were no visible rents  A few small bleeders were controlled with cautery  The vesicourethral anastomosis was accomplished with a running 3-0 V-loc on a CV-23 needle  We placed a new urethral catheter and no significant leak was seen when irrigated  The balloon was inflated with 15 cc volume  After hemostasis, the sponge, needle and instrument counts were deemed correct  All secondary ports were removed  We enlarged the periumbilical port and removed the specimen within the entrapment bag  All sponge, needle, and instrument counts were correct times two  We closed the fascia with a running #1 Maxon suture  This gave an excellent result  All secondary port sites were irrigated and a Marcaine block was applied  The skin was closed with Indermil and dressed with an OpSite dressing  The patient was taken to the recovery room in excellent condition

## 2023-01-25 NOTE — INTERVAL H&P NOTE
H&P reviewed  After examining the patient I find no changes in the patients condition since the H&P had been written  That will signs for this patient are documented elsewhere in today's encounter  Met with patient and wife preoperatively this morning    Informed consent previously obtained

## 2023-01-25 NOTE — TELEPHONE ENCOUNTER
Call placed to patient  Left message to advise clinicals and Rx for CPAP supplies was sent to [facility]

## 2023-01-25 NOTE — ANESTHESIA PROCEDURE NOTES
Arterial Line Insertion  Performed by: Logan Gonzalez CRNA  Authorized by: Tiara Prabhakar DO   Consent: Verbal consent obtained  Written consent obtained  Risks and benefits: risks, benefits and alternatives were discussed  Consent given by: patient  Patient understanding: patient states understanding of the procedure being performed  Patient consent: the patient's understanding of the procedure matches consent given  Procedure consent: procedure consent matches procedure scheduled  Relevant documents: relevant documents present and verified  Patient identity confirmed: arm band  Time out: Immediately prior to procedure a "time out" was called to verify the correct patient, procedure, equipment, support staff and site/side marked as required  Preparation: Patient was prepped and draped in the usual sterile fashion    Indications: hemodynamic monitoring  Orientation:  Right  Location: radial artery  Procedure Details:  Needle gauge: 20  Seldinger technique: Seldinger technique used  Number of attempts: 2    Post-procedure:  Post-procedure: dressing applied  Waveform: good waveform  Patient tolerance: Patient tolerated the procedure well with no immediate complications

## 2023-01-25 NOTE — ANESTHESIA PREPROCEDURE EVALUATION
Procedure:  PROSTATECTOMY RADICAL AND PELVIC LYMPH NODE DISSECTION LAPAROSCOPIC W/ ROBOTICS (Abdomen)    Relevant Problems   ANESTHESIA (within normal limits)      CARDIO   (+) Acute bilateral thoracic back pain   (+) Atrial flutter (HCC)   (+) Benign essential hypertension   (+) Combined hyperlipidemia      ENDO   (+) Subclinical hypothyroidism   (-) Diabetes mellitus, type 2 (HCC)      GI/HEPATIC   (-) Gastroesophageal reflux disease      /RENAL   (+) Prostate cancer (HCC)      HEMATOLOGY (within normal limits)      MUSCULOSKELETAL   (+) Acute bilateral thoracic back pain   (+) Cervical strain      NEURO/PSYCH   (+) Acute nonintractable headache   (+) History of gout   (+) Paresthesia of upper and lower extremities of both sides      PULMONARY   (+) MANDY (obstructive sleep apnea)   (-) Asthma        Physical Exam    Airway    Mallampati score: II  TM Distance: >3 FB  Neck ROM: limited     Dental   No notable dental hx     Cardiovascular      Pulmonary      Other Findings        Anesthesia Plan  ASA Score- 3     Anesthesia Type- general with ASA Monitors  Additional Monitors: arterial line  Airway Plan: ETT  Plan Factors-Exercise tolerance (METS): >4 METS  Chart reviewed  EKG reviewed  Existing labs reviewed  Patient summary reviewed  Patient is not a current smoker  Induction- intravenous  Postoperative Plan- Plan for postoperative opioid use  Informed Consent- Anesthetic plan and risks discussed with patient and spouse  I personally reviewed this patient with the CRNA  Discussed and agreed on the Anesthesia Plan with the CRNA  Alejandro Lr

## 2023-01-26 VITALS
HEART RATE: 69 BPM | TEMPERATURE: 97.3 F | OXYGEN SATURATION: 94 % | DIASTOLIC BLOOD PRESSURE: 60 MMHG | RESPIRATION RATE: 18 BRPM | SYSTOLIC BLOOD PRESSURE: 126 MMHG

## 2023-01-26 LAB
ABO GROUP BLD BPU: NORMAL
ABO GROUP BLD BPU: NORMAL
ANION GAP SERPL CALCULATED.3IONS-SCNC: 5 MMOL/L (ref 4–13)
BPU ID: NORMAL
BPU ID: NORMAL
BUN SERPL-MCNC: 16 MG/DL (ref 5–25)
CALCIUM SERPL-MCNC: 8.5 MG/DL (ref 8.4–10.2)
CHLORIDE SERPL-SCNC: 101 MMOL/L (ref 96–108)
CO2 SERPL-SCNC: 28 MMOL/L (ref 21–32)
CREAT SERPL-MCNC: 0.95 MG/DL (ref 0.6–1.3)
CROSSMATCH: NORMAL
CROSSMATCH: NORMAL
ERYTHROCYTE [DISTWIDTH] IN BLOOD BY AUTOMATED COUNT: 12.5 % (ref 11.6–15.1)
GFR SERPL CREATININE-BSD FRML MDRD: 79 ML/MIN/1.73SQ M
GLUCOSE P FAST SERPL-MCNC: 141 MG/DL (ref 65–99)
GLUCOSE SERPL-MCNC: 141 MG/DL (ref 65–140)
HCT VFR BLD AUTO: 37.6 % (ref 36.5–49.3)
HGB BLD-MCNC: 12.6 G/DL (ref 12–17)
MCH RBC QN AUTO: 29.3 PG (ref 26.8–34.3)
MCHC RBC AUTO-ENTMCNC: 33.5 G/DL (ref 31.4–37.4)
MCV RBC AUTO: 87 FL (ref 82–98)
PLATELET # BLD AUTO: 216 THOUSANDS/UL (ref 149–390)
PMV BLD AUTO: 9.5 FL (ref 8.9–12.7)
POTASSIUM SERPL-SCNC: 4.2 MMOL/L (ref 3.5–5.3)
RBC # BLD AUTO: 4.3 MILLION/UL (ref 3.88–5.62)
SODIUM SERPL-SCNC: 134 MMOL/L (ref 135–147)
UNIT DISPENSE STATUS: NORMAL
UNIT DISPENSE STATUS: NORMAL
UNIT PRODUCT CODE: NORMAL
UNIT PRODUCT CODE: NORMAL
UNIT PRODUCT VOLUME: 350 ML
UNIT PRODUCT VOLUME: 350 ML
UNIT RH: NORMAL
UNIT RH: NORMAL
WBC # BLD AUTO: 16.19 THOUSAND/UL (ref 4.31–10.16)

## 2023-01-26 RX ADMIN — OXYCODONE HYDROCHLORIDE 10 MG: 5 TABLET ORAL at 11:07

## 2023-01-26 RX ADMIN — OXYCODONE HYDROCHLORIDE 10 MG: 5 TABLET ORAL at 15:33

## 2023-01-26 RX ADMIN — OXYCODONE HYDROCHLORIDE 5 MG: 5 TABLET ORAL at 07:32

## 2023-01-26 RX ADMIN — KETOROLAC TROMETHAMINE 15 MG: 30 INJECTION, SOLUTION INTRAMUSCULAR at 05:16

## 2023-01-26 RX ADMIN — KETOROLAC TROMETHAMINE 15 MG: 30 INJECTION, SOLUTION INTRAMUSCULAR at 11:07

## 2023-01-26 RX ADMIN — DOCUSATE SODIUM 100 MG: 100 CAPSULE, LIQUID FILLED ORAL at 10:09

## 2023-01-26 RX ADMIN — STANDARDIZED SENNA CONCENTRATE 8.6 MG: 8.6 TABLET ORAL at 10:09

## 2023-01-26 RX ADMIN — HYDROMORPHONE HYDROCHLORIDE 0.5 MG: 1 INJECTION, SOLUTION INTRAMUSCULAR; INTRAVENOUS; SUBCUTANEOUS at 08:44

## 2023-01-26 RX ADMIN — ACETAMINOPHEN 650 MG: 325 TABLET, FILM COATED ORAL at 05:15

## 2023-01-26 RX ADMIN — SODIUM CHLORIDE, SODIUM LACTATE, POTASSIUM CHLORIDE, AND CALCIUM CHLORIDE 75 ML/HR: .6; .31; .03; .02 INJECTION, SOLUTION INTRAVENOUS at 05:19

## 2023-01-26 RX ADMIN — ACETAMINOPHEN 650 MG: 325 TABLET, FILM COATED ORAL at 11:07

## 2023-01-26 NOTE — PROGRESS NOTES
Progress Note - Urology  Clive Sauer 1950, 68 y o  male MRN: [id number]    Unit/Bed#: OR POOL Encounter: [id number]      Assessment & Plan:  1  Prostate cancer  - Postop day 1 robotic scopic radical prostatectomy, robotic lysis of adhesions by Dr Eliana Noel  - Vital signs stable, afebrile  - WBC 16 19, likely reactive  - Creatinine 0 95, at baseline  - Hemoglobin 12 5 stable  - Exam revealing mild tenderness in RLQ,  Abdomen large, soft, nondistended  Patient reporting pain on right lower quadrant  To be expected after surgery  - Pt walking for the first time when seeing the patient this morning    - Encourage ambulation  - Patient tolerating normal diet  No nausea or vomiting  Patient has not passed gas  Encourage bowel regimen on discharge  - Encourage incentive spirometer  - Choe to remain on discharge  - Pt with appointment 2/06/23 for nursing visit for choe removal  Patient with follow up with Dr Eliana Noel 2/08/2023    - Pt re-evaluated in afternoon  He reports he walked around the unit, feeling good  Ate lunch with no nausea or vomiting  Subjective:   HPI: Pt seen and examined at bedside  Pt reports he is feeling well  He has some moderate abdominal pain rating pain as 3/10  He ate breakfast without difficulty  He denies any SOB, chest pain, N/V, fevers, chills  Review of Systems   Constitutional: Negative for chills and fever  Respiratory: Negative for cough and shortness of breath  Cardiovascular: Negative for chest pain and palpitations  Gastrointestinal: Positive for abdominal pain  Negative for abdominal distention and vomiting  Genitourinary: Negative for dysuria, flank pain and hematuria  Musculoskeletal: Negative for arthralgias and back pain  Skin: Negative for color change and rash  Neurological: Negative for seizures and syncope  All other systems reviewed and are negative        Objective:  Vitals: Blood pressure 127/59, pulse 58, temperature 97 5 °F (36 4 °C), resp  rate 16, SpO2 95 %  ,There is no height or weight on file to calculate BMI  Physical Exam  Constitutional:       General: He is not in acute distress  Appearance: Normal appearance  He is normal weight  He is not ill-appearing or toxic-appearing  HENT:      Head: Normocephalic and atraumatic  Right Ear: External ear normal       Left Ear: External ear normal       Nose: Nose normal       Mouth/Throat:      Mouth: Mucous membranes are moist    Eyes:      General: No scleral icterus  Conjunctiva/sclera: Conjunctivae normal    Cardiovascular:      Rate and Rhythm: Normal rate and regular rhythm  Pulses: Normal pulses  Heart sounds: Normal heart sounds  No murmur heard  No friction rub  No gallop  Pulmonary:      Effort: Pulmonary effort is normal  No respiratory distress  Breath sounds: Normal breath sounds  No stridor  No wheezing, rhonchi or rales  Abdominal:      Comments: Abdomen soft, mildly tender on palpation  Patient has large gut  Nondistended  Midline incision well approximated, no swelling, erythema, drainage  Laparoscopic sites unremarkable  Genitourinary:     Comments: Woody in place draining pink urine  Musculoskeletal:         General: Normal range of motion  Cervical back: Normal range of motion  Skin:     General: Skin is warm  Findings: No rash  Neurological:      General: No focal deficit present  Mental Status: He is alert and oriented to person, place, and time  Mental status is at baseline  Psychiatric:         Mood and Affect: Mood normal          Behavior: Behavior normal          Thought Content:  Thought content normal          Judgment: Judgment normal            Labs:  Recent Labs     01/25/23  1340 01/26/23  0539   WBC 13 91* 16 19*     Recent Labs     01/25/23  1340 01/26/23  0539   HGB 13 7 12 6     Recent Labs     01/25/23  1340 01/26/23  0539   HCT 40 1 37 6     Recent Labs     01/25/23  1340 01/26/23  0539 CREATININE 1 08 0 95       History:  Past Medical History:   Diagnosis Date   • Acute gouty arthritis     last assessed 13    • Anxiety     last assessed 14    • BPH (benign prostatic hyperplasia)    • Chronic thoracic spine pain     last assessed 16    • CPAP (continuous positive airway pressure) dependence    • Dysfunction of both eustachian tubes     last assessed 13   • Erectile dysfunction of non-organic origin     last assessed 10/8/13    • Family history reviewed with no changes     medical history    • Gout    • Hyperlipidemia    • Hypertension    • Sebaceous cyst     last assessed 13    • Skin lesion     last assessed 14    • Sleep apnea    • Subclinical hypothyroidism 2019     Social History     Socioeconomic History   • Marital status: /Civil Union     Spouse name: None   • Number of children: None   • Years of education: None   • Highest education level: None   Occupational History   • None   Tobacco Use   • Smoking status: Former     Packs/day: 0 50     Years: 20 00     Pack years: 10 00     Types: Cigarettes     Quit date:      Years since quittin 0   • Smokeless tobacco: Never   Vaping Use   • Vaping Use: Never used   Substance and Sexual Activity   • Alcohol use: Yes     Comment: few x per month   • Drug use: No   • Sexual activity: Not Currently   Other Topics Concern   • None   Social History Narrative    Consumes 1 cup of coffee per day     Social Determinants of Health     Financial Resource Strain: Not on file   Food Insecurity: Not on file   Transportation Needs: Not on file   Physical Activity: Not on file   Stress: Not on file   Social Connections: Not on file   Intimate Partner Violence: Not on file   Housing Stability: Not on file     Past Surgical History:   Procedure Laterality Date   • APPENDECTOMY      12years old   • BOWEL RESECTION     • CATARACT EXTRACTION     • CERVICAL FUSION N/A 2019    Procedure: Anterior cervical discectomy w fusion C5-6, with allograft and neuromonitoring;  Surgeon: Rhina Jimenez MD;  [facility];  Service: Orthopedics   • COLONOSCOPY     • MASTECTOMY Bilateral    • MOUTH SURGERY      tooth extraction with dental implant   • DE BX PROSTATE STRTCTC SATURATION SAMPLING IMG GID N/A 10/18/2022    Procedure: TRANSPERINEAL MRI FUSION  BIOPSY PROSTATE;  Surgeon: Josefa Ruiz MD;  [facility];  Service: Urology   • SKIN BIOPSY      left cheek   • WRIST SURGERY       Family History   Problem Relation Age of Onset   • Diabetes Sister    • Psoriasis Sister    • Diabetes Family    • Hypertension Family    • No Known Problems Mother    • Eczema Father        [place], [place]  Date: 1/26/2023 Time: 12:38 PM

## 2023-01-26 NOTE — NURSING NOTE
Discharge instructions reviewed with patient and spouse  HAMILTON Pearce came to bedside to assess patient prior to discharge  Nikhil Rizvi was notified that choe catheter was leaking some urine around urethra, she did assess the catheter and said no further intervention was necessary  I did confirm with Nikhil Rizvi that patient could restart xeralto in 5 days and this was discussed with the patient  Choe care was discussed and pt provided return demonstration to for care of the cathter and changing drainage bag  Pt was medicated for pain prior to discharge, unable to reassess  Pt stated his pain was at an acceptable level for discharge

## 2023-01-27 NOTE — TELEPHONE ENCOUNTER
Post Op Note    Suni Mendoza is a 68 y o  male s/p PROSTATECTOMY RADICAL AND PELVIC LYMPH NODE DISSECTION LAPAROSCOPIC W/ ROBOTICS; LYSIS OF AHDESIONS (Abdomen) performed 1/25/23  Suni Mendoza is a patient of Dr Ada Lemus and is seen at the [facility] office  How would you rate your pain on a scale from 1 to 10, 10 being the worst pain ever? 2  Feels good  hasnt needed pain medication  Have you had a fever? No  Have your bowel movements been regular? No  Passing gas but no BM yet  Advised to add miralax into regimen if no BM by day 4   Do you have any difficulty urinating? no  If the patient has an incision- do you have any redness around the incision or any drainage from the incision? No  If the patient has a choe- are you comfortable caring for your choe? Yes Is it draining urine? Yes    Do you have any other questions or concerns that I can address at this time? -Answered several questions for patient  Having leaking around catheter explained bladder spasms, did ensure urine draining into bag  Advised to use neosporin for irritation of meatus     -ER precautions discussed   -confirmed choe removal appt

## 2023-02-06 ENCOUNTER — PROCEDURE VISIT (OUTPATIENT)
Dept: UROLOGY | Facility: CLINIC | Age: 73
End: 2023-02-06

## 2023-02-06 VITALS — HEIGHT: 69 IN | WEIGHT: 260 LBS | BODY MASS INDEX: 38.51 KG/M2

## 2023-02-06 DIAGNOSIS — C61 PROSTATE CANCER (HCC): Primary | ICD-10-CM

## 2023-02-06 NOTE — PROGRESS NOTES
2/6/2023    Mahesh Osborne is a 68 y o  male  [id number]    Diagnosis:  Chief Complaint    Prostate Cancer         Patient presents for Woody catheter removal s/p prostatectomy on 1/25/23 with Dr Wali Funez:  appt with Dr Kristian Leo on 2/8/23    Procedure:  Patient's Woody catheter was removed after deflation of an intact balloon  Patient tolerated the removal well  Since patient drove from [facility] to [facility] office which is an hour drive, suggested to patient RN would f/u with phone call around lunchtime to see if he is urinating OK  Did speak with patient @ [phone] and he states he is urinating and is experiencing incontinence  Advised him this is expected after prostatectomy  Did review Kegel exercises over the phone with patient who was familiar with them  Patient will cancel his RN visit for later today  He is still scheduled to f/u with Dr Kristian Leo later this week  No further questions or concerns at this time      Vitals:    02/06/23 0901   Weight: 118 kg (260 lb)   Height: 5' 9" (1 753 m)         Ethan Dumont, RN,BSN

## 2023-02-08 ENCOUNTER — OFFICE VISIT (OUTPATIENT)
Dept: UROLOGY | Facility: CLINIC | Age: 73
End: 2023-02-08

## 2023-02-08 VITALS
BODY MASS INDEX: 38.66 KG/M2 | HEIGHT: 69 IN | DIASTOLIC BLOOD PRESSURE: 76 MMHG | HEART RATE: 74 BPM | WEIGHT: 261 LBS | OXYGEN SATURATION: 97 % | SYSTOLIC BLOOD PRESSURE: 124 MMHG

## 2023-02-08 DIAGNOSIS — C61 PROSTATE CANCER (HCC): Primary | ICD-10-CM

## 2023-02-08 NOTE — PROGRESS NOTES
Referring Physician: Fatuma Kumar DO  A copy of this note was sent to the referring physician  Diagnoses and all orders for this visit:    Prostate cancer ([address] )  -     PSA Total, Diagnostic; Future  -     PSA Total, Diagnostic; Future            Assessment and plan:       1  Stage III prostate cancer status post robotic prostatectomy  -P T3a, grade group 3 ([place] 4+3 equal 7 with focal cribriform pattern), focal positive surgical margin,  -Pretreatment PSA: 5 1  -Preop Multi parametric MRI 1 7 cm right anterior peripheral zone lesion, 54 g prostate, no extracapsular extension or seminal vesicle invasion or local regional extension    2  Comorbidities: Obesity (BMI 38), obstructive sleep apnea, atrial flutter on anticoagulation, prior history of colectomy for complicated diverticulitis, appendectomy, umbilical hernia repair with mesh acute on chronic back pain      His recovery has been uneventful and he appears to be doing well at this time  Today we discussed the patient's pathology report from surgery and the implications for short and long-term cancer control were discussed  We discussed expected post-prostatectomy urinary incontinence and erectile dysfunction  We encouraged the patient to initiate Kegel exercises to promote return of bladder control  We reviewed penile rehabilitation strategies, including the use of Oral PDE5 inhibitors and vacuum erection device, which will be further addressed at the patient's follow up visit  We discussed potential side effects associated with these interventions  Haylie Marr MD      Chief Complaint     Postop prostatectomy      History of Present Illness     Gigi Rush is a 68 y o  male returns in follow-up for postop    Detailed Urologic History     - please refer to HPI    Review of Systems     Review of Systems   Constitutional: Negative for activity change and fatigue  HENT: Negative for congestion  Eyes: Negative for visual disturbance  Respiratory: Negative for shortness of breath and wheezing  Cardiovascular: Negative for chest pain and leg swelling  Gastrointestinal: Negative for abdominal pain  Endocrine: Negative for polyuria  Genitourinary: Negative for dysuria, flank pain, hematuria and urgency  Musculoskeletal: Negative for back pain  Allergic/Immunologic: Negative for immunocompromised state  Neurological: Negative for dizziness and numbness  Psychiatric/Behavioral: Negative for dysphoric mood  All other systems reviewed and are negative  AUA SYMPTOM SCORE    Flowsheet Row Most Recent Value   AUA SYMPTOM SCORE    How often have you had a sensation of not emptying your bladder completely after you finished urinating? 0 (P)     How often have you had to urinate again less than two hours after you finished urinating? 0 (P)     How often have you found you stopped and started again several times when you urinate? 0 (P)     How often have you found it difficult to postpone urination? 0 (P)     How often have you had a weak urinary stream? 0 (P)     How often have you had to push or strain to begin urination? 0 (P)     How many times did you most typically get up to urinate from the time you went to bed at night until the time you got up in the morning? 0 (P)     Quality of Life: If you were to spend the rest of your life with your urinary condition just the way it is now, how would you feel about that? 1 (P)     AUA SYMPTOM SCORE 0 (P)             Allergies     No Known Allergies    Physical Exam       Physical Exam  Constitutional:       General: He is not in acute distress  Appearance: He is well-developed  HENT:      Head: Normocephalic and atraumatic  Cardiovascular:      Comments: Negative lower extremity edema  Pulmonary:      Effort: Pulmonary effort is normal       Breath sounds: Normal breath sounds  Abdominal:      Palpations: Abdomen is soft     Musculoskeletal:         General: Normal range of motion  Cervical back: Normal range of motion  Skin:     General: Skin is warm  Neurological:      Mental Status: He is alert and oriented to person, place, and time     Psychiatric:         Behavior: Behavior normal        All incisions are well-healed with no palpable hernia      Vital Signs  Vitals:    02/08/23 0855   BP: 124/76   BP Location: Left arm   Patient Position: Sitting   Cuff Size: Adult   Pulse: 74   SpO2: 97%   Weight: 118 kg (261 lb)   Height: 5' 9" (1 753 m)         Current Medications       Current Outpatient Medications:   •  acetaminophen (TYLENOL) 325 mg tablet, Take 2 tablets (650 mg total) by mouth every 4 (four) hours as needed for mild pain, Disp: 30 tablet, Rfl: 0  •  atorvastatin (LIPITOR) 20 mg tablet, TAKE 1 TABLET BY MOUTH EVERY DAY, Disp: 90 tablet, Rfl: 2  •  diclofenac sodium (VOLTAREN) 50 mg EC tablet, Take 1 tablet (50 mg total) by mouth 3 (three) times a day for 7 days, Disp: 21 tablet, Rfl: 0  •  docusate sodium (COLACE) 100 mg capsule, Take 1 capsule (100 mg total) by mouth 2 (two) times a day for 15 days, Disp: 30 capsule, Rfl: 0  •  metoprolol succinate (TOPROL-XL) 50 mg 24 hr tablet, TAKE 1 TABLET BY MOUTH EVERY DAY, Disp: 90 tablet, Rfl: 3      Active Problems     Patient Active Problem List   Diagnosis   • Rotator cuff syndrome of right shoulder   • Combined hyperlipidemia   • Benign essential hypertension   • Acute bilateral thoracic back pain   • History of gout   • Chronic lumbar radiculopathy   • Cervical radiculitis   • Plantar fasciitis of right foot   • Subclinical hypothyroidism   • Elevated blood sugar   • Eczema   • Fatigue   • MANDY (obstructive sleep apnea)   • S/P cervical spinal fusion   • Achilles rupture, left   • Hand dermatitis   • Dupuytren contracture   • Seborrheic keratoses, inflamed   • Obesity (BMI 35 0-39 9 without comorbidity)   • Paresthesia of upper and lower extremities of both sides   • Atrial flutter (HCC)   • Habitual alcohol use   • Dyslipidemia   • Body mass index (BMI)40 0-44 9, adult   • Lower respiratory infection (e g , bronchitis, pneumonia, pneumonitis, pulmonitis)   • Acute nonintractable headache   • Cervical strain   • Vertigo   • Prostate cancer [facility])   • H/O abdominal surgery         Past Medical History     Past Medical History:   Diagnosis Date   • Acute gouty arthritis     last assessed 6/1/13    • Anxiety     last assessed 7/11/14    • BPH (benign prostatic hyperplasia)    • Chronic thoracic spine pain     last assessed 5/13/16    • CPAP (continuous positive airway pressure) dependence    • Dysfunction of both eustachian tubes     last assessed 8/16/13   • Erectile dysfunction of non-organic origin     last assessed 10/8/13    • Family history reviewed with no changes     medical history    • Gout    • Hyperlipidemia    • Hypertension    • Sebaceous cyst     last assessed 12/16/13    • Skin lesion     last assessed 1/2/14    • Sleep apnea    • Subclinical hypothyroidism 03/21/2019         Surgical History     Past Surgical History:   Procedure Laterality Date   • APPENDECTOMY      12years old   • BOWEL RESECTION     • CATARACT EXTRACTION     • CERVICAL FUSION N/A 08/27/2019    Procedure: Anterior cervical discectomy w fusion C5-6, with allograft and neuromonitoring;  Surgeon: Kapil Brand MD;  [facility];  Service: Orthopedics   • COLONOSCOPY     • MASTECTOMY Bilateral    • MOUTH SURGERY      tooth extraction with dental implant   • RI BX PROSTATE STRTCTC SATURATION SAMPLING IMG GID N/A 10/18/2022    Procedure: TRANSPERINEAL MRI FUSION  BIOPSY PROSTATE;  Surgeon: Gael Murphy MD;  [facility];  Service: Urology   • RI LAPS SURG [id number] RPBIC RAD W/NRV SPARING ROBOT N/A 1/25/2023    Procedure: PROSTATECTOMY RADICAL AND PELVIC LYMPH NODE DISSECTION LAPAROSCOPIC W/ ROBOTICS; LYSIS OF AHDESIONS;  Surgeon: Gael Murphy MD;  [facility];  Service: Urology   • SKIN BIOPSY      left cheek • WRIST SURGERY           Family History     Family History   Problem Relation Age of Onset   • Diabetes Sister    • Psoriasis Sister    • Diabetes Family    • Hypertension Family    • No Known Problems Mother    • Eczema Father          Social History     Social History     Social History     Tobacco Use   Smoking Status Former   • Packs/day: 0 50   • Years: 20 00   • Pack years: 10 00   • Types: Cigarettes   • Quit date:    • Years since quittin 1   Smokeless Tobacco Never         Pertinent Lab Values     Lab Results   Component Value Date    CREATININE 0 95 2023       Lab Results   Component Value Date    PSA 5 1 (H) 2022    PSA 6 5 (H) 2022    PSA 3 7 10/29/2021       @RESULTRCNT(1H])@      Pertinent Imaging      - n/a    Portions of the record may have been created with voice recognition software  Occasional wrong word or "sound a like" substitutions may have occurred due to the inherent limitations of voice recognition software  Read the chart carefully and recognize, using context, where substitutions have occurred

## 2023-03-06 ENCOUNTER — LAB (OUTPATIENT)
Age: 73
End: 2023-03-06

## 2023-03-06 ENCOUNTER — TELEPHONE (OUTPATIENT)
Dept: OTHER | Facility: OTHER | Age: 73
End: 2023-03-06

## 2023-03-06 DIAGNOSIS — C61 PROSTATE CANCER (HCC): ICD-10-CM

## 2023-03-06 LAB — PSA SERPL-MCNC: <0.1 NG/ML (ref 0–4)

## 2023-03-06 NOTE — TELEPHONE ENCOUNTER
Patient calling in to notify the office that he will be going in today to get his PSA labs done prior to his upcoming appointment

## 2023-03-07 ENCOUNTER — TELEPHONE (OUTPATIENT)
Dept: UROLOGY | Facility: CLINIC | Age: 73
End: 2023-03-07

## 2023-03-07 NOTE — RESULT ENCOUNTER NOTE
Please let the patient know the great news that his PSA is undetectable  He is cancer free  Should follow-up as scheduled    Thank you

## 2023-03-07 NOTE — TELEPHONE ENCOUNTER
----- Message from Asia Villa MD sent at 3/7/2023  7:01 AM EST -----  Please let the patient know the great news that his PSA is undetectable  He is cancer free  Should follow-up as scheduled    Thank you

## 2023-03-07 NOTE — TELEPHONE ENCOUNTER
Called and spoke to patient and gave undetectable PSA results per Dr Pito Naranjo   Patient will keep 3/10 appt with PA- TB

## 2023-03-10 ENCOUNTER — OFFICE VISIT (OUTPATIENT)
Dept: UROLOGY | Facility: CLINIC | Age: 73
End: 2023-03-10

## 2023-03-10 VITALS
HEIGHT: 69 IN | DIASTOLIC BLOOD PRESSURE: 68 MMHG | BODY MASS INDEX: 37.47 KG/M2 | WEIGHT: 253 LBS | HEART RATE: 76 BPM | SYSTOLIC BLOOD PRESSURE: 118 MMHG

## 2023-03-10 DIAGNOSIS — C61 PROSTATE CANCER (HCC): Primary | ICD-10-CM

## 2023-03-10 NOTE — PROGRESS NOTES
3/10/2023      Chief Complaint   Patient presents with   • Follow-up   • Prostate Cancer         Assessment and Plan    68 y o  male managed by Dr Yamilet Jay    1  pT3a Prostate Cancer  - jennifer score 4+3=7 with focal cribiform pattern, focal positive surgical margin  - diagnosis October 2022 mri fusion bx pirads 5 lesion right side  - staging imaging MRI pelvis  - pretreatment psa 6 5  - treatment robot assisted laparoscopic prostatectomy January 2023  Lab Results   Component Value Date    PSA <0 1 03/06/2023    PSA 5 1 (H) 11/02/2022    PSA 6 5 (H) 06/03/2022     2  Mixed stress and urge urinary incontinence  - exacerbated in postop state  Motivated for formal pelvic physical therapy- will schedule    3  Need for colorectal cancer screening  - has upcoming GI visit to schedule colonoscopy, wait another month before colonoscopy/per rectum      Mr Ariel Urias is 6 weeks status post robot-assisted laparoscopic prostatectomy for [place] 7 prostate cancer  His first postoperative PSA is undetectable  Discussed this good news with him today  For his severe stress urinary incontinence he will proceed to formal pelvic floor physical therapy  Though he has been attempting home kegels he has impaired coordination when he demonstrates this for me today and will benefit from formal training  He agrees  I have also recommend he purchase a squatty potty for his constipation  He does have pre-existing urge incontinence for several years prior to his surgery that can be worked on as well  Having some right lateral thigh numbness and shooting pains which are gradually improving over the last week again he had these before surgery, may be exacerbated by positioning in lithotomy during surgery  I do not expect erectile function recovery as he had no erectile function prior to surgery  Reassured him on loss of penile length consistent with surgery      I recommend a PSA every 3 months at least for the first year possibly even 2 years then every 6 months through year 5 then eventually annually for life  I will see him back in the summer  History of Present Illness  Danielito Tinoco is a 68 y o  male here for evaluation of first postop PSA check  [place] 7 prostate cancer treated with radical prostatectomy January 25, 2023 by Dr Nia Mcallister  Current PSA is undetectable <0 1  Complains of sharp pinching pains lasting 1 second that occur around the perineum penis anus and right lateral thigh  Seem to be getting better with time  He is flooridly incontinenct wearing diapers  Exacerbated by stress maneuvers I e  getting in/out of the truck, bending down, or coughing  He has longtime running water trigger for urge incontinence as well for years  Feels he has to position differently to pass bowel movement  Review of Systems   Constitutional: Negative  Respiratory: Negative  Cardiovascular: Negative  Gastrointestinal: Positive for constipation  Negative for diarrhea and rectal pain  Genitourinary: Positive for urgency  Negative for decreased urine volume, difficulty urinating, dysuria, flank pain, frequency and hematuria  Incontinent of urine- diapers   Musculoskeletal: Negative  Neurological: Positive for numbness (right thigh)             AUA SYMPTOM SCORE    Flowsheet Row Most Recent Value   AUA SYMPTOM SCORE    How often have you had a sensation of not emptying your bladder completely after you finished urinating? 4 (P)     How often have you had to urinate again less than two hours after you finished urinating? 2 (P)     How often have you found you stopped and started again several times when you urinate? 1 (P)     How often have you found it difficult to postpone urination? 3 (P)     How often have you had a weak urinary stream? 1 (P)     How often have you had to push or strain to begin urination? 0 (P)     How many times did you most typically get up to urinate from the time you went to bed at night until the time you got up in the morning? 0 (P)     Quality of Life: If you were to spend the rest of your life with your urinary condition just the way it is now, how would you feel about that? 5 (P)     AUA SYMPTOM SCORE 11 (P)            Vitals  Vitals:    03/10/23 1024   BP: 118/68   BP Location: Left arm   Patient Position: Sitting   Cuff Size: Adult   Pulse: 76   Weight: 115 kg (253 lb)   Height: 5' 9" (1 753 m)       Physical Exam  Vitals and nursing note reviewed  Constitutional:       General: He is not in acute distress  Appearance: He is well-developed  He is not diaphoretic  HENT:      Head: Normocephalic and atraumatic  Pulmonary:      Effort: Pulmonary effort is normal    Abdominal:      Comments: Well healed abdominal scars   Genitourinary:     Comments: Circumcised penis, normal phallus, orthotopic patent meatus  Testes smooth descended bilaterally into the scrotum nontender with no palpable mass  Perineum and anus without fissure hemorrhoid or erythema  Skin:     General: Skin is warm and dry  Neurological:      Mental Status: He is alert and oriented to person, place, and time        Gait: Gait normal    Psychiatric:         Speech: Speech normal          Behavior: Behavior normal            Past History  Past Medical History:   Diagnosis Date   • Acute gouty arthritis     last assessed 6/1/13    • Anxiety     last assessed 7/11/14    • BPH (benign prostatic hyperplasia)    • Chronic thoracic spine pain     last assessed 5/13/16    • CPAP (continuous positive airway pressure) dependence    • Dysfunction of both eustachian tubes     last assessed 8/16/13   • Erectile dysfunction of non-organic origin     last assessed 10/8/13    • Family history reviewed with no changes     medical history    • Gout    • Hyperlipidemia    • Hypertension    • Sebaceous cyst     last assessed 12/16/13    • Skin lesion     last assessed 1/2/14    • Sleep apnea    • Subclinical hypothyroidism 03/21/2019     Social History     Socioeconomic History   • Marital status: /Civil Union     Spouse name: Not on file   • Number of children: Not on file   • Years of education: Not on file   • Highest education level: Not on file   Occupational History   • Not on file   Tobacco Use   • Smoking status: Former     Packs/day: 0 50     Years: 20 00     Pack years: 10 00     Types: Cigarettes     Quit date: 36     Years since quittin 2   • Smokeless tobacco: Never   Vaping Use   • Vaping Use: Never used   Substance and Sexual Activity   • Alcohol use: Yes     Comment: few x per month   • Drug use: No   • Sexual activity: Not Currently   Other Topics Concern   • Not on file   Social History Narrative    Consumes 1 cup of coffee per day     Social Determinants of Health     Financial Resource Strain: Not on file   Food Insecurity: Not on file   Transportation Needs: Not on file   Physical Activity: Not on file   Stress: Not on file   Social Connections: Not on file   Intimate Partner Violence: Not on file   Housing Stability: Not on file     Social History     Tobacco Use   Smoking Status Former   • Packs/day: 0 50   • Years: 20 00   • Pack years: 10 00   • Types: Cigarettes   • Quit date: 36   • Years since quittin 2   Smokeless Tobacco Never     Family History   Problem Relation Age of Onset   • Diabetes Sister    • Psoriasis Sister    • Diabetes Family    • Hypertension Family    • No Known Problems Mother    • Eczema Father        The following portions of the patient's history were reviewed and updated as appropriate: allergies, current medications, past medical history, past social history, past surgical history and problem list     Results  No results found for this or any previous visit (from the past 1 hour(s)) ]  Lab Results   Component Value Date    PSA <0 1 2023    PSA 5 1 (H) 2022    PSA 6 5 (H) 2022    PSA 3 7 10/29/2021     Lab Results   Component Value Date    CALCIUM 8 5 2023    NA 140 05/16/2016    K 4 2 01/26/2023    CO2 28 01/26/2023     01/26/2023    BUN 16 01/26/2023    CREATININE 0 95 01/26/2023     Lab Results   Component Value Date    WBC 16 19 (H) 01/26/2023    HGB 12 6 01/26/2023    HCT 37 6 01/26/2023    MCV 87 01/26/2023     01/26/2023

## 2023-03-12 DIAGNOSIS — I48.92 ATRIAL FLUTTER (HCC): ICD-10-CM

## 2023-03-12 DIAGNOSIS — E78.5 DYSLIPIDEMIA: ICD-10-CM

## 2023-03-13 ENCOUNTER — TELEPHONE (OUTPATIENT)
Dept: FAMILY MEDICINE CLINIC | Facility: CLINIC | Age: 73
End: 2023-03-13

## 2023-03-13 ENCOUNTER — TELEPHONE (OUTPATIENT)
Dept: CARDIOLOGY CLINIC | Facility: CLINIC | Age: 73
End: 2023-03-13

## 2023-03-13 DIAGNOSIS — I48.92 ATRIAL FLUTTER, UNSPECIFIED TYPE (HCC): Primary | ICD-10-CM

## 2023-03-13 DIAGNOSIS — I10 BENIGN ESSENTIAL HYPERTENSION: Primary | ICD-10-CM

## 2023-03-13 RX ORDER — METOPROLOL SUCCINATE 50 MG/1
TABLET, EXTENDED RELEASE ORAL
Qty: 90 TABLET | Refills: 3 | Status: SHIPPED | OUTPATIENT
Start: 2023-03-13

## 2023-03-13 RX ORDER — ATORVASTATIN CALCIUM 20 MG/1
TABLET, FILM COATED ORAL
Qty: 90 TABLET | Refills: 2 | Status: SHIPPED | OUTPATIENT
Start: 2023-03-13

## 2023-03-13 RX ORDER — VALSARTAN AND HYDROCHLOROTHIAZIDE 160; 12.5 MG/1; MG/1
1 TABLET, FILM COATED ORAL DAILY
Qty: 90 TABLET | Refills: 1 | Status: SHIPPED | OUTPATIENT
Start: 2023-03-13

## 2023-03-13 NOTE — TELEPHONE ENCOUNTER
Requesting Valsartan-Hydrochlorothiazide 160-12 5mg he ONLY has 10 left   States he contacted [facility] pharmacy was advised his medication was discontinued by Sheryle Eddy

## 2023-03-13 NOTE — TELEPHONE ENCOUNTER
Dr Lenore Patiño,   Pt called stating his xarelto was discontinued by a physician  I think it may have been accidental but I sent Xarelto 20mg to his pharmacy so pt can continue   Just an [place]

## 2023-03-15 NOTE — PROGRESS NOTES
PT Evaluation     Today's date: 3/20/2023  Patient name: Jennifer Lee  : 1950  MRN: [id number]  Referring provider: Branden Rivera  Dx:   Encounter Diagnosis     ICD-10-CM    1  Prostate cancer ([facility] [address] )  C61       2  Continuous leakage of urine  N39 45       3  INOCENCIA (stress urinary incontinence), male  N39 3       4  Status post prostatectomy  Z90 79       5  Perineal pain in male  R10 2       6  Pelvic pain  R10 2           Start Time: 740  Stop Time: 830  Total time in clinic (min): 50 minutes    Assessment  Assessment details: Colleen Cooper is a 68y o  year old male with complaints of UI post prostatectomy and perineal pain  The following impairments were found on evaluation: poor bladder habits, poor toilet posture and poor fluid intake  Pt has very good PFM strength, but anticipate he is not functionally using PFM contractions and has increased IAP  These impairments contribute to the following functional limitations: decreased tolerance to activity and functional mobility; and the following disability: decreased quality of life and increased risk of infection  Colleen Cooepr  is a good candidate for therapy and would benefit from skilled physical therapy to address the above impairments in order to allow the patient to achieve below goals and return to his prior level of function  Patient education provided on PFM anatomy and function, toilet posture and timed voiding  Patient educated on diagnosis, plan of care and prognosis  Eamongrey Katy are in agreement with recommended plan of care, goals for therapy and demonstrates motivation for active participation in proposed plan of care      Thank you Casie Bell for this referral!    Impairments: abnormal coordination, abnormal muscle tone, abnormal or restricted ROM, impaired physical strength, lacks appropriate home exercise program, poor posture  and poor body mechanics  Barriers to therapy: none  Understanding of Dx/Px/POC: good   Prognosis: good    Goals  STG to be completed in 8 weeks from 3/20/2023:  1  Lloyd Smalls will be independent with initial home exercise program    2  Maxjeffry Smalls will demonstrate ability to contract, relax and actively lengthen PFM  3  Maxjeffry Smalls will report 25% improvement in UI  4  Maxjeffry Smalls will report pain no greater than 4/10 with all functional activity to allow Lloyd Smalls to return to prior level of function  5  Lloyd Smalls will demonstrate good management of IAP with functional movements  LTG to be completed in 12 weeks from 3/20/2023 or upon discharge from PFPT:  1  Lloyd Smalls will be independent with advanced home exercise program for self-management of symptoms  2  Maxjeffry Smalls will demonstrate ability to appropriately activate deep core muscles with functional mobility tasks  3  Maxjeffry Glassa report 50% improvement or better in UI  4  Lloyd Smalls will be able to wear 3 or less pad or liner to decrease risk of urinary tract infection  5  Maxjeffry Smalls will report pain no greater than 2/10 with all functional activity to allow Lloyd Smalls to return to prior level of function  6  Lloyd Smalls will have bristol type 3-5 BMs 75% of the time or more to indicate improved bowel function             Plan  Patient would benefit from: skilled physical therapy  Planned modality interventions: biofeedback, cryotherapy, ultrasound and thermotherapy: hydrocollator packs  Planned therapy interventions: abdominal trunk stabilization, activity modification, ADL retraining, balance, balance/weight bearing training, behavior modification, body mechanics training, breathing training, coordination, flexibility, functional ROM exercises, gait training, graded activity, graded exercise, graded motor, home exercise program, joint mobilization, manual therapy, massage, motor coordination training, neuromuscular re-education, patient education, postural training, strengthening, stretching and therapeutic activities  Frequency: 1x week  Duration in weeks: 12  Plan of Care beginning date: 3/20/2023  Plan of Care expiration date: 6/12/2023  Treatment plan discussed with: patient and referring physician        PT Pelvic Floor Subjective:   History of Present Illness:   Maynor Phillip is a 68 y o  male who prefers he/him pronouns  They present to pelvic floor physical therapy with the primary complaint of UI s/p prostate cancer and treatment for prostate cancer  They are referred to OPPT by Faraz Napoles PA-C from Urology  Estela Rodriguez reports he has prostate removed Jan 25  He still has pain which limits his sitting and his incontinence is severe  He has gross UI described as "streaming out"  He thinks the urine constantly comes out- but is worse with movements  Getting OOB he swings his legs around and then the urine comes out  By the time he gets to the toilet there is about 3 seconds of stream worth of urine left  He is showering and limiting community activity because he is self conscious of the smell of urine  He was told to do kegals and it seemed to get better  But he has pain in his buttocks and at perineal body- has trouble sitting on hard surfaces  The kegals cause him more pain  He did do some research on how to properly do kegals  He stopped doing them until he came here and the UI went from a little better to significantly worse  He has significant UI getting out of his truck- feels it constantly running out  He has UI on stairs as well  During the day sometimes he gets the urge to void but then he will go to the bathroom and will have difficulty getting anything out  He wears a "diaper" pad and underwear  He wears 4-5 pads a day  1-2 pullups a day  At night he does not wear a pad but uses an old towel in his underwear  When he wakes up in the AM the towel is pretty soaked  Per Urology Note:   "Mr Rosas Mejía is 6 weeks status post robot-assisted laparoscopic prostatectomy for [place] 7 prostate cancer  His first postoperative PSA is undetectable   Discussed this good news with him today  For his severe stress urinary incontinence he will proceed to formal pelvic floor physical therapy  Though he has been attempting home kegels he has impaired coordination when he demonstrates this for me today and will benefit from formal training  He agrees  I have also recommend he purchase a squatty potty for his constipation  He does have pre-existing urge incontinence for several years prior to his surgery that can be worked on as well  Having some right lateral thigh numbness and shooting pains which are gradually improving over the last week again he had these before surgery, may be exacerbated by positioning in lithotomy during surgery  I do not expect erectile function recovery as he had no erectile function prior to surgery  Reassured him on loss of penile length consistent with surgery      I recommend a PSA every 3 months at least for the first year possibly even 2 years then every 6 months through year 5 then eventually annually for life  I will see him back in the summer  Jenel Kocher is a 68 y o  male here for evaluation of first postop PSA check  [place] 7 prostate cancer treated with radical prostatectomy January 25, 2023 by Dr Leslie Mercer  Current PSA is undetectable <0 1  Complains of sharp pinching pains lasting 1 second that occur around the perineum penis anus and right lateral thigh  Seem to be getting better with time  He is flooridly incontinenct wearing diapers  Exacerbated by stress maneuvers I e  getting in/out of the truck, bending down, or coughing  He has longtime running water trigger for urge incontinence as well for years   Feels he has to position differently to pass bowel movement "  Social Support:     Lives in:  Multiple-level home    Lives with:  Spouse    Relationship status: /committed    Work status: retired (worked as  for [employer] in 2016)    Life stress severity: moderate and mild (has been walking 1x around the block )  Diet and Exercise:    Diet:balanced nutrition    Exercise type: walking    Exercise frequency: 2-3 times per week    Around the block   Co-morbidities:    Patient Active Problem List:     Rotator cuff syndrome of right shoulder     Combined hyperlipidemia     Benign essential hypertension     Acute bilateral thoracic back pain     History of gout     Chronic lumbar radiculopathy     Cervical radiculitis     Plantar fasciitis of right foot     Subclinical hypothyroidism     Elevated blood sugar     Eczema     Fatigue     MANDY (obstructive sleep apnea)     S/P cervical spinal fusion     Achilles rupture, left     Hand dermatitis     Dupuytren contracture     Seborrheic keratoses, inflamed     Obesity (BMI 35 0-39 9 without comorbidity)     Paresthesia of upper and lower extremities of both sides     Atrial flutter (HCC)     Habitual alcohol use     Dyslipidemia     Body mass index (BMI)40 0-44 9, adult     Lower respiratory infection (e g , bronchitis, pneumonia, pneumonitis, pulmonitis)     Acute nonintractable headache     Cervical strain     Vertigo     [facility]     H/O abdominal surgery     Bladder Function:     Urgency: is not getting up and trying to use the bathroom       Voiding Difficulties comments:     Urinary leakage: urine leakage    Urinary leakage aggravated by: coughing, sneezing, bending, standing up and walking to the bathroom    Nocturia (episodes per night): 0    Painful urination: No      Intake (ounces):      Intake (ounces) comment: Water 32-48oz (he is limiting fluid intake)  Coffee 1-2 8oz cup   Juice cranberry rarely   Incontinence Management:     Pads/Diaper Use:  24 hours    Pads/Diapers Additional Comments: see subjective   Bowel Function:     Bowel Function comments:  Now stools are thin like a pencil - this is new to him - they used to be thicker in diameter     Bowel frequency: daily (used to have 2-3 a day )    Jordan Stool Scale: type 4    Stool softener use: stool softeners (occascional use )    Uses "squatty potty": no Squatty Potty  Sexual Function:     Sexually Active:  Not sexually active (unable to achieve erection )  Pain:     Current pain rating:  3    At best pain rating:  3    At worst pain ratin    Location:  Penile, perineal body, and rectum     Quality:  Dull ache ("like a bad bruise with some sharp stabbing" sometimes itching )    Exacerbated by: prolonged sitting but also random     Duration of symptoms:  Brief    Relieving factors:  Change in position  Treatments:     None    Patient Goals:     Patient goals for therapy:  Fully empty bladder or bowels, improved bladder or bowel function and improved comfort    Other patient goals:  "to stop peeing in my pants"       Objective   Pelvic Floor Exam   Position: side-lying    General Perineum Exam:   Positive for perianal erythema  Visual Inspection of Perineum:   Excursion of perineal body in cephalad direction with contraction of pelvic floor muscles (PFM): good  Involuntary contraction with coughing: yes  Cough reflex: cough reflex  Sphincter Tone Resting: normal  Sphincter Tone Squeeze: normal    Pelvic Floor Muscle Exam     Pelvic floor muscle relaxation is complete       PERFECT Score   Power right: 5/5  Power left: 5/5  Endurance (seconds to max): 10  Repetitions (before fatigue): 10  Fast flicks (in 10 seconds): 7    no TTP to deep PFMs           Precautions: standard, prostate CA, s/p prostatectomy 2023       3/20/2023          Visit Number: #1 #2 #3 #4 #5 #6 #7 #8   Patient Ed           PFM anatomy and function           Timed voiding  Q1 hour           Toilet posture for BM KH                                                      Neuro Re-Ed           STS with exhale --> PFM           Step tap with PFM           DB with PFM           Push/pull with PFM                                             Ther Ex           Warm-up Ther Activity                                 Manual           PFM Exam  Completed- [address]                                                      Modalities

## 2023-03-20 ENCOUNTER — EVALUATION (OUTPATIENT)
Age: 73
End: 2023-03-20

## 2023-03-20 DIAGNOSIS — N39.45 CONTINUOUS LEAKAGE OF URINE: ICD-10-CM

## 2023-03-20 DIAGNOSIS — N39.3 SUI (STRESS URINARY INCONTINENCE), MALE: ICD-10-CM

## 2023-03-20 DIAGNOSIS — R10.2 PERINEAL PAIN IN MALE: ICD-10-CM

## 2023-03-20 DIAGNOSIS — R10.2 PELVIC PAIN: ICD-10-CM

## 2023-03-20 DIAGNOSIS — Z90.79 STATUS POST PROSTATECTOMY: ICD-10-CM

## 2023-03-20 DIAGNOSIS — C61 PROSTATE CANCER (HCC): Primary | ICD-10-CM

## 2023-03-26 NOTE — PROGRESS NOTES
Daily Note     Today's date: 3/28/2023  Patient name: So cAeves  : 1950  MRN: [id number]  Referring provider: Roberto Reid  Dx:   Encounter Diagnosis     ICD-10-CM    1  Prostate cancer ([address] )  C61       2  Continuous leakage of urine  N39 45       3  INOCENCIA (stress urinary incontinence), male  N39 3       4  Status post prostatectomy  Z90 79       5  Perineal pain in male  R10 2       6  Pelvic pain  R10 2           Start Time: 1330  Stop Time: 1430  Total time in clinic (min): 60 minutes    Subjective: Pt reports as he was getting out of his truck he could feel gross amount of UI  He came in an tried to void and had nothing left to come out  He is very frustrated with lack of control  Objective: See treatment diary below      Assessment: So Aceves tolerated today's treatment session well  Patient education provided on pressure management techniques  Brenton Lockhart completed all TE with good form and no adverse reactions  Extensive education provided on pressure management, breathing and PFM activation with functional movements  Brenton Lockhart continues to benefit from skilled OPPT services to address urinary incontinence   Will continue to address functional deficits and focus on progression of POC per patient tolerance  Plan: Continue per plan of care  Progress treatment as tolerated  Precautions: standard, prostate CA, s/p prostatectomy 2023    Access Code: [email or web address]  URL: [email or web address]  Date: 2023  Prepared by: Abbey Steiner    Exercises  - Pelvic Floor Contraction with Exhale  - 1 x daily  - Bridge with Pelvic Floor Contraction  - 1 x daily - 1 sets - 10-20 reps  - Sit to Stand with Pelvic Floor Contraction  - 1 x daily - 1 sets - 10 reps  - Diaphragmatic Breathing to Reduce Intra-abdominal Pressure: Supine to Sit  - 2 x daily - 2 sets - 10 reps     3/20/2023 3/28/2023         Visit Number: #1 #2 #3 #4 #5 #6 #7 #8   Patient Ed           PFM anatomy and function           Timed voiding  Q1 hour           Toilet posture for BM KH          Soda can pressure model   Explained at length          Bed mobility   Log roll and pressure management                                Neuro Re-Ed           STS with exhale --> PFM  x10          Step tap with PFM           DB with PFM  5 min (focus on no throat breathing- exhale front of mouth)         Push/pull with PFM            DB with bridge   2 min          DB   2 min 360* breathing                     Ther Ex           Warm-up                                                                                         Ther Activity                                 Manual           PFM Exam  Completed-                                                       Modalities

## 2023-03-28 ENCOUNTER — OFFICE VISIT (OUTPATIENT)
Age: 73
End: 2023-03-28

## 2023-03-28 DIAGNOSIS — R10.2 PELVIC PAIN: ICD-10-CM

## 2023-03-28 DIAGNOSIS — N39.45 CONTINUOUS LEAKAGE OF URINE: ICD-10-CM

## 2023-03-28 DIAGNOSIS — Z90.79 STATUS POST PROSTATECTOMY: ICD-10-CM

## 2023-03-28 DIAGNOSIS — R10.2 PERINEAL PAIN IN MALE: ICD-10-CM

## 2023-03-28 DIAGNOSIS — N39.3 SUI (STRESS URINARY INCONTINENCE), MALE: ICD-10-CM

## 2023-03-28 DIAGNOSIS — C61 PROSTATE CANCER (HCC): Primary | ICD-10-CM

## 2023-03-31 NOTE — PROGRESS NOTES
Daily Note     Today's date: 2023  Patient name: Joni Connor  : 1950  MRN: [id number]  Referring provider: Duane Boer  Dx:   Encounter Diagnosis     ICD-10-CM    1  Prostate cancer ([facility] [address] )  C61       2  Continuous leakage of urine  N39 45       3  INOCENCIA (stress urinary incontinence), male  N39 3       4  Status post prostatectomy  Z90 79       5  Perineal pain in male  R10 2       6  Pelvic pain  R10 2           Start Time: 830  Stop Time: 930  Total time in clinic (min): 60 minutes    Subjective: Pt reports he is doing better  Still having leakage but it is improved  He is doing better with standing up but then will leak once he starts walking  Noted pt was limping slightly- he states he has a little flare of gout in his foot  He took medication for this  Objective: See treatment diary below      Assessment: Joni Connor tolerated today's treatment session well  Patient education provided on pressure management with functional activites as well as hip and core strengthening   Felipeguerita Villaseñor completed all TE with good form and no adverse reactions  Pt is more aware of pressure management and is able to self-correct about 50% of the time when he has breath holding patterns  He reports improvements in leakage with position changes, but still has leakage when he is relaxed  Felipe Domingoing continues to benefit from skilled OPPT services to address urinary incontinence   Will continue to address functional deficits and focus on progression of POC per patient tolerance  Patient performed Nustep to increase blood flow to the area being treated, prepare the muscles for strength training and stretching, improve overall tolerance to activity, and aerobic endurance  Plan: Continue per plan of care  Progress treatment as tolerated  Precautions: standard, prostate CA, s/p prostatectomy 2023    Access Code: [email or web address]  URL: [email or web address]  Date: 2023  Prepared by: Tony Connelly    Exercises  - Pelvic Floor Contraction with Exhale  - 1 x daily  - Bridge with Pelvic Floor Contraction  - 1 x daily - 1 sets - 10-20 reps  - Sit to Stand with Pelvic Floor Contraction  - 1 x daily - 1 sets - 10 reps  - Diaphragmatic Breathing to Reduce Intra-abdominal Pressure: Supine to Sit  - 2 x daily - 2 sets - 10 reps  - Step Taps on Low Step  - 2 x daily - 2 sets - 10 reps  - Sidestepping with resistance  - 1 x daily - 2 sets  - Long Arc with Pelvic Floor Contraction  - 1 x daily - 2 sets - 10 reps  - Seated March with Pelvic Floor Contraction   - 1 x daily - 2 sets - 10 reps     3/20/2023 3/28/2023 4/4/2023        Visit Number: #1 #2 HEP updated #3 HEP updated  #4 #5 #6 #7 #8   Patient Ed           PFM anatomy and function KH          Timed voiding  Q1 hour           Toilet posture for BM KH          Soda can pressure model   Explained at length          Bed mobility   Log roll and pressure management  Discussed positioning in bed with towel/pillow/blanket to support abdomen in sidelying                               Neuro Re-Ed           STS with exhale --> PFM  x10  x10         Step tap with PFM   1 min         DB with PFM  5 min (focus on no throat breathing- exhale front of mouth) 2 min         Push/pull with PFM            DB with bridge   2 min  2 min         DB   2 min 360* breathing  HEP        Seated LAQ with coordination of breath and PFM   3# weight  x2 min         Seated march with PFM and coordination of breath    3# weights x2 min                               Ther Ex           Warm-up    NuStep level 5 8 min         Lateral walking with TB     Pink TB 10'x2 (needing min A for balance)        Diagonal walking with TB                                                                  Ther Activity                                 Manual           PFM Exam  Completed-                                                       Modalities

## 2023-04-04 ENCOUNTER — OFFICE VISIT (OUTPATIENT)
Age: 73
End: 2023-04-04

## 2023-04-04 DIAGNOSIS — R10.2 PELVIC PAIN: ICD-10-CM

## 2023-04-04 DIAGNOSIS — N39.3 SUI (STRESS URINARY INCONTINENCE), MALE: ICD-10-CM

## 2023-04-04 DIAGNOSIS — C61 PROSTATE CANCER (HCC): Primary | ICD-10-CM

## 2023-04-04 DIAGNOSIS — Z90.79 STATUS POST PROSTATECTOMY: ICD-10-CM

## 2023-04-04 DIAGNOSIS — R10.2 PERINEAL PAIN IN MALE: ICD-10-CM

## 2023-04-04 DIAGNOSIS — N39.45 CONTINUOUS LEAKAGE OF URINE: ICD-10-CM

## 2023-04-20 ENCOUNTER — HOSPITAL ENCOUNTER (EMERGENCY)
Facility: HOSPITAL | Age: 73
Discharge: HOME | End: 2023-04-20
Attending: EMERGENCY MEDICINE | Admitting: EMERGENCY MEDICINE

## 2023-04-20 VITALS
DIASTOLIC BLOOD PRESSURE: 113 MMHG | TEMPERATURE: 98.2 F | BODY MASS INDEX: 35.44 KG/M2 | RESPIRATION RATE: 18 BRPM | HEART RATE: 76 BPM | WEIGHT: 247 LBS | OXYGEN SATURATION: 97 % | SYSTOLIC BLOOD PRESSURE: 152 MMHG

## 2023-04-20 DIAGNOSIS — M10.9 GOUT ATTACK: Primary | ICD-10-CM

## 2023-04-20 LAB — GLUCOSE SERPL-MCNC: 102 MG/DL (ref 65–140)

## 2023-04-20 RX ORDER — ALLOPURINOL 100 MG/1
100 TABLET ORAL DAILY
Qty: 20 TABLET | Refills: 0 | Status: SHIPPED | OUTPATIENT
Start: 2023-04-20

## 2023-04-20 RX ORDER — TRAMADOL HYDROCHLORIDE 50 MG/1
50 TABLET ORAL EVERY 6 HOURS PRN
Qty: 16 TABLET | Refills: 0 | Status: SHIPPED | OUTPATIENT
Start: 2023-04-20 | End: 2023-04-24

## 2023-04-20 RX ORDER — ACETAMINOPHEN 325 MG/1
650 TABLET ORAL ONCE
Status: DISCONTINUED | OUTPATIENT
Start: 2023-04-20 | End: 2023-04-20 | Stop reason: HOSPADM

## 2023-04-20 RX ORDER — ALLOPURINOL 100 MG/1
100 TABLET ORAL DAILY
Status: DISCONTINUED | OUTPATIENT
Start: 2023-04-20 | End: 2023-04-20 | Stop reason: HOSPADM

## 2023-04-20 RX ORDER — PREDNISONE 20 MG/1
60 TABLET ORAL ONCE
Status: COMPLETED | OUTPATIENT
Start: 2023-04-20 | End: 2023-04-20

## 2023-04-20 RX ORDER — PREDNISONE 10 MG/1
TABLET ORAL
Qty: 38 TABLET | Refills: 0 | Status: SHIPPED | OUTPATIENT
Start: 2023-04-20

## 2023-04-20 RX ORDER — PREDNISONE 10 MG/1
TABLET ORAL
Qty: 38 TABLET | Refills: 0 | Status: SHIPPED | OUTPATIENT
Start: 2023-04-20 | End: 2023-04-20 | Stop reason: SDUPTHER

## 2023-04-20 RX ADMIN — PREDNISONE 60 MG: 20 TABLET ORAL at 01:21

## 2023-04-20 NOTE — ED PROVIDER NOTES
History  Chief Complaint   Patient presents with   • Gout Pain     States gout flare up on L foot for about 1 week  Complaining of pain and swelling  Patient is a 64-year-old male with a history of hypertension, a flutter on Xarelto, hyperlipidemia coming in today complaining of persistent gout  Patient states that he has had a diet change in the past several months  Had 1-2 alcoholic drinks greater than 2 to 3 weeks ago  He reports that his gout flare started on his left toe approximately 9 days ago  He was taking colchicine  He then went to see his family doctor which placed him on Keflex  Patient states that he has been taking this but does not believe it is infected  He did take 2 oxycodone's at home with no relief  States he is just very frustrated that is been going on this length of time and he cannot see a specialist for it  He has no trauma, no injury, no shortness of breath or palpitations  Patient does state that his PCP had told him to stop atorvastatin and restart his colchicine which she has been doing  He also reports that he has been off his allopurinol      History provided by:  Patient and medical records  Toe Pain  Location:  Left great toe  Severity:  Moderate  Onset quality:  Gradual  Timing:  Constant  Progression:  Waxing and waning  Chronicity:  Recurrent  Associated symptoms: no abdominal pain, no chest pain, no cough, no ear pain, no fever, no rash, no shortness of breath, no sore throat and no vomiting            Prior to Admission Medications   Prescriptions Last Dose Informant Patient Reported?  Taking?   atorvastatin (LIPITOR) 20 mg tablet   No No   Sig: TAKE 1 TABLET BY MOUTH EVERY DAY   cephalexin (KEFLEX) 500 mg capsule   No No   Sig: Take 1 capsule (500 mg total) by mouth every 8 (eight) hours for 10 days   metoprolol succinate (TOPROL-XL) 50 mg 24 hr tablet   No No   Sig: TAKE 1 TABLET BY MOUTH EVERY DAY   rivaroxaban (Xarelto) 20 mg tablet   No No   Sig: Take 1 tablet (20 mg total) by mouth daily with breakfast   valsartan-hydrochlorothiazide (DIOVAN-HCT) 160-12 5 MG per tablet   No No   Sig: Take 1 tablet by mouth daily      Facility-Administered Medications: None       Past Medical History:   Diagnosis Date   • Acute gouty arthritis     last assessed 6/1/13    • Anxiety     last assessed 7/11/14    • Atrial flutter ([facility] [address] ) 1/22/2021   • BPH (benign prostatic hyperplasia)    • Chronic thoracic spine pain     last assessed 5/13/16    • CPAP (continuous positive airway pressure) dependence    • Dysfunction of both eustachian tubes     last assessed 8/16/13   • Erectile dysfunction of non-organic origin     last assessed 10/8/13    • Family history reviewed with no changes     medical history    • Gout    • Hyperlipidemia    • Hypertension    • Prostate cancer ([facility] ) 10/25/2022   • Sebaceous cyst     last assessed 12/16/13    • Skin lesion     last assessed 1/2/14    • Sleep apnea    • Subclinical hypothyroidism 03/21/2019       Past Surgical History:   Procedure Laterality Date   • APPENDECTOMY      12years old   • BOWEL RESECTION  2010    Sigmoid resection for diverticulitis   • CATARACT EXTRACTION     • CERVICAL FUSION N/A 08/27/2019    Procedure: Anterior cervical discectomy w fusion C5-6, with allograft and neuromonitoring;  Surgeon: Mihir Melendez MD;  Location: [facility];  Service: Orthopedics   • COLONOSCOPY  02/2018    Dr Enma Davies  Tubular adenoma polyps removed from the mid ascending colon, hyperplastic rectal polyp removed, evidence of end to end anastomosis in sigmoid with healthy mucosa  • COLONOSCOPY  2012    Dr Enma Davies  Tubular adenoma polyp removed from right colon  • COLONOSCOPY  2004    Small rectal polyp   • COLONOSCOPY  2002    Polyp ablated   • COLONOSCOPY  2001    Polyps removed  Dr Enma Davies     • MASTECTOMY Bilateral    • MOUTH SURGERY      tooth extraction with dental implant   • WY BX PROSTATE STRTCTC SATURATION SAMPLING IMG GID N/A 10/18/2022 Procedure: TRANSPERINEAL MRI FUSION  BIOPSY PROSTATE;  Surgeon: Claudeen Pretzel, MD;  Location: [facility];  Service: Urology   • VA LAPS SURG [id number] RPBIC RAD W/NRV SPARING ROBOT N/A 2023    Procedure: PROSTATECTOMY RADICAL AND PELVIC LYMPH NODE DISSECTION LAPAROSCOPIC W/ ROBOTICS; LYSIS OF AHDESIONS;  Surgeon: Claudeen Pretzel, MD;  Location: [facility];  Service: Urology   • SKIN BIOPSY      left cheek   • WRIST SURGERY         Family History   Problem Relation Age of Onset   • Hypertension Mother    • Eczema Father    • Heart attack Father    • Diabetes Sister    • Psoriasis Sister    • Hypertension Sister    • Diabetes Family    • Hypertension Family    • Breast cancer Daughter      I have reviewed and agree with the history as documented  E-Cigarette/Vaping   • E-Cigarette Use Never User      E-Cigarette/Vaping Substances   • Nicotine No    • THC No    • CBD No    • Flavoring No    • Other No    • Unknown No      Social History     Tobacco Use   • Smoking status: Former     Packs/day: 0 50     Years: 20 00     Pack years: 10 00     Types: Cigarettes     Start date:      Quit date:      Years since quittin 3   • Smokeless tobacco: Never   Vaping Use   • Vaping Use: Never used   Substance Use Topics   • Alcohol use: Yes     Alcohol/week: 1 0 - 2 0 standard drink     Types: 1 - 2 Standard drinks or equivalent per week   • Drug use: No       Review of Systems   Constitutional: Negative  Negative for chills and fever  HENT: Negative  Negative for ear pain and sore throat  Eyes: Negative for pain and visual disturbance  Respiratory: Negative  Negative for cough and shortness of breath  Cardiovascular: Negative  Negative for chest pain and palpitations  Gastrointestinal: Negative  Negative for abdominal pain and vomiting  Genitourinary: Negative for dysuria and hematuria  Musculoskeletal: Negative for arthralgias and back pain          Left great toe pain     Skin: Negative  Negative for color change and rash  Neurological: Negative  Negative for seizures and syncope  Hematological: Negative  Psychiatric/Behavioral: Negative  All other systems reviewed and are negative  Physical Exam  Physical Exam  Vitals and nursing note reviewed  Constitutional:       General: He is not in acute distress  Appearance: He is well-developed  HENT:      Head: Normocephalic and atraumatic  Comments: Patient maintaining airway and secretions  No stridor   No brawniness under tongue  Eyes:      Extraocular Movements: Extraocular movements intact  Pupils: Pupils are equal, round, and reactive to light  Cardiovascular:      Pulses:           Dorsalis pedis pulses are 2+ on the right side and 2+ on the left side  Pulmonary:      Effort: Pulmonary effort is normal  No respiratory distress  Comments: No conversational dyspnea  Musculoskeletal:         General: No swelling  Cervical back: Neck supple  Feet:    Skin:     General: Skin is warm and dry  Capillary Refill: Capillary refill takes less than 2 seconds  Neurological:      General: No focal deficit present  Mental Status: He is alert and oriented to person, place, and time  GCS: GCS eye subscore is 4  GCS verbal subscore is 5  GCS motor subscore is 6  Cranial Nerves: Cranial nerves 2-12 are intact  Sensory: Sensation is intact  Motor: Motor function is intact  Coordination: Coordination is intact     Psychiatric:         Mood and Affect: Mood normal          Vital Signs  ED Triage Vitals   Temperature Pulse Respirations Blood Pressure SpO2   04/20/23 0044 04/20/23 0043 04/20/23 0043 04/20/23 0043 04/20/23 0043   98 2 °F (36 8 °C) 76 18 (!) 152/113 97 %      Temp Source Heart Rate Source Patient Position - Orthostatic VS BP Location FiO2 (%)   04/20/23 0044 04/20/23 0043 04/20/23 0043 04/20/23 0043 --   Oral Monitor Sitting Left arm       Pain Score "  04/20/23 0043       4           Vitals:    04/20/23 0043   BP: (!) 152/113   Pulse: 76   Patient Position - Orthostatic VS: Sitting         Visual Acuity      ED Medications  Medications   allopurinol (ZYLOPRIM) tablet 100 mg (has no administration in time range)   acetaminophen (TYLENOL) tablet 650 mg (650 mg Oral Not Given 4/20/23 0122)   predniSONE tablet 60 mg (60 mg Oral Given 4/20/23 0121)       Diagnostic Studies  Results Reviewed     Procedure Component Value Units Date/Time    Fingerstick Glucose (POCT) [[id number]]  (Normal) Collected: 04/20/23 0111    Lab Status: Final result Updated: 04/20/23 0112     POC Glucose 102 mg/dl                  No orders to display              Procedures  Procedures         ED Course  ED Course as of 04/20/23 0131   Thu Apr 20, 2023 0113 Patient is a 72-year-old male coming in today for pain due to his gout  On exam patient does have a red swollen first metatarsal on the left side consistent with gout  Will review chart, long discussion with patient that we cannot give narcotics while he is driving  Cannot give NSAIDs as he is on anticoagulation  Will give dose of prednisone as well as allopurinol  Disclosure: Voice to text software was used in the preparation of this document and could have resulted in translational errors       Occasional wrong word or \"sound a like\" substitutions may have occurred due to the inherent limitations of voice recognition software   Read the chart carefully and recognize, using context, where substitutions have occurred        I have independently reviewed external records are available to me to the level of detail possible within the time constraints of my patient care responsibilities in the ED                                                  Medical Decision Making  Patient 72-year-old male with a persistent gout flare that is been on day 9  He is already been on colchicine as well as off his atorvastatin    Patient is on Xarelto " "due to a flutter  Therefore we cannot give him NSAIDs  Discussed with him at length this patient is very frustrated that he cannot get into rheumatology and that the pain is persisting  He does have OxyContin's at home which she states did not help  Discussed with him that we will restart allopurinol and give him a burst of prednisone  He was asking for intra-articular injections and I discussed with him that is beyond our scope of practice  He wanted something to [facility] the crystals\"  Discussed with him I cannot give him narcotics or any medications that could be sedating as he was driving  His fingerstick glucose was within normal limits  Will give Medrol Dosepak  Discussed with him to remain on the Keflex as prescribed by his PCP  Put ambulatory referral in for podiatry  He does not have appointment with rheumatology in May  Gout attack:     Details: Acute on chronic  Amount and/or Complexity of Data Reviewed  Labs: ordered  Decision-making details documented in ED Course  Details: Glucose within normal limits      Risk  OTC drugs  Prescription drug management  Disposition  Final diagnoses:   Gout attack     Time reflects when diagnosis was documented in both MDM as applicable and the Disposition within this note     Time User Action Codes Description Comment    4/20/2023  1:17 AM Hector Jose Add [M10 9] Gout attack       ED Disposition     ED Disposition   Discharge    Condition   Stable    Date/Time   Thu Apr 20, 2023  1:16 AM    Comment   Gonzalez Pierson discharge to home/self care                 Follow-up Information     Follow up With Specialties Details Why Contact Info Additional Information    Paolo Spine, DO Family Medicine Schedule an appointment as soon as possible for a visit in 1 week  [address]  [facility] Podiatry Schedule an appointment as soon as possible for a visit in 3 days  [address]  [facility] [address] [phone]  [address] [facility] [address], [address], [place], [place], [address] [facility] Rheumatology [facility] Rheumatology Schedule an appointment as soon as possible for a visit in 1 week  [address]  [address] [id number] [facility], [address], [address], [place], [place], [id number] [phone]          Patient's Medications   Discharge Prescriptions    ALLOPURINOL (ZYLOPRIM) 100 MG TABLET    Take 1 tablet (100 mg total) by mouth daily       Start Date: 4/20/2023 End Date: --       Order Dose: 100 mg       Quantity: 20 tablet    Refills: 0    PREDNISONE 10 MG TABLET    6 tabs daily for 4 days then, 4 tabs daily for 2 days then, 2 tabs daily for 2 days then, 1 tab daily 2 days       Start Date: 4/20/2023 End Date: --       Order Dose: --       Quantity: 38 tablet    Refills: 0    TRAMADOL (ULTRAM) 50 MG TABLET    Take 1 tablet (50 mg total) by mouth every 6 (six) hours as needed for moderate pain for up to 4 days       Start Date: 4/20/2023 End Date: 4/24/2023       Order Dose: 50 mg       Quantity: 16 tablet    Refills: 0           PDMP Review     None          ED Provider  Electronically Signed by           Cindy Alegria DO  04/20/23 0131

## 2023-04-20 NOTE — PROGRESS NOTES
"Daily Note     Today's date: 2023  Patient name: Silvia Bush  : 1950  MRN: [id number]  Referring provider: Amber Moise  Dx:   Encounter Diagnosis     ICD-10-CM    1  Prostate cancer ([facility] [address] )  C61       2  Continuous leakage of urine  N39 45       3  INOCENCIA (stress urinary incontinence), male  N39 3       4  Status post prostatectomy  Z90 79       5  Perineal pain in male  R10 2       6  Pelvic pain  R10 2           Start Time: 930  Stop Time:   Total time in clinic (min): 60 minutes    Subjective: Pt reports perineal pain is fully resolved  No longer has pain with sitting  He is still having UI  Foot is doing better- went to ED due to gout  Has been less consistent with HEP due to this  He is wearing flip flops due to unable to tolerate sneakers/shoes from gout- he is unsteady and recognizes that flip flops are not the best shoe choice for safety  History of Present Illness:   Silvia Bush is a 68 y o  male who prefers he/him pronouns  They present to pelvic floor physical therapy with the primary complaint of UI s/p prostate cancer and treatment for prostate cancer  They are referred to OPPT by Kim Kaufman PA-C from Urology  Abelardo Triana reports he has prostate removed   He still has pain which limits his sitting and his incontinence is severe  He has gross UI described as \"streaming out\"  He thinks the urine constantly comes out- but is worse with movements  Getting OOB he swings his legs around and then the urine comes out  By the time he gets to the toilet there is about 3 seconds of stream worth of urine left  He is showering and limiting community activity because he is self conscious of the smell of urine  He was told to do kegals and it seemed to get better  But he has pain in his buttocks and at perineal body- has trouble sitting on hard surfaces  The kegals cause him more pain  He did do some research on how to properly do kegals    He stopped doing them " "until he came here and the UI went from a little better to significantly worse  He has significant UI getting out of his truck- feels it constantly running out  He has UI on stairs as well  During the day sometimes he gets the urge to void but then he will go to the bathroom and will have difficulty getting anything out  He wears a \"diaper\" pad and underwear  He wears 4-5 pads a day  1-2 pullups a day  At night he does not wear a pad but uses an old towel in his underwear  When he wakes up in the AM the towel is pretty soaked  Goals  STG to be completed in 8 weeks from 3/20/2023:  1  Gonzalo Fairbanks will be independent with initial home exercise program    2  Gonzalo Fairbanks will demonstrate ability to contract, relax and actively lengthen PFM  3  Gonzalo Fairbanks will report 25% improvement in UI  4  Gonzalo Fairbanks will report pain no greater than 4/10 with all functional activity to allow Gonzalo Fairbanks to return to prior level of function  5  Gonzalo Fairbanks will demonstrate good management of IAP with functional movements  LTG to be completed in 12 weeks from 3/20/2023 or upon discharge from PFPT:  1  Gonzalo Fairbanks will be independent with advanced home exercise program for self-management of symptoms  2  Gonzalo Fairbanks will demonstrate ability to appropriately activate deep core muscles with functional mobility tasks  3  Gonzalo Fairbanks report 50% improvement or better in UI  4  Gonzalo Fairbanks will be able to wear 3 or less pad or liner to decrease risk of urinary tract infection  5  Gonzalo Fairbanks will report pain no greater than 2/10 with all functional activity to allow Gonzalo Fairbanks to return to prior level of function  6  Gonzalo Fairbanks will have bristol type 3-5 BMs 75% of the time or more to indicate improved bowel function  Objective: See treatment diary below      Assessment: Gary Burnham tolerated today's treatment session well    Patient education provided on pressure management and progression of POC and TE    Gonzalo Fiarbanks completed all TE with good form " and no adverse reactions  Pt has had resolution of pelvic/perineal pain, but continues with UI- worse with movement and poor awareness  Lyric Montano continues to benefit from skilled OPPT services to address urinary incontinence   Will continue to address functional deficits and focus on progression of POC per patient tolerance  Patient performed Nustep to increase blood flow to the area being treated, prepare the muscles for strength training and stretching, improve overall tolerance to activity, and aerobic endurance  Plan: Continue per plan of care  Progress treatment as tolerated  Precautions: standard, prostate CA, s/p prostatectomy Jan 2023    Access Code: [email or web address]  URL: [email or web address]  Date: 04/24/2023  Prepared by: Reji Jordan    Exercises  - Pelvic Floor Contraction with Exhale  - 1 x daily  - Bridge with Pelvic Floor Contraction  - 1 x daily - 1 sets - 10-20 reps  - Sit to Stand with Pelvic Floor Contraction  - 1 x daily - 1 sets - 10 reps  - Diaphragmatic Breathing to Reduce Intra-abdominal Pressure: Supine to Sit  - 2 x daily - 2 sets - 10 reps  - Sidestepping with resistance  - 1 x daily - 2 sets  - Long Arc with Pelvic Floor Contraction  - 1 x daily - 2 sets - 10 reps  - Seated March with Pelvic Floor Contraction   - 1 x daily - 2 sets - 10 reps  - Active Straight Leg Raise with Quad Set  - 2 x daily - 1 sets - 10 reps  - Heel Raise with Pelvic Floor Contraction   - 1 x daily - 1 sets - 20 reps  - Step up with Pelvic Floor Contraction  - 1 x daily - 2 sets - 10 reps  - Seated Pelvic Floor Contraction with Isometric Hip Adduction  - 1 x daily - 1 sets - 10 reps  - Seated Pelvic Floor Contraction with Hip Abduction and Resistance Loop  - 1 x daily - 1 sets - 10 reps  - Standing Bicep Curl with Pelvic Floor Contraction  - 2 x daily - 2 sets - 10 reps     3/20/2023 3/28/2023 4/4/2023 4/10/2023 4/17/2023 4/24/2023     Visit Number: #1 #2 HEP updated #3 HEP updated  #4 HEP "Updated  #5 #6 #7 #8   Patient Ed           PFM anatomy and function KH          Timed voiding  Q1 hour           Toilet posture for BM KH          Soda can pressure model   Explained at length          Bed mobility   Log roll and pressure management  Discussed positioning in bed with towel/pillow/blanket to support abdomen in sidelying         Hypertrophy vs NM efficiency     KH                   Neuro Re-Ed           STS with exhale --> PFM  x10  x10  x15   x15     Step tap with PFM   1 min  Step up 4\" x10 each LE        DB with PFM  5 min (focus on no throat breathing- exhale front of mouth) 2 min  2 min  2 min       Push/pull with PFM            DB with bridge   2 min  2 min  2 min  2 min  2 min      DB   2 min 360* breathing  HEP        Seated LAQ with coordination of breath and PFM   3# weight  x2 min  3# weight  x2 min  3# weight  x2 min  (hands in lap) 3# weight  x2 min  (hands in lap)     Seated march with PFM and coordination of breath    3# weights x2 min  3# weights x2 min  3# weight  x2 min  (hands in lap) 3# weight  x2 min  (hands in lap)     HR with PFM     1 min        SLR with PFM and exhale     30\" each LE  1 min each LE (cues for TrA activation and avoid coning) 1 min each LE (cues for TrA activation and avoid coning)                                        Ther Ex           Warm-up    NuStep level 5 8 min  NuStep level 4 8 min   NuStep level 3 8 min      Lateral walking with TB     Pink TB 10'x2 (needing min A for balance)        Diagonal walking with TB           UBE with PFM Contraction    Hold PFM contraction forward 4'/4' level 1 Seated due to gout 4'/4' hold PFM contraction while forward  Seated due to gout 4'/4' hold PFM contraction while forward      Hooklying Hip add with PFM     10\" hold, 5\" rest 2 min  10\" hold 5\" rest 2 min      Hooklying Hip abd with PFM      10\" hold 5\" rest 2 min  10\" hold 5\" rest 2 min      Bicep curl with PFM contraction       5# weights x15                Ther " Activity                                 Manual           PFM Exam  Completed- [address]                                                      Modalities

## 2023-04-24 ENCOUNTER — OFFICE VISIT (OUTPATIENT)
Age: 73
End: 2023-04-24

## 2023-04-24 DIAGNOSIS — R10.2 PELVIC PAIN: ICD-10-CM

## 2023-04-24 DIAGNOSIS — N39.45 CONTINUOUS LEAKAGE OF URINE: ICD-10-CM

## 2023-04-24 DIAGNOSIS — R10.2 PERINEAL PAIN IN MALE: ICD-10-CM

## 2023-04-24 DIAGNOSIS — Z90.79 STATUS POST PROSTATECTOMY: ICD-10-CM

## 2023-04-24 DIAGNOSIS — N39.3 SUI (STRESS URINARY INCONTINENCE), MALE: ICD-10-CM

## 2023-04-24 DIAGNOSIS — C61 PROSTATE CANCER (HCC): Primary | ICD-10-CM

## 2023-05-02 ENCOUNTER — APPOINTMENT (OUTPATIENT)
Age: 73
End: 2023-05-02

## 2023-05-02 DIAGNOSIS — R73.9 ELEVATED BLOOD SUGAR: ICD-10-CM

## 2023-05-02 DIAGNOSIS — I10 BENIGN ESSENTIAL HYPERTENSION: ICD-10-CM

## 2023-05-02 DIAGNOSIS — E03.8 SUBCLINICAL HYPOTHYROIDISM: ICD-10-CM

## 2023-05-02 LAB
ALBUMIN SERPL BCP-MCNC: 3.6 G/DL (ref 3.5–5)
ALP SERPL-CCNC: 75 U/L (ref 46–116)
ALT SERPL W P-5'-P-CCNC: 38 U/L (ref 12–78)
ANION GAP SERPL CALCULATED.3IONS-SCNC: 7 MMOL/L (ref 4–13)
AST SERPL W P-5'-P-CCNC: 20 U/L (ref 5–45)
BILIRUB SERPL-MCNC: 1.04 MG/DL (ref 0.2–1)
BUN SERPL-MCNC: 17 MG/DL (ref 5–25)
CALCIUM SERPL-MCNC: 9.6 MG/DL (ref 8.3–10.1)
CHLORIDE SERPL-SCNC: 105 MMOL/L (ref 96–108)
CO2 SERPL-SCNC: 26 MMOL/L (ref 21–32)
CREAT SERPL-MCNC: 1.1 MG/DL (ref 0.6–1.3)
GFR SERPL CREATININE-BSD FRML MDRD: 66 ML/MIN/1.73SQ M
GLUCOSE P FAST SERPL-MCNC: 90 MG/DL (ref 65–99)
POTASSIUM SERPL-SCNC: 3.9 MMOL/L (ref 3.5–5.3)
PROT SERPL-MCNC: 7 G/DL (ref 6.4–8.4)
SODIUM SERPL-SCNC: 138 MMOL/L (ref 135–147)
TSH SERPL DL<=0.05 MIU/L-ACNC: 4.38 UIU/ML (ref 0.45–4.5)

## 2023-05-04 NOTE — PROGRESS NOTES
Daily Note/Discharge Summary      Today's date: 2023  Patient name: Shayy Lozada  : 1950  MRN: [id number]  Referring provider: Irma Longoria  Dx:   Encounter Diagnosis     ICD-10-CM    1  Prostate cancer ([facility] [place] [address] )  C61       2  Continuous leakage of urine  N39 45       3  INOCENCIA (stress urinary incontinence), male  N39 3       4  Status post prostatectomy  Z90 79       5  Perineal pain in male  R10 2       6  Pelvic pain  R10 2           Start Time: 830  Stop Time: 920  Total time in clinic (min): 50 minutes    Subjective: Pt reports he has a significant improvement in UI  Pads are not saturated in the AM when he wakes up  Still leaks on the stairs- does feel urine come out  Has a lot of UI with bending over at waits  He is aware of the pressure management  He continues to have intentional weight loss to help with this  His goal is under 200 and has done an entire lifestyle change  His foot improved from gout but the soreness and swelling has returned  He is now having symptoms in the opposite foot and the right knuckles  Goals  STG to be completed in 8 weeks from 3/20/2023:  1  Abby Drought will be independent with initial home exercise program MET 2023    2  Abby Drought will demonstrate ability to contract, relax and actively lengthen PFM  MET 2023   3  Abby Drought will report 25% improvement in UI  MET 2023 (25-30%)  4  Abby Drought will report pain no greater than 4/10 with all functional activity to allow Abby Shell to return to prior level of function  MET 2023   5  Abby Drought will demonstrate good management of IAP with functional movements  MET 2023     LTG to be completed in 12 weeks from 3/20/2023 or upon discharge from PFPT:  1  Abby Drought will be independent with advanced home exercise program for self-management of symptoms  PROGRESSING 2023   2  Abby Drought will demonstrate ability to appropriately activate deep core muscles with functional mobility tasks   PROGRESSING 2023 "  3  Victor Hugo Stanford report 50% improvement or better in UI  PROGRESSING 2023 (25-30% improvement)  4  Victor Hugo Stanford will be able to wear 3 or less pad or liner to decrease risk of urinary tract infection  MET 2023 (2 and sometimes 3 per day)  5  Victor Hugo Stanford will report pain no greater than 2/10 with all functional activity to allow Victor Hugo Stanford to return to prior level of function  MET 2023   6  Victor Hugo Stanford will have bristol type 3-5 BMs 75% of the time or more to indicate improved bowel function   MET 2023     Bladder Function:     Urgency: is not getting up and trying to use the bathroom  --> is now voiding in toilet       Urinary leakage aggravated by:   Coughing --> still occurs    Sneezing --> still occurs    Bending --> still occurs     standing up --> less amount - is able to prepare and to PFM contraction    walking to the bathroom --> about the same- still occurs with walking into bathroom      Nocturia (episodes per night): 0 --> same      Painful urination: No  --> same      Incontinence Management:     Pads/Diaper Use:  24 hours --> typically 2 pads per day, 3 at most        Bowel Function:     Bowel Function comments:  Now stools are thin like a pencil - this is new to him - they used to be thicker in diameter --> returned to normals      Bowel frequency: daily (used to have 2-3 a day ) --> 2 x per day      Cloud Stool Scale: type 4 --> type 3     Stool softener use: stool softeners (occascional use ) --> none - getting fiber in diet       Pain:     Current pain rating:  3 --> 0    At best pain rating:  3 --> 0      At worst pain ratin --> 0     Location:  Penile, perineal body, and rectum --> resolved      Quality:  Dull ache (\"like a bad bruise with some sharp stabbing\" sometimes itching ) --> revolved      Exacerbated by: prolonged sitting but also random --> resolved      Duration of symptoms:  Brief --> resolved      Relieving factors:  Change in position --> resolved      Objective: See treatment " diary below      Assessment: Ambrocio Music has had improvement in symptoms since starting PFPT  Ambrocio Music has decreased INOCENCIA and urinary incontinence and is beginning to increase his activity level without leakage  He conitnues to have passive UI- anticipate this is due to poor function of internal urethral sphincter  Ambrocio Music is independent with managing symptoms at home and is appropriate for discharge at this time  Ambrocio Music agreeable to this  Ambroico Music encouraged to call this office if they has any questions  Plan: Continue per plan of care  Progress treatment as tolerated  Precautions: standard, prostate CA, s/p prostatectomy Jan 2023    Access Code: [email or web address]  URL: [email or web address]  Date: 05/08/2023  Prepared by: Xiomy Ramos    Exercises  - Pelvic Floor Contraction with Exhale  - 1 x daily  - Bridge with Pelvic Floor Contraction  - 1 x daily - 1 sets - 10-20 reps  - Sit to Stand with Pelvic Floor Contraction  - 1 x daily - 1 sets - 10 reps  - Diaphragmatic Breathing to Reduce Intra-abdominal Pressure: Supine to Sit  - 2 x daily - 2 sets - 10 reps  - Sidestepping with resistance  - 1 x daily - 2 sets  - Long Arc with Pelvic Floor Contraction  - 1 x daily - 2 sets - 10 reps  - Seated March with Pelvic Floor Contraction   - 1 x daily - 2 sets - 10 reps  - Active Straight Leg Raise with Quad Set  - 2 x daily - 1 sets - 10 reps  - Heel Raise with Pelvic Floor Contraction   - 1 x daily - 1 sets - 20 reps  - Step up with Pelvic Floor Contraction  - 1 x daily - 2 sets - 10 reps  - Seated Pelvic Floor Contraction with Isometric Hip Adduction  - 1 x daily - 1 sets - 10 reps  - Seated Pelvic Floor Contraction with Hip Abduction and Resistance Loop  - 1 x daily - 1 sets - 10 reps  - Standing Bicep Curl with Pelvic Floor Contraction  - 2 x daily - 2 sets - 10 reps  - Pelvic Floor Contraction with Staggered Stance  - 1 x daily - 2 sets - 10 reps - 10 hold  - Mini "Squat with Pelvic Floor Contraction  - 2 x daily - 2 sets - 10 reps  - Standing Hip Abduction with Counter Support and Pelvic Floor  - 2 x daily - 2 sets - 10 reps  - Standing Hip Extension with Counter Support and Pelvic Floor   - 2 x daily - 2 sets - 10 reps     3/20/2023 3/28/2023 4/4/2023 4/10/2023 4/17/2023 4/24/2023 5/8/2023     Visit Number: #1 #2 HEP updated #3 HEP updated  #4 HEP Updated  #5 #6 #7 #8   Patient Ed           PFM anatomy and function [address]      Internal vs external urethral sphincter     Timed voiding  Q1 hour           Toilet posture for BM Hassel Na can pressure model   Explained at length          Bed mobility   Log roll and pressure management  Discussed positioning in bed with towel/pillow/blanket to support abdomen in sidelying         Hypertrophy vs NM efficiency     KH        Wheeler clamp vs condom cath        [address]                                      Neuro Re-Ed           STS with exhale --> PFM  x10  x10  x15   x15     Step tap with PFM   1 min  Step up 4\" x10 each LE        DB with PFM  5 min (focus on no throat breathing- exhale front of mouth) 2 min  2 min  2 min       Push/pull with PFM            DB with bridge   2 min  2 min  2 min  2 min  2 min      DB   2 min 360* breathing  HEP        Seated LAQ with coordination of breath and PFM   3# weight  x2 min  3# weight  x2 min  3# weight  x2 min  (hands in lap) 3# weight  x2 min  (hands in lap)     Seated march with PFM and coordination of breath    3# weights x2 min  3# weights x2 min  3# weight  x2 min  (hands in lap) 3# weight  x2 min  (hands in lap)     HR with PFM     1 min        SLR with PFM and exhale     30\" each LE  1 min each LE (cues for TrA activation and avoid coning) 1 min each LE (cues for TrA activation and avoid coning)       PFM contraction in staggered stance       8\" step 10\" hold x5 each LE    Standing hip abd and ext with PFM and exhale        x10 each, each LE     Squat with PFM contraction and exhale   " "    x10                                      Ther Ex           Warm-up    NuStep level 5 8 min  NuStep level 4 8 min   NuStep level 3 8 min      Lateral walking with TB     Pink TB 10'x2 (needing min A for balance)        Diagonal walking with TB           UBE with PFM Contraction    Hold PFM contraction forward 4'/4' level 1 Seated due to gout 4'/4' hold PFM contraction while forward  Seated due to gout 4'/4' hold PFM contraction while forward      Hooklying Hip add with PFM     10\" hold, 5\" rest 2 min  10\" hold 5\" rest 2 min      Hooklying Hip abd with PFM      10\" hold 5\" rest 2 min  10\" hold 5\" rest 2 min      Bicep curl with PFM contraction       5# weights x15                Ther Activity                                 Manual           PFM Exam  Completed- KH                                                      Modalities                                                    "

## 2023-05-08 ENCOUNTER — OFFICE VISIT (OUTPATIENT)
Age: 73
End: 2023-05-08

## 2023-05-08 ENCOUNTER — APPOINTMENT (OUTPATIENT)
Dept: RADIOLOGY | Facility: CLINIC | Age: 73
End: 2023-05-08

## 2023-05-08 ENCOUNTER — OFFICE VISIT (OUTPATIENT)
Dept: FAMILY MEDICINE CLINIC | Facility: CLINIC | Age: 73
End: 2023-05-08

## 2023-05-08 VITALS
WEIGHT: 237 LBS | OXYGEN SATURATION: 99 % | TEMPERATURE: 97.5 F | SYSTOLIC BLOOD PRESSURE: 120 MMHG | HEART RATE: 84 BPM | BODY MASS INDEX: 33.93 KG/M2 | DIASTOLIC BLOOD PRESSURE: 78 MMHG | RESPIRATION RATE: 16 BRPM | HEIGHT: 70 IN

## 2023-05-08 DIAGNOSIS — E03.8 SUBCLINICAL HYPOTHYROIDISM: ICD-10-CM

## 2023-05-08 DIAGNOSIS — M10.9 GOUT ATTACK: ICD-10-CM

## 2023-05-08 DIAGNOSIS — N39.45 CONTINUOUS LEAKAGE OF URINE: ICD-10-CM

## 2023-05-08 DIAGNOSIS — N39.3 SUI (STRESS URINARY INCONTINENCE), MALE: ICD-10-CM

## 2023-05-08 DIAGNOSIS — Z90.79 STATUS POST PROSTATECTOMY: ICD-10-CM

## 2023-05-08 DIAGNOSIS — E66.09 CLASS 1 OBESITY DUE TO EXCESS CALORIES WITH SERIOUS COMORBIDITY AND BODY MASS INDEX (BMI) OF 34.0 TO 34.9 IN ADULT: ICD-10-CM

## 2023-05-08 DIAGNOSIS — M10.9 GOUT ATTACK: Primary | ICD-10-CM

## 2023-05-08 DIAGNOSIS — C61 PROSTATE CANCER (HCC): ICD-10-CM

## 2023-05-08 DIAGNOSIS — R10.2 PELVIC PAIN: ICD-10-CM

## 2023-05-08 DIAGNOSIS — G47.33 OSA (OBSTRUCTIVE SLEEP APNEA): ICD-10-CM

## 2023-05-08 DIAGNOSIS — F10.90 HABITUAL ALCOHOL USE: ICD-10-CM

## 2023-05-08 DIAGNOSIS — Z98.890 H/O ABDOMINAL SURGERY: ICD-10-CM

## 2023-05-08 DIAGNOSIS — R73.9 ELEVATED BLOOD SUGAR: ICD-10-CM

## 2023-05-08 DIAGNOSIS — R10.2 PERINEAL PAIN IN MALE: ICD-10-CM

## 2023-05-08 DIAGNOSIS — C61 PROSTATE CANCER (HCC): Primary | ICD-10-CM

## 2023-05-08 DIAGNOSIS — E78.2 COMBINED HYPERLIPIDEMIA: ICD-10-CM

## 2023-05-08 DIAGNOSIS — I10 BENIGN ESSENTIAL HYPERTENSION: ICD-10-CM

## 2023-05-08 PROBLEM — E66.9 OBESITY (BMI 35.0-39.9 WITHOUT COMORBIDITY): Status: RESOLVED | Noted: 2019-11-11 | Resolved: 2023-05-08

## 2023-05-08 LAB
EST. AVERAGE GLUCOSE BLD GHB EST-MCNC: 128 MG/DL
HBA1C MFR BLD: 6.1 %

## 2023-05-08 RX ORDER — ALLOPURINOL 100 MG/1
100 TABLET ORAL DAILY
Qty: 90 TABLET | Refills: 1 | Status: SHIPPED | OUTPATIENT
Start: 2023-05-08

## 2023-05-08 RX ORDER — ALLOPURINOL 100 MG/1
100 TABLET ORAL DAILY
Qty: 90 TABLET | Refills: 1 | Status: CANCELLED | OUTPATIENT
Start: 2023-05-08

## 2023-05-08 NOTE — ASSESSMENT & PLAN NOTE
Since last visit, patient has been having pain and first MTP joint of left foot  Pain began approximately 3 and half weeks ago  He was initially prescribed antibiotics, which were ineffective  He was subsequently seen in urgent care and started on allopurinol, given oral steroid taper, and advised to take colchicine twice daily  Patient still having pain, but overall he states he is 75% better  Since last visit, patient has lost 35 pounds  He is no longer drinking alcohol  He is no longer eating refined sweets  Examination reveals mild erythema and tenderness first MTP left foot  1) we will send for x-ray left foot today  2) continue allopurinol 100 mg daily  3) restart colchicine twice daily  4) repeat uric acid level in 1 month  5) continue lifestyle modification (alcohol reduction, weight loss)    We will call with x-ray and uric acid level results  May need to further titrate allopurinol dosage

## 2023-05-08 NOTE — ASSESSMENT & PLAN NOTE
Weight today 237, BMI 34 31  Patient is lost 35 pounds since last visit  He is no longer drinking alcohol  He is no longer eating refined sugars  He is exercising regularly

## 2023-05-08 NOTE — ASSESSMENT & PLAN NOTE
Doing well on atorvastatin 20 mg daily  Patient is lost 35 pounds since last visit  Continue to work on diet and exercise    We will continue to monitor

## 2023-05-08 NOTE — PROGRESS NOTES
Name: Eliot Marin      : 1950      MRN: [id number]  Encounter Provider: Janee Phipps DO  Encounter Date: 2023   Encounter department: [address] [place] [address]  Gout attack  Assessment & Plan:  Since last visit, patient has been having pain and first MTP joint of left foot  Pain began approximately 3 and half weeks ago  He was initially prescribed antibiotics, which were ineffective  He was subsequently seen in urgent care and started on allopurinol, given oral steroid taper, and advised to take colchicine twice daily  Patient still having pain, but overall he states he is 75% better  Since last visit, patient has lost 35 pounds  He is no longer drinking alcohol  He is no longer eating refined sweets  Examination reveals mild erythema and tenderness first MTP left foot  1) we will send for x-ray left foot today  2) continue allopurinol 100 mg daily  3) restart colchicine twice daily  4) repeat uric acid level in 1 month  5) continue lifestyle modification (alcohol reduction, weight loss)    We will call with x-ray and uric acid level results  May need to further titrate allopurinol dosage  Orders:  -     Uric acid; Future; Expected date: 2023  -     XR foot 3+ vw left; Future; Expected date: 2023  -     allopurinol (ZYLOPRIM) 100 mg tablet; Take 1 tablet (100 mg total) by mouth daily    2  Elevated blood sugar  Assessment & Plan:  A1c from May 2 was 6 1%  Patient is lost 35 pounds since last visit  He has stopped eating refined sugars and discontinued alcohol  He did receive oral steroids due to gout recently  We will continue to follow  Orders:  -     Comprehensive metabolic panel; Future; Expected date: 10/01/2023  -     Hemoglobin A1C; Future; Expected date: 10/01/2023    3   Benign essential hypertension  Assessment & Plan:  Blood pressure controlled on valsartan /12 5 and metoprolol XL 50    Orders:  -     CBC and differential; Future; Expected date: 10/01/2023    4  Combined hyperlipidemia  Assessment & Plan:  Doing well on atorvastatin 20 mg daily  Patient is lost 35 pounds since last visit  Continue to work on diet and exercise  We will continue to monitor    Orders:  -     Lipid Panel with Direct LDL reflex; Future; Expected date: 10/01/2023    5  Subclinical hypothyroidism  Assessment & Plan:  TSH from a second was normal   We will continue to follow    Orders:  -     TSH, 3rd generation with Free T4 reflex; Future; Expected date: 10/01/2023    6  MANDY (obstructive sleep apnea)  Assessment & Plan:  Continue nightly CPAP      7  Prostate cancer [facility])  Assessment & Plan:  Continue follow-up with urology as directed      8  H/O abdominal surgery  Assessment & Plan:  Status post sigmoid resection 2010 due to diverticulitis  9  Habitual alcohol use  Assessment & Plan:  Since last visit, patient has discontinued alcohol ingestion  10  Class 1 obesity due to excess calories with serious comorbidity and body mass index (BMI) of 34 0 to 34 9 in adult  Assessment & Plan:  Weight today 237, BMI 34 31  Patient is lost 35 pounds since last visit  He is no longer drinking alcohol  He is no longer eating refined sugars  He is exercising regularly  Will call with x-ray results  Recheck uric acid level in 1 month  We will call with results    6 months, fasting blood work prior (PSA ordered by urology)       Subjective     Patient presents for recheck of chronic medical problems today  Since last visit, patient has been having ongoing pain in his left first toe  Otherwise doing well  Compliant on prescribed medications  He had labs done on May 2    Review of Systems   Respiratory: Negative  Cardiovascular: Negative  Gastrointestinal: Negative  Genitourinary: Negative  Musculoskeletal: Positive for arthralgias         Past Medical History:   Diagnosis Date   • Acute gouty arthritis     last assessed 6/1/13    • Anxiety     last assessed 7/11/14    • Atrial flutter ([facility] [address] ) 1/22/2021   • BPH (benign prostatic hyperplasia)    • Chronic thoracic spine pain     last assessed 5/13/16    • CPAP (continuous positive airway pressure) dependence    • Dysfunction of both eustachian tubes     last assessed 8/16/13   • Erectile dysfunction of non-organic origin     last assessed 10/8/13    • Family history reviewed with no changes     medical history    • Gout    • Hyperlipidemia    • Hypertension    • Prostate cancer ([facility] [address] ) 10/25/2022   • Sebaceous cyst     last assessed 12/16/13    • Skin lesion     last assessed 1/2/14    • Sleep apnea    • Subclinical hypothyroidism 03/21/2019     Past Surgical History:   Procedure Laterality Date   • APPENDECTOMY      12years old   • BOWEL RESECTION  2010    Sigmoid resection for diverticulitis   • CATARACT EXTRACTION     • CERVICAL FUSION N/A 08/27/2019    Procedure: Anterior cervical discectomy w fusion C5-6, with allograft and neuromonitoring;  Surgeon: Tr Pedroza MD;  [facility];  Service: Orthopedics   • COLONOSCOPY  02/2018    Dr Flaquito Garibay  Tubular adenoma polyps removed from the mid ascending colon, hyperplastic rectal polyp removed, evidence of end to end anastomosis in sigmoid with healthy mucosa  • COLONOSCOPY  2012    Dr Flaquito Garibay  Tubular adenoma polyp removed from right colon  • COLONOSCOPY  2004    Small rectal polyp   • COLONOSCOPY  2002    Polyp ablated   • COLONOSCOPY  2001    Polyps removed  Dr Flaquito Garibay     • MASTECTOMY Bilateral    • MOUTH SURGERY      tooth extraction with dental implant   • [place] BX PROSTATE STRTCTC SATURATION SAMPLING IMG GID N/A 10/18/2022    Procedure: TRANSPERINEAL MRI FUSION  BIOPSY PROSTATE;  Surgeon: Cecilie Simmonds, MD;  Location: [facility];  Service: Urology   • NY LAPS SURG [id number] RPBIC RAD W/NRV SPARING ROBOT N/A 01/25/2023    Procedure: PROSTATECTOMY RADICAL AND PELVIC LYMPH NODE DISSECTION LAPAROSCOPIC W/ ROBOTICS; LYSIS [facility];  Surgeon: Tiffany Shelley MD;  Location: [facility];  Service: Urology   • SKIN BIOPSY      left cheek   • WRIST SURGERY       Family History   Problem Relation Age of Onset   • Hypertension Mother    • Eczema Father    • Heart attack Father    • Diabetes Sister    • Psoriasis Sister    • Hypertension Sister    • Diabetes Family    • Hypertension Family    • Breast cancer Daughter      Social History     Socioeconomic History   • Marital status: /Civil Union     Spouse name: None   • Number of children: None   • Years of education: None   • Highest education level: None   Occupational History   • Occupation: retired   Tobacco Use   • Smoking status: Former     Packs/day: 0 50     Years: 20 00     Pack years: 10 00     Types: Cigarettes     Start date:      Quit date:      Years since quittin 3   • Smokeless tobacco: Never   Vaping Use   • Vaping Use: Never used   Substance and Sexual Activity   • Alcohol use:  Yes     Alcohol/week: 1 0 - 2 0 standard drink     Types: 1 - 2 Standard drinks or equivalent per week   • Drug use: No   • Sexual activity: Not Currently   Other Topics Concern   • None   Social History Narrative    Consumes 1 cup of coffee per day     Social Determinants of Health     Financial Resource Strain: Not on file   Food Insecurity: Not on file   Transportation Needs: Not on file   Physical Activity: Not on file   Stress: Not on file   Social Connections: Not on file   Intimate Partner Violence: Not on file   Housing Stability: Not on file     Current Outpatient Medications on File Prior to Visit   Medication Sig   • allopurinol (ZYLOPRIM) 100 mg tablet Take 1 tablet (100 mg total) by mouth daily   • atorvastatin (LIPITOR) 20 mg tablet TAKE 1 TABLET BY MOUTH EVERY DAY   • metoprolol succinate (TOPROL-XL) 50 mg 24 hr tablet TAKE 1 TABLET BY MOUTH EVERY DAY   • rivaroxaban (Xarelto) 20 mg tablet Take 1 tablet (20 mg total) by mouth daily with breakfast   • "valsartan-hydrochlorothiazide (DIOVAN-HCT) 160-12 5 MG per tablet Take 1 tablet by mouth daily   • [DISCONTINUED] predniSONE 10 mg tablet 6 tabs daily for 4 days then, 4 tabs daily for 2 days then, 2 tabs daily for 2 days then, 1 tab daily 2 days     No Known Allergies  Immunization History   Administered Date(s) Administered   • COVID-19 PFIZER VACCINE 0 3 ML IM 03/24/2021, 04/14/2021, 11/03/2021   • COVID-19 Pfizer vac (Randal-sucrose, gray cap) 12 yr+ IM 05/19/2022   • INFLUENZA 11/01/2015, 11/02/2017, 09/23/2018, 10/03/2019, 10/06/2021, 10/28/2022   • Influenza Quadrivalent Preservative Free 3 years and older IM 11/02/2017   • Influenza Quadrivalent, 6-35 Months IM 11/01/2015   • Influenza, high dose seasonal 0 7 mL 10/03/2019, 08/27/2020   • Pneumococcal Conjugate 13-Valent 11/11/2019   • Pneumococcal Polysaccharide PPV23 12/09/2020   • Zoster Vaccine Recombinant 09/10/2020, 12/19/2020       Objective     /78 (BP Location: Left arm, Patient Position: Sitting, Cuff Size: Adult)   Pulse 84   Temp 97 5 °F (36 4 °C) (Temporal)   Resp 16   Ht 5' 9 69\" (1 77 m)   Wt 108 kg (237 lb)   SpO2 99%   BMI 34 31 kg/m²     Physical Exam  Cardiovascular:      Rate and Rhythm: Normal rate and regular rhythm  Heart sounds: Normal heart sounds  Comments: Carotids: no bruits  Ext: no edema  Pulmonary:      Effort: Pulmonary effort is normal  No respiratory distress  Breath sounds: No wheezing or rales  Musculoskeletal:      Comments: Left first MTP joint erythematous and tender  Psychiatric:         Behavior: Behavior normal          Thought Content:  Thought content normal        Sofi Keane,   "

## 2023-05-08 NOTE — PROGRESS NOTES
Name: Eliot Marin      : 1950      MRN: [id number]  Encounter Provider: Janee Phipps DO  Encounter Date: 2023   Encounter department: [address] [place] [address]     1  Elevated blood sugar    2  Gout attack    3  Benign essential hypertension    4  Combined hyperlipidemia    5  Subclinical hypothyroidism    6  MANDY (obstructive sleep apnea)    7  Prostate cancer ([address] )    8  H/O abdominal surgery    9  Habitual alcohol use           Subjective     HPI  Review of Systems   Respiratory: Negative  Cardiovascular: Negative  Gastrointestinal: Negative  Genitourinary: Negative  Past Medical History:   Diagnosis Date   • Acute gouty arthritis     last assessed 13    • Anxiety     last assessed 14    • Atrial flutter ([address] ) 2021   • BPH (benign prostatic hyperplasia)    • Chronic thoracic spine pain     last assessed 16    • CPAP (continuous positive airway pressure) dependence    • Dysfunction of both eustachian tubes     last assessed 13   • Erectile dysfunction of non-organic origin     last assessed 10/8/13    • Family history reviewed with no changes     medical history    • Gout    • Hyperlipidemia    • Hypertension    • Prostate cancer ([facility] [address] ) 10/25/2022   • Sebaceous cyst     last assessed 13    • Skin lesion     last assessed 14    • Sleep apnea    • Subclinical hypothyroidism 2019     Past Surgical History:   Procedure Laterality Date   • APPENDECTOMY      12years old   • BOWEL RESECTION      Sigmoid resection for diverticulitis   • CATARACT EXTRACTION     • CERVICAL FUSION N/A 2019    Procedure: Anterior cervical discectomy w fusion C5-6, with allograft and neuromonitoring;  Surgeon: Garrett Gutierrez MD;  Location[facility];  Service: Orthopedics   • COLONOSCOPY  2018    Dr Claudene Mccune    Tubular adenoma polyps removed from the mid ascending colon, hyperplastic rectal polyp removed, evidence of end to end anastomosis in sigmoid with healthy mucosa  • COLONOSCOPY      Dr Sven Medeiros  Tubular adenoma polyp removed from right colon  • COLONOSCOPY      Small rectal polyp   • COLONOSCOPY      Polyp ablated   • COLONOSCOPY      Polyps removed  Dr Sven Medeiros  • MASTECTOMY Bilateral    • MOUTH SURGERY      tooth extraction with dental implant   • [place] BX PROSTATE STRTCTC SATURATION SAMPLING IMG GID N/A 10/18/2022    Procedure: TRANSPERINEAL MRI FUSION  BIOPSY PROSTATE;  Surgeon: Kiley Richmond MD;  Location[facility];  Service: Urology   • [place] LAPS SURG [id number] RPBIC RAD W/NRV SPARING ROBOT N/A 2023    Procedure: PROSTATECTOMY RADICAL AND PELVIC LYMPH NODE DISSECTION LAPAROSCOPIC W/ ROBOTICS; LYSIS OF AHDESIONS;  Surgeon: Kiley Richmond MD;  [facility];  Service: Urology   • SKIN BIOPSY      left cheek   • WRIST SURGERY       Family History   Problem Relation Age of Onset   • Hypertension Mother    • Eczema Father    • Heart attack Father    • Diabetes Sister    • Psoriasis Sister    • Hypertension Sister    • Diabetes Family    • Hypertension Family    • Breast cancer Daughter      Social History     Socioeconomic History   • Marital status: /Civil Union     Spouse name: None   • Number of children: None   • Years of education: None   • Highest education level: None   Occupational History   • Occupation: retired   Tobacco Use   • Smoking status: Former     Packs/day: 0 50     Years: 20 00     Pack years: 10 00     Types: Cigarettes     Start date:      Quit date:      Years since quittin 3   • Smokeless tobacco: Never   Vaping Use   • Vaping Use: Never used   Substance and Sexual Activity   • Alcohol use:  Yes     Alcohol/week: 1 0 - 2 0 standard drink     Types: 1 - 2 Standard drinks or equivalent per week   • Drug use: No   • Sexual activity: Not Currently   Other Topics Concern   • None   Social History Narrative    Consumes 1 cup of coffee per day     Social "Determinants of Health     Financial Resource Strain: Not on file   Food Insecurity: Not on file   Transportation Needs: Not on file   Physical Activity: Not on file   Stress: Not on file   Social Connections: Not on file   Intimate Partner Violence: Not on file   Housing Stability: Not on file     Current Outpatient Medications on File Prior to Visit   Medication Sig   • allopurinol (ZYLOPRIM) 100 mg tablet Take 1 tablet (100 mg total) by mouth daily   • atorvastatin (LIPITOR) 20 mg tablet TAKE 1 TABLET BY MOUTH EVERY DAY   • metoprolol succinate (TOPROL-XL) 50 mg 24 hr tablet TAKE 1 TABLET BY MOUTH EVERY DAY   • rivaroxaban (Xarelto) 20 mg tablet Take 1 tablet (20 mg total) by mouth daily with breakfast   • valsartan-hydrochlorothiazide (DIOVAN-HCT) 160-12 5 MG per tablet Take 1 tablet by mouth daily   • [DISCONTINUED] predniSONE 10 mg tablet 6 tabs daily for 4 days then, 4 tabs daily for 2 days then, 2 tabs daily for 2 days then, 1 tab daily 2 days     No Known Allergies  Immunization History   Administered Date(s) Administered   • COVID-19 PFIZER VACCINE 0 3 ML IM 03/24/2021, 04/14/2021, 11/03/2021   • COVID-19 Pfizer vac (Randal-sucrose, gray cap) 12 yr+ IM 05/19/2022   • INFLUENZA 11/01/2015, 11/02/2017, 09/23/2018, 10/03/2019, 10/06/2021, 10/28/2022   • Influenza Quadrivalent Preservative Free 3 years and older IM 11/02/2017   • Influenza Quadrivalent, 6-35 Months IM 11/01/2015   • Influenza, high dose seasonal 0 7 mL 10/03/2019, 08/27/2020   • Pneumococcal Conjugate 13-Valent 11/11/2019   • Pneumococcal Polysaccharide PPV23 12/09/2020   • Zoster Vaccine Recombinant 09/10/2020, 12/19/2020       Objective     /78 (BP Location: Left arm, Patient Position: Sitting, Cuff Size: Adult)   Pulse 84   Temp 97 5 °F (36 4 °C) (Temporal)   Resp 16   Ht 5' 9 69\" (1 77 m)   Wt 108 kg (237 lb)   SpO2 99%   BMI 34 31 kg/m²     Physical Exam  Cardiovascular:      Rate and Rhythm: Normal rate and regular rhythm       " Heart sounds: Normal heart sounds  Comments: Carotids: no bruits  Ext: no edema  Pulmonary:      Effort: Pulmonary effort is normal  No respiratory distress  Breath sounds: No wheezing or rales  Psychiatric:         Behavior: Behavior normal          Thought Content:  Thought content normal        Gina Richardson,

## 2023-05-08 NOTE — ASSESSMENT & PLAN NOTE
A1c from May 2 was 6 1%  Patient is lost 35 pounds since last visit  He has stopped eating refined sugars and discontinued alcohol  He did receive oral steroids due to gout recently  We will continue to follow

## 2023-05-11 ENCOUNTER — TELEPHONE (OUTPATIENT)
Dept: SLEEP CENTER | Facility: CLINIC | Age: 73
End: 2023-05-11

## 2023-05-11 ENCOUNTER — TELEPHONE (OUTPATIENT)
Dept: FAMILY MEDICINE CLINIC | Facility: CLINIC | Age: 73
End: 2023-05-11

## 2023-05-11 NOTE — TELEPHONE ENCOUNTER
"Patient left message on the nurse line stating that he felt his pressure was too high and it is causing a \"stinging, burning  pain\" in his nose and extremely dry mouth which wakes him daily by 04:00  Obtained compliance from [employer] and scanned to media  Call placed to patient  Advised compliance requested and once reviewed by the physician, one of the nurses will be in touch with the recommendations  Dr Justo Taveras notified    "

## 2023-05-12 ENCOUNTER — TELEPHONE (OUTPATIENT)
Dept: FAMILY MEDICINE CLINIC | Facility: CLINIC | Age: 73
End: 2023-05-12

## 2023-05-16 NOTE — TELEPHONE ENCOUNTER
Called the patient recommended he try saline gel spray and I sent a message to [employer] liaisons to contact him and adjust heat and humidity on his machine

## 2023-05-17 ENCOUNTER — TELEPHONE (OUTPATIENT)
Dept: SLEEP CENTER | Facility: CLINIC | Age: 73
End: 2023-05-17

## 2023-05-17 NOTE — TELEPHONE ENCOUNTER
I called the pt  and LVM for him to call our company back  Maybe he needs his mask changed and try something other than nasal pillows  His HH is at level 4 already but maybe it not working properly

## 2023-05-22 ENCOUNTER — TELEPHONE (OUTPATIENT)
Dept: FAMILY MEDICINE CLINIC | Facility: CLINIC | Age: 73
End: 2023-05-22
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
## 2023-05-22 NOTE — TELEPHONE ENCOUNTER
Patient was bitten by a tick on Friday, 5 19 23 on his back near left shoulder  There was a red rash on the area where the tick was removed  The redness left the next day and the patient is not experiencing any symptoms  He would like to know if he antibiotics would be necessary, prior to scheduling

## 2023-05-22 NOTE — TELEPHONE ENCOUNTER
Call patient, he needs appointment if rash returns  I will place order for a Lyme titer blood test to get done in approximately 2 to 3 weeks
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
## 2023-05-23 RX ORDER — DOXYCYCLINE HYCLATE 100 MG/1
100 CAPSULE ORAL EVERY 12 HOURS SCHEDULED
Qty: 20 CAPSULE | Refills: 0 | Status: SHIPPED | OUTPATIENT
Start: 2023-05-23 | End: 2023-06-02

## 2023-05-30 ENCOUNTER — APPOINTMENT (OUTPATIENT)
Age: 73
End: 2023-05-30

## 2023-05-30 DIAGNOSIS — M10.9 GOUT ATTACK: ICD-10-CM
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
## 2023-05-30 DIAGNOSIS — C61 PROSTATE CANCER (HCC): ICD-10-CM

## 2023-05-30 LAB
B BURGDOR IGG+IGM SER-ACNC: 0.3 AI
PSA SERPL-MCNC: <0.01 NG/ML (ref 0–4)
URATE SERPL-MCNC: 7.2 MG/DL (ref 3.5–8.5)

## 2023-06-01 NOTE — TELEPHONE ENCOUNTER
Pt is aware, apologized for delay  He already had Lyme test completed 5/30, he saw it was negative  Says he's doing well, rash has not come back at this time

## 2023-06-15 ENCOUNTER — OFFICE VISIT (OUTPATIENT)
Dept: UROLOGY | Facility: CLINIC | Age: 73
End: 2023-06-15
Payer: COMMERCIAL

## 2023-06-15 VITALS
HEART RATE: 68 BPM | DIASTOLIC BLOOD PRESSURE: 88 MMHG | BODY MASS INDEX: 34.75 KG/M2 | SYSTOLIC BLOOD PRESSURE: 140 MMHG | WEIGHT: 240 LBS

## 2023-06-15 DIAGNOSIS — C61 PROSTATE CANCER (HCC): Primary | ICD-10-CM

## 2023-06-15 DIAGNOSIS — N39.3 MALE URINARY STRESS INCONTINENCE: ICD-10-CM

## 2023-06-15 PROCEDURE — 99214 OFFICE O/P EST MOD 30 MIN: CPT | Performed by: PHYSICIAN ASSISTANT

## 2023-06-15 NOTE — PROGRESS NOTES
6/15/2023      No chief complaint on file  Assessment and Plan    68 y o  male managed by Dr Chary Lacy    1  pT3a Prostate Cancer  - jennifer score 4+3=7 with focal cribiform pattern, focal +margin  - diagnosis Oct 2022 mri fusion bx pirads 5 lesion right side  - staging imaging MRI  - pretreatment psa 6 5  - treatment RALP-bPLND January 2023  Lab Results   Component Value Date    PSA <0 01 05/30/2023    PSA <0 1 03/06/2023    PSA 5 1 (H) 11/02/2022     2  Mixed urinary incontinence  - resume pelvic floor physical therapy  -Referral for discussion urethral sling versus AUS    Mr Ange Hobson is 5 months status post robot-assisted laparoscopic prostatectomy for [place] 7 prostate cancer  2 postoperative PSAs are undetectable  We will continue visits at 3-month lab work  He has had some improvement in his stress incontinence and passive urinary leakage with pelvic floor physical therapy but has lost some headway  He inquires about additional incontinence therapies  He has considered external/condom catheters, Wheeler clamps but feels these will just pop off  I discussed with him the basics of urethral slings and artificial urinary sphincters today  He is interested I have referred him to Dr Milan Nelson to discuss  He has no urinary urgency or nocturia I did not think there is any role for pharmacotherapy at this time  History of Present Illness  Parks Severin is a 68 y o  male here for evaluation of 3 month followup prostate cancer  He is 5 months status post radical prostatectomy for P T3a prostate cancer  Postoperative PSAs are undetectable  He has ongoing stress and urge incontinence  He did have voiding dysfunction prior to his surgery  He attempted home Kegel's was referred to pelvic floor physical therapy saw them for several sessions  He did have some benefit but symptoms have recurred without consistent home exercise plan   He uses vaseline and desitin for scrotal skin due to moisture in his diaper  Review of Systems   Constitutional: Negative  Respiratory: Negative  Cardiovascular: Negative  Genitourinary: Negative for decreased urine volume, difficulty urinating, dysuria, flank pain, frequency, hematuria, scrotal swelling, testicular pain and urgency  Musculoskeletal: Negative  AUA SYMPTOM SCORE    Flowsheet Row Most Recent Value   AUA SYMPTOM SCORE    How often have you had a sensation of not emptying your bladder completely after you finished urinating? 0 (P)     How often have you had to urinate again less than two hours after you finished urinating? 0 (P)     How often have you found you stopped and started again several times when you urinate? 0 (P)     How often have you found it difficult to postpone urination? 4 (P)     How often have you had a weak urinary stream? 2 (P)     How often have you had to push or strain to begin urination? 0 (P)     How many times did you most typically get up to urinate from the time you went to bed at night until the time you got up in the morning? 0 (P)     Quality of Life: If you were to spend the rest of your life with your urinary condition just the way it is now, how would you feel about that? 5 (P)     AUA SYMPTOM SCORE 6 (P)            Vitals  Vitals:    06/15/23 1036   BP: 140/88   Pulse: 68   Weight: 109 kg (240 lb)       Physical Exam  Vitals and nursing note reviewed  Constitutional:       General: He is not in acute distress  Appearance: He is well-developed  He is not diaphoretic  HENT:      Head: Normocephalic and atraumatic  Pulmonary:      Effort: Pulmonary effort is normal    Skin:     General: Skin is warm and dry  Neurological:      Mental Status: He is alert and oriented to person, place, and time        Gait: Gait normal    Psychiatric:         Speech: Speech normal          Behavior: Behavior normal            Past History  Past Medical History:   Diagnosis Date   • Acute gouty arthritis     last assessed 13    • Anxiety     last assessed 14    • Atrial flutter ([facility] [address] ) 2021   • BPH (benign prostatic hyperplasia)    • Chronic thoracic spine pain     last assessed 16    • CPAP (continuous positive airway pressure) dependence    • Dysfunction of both eustachian tubes     last assessed 13   • Erectile dysfunction of non-organic origin     last assessed 10/8/13    • Family history reviewed with no changes     medical history    • Gout    • Hyperlipidemia    • Hypertension    • [facility] 10/25/2022   • Sebaceous cyst     last assessed 13    • Skin lesion     last assessed 14    • Sleep apnea    • Subclinical hypothyroidism 2019     Social History     Socioeconomic History   • Marital status: /Civil Union     Spouse name: None   • Number of children: None   • Years of education: None   • Highest education level: None   Occupational History   • Occupation: retired   Tobacco Use   • Smoking status: Former     Packs/day: 0 50     Years: 20 00     Total pack years: 10 00     Types: Cigarettes     Start date:      Quit date:      Years since quittin 4   • Smokeless tobacco: Never   Vaping Use   • Vaping Use: Never used   Substance and Sexual Activity   • Alcohol use: Not Currently   • Drug use: No   • Sexual activity: Not Currently   Other Topics Concern   • None   Social History Narrative    Consumes 1 cup of coffee per day     Social Determinants of Health     Financial Resource Strain: Not on file   Food Insecurity: Not on file   Transportation Needs: Not on file   Physical Activity: Not on file   Stress: Not on file   Social Connections: Not on file   Intimate Partner Violence: Not on file   Housing Stability: Not on file     Social History     Tobacco Use   Smoking Status Former   • Packs/day: 0 50   • Years: 20 00   • Total pack years: 10 00   • Types: Cigarettes   • Start date:    • Quit date: 5   • Years since quittin 4   Smokeless Tobacco Never     Family History   Problem Relation Age of Onset   • Hypertension Mother    • Eczema Father    • Heart attack Father    • Diabetes Sister    • Psoriasis Sister    • Hypertension Sister    • Diabetes Family    • Hypertension Family    • Breast cancer Daughter        The following portions of the patient's history were reviewed and updated as appropriate: allergies, current medications, past medical history, past social history, past surgical history and problem list     Results  No results found for this or any previous visit (from the past 1 hour(s)) ]  Lab Results   Component Value Date    PSA <0 01 05/30/2023    PSA <0 1 03/06/2023    PSA 5 1 (H) 11/02/2022    PSA 6 5 (H) 06/03/2022     Lab Results   Component Value Date    BUN 17 05/02/2023    CALCIUM 9 6 05/02/2023     05/02/2023    CO2 26 05/02/2023    CREATININE 1 10 05/02/2023    K 3 9 05/02/2023     05/16/2016     Lab Results   Component Value Date    HCT 37 6 01/26/2023    HGB 12 6 01/26/2023    MCV 87 01/26/2023     01/26/2023    WBC 16 19 (H) 01/26/2023

## 2023-06-16 ENCOUNTER — TELEPHONE (OUTPATIENT)
Dept: UROLOGY | Facility: CLINIC | Age: 73
End: 2023-06-16

## 2023-06-16 NOTE — TELEPHONE ENCOUNTER
Patient seen by César Alvarez PA-C and was referred to Dr Massie Galeazzi  Records faxed to [phone] ( 86 pgs ) and confirmation received

## 2023-06-21 DIAGNOSIS — I10 BENIGN ESSENTIAL HYPERTENSION: ICD-10-CM

## 2023-06-22 RX ORDER — VALSARTAN AND HYDROCHLOROTHIAZIDE 160; 12.5 MG/1; MG/1
TABLET, FILM COATED ORAL
Qty: 90 TABLET | Refills: 1 | Status: SHIPPED | OUTPATIENT
Start: 2023-06-22

## 2023-06-23 NOTE — TELEPHONE ENCOUNTER
Contacted and spoke with patient to discuss Rivka's opinion  Informed patient Carmine Cervantes said it is fine to cancel appointment with Dr Cesario Smith    Patient will work on his PFPT exercises and then revisit the problem at his Sept visit

## 2023-06-23 NOTE — TELEPHONE ENCOUNTER
Sure yes that's fine  At visit last week we talked about getting back into daily habit of his PFPT exercises which helped in the past  Agree he's hoping to avoid AUS or sling   Can revisit in the fall if no benefit with home exercise plan

## 2023-06-23 NOTE — TELEPHONE ENCOUNTER
Pt called and he wants to cancel his appointment Dr Yogi Hinds because he doesn't think he is at the point where he wants to have that type of surgery yet  He wants to keep on with his Physical therapy  But he would like to talk to Devon Esparza about this before he cancels his appointment       Pt can be reached at [phone]

## 2023-06-28 DIAGNOSIS — M10.9 GOUT ATTACK: ICD-10-CM

## 2023-06-28 RX ORDER — ALLOPURINOL 300 MG/1
300 TABLET ORAL DAILY
Qty: 90 TABLET | Refills: 1 | Status: SHIPPED | OUTPATIENT
Start: 2023-06-28

## 2023-07-31 ENCOUNTER — APPOINTMENT (OUTPATIENT)
Age: 73
End: 2023-07-31
Payer: COMMERCIAL

## 2023-07-31 DIAGNOSIS — M10.9 GOUT, UNSPECIFIED CAUSE, UNSPECIFIED CHRONICITY, UNSPECIFIED SITE: ICD-10-CM

## 2023-07-31 PROCEDURE — 36415 COLL VENOUS BLD VENIPUNCTURE: CPT

## 2023-08-02 ENCOUNTER — APPOINTMENT (OUTPATIENT)
Age: 73
End: 2023-08-02
Payer: COMMERCIAL

## 2023-08-02 DIAGNOSIS — M10.9 GOUT, UNSPECIFIED CAUSE, UNSPECIFIED CHRONICITY, UNSPECIFIED SITE: ICD-10-CM

## 2023-08-02 LAB
ALBUMIN SERPL BCP-MCNC: 3.4 G/DL (ref 3.5–5)
ALP SERPL-CCNC: 72 U/L (ref 46–116)
ALT SERPL W P-5'-P-CCNC: 33 U/L (ref 12–78)
AST SERPL W P-5'-P-CCNC: 16 U/L (ref 5–45)
BASOPHILS # BLD AUTO: 0.05 THOUSANDS/ÂΜL (ref 0–0.1)
BASOPHILS NFR BLD AUTO: 1 % (ref 0–1)
BILIRUB DIRECT SERPL-MCNC: 0.16 MG/DL (ref 0–0.2)
BILIRUB SERPL-MCNC: 0.56 MG/DL (ref 0.2–1)
CREAT SERPL-MCNC: 1.09 MG/DL (ref 0.6–1.3)
CRP SERPL QL: <3 MG/L
EOSINOPHIL # BLD AUTO: 0.31 THOUSAND/ÂΜL (ref 0–0.61)
EOSINOPHIL NFR BLD AUTO: 4 % (ref 0–6)
ERYTHROCYTE [DISTWIDTH] IN BLOOD BY AUTOMATED COUNT: 13.5 % (ref 11.6–15.1)
ERYTHROCYTE [SEDIMENTATION RATE] IN BLOOD: 5 MM/HOUR (ref 0–19)
GFR SERPL CREATININE-BSD FRML MDRD: 67 ML/MIN/1.73SQ M
HCT VFR BLD AUTO: 46.2 % (ref 36.5–49.3)
HGB BLD-MCNC: 15.2 G/DL (ref 12–17)
IMM GRANULOCYTES # BLD AUTO: 0.03 THOUSAND/UL (ref 0–0.2)
IMM GRANULOCYTES NFR BLD AUTO: 0 % (ref 0–2)
LYMPHOCYTES # BLD AUTO: 3.29 THOUSANDS/ÂΜL (ref 0.6–4.47)
LYMPHOCYTES NFR BLD AUTO: 37 % (ref 14–44)
MCH RBC QN AUTO: 29.7 PG (ref 26.8–34.3)
MCHC RBC AUTO-ENTMCNC: 32.9 G/DL (ref 31.4–37.4)
MCV RBC AUTO: 90 FL (ref 82–98)
MONOCYTES # BLD AUTO: 0.78 THOUSAND/ÂΜL (ref 0.17–1.22)
MONOCYTES NFR BLD AUTO: 9 % (ref 4–12)
NEUTROPHILS # BLD AUTO: 4.5 THOUSANDS/ÂΜL (ref 1.85–7.62)
NEUTS SEG NFR BLD AUTO: 49 % (ref 43–75)
NRBC BLD AUTO-RTO: 0 /100 WBCS
PLATELET # BLD AUTO: 271 THOUSANDS/UL (ref 149–390)
PMV BLD AUTO: 10.3 FL (ref 8.9–12.7)
PROT SERPL-MCNC: 6.6 G/DL (ref 6.4–8.4)
RBC # BLD AUTO: 5.12 MILLION/UL (ref 3.88–5.62)
URATE SERPL-MCNC: 4.9 MG/DL (ref 3.5–8.5)
WBC # BLD AUTO: 8.96 THOUSAND/UL (ref 4.31–10.16)

## 2023-08-02 PROCEDURE — 85652 RBC SED RATE AUTOMATED: CPT

## 2023-08-02 PROCEDURE — 82565 ASSAY OF CREATININE: CPT

## 2023-08-02 PROCEDURE — 86140 C-REACTIVE PROTEIN: CPT

## 2023-08-02 PROCEDURE — 84550 ASSAY OF BLOOD/URIC ACID: CPT

## 2023-08-02 PROCEDURE — 36415 COLL VENOUS BLD VENIPUNCTURE: CPT

## 2023-08-02 PROCEDURE — 80076 HEPATIC FUNCTION PANEL: CPT

## 2023-08-02 PROCEDURE — 85025 COMPLETE CBC W/AUTO DIFF WBC: CPT

## 2023-08-07 NOTE — PROGRESS NOTES
Electrophysiology Follow Up  Heart & Vascular [address] [facility] Cardiology [facility]  ALONDRA Reardon Huntstad    Name: Cony Johnson  : 1950  MRN: [id number]    ASSESSMENT/PLAN:  1. Paroxysmal atrial fibrillation and atrial flutter on Xarelto  -- [id number] = 2  -- Xarelto and Toprol XL  -- stopped drinking alcohol  -- maintaining 40 pound weight loss  2. HTN  -- well controlled on Toprol XL, valsartan/HCTZ  3. MANDY on CPAP qhs  -- less compliant recently, discussed continuing to wear this  4. HLD  -- atorvastatin  5. Prostate cancer s/p robotic prostatectomy   6. Gout  -- maintained on allopurinol   -- consider discontinuation of HCTZ    Shayla Later has done well from an Afib standpoint. He has not had any known recurrence of A-fib. He started to drink alcohol again but has since quit with his last drink on 2023. He has maintained a weight loss of approximately 35 to 40 pounds. He continues to exercise by riding his bike. Shayla Later wanted to discuss discontinuing Xarelto and metoprolol today. While he has not had recurrence of A-fib, it is possible he will have recurrence in the future. With his [id number] score 2, I recommend continuing anticoagulation for stroke prevention. He has agreed to continue Xarelto. With respect to metoprolol, his heart rate today is 55 bpm he reports occasional readings of low blood pressure with a systolic less than 142 bpm.  In general he has done well on the current dose, although he may try decreasing it to 25 mg daily. He will check his blood pressure at home and record the readings. If multiple readings are >130/80, we will increase this back to 50 mg daily. He also mentioned discontinuing HCTZ given his recent gout flare. I would favor keeping metoprolol at 50 mg daily and discontinuing HCTZ, however he prefers to discuss this with his PCP first.     He will return to the office in 6 months to folllow up with Dr. Cathie Patricio. He will call the office in the meantime with any questions or concerns. CC/HPI:    Say Choi is a 68 y.o. male with a history of PAF on Xarelto, atrial flutter, HTN, MANDY on CPAP, HLD, obesity, gout, and prostate cancer who presents for arrhythmia follow up. He initially presented with atrial flutter and HFpEF requiring hospitalization. His blood pressure was elevated. He was started on metoprolol with improved rate and blood pressure control. He quit drinking alcohol and lost 40 pounds. He wears a CPAP nightly for MANDY. He has done well with no Afib since 2021. He had a viral illness in 2022 (possiblly COVID) and recovered. He started drinking alcohol again. He underwent prostatectomy in January 2023 and recovered well. He had issues with gout in the spring of 2023. He returns today for Afib follow up. He has been feeling well. He denies any episodes of palpitations, lightheadedness, dizziness, orthopnea, or shortness of breath. He stopped drinking alcohol on March 25, 2023. He has not been compliant with CPAP recently. He continues to exercise by riding his bike. He would like to discuss decreasing metoprolol and discontinuing Xarelto today. He reports his blood pressure control has been very good at home ranging with SBP ranging from 120-130 and occasionally less than 100. EKG: sinus bradycardia HR 56 bpm  ms, QRS 90 ms, QTc 387 ms      Review of Systems   Constitutional: Negative. HENT: Negative. Eyes: Negative. Respiratory: Negative for chest tightness and shortness of breath. Cardiovascular: Negative for chest pain and palpitations. Gastrointestinal: Negative for abdominal pain, nausea and vomiting. Endocrine: Negative. Genitourinary: Negative. Musculoskeletal: Negative for back pain. Neurological: Negative for dizziness, syncope and weakness. Hematological: Negative. Psychiatric/Behavioral: Negative.       OBJECTIVE:  Vitals:   /70 (BP Location: Left arm, Patient Position: Sitting, Cuff Size: Standard)   Pulse 56   Ht 5' 9" (1.753 m)   Wt 110 kg (243 lb) Comment: verbalized by pt  BMI 35.88 kg/m²   Body mass index is 35.88 kg/m². Physical Exam:  GEN: NAD, alert and oriented x 3, well appearing  SKIN: warm, dry without significant lesions or rashes  HEENT: NCAT, PERRL, EOMs intact  NECK: supple, no JVD appreciated  CARDIOVASCULAR: RRR, normal S1, S2 without murmurs, rubs, or gallops   LUNGS: CTA bilaterally without wheezes, rhonchi, or rales  ABDOMEN: Soft, nontender, nondistended.    EXTREMITIES/VASCULAR: perfused without clubbing, cyanosis, or LE edema b/l  PSYCH: Normal mood and affect  NEURO: CN ll-Xll grossly intact      Medications:      Current Outpatient Medications:   •  allopurinol (ZYLOPRIM) 300 mg tablet, Take 1 tablet (300 mg total) by mouth daily, Disp: 90 tablet, Rfl: 1  •  atorvastatin (LIPITOR) 20 mg tablet, TAKE 1 TABLET BY MOUTH EVERY DAY, Disp: 90 tablet, Rfl: 2  •  metoprolol succinate (TOPROL-XL) 50 mg 24 hr tablet, TAKE 1 TABLET BY MOUTH EVERY DAY, Disp: 90 tablet, Rfl: 3  •  rivaroxaban (Xarelto) 20 mg tablet, Take 1 tablet (20 mg total) by mouth daily with breakfast, Disp: 90 tablet, Rfl: 3  •  valsartan-hydrochlorothiazide (DIOVAN-HCT) 160-12.5 MG per tablet, TAKE 1 TABLET BY MOUTH EVERY DAY, Disp: 90 tablet, Rfl: 1       Historical Information   Past Medical History:   Diagnosis Date   • Acute gouty arthritis     last assessed 6/1/13    • Anxiety     last assessed 7/11/14    • Atrial flutter ([address]) 1/22/2021   • BPH (benign prostatic hyperplasia)    • Chronic thoracic spine pain     last assessed 5/13/16    • CPAP (continuous positive airway pressure) dependence    • Dysfunction of both eustachian tubes     last assessed 8/16/13   • Erectile dysfunction of non-organic origin     last assessed 10/8/13    • Family history reviewed with no changes     medical history    • Gout    • Hyperlipidemia    • Hypertension • Prostate cancer ([address]) 10/25/2022   • Sebaceous cyst     last assessed 12/16/13    • Skin lesion     last assessed 1/2/14    • Sleep apnea    • Subclinical hypothyroidism 03/21/2019       Past Surgical History:   Procedure Laterality Date   • APPENDECTOMY      12years old   • BOWEL RESECTION  2010    Sigmoid resection for diverticulitis   • CATARACT EXTRACTION     • CERVICAL FUSION N/A 08/27/2019    Procedure: Anterior cervical discectomy w fusion C5-6, with allograft and neuromonitoring;  Surgeon: Travis Gomez MD;  Location: [facility];  Service: Orthopedics   • COLONOSCOPY  02/2018    Dr. Maciej Fernandez. Tubular adenoma polyps removed from the mid ascending colon, hyperplastic rectal polyp removed, evidence of end to end anastomosis in sigmoid with healthy mucosa. • COLONOSCOPY  2012    Dr. Maciej Fernandez. Tubular adenoma polyp removed from right colon. • COLONOSCOPY  2004    Small rectal polyp   • COLONOSCOPY  2002    Polyp ablated   • COLONOSCOPY  2001    Polyps removed. Dr. Maciej Fernandez.    • MASTECTOMY Bilateral    • MOUTH SURGERY      tooth extraction with dental implant   • TX BX PROSTATE STRTCTC SATURATION SAMPLING IMG GID N/A 10/18/2022    Procedure: TRANSPERINEAL MRI FUSION  BIOPSY PROSTATE;  Surgeon: Tramaine Beal MD;  Location: [facility];  Service: Urology   • TX LAPS SURG [id number] RPBIC RAD W/NRV SPARING ROBOT N/A 01/25/2023    Procedure: PROSTATECTOMY RADICAL AND PELVIC LYMPH NODE DISSECTION LAPAROSCOPIC W/ ROBOTICS; LYSIS OF AHDESIONS;  Surgeon: Tramaine Beal MD;  Location: [facility];  Service: Urology   • SKIN BIOPSY      left cheek   • WRIST SURGERY         Social History     Substance and Sexual Activity   Alcohol Use Not Currently     Social History     Substance and Sexual Activity   Drug Use No     Social History     Tobacco Use   Smoking Status Former   • Packs/day: 0.50   • Years: 20.00   • Total pack years: 10.00   • Types: Cigarettes   • Start date: 27   • Quit date: 5   • Years since quittin.6   Smokeless Tobacco Never       Family History   Problem Relation Age of Onset   • Hypertension Mother    • Eczema Father    • Heart attack Father    • Diabetes Sister    • Psoriasis Sister    • Hypertension Sister    • Diabetes Family    • Hypertension Family    • Breast cancer Daughter        Labs & Results:  Below is the patient's most recent value for Albumin, ALT, AST, BUN, Calcium, Chloride, Cholesterol, CO2, Creatinine, GFR, Glucose, HDL, Hematocrit, Hemoglobin, Hemoglobin A1C, LDL, Magnesium, Phosphorus, Platelets, Potassium, PSA, Sodium, Triglycerides, and WBC. Lab Results   Component Value Date    ALT 33 2023    AST 16 2023    BUN 17 2023    CALCIUM 9.6 2023     2023    CHOL 248 (H) 2016    CO2 26 2023    CREATININE 1.09 2023    HDL 58 2022    HCT 46.2 2023    HGB 15.2 2023    HGBA1C 6.1 (H) 2023    MG 2.1 2021     2023    K 3.9 2023    PSA <0.01 2023     2016    TRIG 87 2022    WBC 8.96 2023     Note: for a comprehensive list of the patient's lab results, access the Results Review activity. CARDIAC TESTING:    ECHO 2022:   •  Left Ventricle: Left ventricular cavity size is normal. Wall thickness is mildly increased. The left ventricular ejection fraction is 57%. Systolic function is normal. Wall motion is normal. Diastolic function is mildly abnormal, consistent with grade I (abnormal) relaxation. •  Aorta: The aortic root is borderline  dilated. ECHO 2021: LVEF 60%    [facility] 2021: Patient had a min HR of 41 bpm, max HR of 190 bpm, and avg HR of 62 bpm. Predominant underlying rhythm was Sinus Rhythm.  26 Supraventricular Tachycardia runs occurred, the run with the fastest interval lasting 4 beats with a max rate of 190 bpm, the longest lasting 27.0 secs with an avg rate of 132 bpm. Isolated SVEs were rare (<1.0%), SVE Couplets were rare (<1.0%), and SVE Triplets were rare (<1.0%). Isolated VEs were rare (<1.0%, 992), VE Triplets were rare (<1.0%, 1), and no VE Couplets were present. PHARMACOLOGIC NUCLEAR STRESS TEST 1/25/2021:   1. Pharmacologic nuclear stress test is negative for myocardial ischemia  2. ECG portion of stress test is negative for myocardial ischemia  3. Left ventricle is mildly dilated with normal wall motion; gated EF 48%  4. No reported symptoms  5. Diaphragmatic attenuation is noted  6. Rare VPCs and rare APCs  7.  There is no study for comparison

## 2023-08-11 ENCOUNTER — OFFICE VISIT (OUTPATIENT)
Dept: CARDIOLOGY CLINIC | Facility: CLINIC | Age: 73
End: 2023-08-11
Payer: COMMERCIAL

## 2023-08-11 VITALS
HEIGHT: 69 IN | HEART RATE: 56 BPM | SYSTOLIC BLOOD PRESSURE: 132 MMHG | BODY MASS INDEX: 35.99 KG/M2 | WEIGHT: 243 LBS | DIASTOLIC BLOOD PRESSURE: 70 MMHG

## 2023-08-11 DIAGNOSIS — I48.92 ATRIAL FLUTTER (HCC): ICD-10-CM

## 2023-08-11 DIAGNOSIS — I48.92 ATRIAL FLUTTER, UNSPECIFIED TYPE (HCC): Primary | ICD-10-CM

## 2023-08-11 PROCEDURE — 93000 ELECTROCARDIOGRAM COMPLETE: CPT | Performed by: PHYSICIAN ASSISTANT

## 2023-08-11 PROCEDURE — 99213 OFFICE O/P EST LOW 20 MIN: CPT | Performed by: PHYSICIAN ASSISTANT

## 2023-08-11 RX ORDER — METOPROLOL SUCCINATE 50 MG/1
25 TABLET, EXTENDED RELEASE ORAL DAILY
Qty: 90 TABLET | Refills: 0
Start: 2023-08-11

## 2023-08-11 NOTE — PATIENT INSTRUCTIONS
You are doing well from an Afib standpoint  Continue Xarelto to prevent stroke  You can decrease metoprolol to 25 mg once daily  Check blood pressure regularly, if blood pressure >130/80 then go back to 50 mg once daily  Discuss HCTZ with PCP  F/U with Dr. Gonzalo Ramsey in 6 months

## 2023-08-21 ENCOUNTER — TELEPHONE (OUTPATIENT)
Dept: CARDIOLOGY CLINIC | Facility: CLINIC | Age: 73
End: 2023-08-21

## 2023-08-22 RX ORDER — METOPROLOL SUCCINATE 50 MG/1
50 TABLET, EXTENDED RELEASE ORAL DAILY
COMMUNITY

## 2023-09-08 ENCOUNTER — OFFICE VISIT (OUTPATIENT)
Dept: FAMILY MEDICINE CLINIC | Facility: CLINIC | Age: 73
End: 2023-09-08
Payer: COMMERCIAL

## 2023-09-08 VITALS
HEART RATE: 86 BPM | HEIGHT: 69 IN | DIASTOLIC BLOOD PRESSURE: 70 MMHG | SYSTOLIC BLOOD PRESSURE: 120 MMHG | TEMPERATURE: 97.8 F | OXYGEN SATURATION: 97 % | BODY MASS INDEX: 35.99 KG/M2 | RESPIRATION RATE: 16 BRPM | WEIGHT: 243 LBS

## 2023-09-08 DIAGNOSIS — I10 BENIGN ESSENTIAL HYPERTENSION: ICD-10-CM

## 2023-09-08 DIAGNOSIS — E78.2 COMBINED HYPERLIPIDEMIA: ICD-10-CM

## 2023-09-08 DIAGNOSIS — Z00.00 MEDICARE ANNUAL WELLNESS VISIT, SUBSEQUENT: Primary | ICD-10-CM

## 2023-09-08 DIAGNOSIS — R00.2 PALPITATIONS: ICD-10-CM

## 2023-09-08 DIAGNOSIS — Z87.39 HISTORY OF GOUT: ICD-10-CM

## 2023-09-08 DIAGNOSIS — C61 PROSTATE CANCER (HCC): ICD-10-CM

## 2023-09-08 DIAGNOSIS — I48.92 ATRIAL FLUTTER, UNSPECIFIED TYPE (HCC): ICD-10-CM

## 2023-09-08 DIAGNOSIS — G47.33 OSA (OBSTRUCTIVE SLEEP APNEA): ICD-10-CM

## 2023-09-08 DIAGNOSIS — M25.511 ACUTE PAIN OF RIGHT SHOULDER: ICD-10-CM

## 2023-09-08 PROCEDURE — G0439 PPPS, SUBSEQ VISIT: HCPCS | Performed by: NURSE PRACTITIONER

## 2023-09-08 PROCEDURE — 99214 OFFICE O/P EST MOD 30 MIN: CPT | Performed by: NURSE PRACTITIONER

## 2023-09-08 NOTE — PATIENT INSTRUCTIONS
Medicare Preventive Visit Patient Instructions  Thank you for completing your Welcome to Medicare Visit or Medicare Annual Wellness Visit today. Your next wellness visit will be due in one year (9/8/2024). The screening/preventive services that you may require over the next 5-10 years are detailed below. Some tests may not apply to you based off risk factors and/or age. Screening tests ordered at today's visit but not completed yet may show as past due. Also, please note that scanned in results may not display below. Preventive Screenings:  Service Recommendations Previous Testing/Comments   Colorectal Cancer Screening  · Colonoscopy    · Fecal Occult Blood Test (FOBT)/Fecal Immunochemical Test (FIT)  · Fecal DNA/Cologuard Test  · Flexible Sigmoidoscopy Age: 43-73 years old   Colonoscopy: every 10 years (May be performed more frequently if at higher risk)  OR  FOBT/FIT: every 1 year  OR  Cologuard: every 3 years  OR  Sigmoidoscopy: every 5 years  Screening may be recommended earlier than age 39 if at higher risk for colorectal cancer. Also, an individualized decision between you and your healthcare provider will decide whether screening between the ages of 77-80 would be appropriate.  Colonoscopy: 05/16/2023  FOBT/FIT: Not on file  Cologuard: Not on file  Sigmoidoscopy: Not on file    Screening Current     Prostate Cancer Screening Individualized decision between patient and health care provider in men between ages of 53-66   Medicare will cover every 12 months beginning on the day after your 50th birthday PSA: <0.01 ng/mL     History Prostate Cancer     Hepatitis C Screening Once for adults born between 1945 and 1965  More frequently in patients at high risk for Hepatitis C Hep C Antibody: Not on file        Diabetes Screening 1-2 times per year if you're at risk for diabetes or have pre-diabetes Fasting glucose: 90 mg/dL (5/2/2023)  A1C: 6.1 % (5/2/2023)  Screening Current   Cholesterol Screening Once every 5 years if you don't have a lipid disorder. May order more often based on risk factors. Lipid panel: 11/02/2022  Screening Not Indicated  History Lipid Disorder      Other Preventive Screenings Covered by Medicare:  1. Abdominal Aortic Aneurysm (AAA) Screening: covered once if your at risk. You're considered to be at risk if you have a family history of AAA or a male between the age of 70-76 who smoking at least 100 cigarettes in your lifetime. 2. Lung Cancer Screening: covers low dose CT scan once per year if you meet all of the following conditions: (1) Age 48-67; (2) No signs or symptoms of lung cancer; (3) Current smoker or have quit smoking within the last 15 years; (4) You have a tobacco smoking history of at least 20 pack years (packs per day x number of years you smoked); (5) You get a written order from a healthcare provider. 3. Glaucoma Screening: covered annually if you're considered high risk: (1) You have diabetes OR (2) Family history of glaucoma OR (3)  aged 48 and older OR (3)  American aged 72 and older  3. Osteoporosis Screening: covered every 2 years if you meet one of the following conditions: (1) Have a vertebral abnormality; (2) On glucocorticoid therapy for more than 3 months; (3) Have primary hyperparathyroidism; (4) On osteoporosis medications and need to assess response to drug therapy. 5. HIV Screening: covered annually if you're between the age of 14-79. Also covered annually if you are younger than 13 and older than 72 with risk factors for HIV infection. For pregnant patients, it is covered up to 3 times per pregnancy.     Immunizations:  Immunization Recommendations   Influenza Vaccine Annual influenza vaccination during flu season is recommended for all persons aged >= 6 months who do not have contraindications   Pneumococcal Vaccine   * Pneumococcal conjugate vaccine = PCV13 (Prevnar 13), PCV15 (Vaxneuvance), PCV20 (Prevnar 20)  * Pneumococcal polysaccharide vaccine = PPSV23 (Pneumovax) Adults 2364 years old: 1-3 doses may be recommended based on certain risk factors  Adults 72 years old: 1-2 doses may be recommended based off what pneumonia vaccine you previously received   Hepatitis B Vaccine 3 dose series if at intermediate or high risk (ex: diabetes, end stage renal disease, liver disease)   Tetanus (Td) Vaccine - COST NOT COVERED BY MEDICARE PART B Following completion of primary series, a booster dose should be given every 10 years to maintain immunity against tetanus. Td may also be given as tetanus wound prophylaxis. Tdap Vaccine - COST NOT COVERED BY MEDICARE PART B Recommended at least once for all adults. For pregnant patients, recommended with each pregnancy. Shingles Vaccine (Shingrix) - COST NOT COVERED BY MEDICARE PART B  2 shot series recommended in those aged 48 and above     Health Maintenance Due:      Topic Date Due   • Hepatitis C Screening  Never done   • Colorectal Cancer Screening  05/16/2033     Immunizations Due:      Topic Date Due   • COVID-19 Vaccine (5 - Pfizer series) 07/14/2022   • Influenza Vaccine (1) 09/01/2023     Advance Directives   What are advance directives? Advance directives are legal documents that state your wishes and plans for medical care. These plans are made ahead of time in case you lose your ability to make decisions for yourself. Advance directives can apply to any medical decision, such as the treatments you want, and if you want to donate organs. What are the types of advance directives? There are many types of advance directives, and each state has rules about how to use them. You may choose a combination of any of the following:  · Living will: This is a written record of the treatment you want. You can also choose which treatments you do not want, which to limit, and which to stop at a certain time. This includes surgery, medicine, IV fluid, and tube feedings.    · Durable power of  for [facility]): This is a written record that states who you want to make healthcare choices for you when you are unable to make them for yourself. This person, called a proxy, is usually a family member or a friend. You may choose more than 1 proxy. · Do not resuscitate (DNR) order:  A DNR order is used in case your heart stops beating or you stop breathing. It is a request not to have certain forms of treatment, such as CPR. A DNR order may be included in other types of advance directives. · Medical directive: This covers the care that you want if you are in a coma, near death, or unable to make decisions for yourself. You can list the treatments you want for each condition. Treatment may include pain medicine, surgery, blood transfusions, dialysis, IV or tube feedings, and a ventilator (breathing machine). · Values history: This document has questions about your views, beliefs, and how you feel and think about life. This information can help others choose the care that you would choose. Why are advance directives important? An advance directive helps you control your care. Although spoken wishes may be used, it is better to have your wishes written down. Spoken wishes can be misunderstood, or not followed. Treatments may be given even if you do not want them. An advance directive may make it easier for your family to make difficult choices about your care. Fall Prevention    Fall prevention  includes ways to make your home and other areas safer. It also includes ways you can move more carefully to prevent a fall. Health conditions that cause changes in your blood pressure, vision, or muscle strength and coordination may increase your risk for falls. Medicines may also increase your risk for falls if they make you dizzy, weak, or sleepy. Fall prevention tips:   · Stand or sit up slowly. · Use assistive devices as directed. · Wear shoes that fit well and have soles that .     · Wear a personal alarm. · Stay active. · Manage your medical conditions. Home Safety Tips:  · Add items to prevent falls in the bathroom. · Keep paths clear. · Install bright lights in your home. · Keep items you use often on shelves within reach. · Paint or place reflective tape on the edges of your stairs. Weight Management   Why it is important to manage your weight:  Being overweight increases your risk of health conditions such as heart disease, high blood pressure, type 2 diabetes, and certain types of cancer. It can also increase your risk for osteoarthritis, sleep apnea, and other respiratory problems. Aim for a slow, steady weight loss. Even a small amount of weight loss can lower your risk of health problems. How to lose weight safely:  A safe and healthy way to lose weight is to eat fewer calories and get regular exercise. You can lose up about 1 pound a week by decreasing the number of calories you eat by 500 calories each day. Healthy meal plan for weight management:  A healthy meal plan includes a variety of foods, contains fewer calories, and helps you stay healthy. A healthy meal plan includes the following:  · Eat whole-grain foods more often. A healthy meal plan should contain fiber. Fiber is the part of grains, fruits, and vegetables that is not broken down by your body. Whole-grain foods are healthy and provide extra fiber in your diet. Some examples of whole-grain foods are whole-wheat breads and pastas, oatmeal, brown rice, and bulgur. · Eat a variety of vegetables every day. Include dark, leafy greens such as spinach, kale, dustin greens, and mustard greens. Eat yellow and orange vegetables such as carrots, sweet potatoes, and winter squash. · Eat a variety of fruits every day. Choose fresh or canned fruit (canned in its own juice or light syrup) instead of juice. Fruit juice has very little or no fiber. · Eat low-fat dairy foods.   Drink fat-free (skim) milk or 1% milk. Eat fat-free yogurt and low-fat cottage cheese. Try low-fat cheeses such as mozzarella and other reduced-fat cheeses. · Choose meat and other protein foods that are low in fat. Choose beans or other legumes such as split peas or lentils. Choose fish, skinless poultry (chicken or turkey), or lean cuts of red meat (beef or pork). Before you cook meat or poultry, cut off any visible fat. · Use less fat and oil. Try baking foods instead of frying them. Add less fat, such as margarine, sour cream, regular salad dressing and mayonnaise to foods. Eat fewer high-fat foods. Some examples of high-fat foods include french fries, doughnuts, ice cream, and cakes. · Eat fewer sweets. Limit foods and drinks that are high in sugar. This includes candy, cookies, regular soda, and sweetened drinks. Exercise:  Exercise at least 30 minutes per day on most days of the week. Some examples of exercise include walking, biking, dancing, and swimming. You can also fit in more physical activity by taking the stairs instead of the elevator or parking farther away from stores. Ask your healthcare provider about the best exercise plan for you. © Copyright [employer] 2018 Information is for End User's use only and may not be sold, redistributed or otherwise used for commercial purposes.  All illustrations and images included in CareNotes® are the copyrighted property of [employer] or [address] [place] [address]

## 2023-09-08 NOTE — PROGRESS NOTES
Assessment and Plan:     Problem List Items Addressed This Visit        Respiratory    MANDY (obstructive sleep apnea)     Appears stable with HS CPAP            Cardiovascular and Mediastinum    Benign essential hypertension     Stable/controlled  BP in the office today: 120/70  Reports asymptomatic  Compliant with valsartan HCTZ 160-12.5; metoprolol 50  Advised periodic home checks         Atrial flutter (HCC)     Reports occasional palpitations  No chest pain  History of afib  Metoprolol 50; Xarelto 20 daily  Follows with cardiology  Last seen August  EKG today     SR: PAC  Advised f/u with cardiology            Genitourinary    Prostate cancer [facility])     Lengthy discussion today regarding continued incontinence   Urology has referred him for AUS/sling, but patient is not interested yet at this time  Has completed therapy and reports continuing with home exercise  Does have a follow-up with urology in the next few weeks  He will discuss his concerns at follow-up appointment            Other    Combined hyperlipidemia     Last lipid panel 11/2022   Check in November   Cholesterol at that time:            Total 158; LDL 83  Compliant with atorvastatin 20  Tolerating well-denies myalgia         History of gout     No current concerns   continues with allopurinol         Acute pain of right shoulder     History of rotator cuff syndrome 2017  Symptoms intermittent  Last month unremarkable today  Discussed gentle stretches  Recommend PT  Patient will consider         Palpitations     Reports occasional palpitations  No chest pain  History of afib  Metoprolol 50; Xarelto 20 daily  Follows with cardiology  Last seen August  EKG today     SR: PAC  Advised f/u with cardiology         Relevant Orders    POCT ECG (Completed)   Other Visit Diagnoses     Medicare annual wellness visit, subsequent    -  Primary           Preventive health issues were discussed with patient, and age appropriate screening tests were ordered as noted in patient's After Visit Summary. Personalized health advice and appropriate referrals for health education or preventive services given if needed, as noted in patient's After Visit Summary.      History of Present Illness:     Patient presents for a Medicare Wellness Visit    HPI   Patient Care Team:  Rachel Silva DO as PCP - General (Family Medicine)     Review of Systems:     Review of Systems     Problem List:     Patient Active Problem List   Diagnosis   • Rotator cuff syndrome of right shoulder   • Class 1 obesity due to excess calories with serious comorbidity and body mass index (BMI) of 34.0 to 34.9 in adult   • Combined hyperlipidemia   • Benign essential hypertension   • Acute bilateral thoracic back pain   • History of gout   • Chronic lumbar radiculopathy   • Cervical radiculitis   • Plantar fasciitis of right foot   • Subclinical hypothyroidism   • Elevated blood sugar   • Eczema   • Fatigue   • MANDY (obstructive sleep apnea)   • S/P cervical spinal fusion   • Achilles rupture, left   • Hand dermatitis   • Dupuytren contracture   • Seborrheic keratoses, inflamed   • Paresthesia of upper and lower extremities of both sides   • Atrial flutter (HCC)   • Habitual alcohol use   • Dyslipidemia   • Body mass index (BMI)40.0-44.9, adult   • Lower respiratory infection (e.g., bronchitis, pneumonia, pneumonitis, pulmonitis)   • Acute nonintractable headache   • Cervical strain   • Vertigo   • Prostate cancer ([address])   • H/O abdominal surgery   • Gout attack   • Acute pain of right shoulder   • Palpitations      Past Medical and Surgical History:     Past Medical History:   Diagnosis Date   • Acute gouty arthritis     last assessed 6/1/13    • Anxiety     last assessed 7/11/14    • Atrial flutter ([address]) 1/22/2021   • BPH (benign prostatic hyperplasia)    • Chronic thoracic spine pain     last assessed 5/13/16    • CPAP (continuous positive airway pressure) dependence    • Dysfunction of both eustachian tubes last assessed 8/16/13   • Erectile dysfunction of non-organic origin     last assessed 10/8/13    • Family history reviewed with no changes     medical history    • Gout    • Hyperlipidemia    • Hypertension    • Prostate cancer ([address]) 10/25/2022   • Sebaceous cyst     last assessed 12/16/13    • Skin lesion     last assessed 1/2/14    • Sleep apnea    • Subclinical hypothyroidism 03/21/2019     Past Surgical History:   Procedure Laterality Date   • APPENDECTOMY      12years old   • BOWEL RESECTION  2010    Sigmoid resection for diverticulitis   • CATARACT EXTRACTION     • CERVICAL FUSION N/A 08/27/2019    Procedure: Anterior cervical discectomy w fusion C5-6, with allograft and neuromonitoring;  Surgeon: Nina Licona MD;  Location: [facility];  Service: Orthopedics   • COLONOSCOPY  02/2018    Dr. Riley Cormier. Tubular adenoma polyps removed from the mid ascending colon, hyperplastic rectal polyp removed, evidence of end to end anastomosis in sigmoid with healthy mucosa. • COLONOSCOPY  2012    Dr. Riley Cormier. Tubular adenoma polyp removed from right colon. • COLONOSCOPY  2004    Small rectal polyp   • COLONOSCOPY  2002    Polyp ablated   • COLONOSCOPY  2001    Polyps removed. Dr. Riley Cormier.    • MASTECTOMY Bilateral    • MOUTH SURGERY      tooth extraction with dental implant   • MS BX PROSTATE STRTCTC SATURATION SAMPLING IMG GID N/A 10/18/2022    Procedure: TRANSPERINEAL MRI FUSION  BIOPSY PROSTATE;  Surgeon: Korina Rodriguez MD;  Location: [facility];  Service: Urology   • MS LAPS SURG [id number] RPBIC RAD W/NRV SPARING ROBOT N/A 01/25/2023    Procedure: PROSTATECTOMY RADICAL AND PELVIC LYMPH NODE DISSECTION LAPAROSCOPIC W/ ROBOTICS; LYSIS OF AHDESIONS;  Surgeon: Korina Rodriguez MD;  Location[facility];  Service: Urology   • SKIN BIOPSY      left cheek   • WRIST SURGERY        Family History:     Family History   Problem Relation Age of Onset   • Hypertension Mother    • Eczema Father    • Heart attack Father • Diabetes Sister    • Psoriasis Sister    • Hypertension Sister    • Diabetes Family    • Hypertension Family    • Breast cancer Daughter       Social History:     Social History     Socioeconomic History   • Marital status: /Civil Union     Spouse name: None   • Number of children: None   • Years of education: None   • Highest education level: None   Occupational History   • Occupation: retired   Tobacco Use   • Smoking status: Former     Packs/day: 0.50     Years: 20.00     Total pack years: 10.00     Types: Cigarettes     Start date:      Quit date:      Years since quittin.7   • Smokeless tobacco: Never   Vaping Use   • Vaping Use: Never used   Substance and Sexual Activity   • Alcohol use: Not Currently   • Drug use: Never   • Sexual activity: Not Currently   Other Topics Concern   • None   Social History Narrative    Consumes 1 cup of coffee per day     Social Determinants of Health     Financial Resource Strain: Low Risk  (2023)    Overall Financial Resource Strain (CARDIA)    • Difficulty of Paying Living Expenses: Not hard at all   Food Insecurity: Not on file   Transportation Needs: No Transportation Needs (2023)    PRAPARE - Transportation    • Lack of Transportation (Medical): No    • Lack of Transportation (Non-Medical):  No   Physical Activity: Not on file   Stress: Not on file   Social Connections: Not on file   Intimate Partner Violence: Not on file   Housing Stability: Not on file      Medications and Allergies:     Current Outpatient Medications   Medication Sig Dispense Refill   • allopurinol (ZYLOPRIM) 300 mg tablet Take 1 tablet (300 mg total) by mouth daily 90 tablet 1   • atorvastatin (LIPITOR) 20 mg tablet TAKE 1 TABLET BY MOUTH EVERY DAY 90 tablet 2   • metoprolol succinate (TOPROL-XL) 50 mg 24 hr tablet Take 50 mg by mouth daily     • rivaroxaban (Xarelto) 20 mg tablet Take 1 tablet (20 mg total) by mouth daily with breakfast 90 tablet 3   • valsartan (DIOVAN) 160 mg tablet Take 1 tablet (160 mg total) by mouth daily 90 tablet 1     No current facility-administered medications for this visit. No Known Allergies   Immunizations:     Immunization History   Administered Date(s) Administered   • COVID-19 PFIZER VACCINE 0.3 ML IM 03/24/2021, 04/14/2021, 11/03/2021   • COVID-19 Pfizer vac (Randal-sucrose, gray cap) 12 yr+ IM 05/19/2022   • INFLUENZA 11/01/2015, 11/02/2017, 09/23/2018, 10/03/2019, 10/06/2021, 10/28/2022   • Influenza Quadrivalent Preservative Free 3 years and older IM 11/02/2017   • Influenza Quadrivalent, 6-35 Months IM 11/01/2015   • Influenza, high dose seasonal 0.7 mL 10/03/2019, 08/27/2020   • Pneumococcal Conjugate 13-Valent 11/11/2019   • Pneumococcal Polysaccharide PPV23 12/09/2020   • Zoster Vaccine Recombinant 09/10/2020, 12/19/2020      Health Maintenance:         Topic Date Due   • Hepatitis C Screening  Never done   • Colorectal Cancer Screening  05/16/2033         Topic Date Due   • COVID-19 Vaccine (5 - Pfizer series) 07/14/2022   • Influenza Vaccine (1) 09/01/2023      Medicare Screening Tests and Risk Assessments:     Gopal Morocho is here for his Subsequent Wellness visit. Last Medicare Wellness visit information reviewed, patient interviewed and updates made to the record today. Health Risk Assessment:   Patient rates overall health as good. Patient feels that their physical health rating is same. Patient is satisfied with their life. Eyesight was rated as same. Hearing was rated as same. Patient feels that their emotional and mental health rating is same. Patients states they are sometimes angry. Patient states they are sometimes unusually tired/fatigued. Pain experienced in the last 7 days has been some. Patient's pain rating has been 3/10. Patient states that he has experienced no weight loss or gain in last 6 months. Depression Screening:   PHQ-2 Score: 0      Fall Risk Screening:    In the past year, patient has experienced: history of falling in past year    Number of falls: 1  Injured during fall?: No    Feels unsteady when standing or walking?: No    Worried about falling?: No      Home Safety:  Patient does not have trouble with stairs inside or outside of their home. Patient has working smoke alarms and has working carbon monoxide detector. Home safety hazards include: loose rugs on the floor. Nutrition:   Current diet is Low Cholesterol, Low Carb, No Added Salt and Limited junk food. Medications:   Patient is not currently taking any over-the-counter supplements. Patient is able to manage medications. Activities of Daily Living (ADLs)/Instrumental Activities of Daily Living (IADLs):   Walk and transfer into and out of bed and chair?: Yes  Dress and groom yourself?: Yes    Bathe or shower yourself?: Yes    Feed yourself?  Yes  Do your laundry/housekeeping?: Yes  Manage your money, pay your bills and track your expenses?: Yes  Make your own meals?: Yes    Do your own shopping?: Yes    Durable Medical Equipment Suppliers  Sleep apnea machine    Previous Hospitalizations:   Any hospitalizations or ED visits within the last 12 months?: Yes    How many hospitalizations have you had in the last year?: 1-2    Advance Care Planning:   Living will: No    Durable POA for healthcare: No    Advanced directive: No    Advanced directive counseling given: Yes    Five wishes given: Yes    End of Life Decisions reviewed with patient: Yes    Provider agrees with end of life decisions: Yes      Cognitive Screening:   Provider or family/friend/caregiver concerned regarding cognition?: No    PREVENTIVE SCREENINGS      Cardiovascular Screening:    General: Screening Not Indicated and History Lipid Disorder      Diabetes Screening:     General: Screening Current    Due for: Blood Glucose      Colorectal Cancer Screening:     General: Screening Current      Prostate Cancer Screening:    General: History Prostate Cancer and Screening Not Indicated Osteoporosis Screening:    General: Screening Not Indicated      Abdominal Aortic Aneurysm (AAA) Screening:    Risk factors include: age between 70-75 yo and tobacco use        General: Screening Not Indicated      Lung Cancer Screening:     General: Screening Not Indicated      Hepatitis C Screening:      Hep C Screening Accepted: Yes      Screening, Brief Intervention, and Referral to Treatment (SBIRT)    Screening  Typical number of drinks in a day: 0  Typical number of drinks in a week: 0  Interpretation: Low risk drinking behavior. AUDIT-C Screenin) How often did you have a drink containing alcohol in the past year? never  2) How many drinks did you have on a typical day when you were drinking in the past year? 1 to 2  3) How often did you have 6 or more drinks on one occasion in the past year? monthly    AUDIT-C Score: 2  Interpretation: Score 0-3 (male): Negative screen for alcohol misuse    Single Item Drug Screening:  How often have you used an illegal drug (including marijuana) or a prescription medication for non-medical reasons in the past year? never    Single Item Drug Screen Score: 0  Interpretation: Negative screen for possible drug use disorder    Other Counseling Topics:   Car/seat belt/driving safety, skin self-exam, sunscreen and regular weightbearing exercise and calcium and vitamin D intake. No results found.      Physical Exam:     /70 (BP Location: Left arm, Patient Position: Sitting, Cuff Size: Large)   Pulse 86   Temp 97.8 °F (36.6 °C) (Temporal)   Resp 16   Ht 5' 8.9" (1.75 m)   Wt 110 kg (243 lb)   SpO2 97%   BMI 35.99 kg/m²     Physical Exam     ALEJANDRO Mayer

## 2023-09-12 DIAGNOSIS — I10 BENIGN ESSENTIAL HYPERTENSION: Primary | ICD-10-CM

## 2023-09-12 RX ORDER — VALSARTAN 160 MG/1
160 TABLET ORAL DAILY
Qty: 90 TABLET | Refills: 1 | Status: SHIPPED | OUTPATIENT
Start: 2023-09-12

## 2023-09-18 ENCOUNTER — APPOINTMENT (OUTPATIENT)
Age: 73
End: 2023-09-18
Payer: COMMERCIAL

## 2023-09-18 DIAGNOSIS — C61 PROSTATE CANCER (HCC): ICD-10-CM

## 2023-09-18 LAB — PSA SERPL-MCNC: <0.01 NG/ML (ref 0–4)

## 2023-09-18 PROCEDURE — 84153 ASSAY OF PSA TOTAL: CPT

## 2023-09-18 PROCEDURE — 36415 COLL VENOUS BLD VENIPUNCTURE: CPT

## 2023-09-22 ENCOUNTER — OFFICE VISIT (OUTPATIENT)
Dept: UROLOGY | Facility: CLINIC | Age: 73
End: 2023-09-22
Payer: COMMERCIAL

## 2023-09-22 VITALS
OXYGEN SATURATION: 96 % | HEIGHT: 69 IN | BODY MASS INDEX: 36.43 KG/M2 | HEART RATE: 62 BPM | WEIGHT: 246 LBS | SYSTOLIC BLOOD PRESSURE: 100 MMHG | DIASTOLIC BLOOD PRESSURE: 70 MMHG

## 2023-09-22 DIAGNOSIS — C61 PROSTATE CANCER (HCC): Primary | ICD-10-CM

## 2023-09-22 LAB
SL AMB  POCT GLUCOSE, UA: ABNORMAL
SL AMB LEUKOCYTE ESTERASE,UA: ABNORMAL
SL AMB POCT BILIRUBIN,UA: ABNORMAL
SL AMB POCT BLOOD,UA: ABNORMAL
SL AMB POCT CLARITY,UA: CLEAR
SL AMB POCT COLOR,UA: YELLOW
SL AMB POCT KETONES,UA: ABNORMAL
SL AMB POCT NITRITE,UA: ABNORMAL
SL AMB POCT PH,UA: 6.5
SL AMB POCT SPECIFIC GRAVITY,UA: 1
SL AMB POCT URINE PROTEIN: ABNORMAL
SL AMB POCT UROBILINOGEN: 0.2

## 2023-09-22 PROCEDURE — 81002 URINALYSIS NONAUTO W/O SCOPE: CPT | Performed by: PHYSICIAN ASSISTANT

## 2023-09-22 PROCEDURE — 99214 OFFICE O/P EST MOD 30 MIN: CPT | Performed by: PHYSICIAN ASSISTANT

## 2023-09-22 NOTE — PROGRESS NOTES
9/22/2023      Chief Complaint   Patient presents with   • Follow-up     3 month f/u          Assessment and Plan    68 y.o. male managed by Dr Sera Hills    1. pT3a Prue 4+3=7 prostate cancer  Diagnosis October 2022 MRI fusion biopsy PI-RADS 5 lesion right side  Staging imaging MRI  Pretreatment PSA 6.5  Treatment RALP-bPLND January 2023 Dr. Sera Hills, focal positive margin    2. Stress urinary incontinence  continue home PT  not interested in reconstructive referral for AUS/sling but knows option    plan: Teer Palomo is 9 months postop undetectable psa's, continue working on continence control and weight loss. Next psa 3 months then start q6 months. History of Present Illness  Lary Hodgkins is a 68 y.o. male here for evaluation of 9-month status post robot-assisted laparoscopic prostatectomy, undetectable PSAs, still working on his stress incontinence. He was refferred to Dr Nola Arevalo to discuss AUS pt not ready to commit to that cancelled the appt. Slow improvements want to see how he does by 1 year corin. Review of Systems   Constitutional: Negative. Respiratory: Negative. Cardiovascular: Negative. Genitourinary: Negative for decreased urine volume, difficulty urinating, dysuria, flank pain, frequency, hematuria and urgency. Musculoskeletal: Negative.             AUA SYMPTOM SCORE    Flowsheet Row Most Recent Value   AUA SYMPTOM SCORE    How often have you had a sensation of not emptying your bladder completely after you finished urinating? 0 (P)     How often have you had to urinate again less than two hours after you finished urinating? 1 (P)     How often have you found you stopped and started again several times when you urinate? 0 (P)     How often have you found it difficult to postpone urination? 0 (P)     How often have you had a weak urinary stream? 0 (P)     How often have you had to push or strain to begin urination? 0 (P)     How many times did you most typically get up to urinate from the time you went to bed at night until the time you got up in the morning? 0 (P)     Quality of Life: If you were to spend the rest of your life with your urinary condition just the way it is now, how would you feel about that? 5 (P)     AUA SYMPTOM SCORE 1 (P)            Vitals  Vitals:    09/22/23 1119   BP: 100/70   BP Location: Left arm   Patient Position: Sitting   Cuff Size: Adult   Pulse: 62   SpO2: 96%   Weight: 112 kg (246 lb)   Height: 5' 8.9" (1.75 m)       Physical Exam  Vitals and nursing note reviewed. Constitutional:       General: He is not in acute distress. Appearance: He is well-developed. He is not diaphoretic. HENT:      Head: Normocephalic and atraumatic. Pulmonary:      Effort: Pulmonary effort is normal.   Skin:     General: Skin is warm and dry. Neurological:      Mental Status: He is alert and oriented to person, place, and time.       Gait: Gait normal.   Psychiatric:         Speech: Speech normal.         Behavior: Behavior normal.           Past History  Past Medical History:   Diagnosis Date   • Acute gouty arthritis     last assessed 6/1/13    • Anxiety     last assessed 7/11/14    • Atrial flutter ([address]) 1/22/2021   • BPH (benign prostatic hyperplasia)    • Chronic thoracic spine pain     last assessed 5/13/16    • CPAP (continuous positive airway pressure) dependence    • Dysfunction of both eustachian tubes     last assessed 8/16/13   • Erectile dysfunction of non-organic origin     last assessed 10/8/13    • Family history reviewed with no changes     medical history    • Gout    • Hyperlipidemia    • Hypertension    • Prostate cancer ([address]) 10/25/2022   • Sebaceous cyst     last assessed 12/16/13    • Skin lesion     last assessed 1/2/14    • Sleep apnea    • Subclinical hypothyroidism 03/21/2019     Social History     Socioeconomic History   • Marital status: /Civil Union     Spouse name: None   • Number of children: None   • Years of education: None   • Highest education level: None   Occupational History   • Occupation: retired   Tobacco Use   • Smoking status: Former     Packs/day: 0.50     Years: 20.00     Total pack years: 10.00     Types: Cigarettes     Start date:      Quit date: 1985     Years since quittin.7   • Smokeless tobacco: Never   Vaping Use   • Vaping Use: Never used   Substance and Sexual Activity   • Alcohol use: Not Currently   • Drug use: Never   • Sexual activity: Not Currently   Other Topics Concern   • None   Social History Narrative    Consumes 1 cup of coffee per day     Social Determinants of Health     Financial Resource Strain: Low Risk  (2023)    Overall Financial Resource Strain (CARDIA)    • Difficulty of Paying Living Expenses: Not hard at all   Food Insecurity: Not on file   Transportation Needs: No Transportation Needs (2023)    PRAPARE - Transportation    • Lack of Transportation (Medical): No    • Lack of Transportation (Non-Medical):  No   Physical Activity: Not on file   Stress: Not on file   Social Connections: Not on file   Intimate Partner Violence: Not on file   Housing Stability: Not on file     Social History     Tobacco Use   Smoking Status Former   • Packs/day: 0.50   • Years: 20.00   • Total pack years: 10.00   • Types: Cigarettes   • Start date:    • Quit date: 5   • Years since quittin.7   Smokeless Tobacco Never     Family History   Problem Relation Age of Onset   • Hypertension Mother    • Eczema Father    • Heart attack Father    • Diabetes Sister    • Psoriasis Sister    • Hypertension Sister    • Diabetes Family    • Hypertension Family    • Breast cancer Daughter        The following portions of the patient's history were reviewed and updated as appropriate: allergies, current medications, past medical history, past social history, past surgical history and problem list.    Results  Recent Results (from the past 1 hour(s))   POCT urine dip    Collection Time: 23 11:23 AM   Result Value Ref Range    LEUKOCYTE ESTERASE,UA ++     NITRITE,UA -     SL AMB POCT UROBILINOGEN 0.2     POCT URINE PROTEIN -      PH,UA 6.5     BLOOD,UA -     SPECIFIC GRAVITY,UA 1.005     KETONES,UA -     BILIRUBIN,UA -     GLUCOSE, UA -      COLOR,UA yellow     CLARITY,UA clear    ]  Lab Results   Component Value Date    PSA <0.01 09/18/2023    PSA <0.01 05/30/2023    PSA <0.1 03/06/2023    PSA 5.1 (H) 11/02/2022     Lab Results   Component Value Date    CALCIUM 9.6 05/02/2023     05/16/2016    K 3.9 05/02/2023    CO2 26 05/02/2023     05/02/2023    BUN 17 05/02/2023    CREATININE 1.09 08/02/2023     Lab Results   Component Value Date    WBC 8.96 08/02/2023    HGB 15.2 08/02/2023    HCT 46.2 08/02/2023    MCV 90 08/02/2023     08/02/2023

## 2023-09-30 PROBLEM — M25.511 ACUTE PAIN OF RIGHT SHOULDER: Status: ACTIVE | Noted: 2023-09-30

## 2023-09-30 PROBLEM — R00.2 PALPITATIONS: Status: ACTIVE | Noted: 2023-09-30

## 2023-09-30 PROCEDURE — 93000 ELECTROCARDIOGRAM COMPLETE: CPT | Performed by: NURSE PRACTITIONER

## 2023-09-30 NOTE — ASSESSMENT & PLAN NOTE
Lengthy discussion today regarding continued incontinence   Urology has referred him for AUS/sling, but patient is not interested yet at this time  Has completed therapy and reports continuing with home exercise  Does have a follow-up with urology in the next few weeks  He will discuss his concerns at follow-up appointment

## 2023-09-30 NOTE — ASSESSMENT & PLAN NOTE
Reports occasional palpitations  No chest pain  History of afib  Metoprolol 50; Xarelto 20 daily  Follows with cardiology  Last seen August  EKG today     SR: PAC  Advised f/u with cardiology

## 2023-09-30 NOTE — PROGRESS NOTES
[facility]    ASSESSMENT AND PLAN     1. Combined hyperlipidemia  Assessment & Plan:  Last lipid panel 11/2022   Check in November   Cholesterol at that time:            Total 158; LDL 83  Compliant with atorvastatin 20  Tolerating well-denies myalgia      2. Acute pain of right shoulder  Assessment & Plan:  History of rotator cuff syndrome 2017  Symptoms intermittent  Last month unremarkable today  Discussed gentle stretches  Recommend PT  Patient will consider      3. Benign essential hypertension  Assessment & Plan:  Stable/controlled  BP in the office today: 120/70  Reports asymptomatic  Compliant with valsartan HCTZ 160-12.5; metoprolol 50  Advised periodic home checks      4. Prostate cancer [facility])  Assessment & Plan:  Lengthy discussion today regarding continued incontinence   Urology has referred him for AUS/sling, but patient is not interested yet at this time  Has completed therapy and reports continuing with home exercise  Does have a follow-up with urology in the next few weeks  He will discuss his concerns at follow-up appointment      5. MANDY (obstructive sleep apnea)  Assessment & Plan:  Appears stable with HS CPAP      6. Atrial flutter, unspecified type [facility])  Assessment & Plan:  Reports occasional palpitations  No chest pain  History of afib  Metoprolol 50; Xarelto 20 daily  Follows with cardiology  Last seen August  EKG today     SR: PAC  Advised f/u with cardiology      7. Palpitations  Assessment & Plan:  Reports occasional palpitations  No chest pain  History of afib  Metoprolol 50; Xarelto 20 daily  Follows with cardiology  Last seen August  EKG today     SR: PAC  Advised f/u with cardiology    Orders:  -     POCT ECG    8. History of gout  Assessment & Plan:  No current concerns   continues with allopurinol      9. Medicare annual wellness visit, subsequent     BMI Counseling: Body mass index is 35.99 kg/m².  The BMI is above normal. Nutrition recommendations include decreasing portion sizes, moderation in carbohydrate intake and reducing intake of saturated and trans fat. Exercise recommendations include exercising 3-5 times per week. Rationale for BMI follow-up plan is due to patient being overweight or obese. Healthy lifestyle counseling    Depression Screening and Follow-up Plan: Patient was screened for depression during today's encounter. They screened negative with a PHQ-2 score of 0. Falls Plan of Care: Reports 1: Trip/fall  No fall risks identified      SUBJECTIVE       Patient ID: Magdaleno Watters is a 68 y.o. male. Chief Complaint   Patient presents with   • Medicare Wellness Visit       HISTORY OF PRESENT ILLNESS    Medicare wellness        The following portions of the patient's history were reviewed and updated as appropriate: allergies, current medications, past family history, past medical history, past social history, past surgical history, and problem list.    REVIEW OF SYSTEMS  Review of Systems   Constitutional: Negative. Respiratory: Negative. Cardiovascular: Negative. Gastrointestinal: Negative. Genitourinary: Negative. Persistent incontinence   Musculoskeletal: Positive for arthralgias. Neurological: Negative. Psychiatric/Behavioral: Negative.         OBJECTIVE      VITAL SIGNS  /70 (BP Location: Left arm, Patient Position: Sitting, Cuff Size: Large)   Pulse 86   Temp 97.8 °F (36.6 °C) (Temporal)   Resp 16   Ht 5' 8.9" (1.75 m)   Wt 110 kg (243 lb)   SpO2 97%   BMI 35.99 kg/m²     CURRENT MEDICATIONS    Current Outpatient Medications:   •  allopurinol (ZYLOPRIM) 300 mg tablet, Take 1 tablet (300 mg total) by mouth daily, Disp: 90 tablet, Rfl: 1  •  atorvastatin (LIPITOR) 20 mg tablet, TAKE 1 TABLET BY MOUTH EVERY DAY, Disp: 90 tablet, Rfl: 2  •  metoprolol succinate (TOPROL-XL) 50 mg 24 hr tablet, Take 50 mg by mouth daily, Disp: , Rfl:   •  rivaroxaban (Xarelto) 20 mg tablet, Take 1 tablet (20 mg total) by mouth daily with breakfast, Disp: 90 tablet, Rfl: 3  •  valsartan (DIOVAN) 160 mg tablet, Take 1 tablet (160 mg total) by mouth daily, Disp: 90 tablet, Rfl: 1      PHYSICAL EXAMINATION   Physical Exam  Vitals and nursing note reviewed. Constitutional:       General: He is not in acute distress. Appearance: Normal appearance. He is well-developed and well-groomed. He is not ill-appearing. HENT:      Head: Normocephalic. Cardiovascular:      Rate and Rhythm: Normal rate and regular rhythm. Pulmonary:      Effort: Pulmonary effort is normal. No respiratory distress. Breath sounds: Normal breath sounds and air entry. Skin:     General: Skin is warm and dry. Neurological:      General: No focal deficit present. Mental Status: He is alert and oriented to person, place, and time.    Psychiatric:         Attention and Perception: Attention normal.         Mood and Affect: Mood normal.         Behavior: Behavior normal.         Judgment: Judgment normal.

## 2023-09-30 NOTE — ASSESSMENT & PLAN NOTE
Last lipid panel 11/2022   Check in November   Cholesterol at that time:            Total 158; LDL 83  Compliant with atorvastatin 20  Tolerating well-denies myalgia

## 2023-09-30 NOTE — ASSESSMENT & PLAN NOTE
Stable/controlled  BP in the office today: 120/70  Reports asymptomatic  Compliant with valsartan HCTZ 160-12.5; metoprolol 50  Advised periodic home checks

## 2023-09-30 NOTE — ASSESSMENT & PLAN NOTE
History of rotator cuff syndrome 2017  Symptoms intermittent  Last month unremarkable today  Discussed gentle stretches  Recommend PT  Patient will consider

## 2023-10-20 DIAGNOSIS — E78.5 DYSLIPIDEMIA: ICD-10-CM

## 2023-10-20 DIAGNOSIS — M10.9 GOUT ATTACK: ICD-10-CM

## 2023-10-20 RX ORDER — ALLOPURINOL 300 MG/1
300 TABLET ORAL DAILY
Qty: 90 TABLET | Refills: 1 | Status: SHIPPED | OUTPATIENT
Start: 2023-10-20

## 2023-10-20 RX ORDER — ATORVASTATIN CALCIUM 20 MG/1
TABLET, FILM COATED ORAL
Qty: 90 TABLET | Refills: 2 | Status: SHIPPED | OUTPATIENT
Start: 2023-10-20

## 2023-10-23 ENCOUNTER — APPOINTMENT (OUTPATIENT)
Age: 73
End: 2023-10-23
Payer: COMMERCIAL

## 2023-10-23 DIAGNOSIS — M10.9 GOUT, UNSPECIFIED CAUSE, UNSPECIFIED CHRONICITY, UNSPECIFIED SITE: ICD-10-CM

## 2023-10-23 LAB
ALBUMIN SERPL BCP-MCNC: 3.8 G/DL (ref 3.5–5)
ALP SERPL-CCNC: 68 U/L (ref 34–104)
ALT SERPL W P-5'-P-CCNC: 20 U/L (ref 7–52)
AST SERPL W P-5'-P-CCNC: 16 U/L (ref 13–39)
BILIRUB DIRECT SERPL-MCNC: 0.09 MG/DL (ref 0–0.2)
BILIRUB SERPL-MCNC: 0.66 MG/DL (ref 0.2–1)
CREAT SERPL-MCNC: 1.02 MG/DL (ref 0.6–1.3)
CRP SERPL QL: 1.8 MG/L
GFR SERPL CREATININE-BSD FRML MDRD: 73 ML/MIN/1.73SQ M
PROT SERPL-MCNC: 6.5 G/DL (ref 6.4–8.4)
URATE SERPL-MCNC: 5.3 MG/DL (ref 3.5–8.5)

## 2023-10-23 PROCEDURE — 80076 HEPATIC FUNCTION PANEL: CPT

## 2023-10-23 PROCEDURE — 82565 ASSAY OF CREATININE: CPT

## 2023-10-23 PROCEDURE — 86140 C-REACTIVE PROTEIN: CPT

## 2023-10-23 PROCEDURE — 84550 ASSAY OF BLOOD/URIC ACID: CPT

## 2023-10-23 PROCEDURE — 36415 COLL VENOUS BLD VENIPUNCTURE: CPT

## 2024-01-03 ENCOUNTER — TELEPHONE (OUTPATIENT)
Dept: UROLOGY | Facility: CLINIC | Age: 74
End: 2024-01-03

## 2024-01-03 NOTE — TELEPHONE ENCOUNTER
Spoke to the patient 1/3/24 to inform him of  his 1/19/24 appt with Rivka Vizcarra is cancelled the new appt is 3/1/24 at 1 pm with Rivka Vizcarra patient is ok with the change

## 2024-01-19 ENCOUNTER — OFFICE VISIT (OUTPATIENT)
Dept: SLEEP CENTER | Facility: CLINIC | Age: 74
End: 2024-01-19
Payer: COMMERCIAL

## 2024-01-19 VITALS
DIASTOLIC BLOOD PRESSURE: 59 MMHG | WEIGHT: 246 LBS | HEART RATE: 69 BPM | BODY MASS INDEX: 36.43 KG/M2 | SYSTOLIC BLOOD PRESSURE: 126 MMHG

## 2024-01-19 DIAGNOSIS — G47.33 OSA (OBSTRUCTIVE SLEEP APNEA): Primary | ICD-10-CM

## 2024-01-19 PROBLEM — R53.83 FATIGUE: Status: RESOLVED | Noted: 2019-05-10 | Resolved: 2024-01-19

## 2024-01-19 PROBLEM — R51.9 ACUTE NONINTRACTABLE HEADACHE: Status: RESOLVED | Noted: 2022-02-01 | Resolved: 2024-01-19

## 2024-01-19 PROBLEM — R42 VERTIGO: Status: RESOLVED | Noted: 2022-05-19 | Resolved: 2024-01-19

## 2024-01-19 PROBLEM — J22 LOWER RESPIRATORY INFECTION (E.G., BRONCHITIS, PNEUMONIA, PNEUMONITIS, PULMONITIS): Status: RESOLVED | Noted: 2021-12-28 | Resolved: 2024-01-19

## 2024-01-19 PROBLEM — M72.2 PLANTAR FASCIITIS OF RIGHT FOOT: Status: RESOLVED | Noted: 2019-03-21 | Resolved: 2024-01-19

## 2024-01-19 PROBLEM — M75.101 ROTATOR CUFF SYNDROME OF RIGHT SHOULDER: Status: RESOLVED | Noted: 2017-07-24 | Resolved: 2024-01-19

## 2024-01-19 PROCEDURE — 99213 OFFICE O/P EST LOW 20 MIN: CPT

## 2024-01-19 RX ORDER — COLCHICINE 0.6 MG/1
TABLET ORAL
COMMUNITY
Start: 2023-10-27

## 2024-01-19 NOTE — PATIENT INSTRUCTIONS
Continue PAP Therapy  - Continue APAP at 10-12 cmH2O.  Refill of supplies was sent to your medical equipment company.  - An order for a mask refitting was sent to your medical equipment company to fit you to a new style mask.  Remember to clean your mask and equipment regularly, as directed.  You should be eligible for new supplies approximately every 3-6 months, depending on your insurance coverage. Contact your Durable Medical Equipment (DME) company for new supplies as needed.  Follow up in 1 year    Care and Maintenance  Headgear should be washed as needed. Daily inspection and weekly washings are recommended. Do not disassemble the straps. Machine wash in warm water, making sure to attach Velcro hooks and tabs before washing. Line dry or machine dry on a low setting.  Masks should be washed every day. Daily inspection is recommended. Leave the mask and tubing attached. Gently wash the mask with a soft cloth using warm water and mild detergent, concentrating on the mask cushion flaps. DO NOT use alcohol or bleach. Rinse thoroughly and air dry.  Tubing and headgear should be washed weekly. Daily inspection is recommended. Wash in warm water and mild detergent and rinse thoroughly. Hook the tubing to the machine and blow until dry.  Humidifier should be washed daily and filled with DISTILLED water before use. Wash with warm water and mild detergent. Disinfect weekly by soaking with a solution of 1 part white vinegar and 3 parts water for 30 minutes. Rinse thoroughly and air dry.  Disposable filters should be replaced once a month. Wash reusable foam filters with warm water and mild detergent at least once a month. Rinse thoroughly and dry with paper towels.  Avoid  that contain fragrance or conditioners, as these will leave a residue.  NEVER iron any soft goods.    Insurance Requirements  Your insurance requires a face-to-face follow up visit within a 31-90 day period after starting PAP therapy.  Your  insurance requires compliance with your PAP device, which is at least 4 hours per night for 70% of the time. This must be done over a 30 day period and must occur within the initial 31-90 day period after starting CPAP.  Your insurance also requires at least yearly follow ups to continue to pay for CPAP supplies.     PAP Supply Guidelines  Below are the guidelines for reordering your supplies. You will be responsible for your deductible, co payments, and out of pocket expenses.    Item Frequency   Nasal Mask (no headgear) 1 every 3 months   Nasal Mask Cushion 1 every 2 weeks   Full Face Mask (no headgear) 1 every 3 months   Full Face Mask Cushion 1 every month   Nasal Pillows 1 every 2 weeks   Headgear 1 every 6 months   hin Strap 1 every 6 months   helio 1 every 3 months   Filters: Reusable 1 every 6 months   Filters: Disposable 1 every 2 weeks   Humidifier Chamber(disposable) 1 every 6 months

## 2024-01-19 NOTE — PROGRESS NOTES
[facility]  Sleep Medicine Follow Up/Established Patient    PATIENT NAME: Avi Dempsey  DATE OF SERVICE: January 19, 2024  DATE OF LAST VISIT: 12/30/2022    ASSESSMENT/PLAN:  Avi Dempsey is a 74 y.o. male with PMHx of MANDY on CPAP, HTN, aflutter on eliquis, prostate cancer, gout, HLD and obesity who returns to the office for follow up of MANDY.    Diagnoses and all orders for this visit:    MANDY (Obstructive Sleep Apnea)  -Patient has a history of severe MANDY (DOMINIC 50.2, O2 henry 65%, 2019) is currently treated with auto CPAP 10-12 cmH2O.  Currently he reports he has been struggling with his nasal mask as it causes dry nose and nasal burning.  He reports he received a hybrid mask from his cousin which she likes better and states causes less dryness as he often breathes through his mouth with his nasal mask; however, it does not appear to fit properly.  -Therapy and compliance data reviewed: Residual AHI WNL, no significant large leak, compliance 60%.  - Continue APAP 10-12 cmH2O with prescription for new mask fitting.  The patient would prefer a hybrid style mask as this is what he was given by his cousin.  Prescription for refill of supplies was also provided during today's visit.  - Explained the importance of keeping the machine clean, and that they should be eligible for refills of supplies every 3-6 months depending on insurance.  We also discussed the insurance compliance requirements.  - Encouraged healthy lifestyle with adequate sleep (7-9 hours per night), healthy balanced diet and routine exercise.  Explained the importance of avoiding driving while drowsy.  - Follow-up in 1 year.  -     PAP DME Resupply/Reorder  -     Mask fitting only; Future  ________________________________________________________________________________________________    Interval History: Avi Dempsey is a 74 y.o. male with PMHx of MANDY on CPAP, HTN, aflutter on eliquis, prostate cancer, gout, HLD and  obesity who returns to the office for follow up of MANDY.  Patient reports since he was last seen he has been struggling with his nasal pillows.  He states he will wake up with nasal burning and dry nose often and will end up taking off the mask when this happens.  He states that his cousin gave him a hybrid style mask to wear which helped with the dryness as he was breathing through his mouth when he was sleeping with a nasal mask.  However Meek was given does not fit quite right, but was overall an improvement from the nasal pillows.  He states at this time he is not using his machine due to the issues with his mask, but does want to get back into using it daily as he was previously.  He states he stopped using the device around [place] time.  He states when he is able to tolerate the mask he does not receive benefit from using the machine, and is less sleepy during the day and no longer snores at night.    PAP History  Sleep Apnea Type: MANDY  Most Recent AHI: 50.2 on 6/13/2019  Treatment: APAP    DME: [facility]    Was usin APAP for 6 hours per night, 7 nights per week.  Current PAP Settings: 10-12 cmH2O  Compliance Data: 60%,  see below    Mask Type: hybrid  PAP Issues: dry mouth/throat  Uses Chin Strap: No  Uses Ramp Function: Yes   Uses Humidity: Yes     There is a perceived benefit by the patient: does have increased energy  Observers report no longer has snoring with APAP use.    Prior History: Patient for started following with sleep medicine at [facility] in 2019 after he had an HSAT which showed severe MANDY, PAP titration that recommended 10 cmH2O.  He initially had a recalled Romeo device, but has since received a replacement device.  In the past he has had issues with dry mouth and dry nose.    ESS: Total score: 13/24  Greater or equal to 10 is positive for excessive daytime sleepiness  Pertinent Meds: None    Sleep-Wake Schedule:  Bedtime: 3330-0920  Wake Time: 1807-7766  Difficulty Falling Asleep:  No  Avg Number of Awakenings: 0  Cause of Awakenings: NA  Weekend Sleep Schedule: unchanged  Naps: no    Avg TST per 24 hours: 7 hours    Past Treatments:  None    Prior Sleep Studies:  PAP Titration -- 10/2/2019 (Wt 257lbs)  Tested Range - 5-10 cmH2O  Recommended Pressure - 10 cmH2O    HSAT -- 6/13/2019  DOMINIC - 50.2  O2 [place] - 65%  TST < 90% - 15% of TRT    Other Relevant Labs and Studies:  Therapy and Compliance Data -- 11/24/2023 - 12/23/2023        Past Medical History:   Diagnosis Date    Acute gouty arthritis     last assessed 6/1/13     Anxiety     last assessed 7/11/14     Atrial flutter (HCC) 1/22/2021    BPH (benign prostatic hyperplasia)     Chronic thoracic spine pain     last assessed 5/13/16     CPAP (continuous positive airway pressure) dependence     Dysfunction of both eustachian tubes     last assessed 8/16/13    Erectile dysfunction of non-organic origin     last assessed 10/8/13     Family history reviewed with no changes     medical history     Gout     Hyperlipidemia     Hypertension     Prostate cancer (HCC) 10/25/2022    Sebaceous cyst     last assessed 12/16/13     Skin lesion     last assessed 1/2/14     Sleep apnea     Subclinical hypothyroidism 03/21/2019     Past Surgical History:   Procedure Laterality Date    APPENDECTOMY      6 years old    BOWEL RESECTION  2010    Sigmoid resection for diverticulitis    CATARACT EXTRACTION      CERVICAL FUSION N/A 08/27/2019    Procedure: Anterior cervical discectomy w fusion C5-6, with allograft and neuromonitoring;  Surgeon: Gricel Hall MD;  Location: [facility];  Service: Orthopedics    COLONOSCOPY  02/2018    Dr. Mixon.  Tubular adenoma polyps removed from the mid ascending colon, hyperplastic rectal polyp removed, evidence of end to end anastomosis in sigmoid with healthy mucosa.    COLONOSCOPY  2012    Dr. Mixon.  Tubular adenoma polyp removed from right colon.    COLONOSCOPY  2004    Small rectal polyp    COLONOSCOPY  2002    Polyp  ablated    COLONOSCOPY  2001    Polyps removed.  Dr. Mixon.    MASTECTOMY Bilateral     MOUTH SURGERY      tooth extraction with dental implant    ME BX PROSTATE STRTCTC SATURATION SAMPLING IMG GID N/A 10/18/2022    Procedure: TRANSPERINEAL MRI FUSION  BIOPSY PROSTATE;  Surgeon: Otilio Bagley MD;  Location: [facility];  Service: Urology    ME LAPS SURG [id number] RPBIC RAD W/NRV SPARING ROBOT N/A 01/25/2023    Procedure: PROSTATECTOMY RADICAL AND PELVIC LYMPH NODE DISSECTION LAPAROSCOPIC W/ ROBOTICS; LYSIS OF AHDESIONS;  Surgeon: Otilio Bagley MD;  Location: [facility];  Service: Urology    SKIN BIOPSY      left cheek    WRIST SURGERY       Patient Active Problem List   Diagnosis    Class 1 obesity due to excess calories with serious comorbidity and body mass index (BMI) of 34.0 to 34.9 in adult    Combined hyperlipidemia    Benign essential hypertension    History of gout    Chronic lumbar radiculopathy    Cervical radiculitis    Subclinical hypothyroidism    Elevated blood sugar    Eczema    MANDY (obstructive sleep apnea)    S/P cervical spinal fusion    Achilles rupture, left    Hand dermatitis    Dupuytren contracture    Seborrheic keratoses, inflamed    Paresthesia of upper and lower extremities of both sides    Atrial flutter (HCC)    Habitual alcohol use    Dyslipidemia    Body mass index (BMI)40.0-44.9, adult    Cervical strain    Prostate cancer (HCC)    H/O abdominal surgery    Gout attack    Acute pain of right shoulder    Palpitations     Allergies as of 01/19/2024    (No Known Allergies)     REVIEW OF SYSTEMS:  Review of Systems  10-point system review completed, all of which are negative except as mentioned above.    CURRENT MEDICATIONS:  Current Outpatient Medications   Medication Instructions    allopurinol (ZYLOPRIM) 300 mg, Oral, Daily    atorvastatin (LIPITOR) 20 mg tablet TAKE 1 TABLET BY MOUTH EVERY DAY    colchicine (COLCRYS) 0.6 mg tablet TAKE 1 TABLET BY MOUTH MONDAY, WEDNESDAY, AND  FRIDAY FOR PREVENTION OF GOUT BREAKTHROUGH ATTACK    metoprolol succinate (TOPROL-XL) 50 mg, Oral, Daily    rivaroxaban (XARELTO) 20 mg, Oral, Daily with breakfast    valsartan (DIOVAN) 160 mg, Oral, Daily     SOCIAL HISTORY:  Social History     Tobacco Use    Smoking status: Former     Current packs/day: 0.00     Average packs/day: 0.5 packs/day for 20.0 years (10.0 ttl pk-yrs)     Types: Cigarettes     Start date:      Quit date:      Years since quittin.0    Smokeless tobacco: Never   Vaping Use    Vaping status: Never Used   Substance Use Topics    Alcohol use: Not Currently    Drug use: Never     FAMILY HISTORY:  Family History   Problem Relation Age of Onset    Hypertension Mother     Eczema Father     Heart attack Father     Diabetes Sister     Psoriasis Sister     Hypertension Sister     Diabetes Family     Hypertension Family     Breast cancer Daughter      PHYSICAL EXAMINATION:  Vital Signs:  /59 (BP Location: Left arm, Patient Position: Sitting, Cuff Size: Large)   Pulse 69   Wt 112 kg (246 lb)   BMI 36.43 kg/m²   Body mass index is 36.43 kg/m².    Constitutional: NAD, well appearing   Mental Status: AAOx3  Skin: Warm, dry, no rashes noted   Eyes: PERRL, normal conjunctiva  ENT: Nasal congestion absent, nasal valve incompetence absent.  Posterior Airspace:   Agosto Tongue Position: 3  Retrognathia: absent  Overbite: absent  High Arched Palate: absent  Tongue Scalloping/Ridging: absent  Tonsils: 1+  Uvula: normal  Chest: RRR, +S1/S2, CTA B/L, no W/R/R   Abdomen: Soft, NT/ND  Extremities: No digital clubbing or pedal edema    I have spent a total time of 30 minutes on 24 in caring for this patient including Instructions for management, Patient and family education, Counseling / Coordination of care, Documenting in the medical record, Reviewing / ordering tests, medicine, procedures  , and Obtaining or reviewing history  .    Christianne Garcia MD  Pulmonary-Critical Care and Sleep  "Medicine  01/19/24    Portions of the record may have been created with voice recognition software. Occasional wrong word or \"sound a like\" substitutions may have occurred due to the inherent limitations of voice recognition software. Please read the chart carefully and recognize, using context, where substitutions have occurred.     "

## 2024-01-22 ENCOUNTER — TELEPHONE (OUTPATIENT)
Dept: SLEEP CENTER | Facility: CLINIC | Age: 74
End: 2024-01-22

## 2024-01-23 LAB

## 2024-01-24 ENCOUNTER — APPOINTMENT (OUTPATIENT)
Age: 74
End: 2024-01-24
Payer: COMMERCIAL

## 2024-01-24 DIAGNOSIS — M10.9 GOUT, UNSPECIFIED CAUSE, UNSPECIFIED CHRONICITY, UNSPECIFIED SITE: ICD-10-CM

## 2024-01-24 LAB
ALBUMIN SERPL BCP-MCNC: 4.1 G/DL (ref 3.5–5)
ALP SERPL-CCNC: 65 U/L (ref 34–104)
ALT SERPL W P-5'-P-CCNC: 18 U/L (ref 7–52)
AST SERPL W P-5'-P-CCNC: 19 U/L (ref 13–39)
BILIRUB DIRECT SERPL-MCNC: 0.19 MG/DL (ref 0–0.2)
BILIRUB SERPL-MCNC: 0.87 MG/DL (ref 0.2–1)
CREAT SERPL-MCNC: 0.94 MG/DL (ref 0.6–1.3)
ERYTHROCYTE [DISTWIDTH] IN BLOOD BY AUTOMATED COUNT: 13.4 % (ref 11.6–15.1)
GFR SERPL CREATININE-BSD FRML MDRD: 80 ML/MIN/1.73SQ M
HCT VFR BLD AUTO: 44.5 % (ref 36.5–49.3)
HGB BLD-MCNC: 14.9 G/DL (ref 12–17)
MCH RBC QN AUTO: 29.7 PG (ref 26.8–34.3)
MCHC RBC AUTO-ENTMCNC: 33.5 G/DL (ref 31.4–37.4)
MCV RBC AUTO: 89 FL (ref 82–98)
PLATELET # BLD AUTO: 270 THOUSANDS/UL (ref 149–390)
PMV BLD AUTO: 10.2 FL (ref 8.9–12.7)
PROT SERPL-MCNC: 6.6 G/DL (ref 6.4–8.4)
RBC # BLD AUTO: 5.02 MILLION/UL (ref 3.88–5.62)
URATE SERPL-MCNC: 5.4 MG/DL (ref 3.5–8.5)
WBC # BLD AUTO: 8.4 THOUSAND/UL (ref 4.31–10.16)

## 2024-01-24 PROCEDURE — 80076 HEPATIC FUNCTION PANEL: CPT

## 2024-01-24 PROCEDURE — 84550 ASSAY OF BLOOD/URIC ACID: CPT

## 2024-01-24 PROCEDURE — 36415 COLL VENOUS BLD VENIPUNCTURE: CPT

## 2024-01-24 PROCEDURE — 82565 ASSAY OF CREATININE: CPT

## 2024-01-24 PROCEDURE — 85027 COMPLETE CBC AUTOMATED: CPT

## 2024-02-26 ENCOUNTER — APPOINTMENT (OUTPATIENT)
Age: 74
End: 2024-02-26
Payer: COMMERCIAL

## 2024-02-26 ENCOUNTER — TELEPHONE (OUTPATIENT)
Dept: UROLOGY | Facility: MEDICAL CENTER | Age: 74
End: 2024-02-26

## 2024-02-26 DIAGNOSIS — C61 PROSTATE CANCER (HCC): ICD-10-CM

## 2024-02-26 LAB — PSA SERPL-MCNC: <0.01 NG/ML (ref 0–4)

## 2024-02-26 PROCEDURE — 84153 ASSAY OF PSA TOTAL: CPT

## 2024-02-26 PROCEDURE — 36415 COLL VENOUS BLD VENIPUNCTURE: CPT

## 2024-02-26 NOTE — TELEPHONE ENCOUNTER
Call [facility] for patient to have PSA done prior to scheduled visit on 03/01/24 with Rivka Vizcarra.

## 2024-03-01 ENCOUNTER — OFFICE VISIT (OUTPATIENT)
Dept: UROLOGY | Facility: CLINIC | Age: 74
End: 2024-03-01
Payer: COMMERCIAL

## 2024-03-01 VITALS
HEART RATE: 68 BPM | BODY MASS INDEX: 36.08 KG/M2 | HEIGHT: 70 IN | DIASTOLIC BLOOD PRESSURE: 76 MMHG | OXYGEN SATURATION: 97 % | WEIGHT: 252 LBS | SYSTOLIC BLOOD PRESSURE: 126 MMHG

## 2024-03-01 DIAGNOSIS — N39.3 MALE URINARY STRESS INCONTINENCE: ICD-10-CM

## 2024-03-01 DIAGNOSIS — C61 PROSTATE CANCER (HCC): Primary | ICD-10-CM

## 2024-03-01 PROCEDURE — 99213 OFFICE O/P EST LOW 20 MIN: CPT | Performed by: PHYSICIAN ASSISTANT

## 2024-03-01 NOTE — ASSESSMENT & PLAN NOTE
pT3a Prostate Cancer  - jennifer score 4+3=7 with focal cribiform pattern, focal +margin  - diagnosis Oct 2022 mri fusion bx pirads 5 lesion right side  - staging imaging MRI  - pretreatment psa 6.5  - treatment RALP-bPLND January 2023    Done well undetectable PSAs for first postoperative year    Lab Results   Component Value Date    PSA <0.01 02/26/2024    PSA <0.01 09/18/2023    PSA <0.01 05/30/2023

## 2024-03-01 NOTE — PROGRESS NOTES
"3/1/2024      Chief Complaint   Patient presents with    Prostate Cancer     6 month follow up / PSA screen <0.01         Assessment and Plan    74 y.o. male managed by     Prostate cancer ([facility])  pT3a Prostate Cancer  - jennifer score 4+3=7 with focal cribiform pattern, focal +margin  - diagnosis Oct 2022 mri fusion bx pirads 5 lesion right side  - staging imaging MRI  - pretreatment psa 6.5  - treatment RALP-bPLND January 2023    Done well undetectable PSAs for first postoperative year    Lab Results   Component Value Date    PSA <0.01 02/26/2024    PSA <0.01 09/18/2023    PSA <0.01 05/30/2023           Male urinary stress incontinence  postoperative INOCENCIA after RALP prostate cancer January 2023  Pelvic floor physical therapy  AUS consultation recommended/offered last fall- he decided not inclined toward surgery again    f/u 6 month PSA    History of Present Illness  Avi Dempsey is a 74 y.o. male here for evaluation of 6 month prostate cancer f/u. RALP one year ago. Stress incontinence continues to bother him with ADLs he avoids because of it i.e. going to dinner with friends, golfing. He has PFPT tools and knows he needs to get back to doing them every day. He questions if he made the right choice to have surgery though he is happy to see undetectable PSAs.        Review of Systems   Constitutional: Negative.    Respiratory: Negative.     Cardiovascular: Negative.    Genitourinary:  Negative for decreased urine volume, difficulty urinating, dysuria, flank pain, frequency, hematuria, scrotal swelling, testicular pain and urgency.        Stress incontinence   Musculoskeletal: Negative.                 Vitals  Vitals:    03/01/24 1258   BP: 126/76   BP Location: Right arm   Patient Position: Sitting   Cuff Size: Large   Pulse: 68   SpO2: 97%   Weight: 114 kg (252 lb)   Height: 5' 10\" (1.778 m)       Physical Exam      Past History  Past Medical History:   Diagnosis Date    Acute gouty arthritis     last assessed " 13     Anxiety     last assessed 14     Atrial flutter (HCC) 2021    BPH (benign prostatic hyperplasia)     Chronic thoracic spine pain     last assessed 16     CPAP (continuous positive airway pressure) dependence     Dysfunction of both eustachian tubes     last assessed 13    Erectile dysfunction of non-organic origin     last assessed 10/8/13     Family history reviewed with no changes     medical history     Gout     Hyperlipidemia     Hypertension     Prostate cancer (HCC) 10/25/2022    Sebaceous cyst     last assessed 13     Skin lesion     last assessed 14     Sleep apnea     Subclinical hypothyroidism 2019     Social History     Socioeconomic History    Marital status: /Civil Union     Spouse name: Not on file    Number of children: Not on file    Years of education: Not on file    Highest education level: Not on file   Occupational History    Occupation: retired   Tobacco Use    Smoking status: Former     Current packs/day: 0.00     Average packs/day: 0.5 packs/day for 20.0 years (10.0 ttl pk-yrs)     Types: Cigarettes     Start date:      Quit date:      Years since quittin.2    Smokeless tobacco: Never   Vaping Use    Vaping status: Never Used   Substance and Sexual Activity    Alcohol use: Not Currently    Drug use: Never    Sexual activity: Not Currently   Other Topics Concern    Not on file   Social History Narrative    Consumes 1 cup of coffee per day     Social Determinants of Health     Financial Resource Strain: Low Risk  (2023)    Overall Financial Resource Strain (CARDIA)     Difficulty of Paying Living Expenses: Not hard at all   Food Insecurity: Not on file   Transportation Needs: No Transportation Needs (2023)    PRAPARE - Transportation     Lack of Transportation (Medical): No     Lack of Transportation (Non-Medical): No   Physical Activity: Not on file   Stress: Not on file   Social Connections: Not on file   Intimate  Partner Violence: Not on file   Housing Stability: Not on file     Social History     Tobacco Use   Smoking Status Former    Current packs/day: 0.00    Average packs/day: 0.5 packs/day for 20.0 years (10.0 ttl pk-yrs)    Types: Cigarettes    Start date:     Quit date:     Years since quittin.2   Smokeless Tobacco Never     Family History   Problem Relation Age of Onset    Hypertension Mother     Eczema Father     Heart attack Father     Diabetes Sister     Psoriasis Sister     Hypertension Sister     Diabetes Family     Hypertension Family     Breast cancer Daughter        The following portions of the patient's history were reviewed and updated as appropriate: allergies, current medications, past medical history, past social history, past surgical history and problem list.    Results  No results found for this or any previous visit (from the past 1 hour(s)).]  Lab Results   Component Value Date    PSA <0.01 2024    PSA <0.01 2023    PSA <0.01 2023    PSA <0.1 2023     Lab Results   Component Value Date    CALCIUM 9.6 2023     2016    K 3.9 2023    CO2 26 2023     2023    BUN 17 2023    CREATININE 0.94 2024     Lab Results   Component Value Date    WBC 8.40 2024    HGB 14.9 2024    HCT 44.5 2024    MCV 89 2024     2024

## 2024-03-01 NOTE — ASSESSMENT & PLAN NOTE
postoperative INOCENCIA after RALP prostate cancer January 2023  Pelvic floor physical therapy  AUS consultation recommended/offered last fall- he decided not inclined toward surgery again

## 2024-03-05 ENCOUNTER — TELEPHONE (OUTPATIENT)
Age: 74
End: 2024-03-05

## 2024-03-05 NOTE — TELEPHONE ENCOUNTER
"Pt called c/o elbow pain post heavy lifting over the weekend. Pt states that pain came on suddenly and denies specific injury to site. States that elbow is warm and swollen. Pt requesting pain medication. Pt advised that pain mediation are not given in UC office. Pt advised to come in for evaluation but patient was insistent he did not want to come in because, \"an xray would tell us what we already know\". Pt also informed to call family doctor or go to ER for evaluation and to discuss pain medication options. Pt expressed understanding and denied needs at conclusion of call.   "

## 2024-03-06 ENCOUNTER — OFFICE VISIT (OUTPATIENT)
Dept: FAMILY MEDICINE CLINIC | Facility: CLINIC | Age: 74
End: 2024-03-06
Payer: COMMERCIAL

## 2024-03-06 ENCOUNTER — TELEPHONE (OUTPATIENT)
Age: 74
End: 2024-03-06

## 2024-03-06 VITALS
TEMPERATURE: 97.5 F | SYSTOLIC BLOOD PRESSURE: 158 MMHG | HEART RATE: 87 BPM | HEIGHT: 70 IN | BODY MASS INDEX: 36.16 KG/M2 | OXYGEN SATURATION: 97 % | DIASTOLIC BLOOD PRESSURE: 70 MMHG

## 2024-03-06 DIAGNOSIS — I48.92 ATRIAL FLUTTER, UNSPECIFIED TYPE (HCC): ICD-10-CM

## 2024-03-06 DIAGNOSIS — I48.92 UNSPECIFIED ATRIAL FLUTTER (HCC): ICD-10-CM

## 2024-03-06 DIAGNOSIS — M70.22 OLECRANON BURSITIS OF LEFT ELBOW: Primary | ICD-10-CM

## 2024-03-06 DIAGNOSIS — I10 BENIGN ESSENTIAL HYPERTENSION: ICD-10-CM

## 2024-03-06 PROCEDURE — G2211 COMPLEX E/M VISIT ADD ON: HCPCS | Performed by: FAMILY MEDICINE

## 2024-03-06 PROCEDURE — 99214 OFFICE O/P EST MOD 30 MIN: CPT | Performed by: FAMILY MEDICINE

## 2024-03-06 RX ORDER — VALSARTAN 160 MG/1
160 TABLET ORAL DAILY
Qty: 90 TABLET | Refills: 1 | Status: SHIPPED | OUTPATIENT
Start: 2024-03-06

## 2024-03-06 RX ORDER — RIVAROXABAN 20 MG/1
20 TABLET, FILM COATED ORAL
Qty: 90 TABLET | Refills: 3 | Status: SHIPPED | OUTPATIENT
Start: 2024-03-06

## 2024-03-06 RX ORDER — METOPROLOL SUCCINATE 50 MG/1
50 TABLET, EXTENDED RELEASE ORAL DAILY
Qty: 90 TABLET | Refills: 3 | Status: SHIPPED | OUTPATIENT
Start: 2024-03-06

## 2024-03-06 RX ORDER — TRAMADOL HYDROCHLORIDE 50 MG/1
50 TABLET ORAL EVERY 8 HOURS PRN
Qty: 21 TABLET | Refills: 0 | Status: SHIPPED | OUTPATIENT
Start: 2024-03-06

## 2024-03-06 NOTE — TELEPHONE ENCOUNTER
Patient called in stating that he saw Dr Syed this morning and was advised to start an 8 day taper of the Methylprednisone and that he was asked what dose he had at home and was unsure . He stated he thought Dr Syed said 10mg but the pack he has is 4mg and he's just wanting to make sure that's correct . Please advise and return patient call .

## 2024-03-06 NOTE — TELEPHONE ENCOUNTER
Patient aware of provider's recommendations to take 24 mg of Prednisone daily for 3 days and taper by one tablet daily until complete.

## 2024-03-06 NOTE — PROGRESS NOTES
Assessment/Plan:   1. Olecranon bursitis of left elbow  Symptoms appear likely secondary to olecranon bursitis.  Will hold off on pursuing imaging at this time.  He was advised on importance of stretching exercises.  He may ice this area for 10 to 15 minutes multiple times a day.  He does have methylprednisone at home.  He was advised to start a 8-day taper.  He was started on the proper dosing for this taper.  Given the severity of his pain, he was given a prescription for tramadol to use for severe pain relief.  He was advised to call with an update on his symptoms.  - traMADol (Ultram) 50 mg tablet; Take 1 tablet (50 mg total) by mouth every 8 (eight) hours as needed for moderate pain  Dispense: 21 tablet; Refill: 0               There are no diagnoses linked to this encounter.      Subjective:       Chief Complaint   Patient presents with    Elbow Pain     Left elbow pain and swelling since February 27th.      Patient ID: Avi Dempsey is a 74 y.o. male.    Elbow Pain  This is a new problem. Episode onset: 8 days ago. The problem occurs constantly. The problem has been unchanged. Associated symptoms include arthralgias. Pertinent negatives include no abdominal pain, chest pain, chills, congestion, coughing, fatigue, fever, headaches, myalgias, nausea, numbness or sore throat. Associated symptoms comments: Swelling of wrist and forearm. Exacerbated by: cutting wood. Treatments tried: 20mg of methylprednisolone,       Review of Systems   Constitutional:  Negative for activity change, chills, fatigue and fever.   HENT:  Negative for congestion, ear pain, sinus pressure and sore throat.    Eyes:  Negative for redness, itching and visual disturbance.   Respiratory:  Negative for cough and shortness of breath.    Cardiovascular:  Negative for chest pain and palpitations.   Gastrointestinal:  Negative for abdominal pain, diarrhea and nausea.   Endocrine: Negative for cold intolerance and heat intolerance.  "  Genitourinary:  Negative for dysuria, flank pain and frequency.   Musculoskeletal:  Positive for arthralgias. Negative for back pain, gait problem and myalgias.   Skin:  Negative for color change.   Allergic/Immunologic: Negative for environmental allergies.   Neurological:  Negative for dizziness, numbness and headaches.   Psychiatric/Behavioral:  Negative for behavioral problems and sleep disturbance.        The following portions of the patient's history were reviewed and updated as appropriate : past family history, past medical history, past social history and past surgical history.    Current Outpatient Medications:     allopurinol (ZYLOPRIM) 300 mg tablet, TAKE 1 TABLET BY MOUTH EVERY DAY, Disp: 90 tablet, Rfl: 1    atorvastatin (LIPITOR) 20 mg tablet, TAKE 1 TABLET BY MOUTH EVERY DAY, Disp: 90 tablet, Rfl: 2    colchicine (COLCRYS) 0.6 mg tablet, TAKE 1 TABLET BY MOUTH MONDAY, WEDNESDAY, AND FRIDAY FOR PREVENTION OF GOUT BREAKTHROUGH ATTACK, Disp: , Rfl:     metoprolol succinate (TOPROL-XL) 50 mg 24 hr tablet, Take 50 mg by mouth daily, Disp: , Rfl:     rivaroxaban (Xarelto) 20 mg tablet, Take 1 tablet (20 mg total) by mouth daily with breakfast, Disp: 90 tablet, Rfl: 3    valsartan (DIOVAN) 160 mg tablet, Take 1 tablet (160 mg total) by mouth daily, Disp: 90 tablet, Rfl: 1         Objective:         Vitals:    03/06/24 0951   BP: 158/70   BP Location: Right arm   Patient Position: Sitting   Cuff Size: Large   Pulse: 87   Temp: 97.5 °F (36.4 °C)   TempSrc: Temporal   SpO2: 97%   Height: 5' 10\" (1.778 m)     Physical Exam  Vitals reviewed.   Constitutional:       Appearance: Normal appearance. He is well-developed.   HENT:      Head: Normocephalic and atraumatic.      Right Ear: Hearing normal.      Left Ear: Hearing normal.      Nose: Nose normal. No septal deviation.   Eyes:      General: Lids are normal.      Pupils: Pupils are equal, round, and reactive to light.   Neck:      Thyroid: No thyroid mass or " thyromegaly.      Trachea: Trachea normal.   Cardiovascular:      Rate and Rhythm: Normal rate.   Pulmonary:      Effort: Pulmonary effort is normal. No respiratory distress.      Breath sounds: No decreased breath sounds.   Abdominal:      Tenderness: There is no guarding.   Musculoskeletal:      Left elbow: Decreased range of motion. Tenderness present in olecranon process.      Cervical back: Normal range of motion.   Skin:     General: Skin is warm and dry.   Neurological:      Mental Status: He is alert and oriented to person, place, and time.      Cranial Nerves: No cranial nerve deficit.      Sensory: No sensory deficit.   Psychiatric:         Speech: Speech normal.         Behavior: Behavior normal.         Thought Content: Thought content normal.         Judgment: Judgment normal.

## 2024-03-06 NOTE — TELEPHONE ENCOUNTER
Requested medication(s) are due for refill today: Yes  Patient has already received a courtesy refill: No  Other reason request has been forwarded to provider: script failed

## 2024-03-25 ENCOUNTER — OFFICE VISIT (OUTPATIENT)
Dept: CARDIOLOGY CLINIC | Facility: CLINIC | Age: 74
End: 2024-03-25
Payer: COMMERCIAL

## 2024-03-25 VITALS
WEIGHT: 246.5 LBS | HEART RATE: 70 BPM | HEIGHT: 70 IN | SYSTOLIC BLOOD PRESSURE: 142 MMHG | BODY MASS INDEX: 35.29 KG/M2 | DIASTOLIC BLOOD PRESSURE: 66 MMHG

## 2024-03-25 DIAGNOSIS — I48.92 ATRIAL FLUTTER, UNSPECIFIED TYPE (HCC): Primary | ICD-10-CM

## 2024-03-25 PROCEDURE — 99213 OFFICE O/P EST LOW 20 MIN: CPT | Performed by: INTERNAL MEDICINE

## 2024-03-25 PROCEDURE — 93000 ELECTROCARDIOGRAM COMPLETE: CPT | Performed by: INTERNAL MEDICINE

## 2024-03-25 RX ORDER — METHYLPREDNISOLONE 4 MG/1
TABLET ORAL
COMMUNITY
Start: 2024-03-06

## 2024-03-25 NOTE — PROGRESS NOTES
HEART AND VASCULAR  CARDIAC ELECTROPHYSIOLOGY   HEART RHYTHM CENTER  [facility]    Outpatient  Follow-up  Today's Date: 03/25/24        Patient name: Avi Dempsey  YOB: 1950  Sex: male         Chief Complaint: f/u  afib       ASSESSMENT:  Problem List Items Addressed This Visit          Cardiovascular and Mediastinum    Atrial flutter (HCC) - Primary    Relevant Orders    POCT ECG     75 yo male  1) paroxymsal afib/flutter- NSR since January 2021- [id number], on xarelto/metoprolol and has lost 40 pounds and drinks much less  2) HTN well controlled today, slightly up today. On Diovan/hctz and metop  3) MANDY on CPAP  4) Dyslipidemia, well controlled on statin  5) Atypical CP , at rest, reproduced w palpation, no sweating/nausea/SOB    PLAN:  1. Continue diet/exercise, avoid excess ETOH, maximum 7 drinks a week  2. Cont xarelto, metoprolol      Follow up in: 6 months    Orders Placed This Encounter   Procedures    POCT ECG     There are no discontinued medications.      .............................................................................................    HPI/Subjective:   75 yo male  1) paroxymsal afib/flutter- NSR since January 2021- [id number], on xarelto/metoprolol and has lost 40 pounds and drinks much less  2) HTN well controlled today, slightly up today. On Diovan/hctz and metop  3) MANDY on CPAP  4) Dyslipidemia, well controlled on statin  5) Atypical CP , at rest, reproduced w palpation, no sweating/nausea/SOB        Past Medical History:   Diagnosis Date    Acute gouty arthritis     last assessed 6/1/13     Anxiety     last assessed 7/11/14     Atrial flutter (HCC) 1/22/2021    BPH (benign prostatic hyperplasia)     Chronic thoracic spine pain     last assessed 5/13/16     CPAP (continuous positive airway pressure) dependence     Dysfunction of both eustachian tubes     last assessed 8/16/13    Erectile dysfunction of non-organic origin     last assessed 10/8/13      Family history reviewed with no changes     medical history     Gout     Hyperlipidemia     Hypertension     Prostate cancer (HCC) 10/25/2022    Sebaceous cyst     last assessed 12/16/13     Skin lesion     last assessed 1/2/14     Sleep apnea     Subclinical hypothyroidism 03/21/2019       No Known Allergies  I reviewed the Home Medication list and Allergies in the chart.   Scheduled Meds:  Current Outpatient Medications   Medication Sig Dispense Refill    allopurinol (ZYLOPRIM) 300 mg tablet TAKE 1 TABLET BY MOUTH EVERY DAY 90 tablet 1    atorvastatin (LIPITOR) 20 mg tablet TAKE 1 TABLET BY MOUTH EVERY DAY 90 tablet 2    colchicine (COLCRYS) 0.6 mg tablet TAKE 1 TABLET BY MOUTH MONDAY, WEDNESDAY, AND FRIDAY FOR PREVENTION OF GOUT BREAKTHROUGH ATTACK      methylprednisolone (MEDROL) 4 mg tablet TAKE 1 TABLET BY MOUTH ONCE A DAY WITH FOOD WHEN MEDROL DOSE PACK IS FINISHED      metoprolol succinate (TOPROL-XL) 50 mg 24 hr tablet TAKE 1 TABLET BY MOUTH EVERY DAY 90 tablet 3    traMADol (Ultram) 50 mg tablet Take 1 tablet (50 mg total) by mouth every 8 (eight) hours as needed for moderate pain 21 tablet 0    valsartan (DIOVAN) 160 mg tablet TAKE 1 TABLET BY MOUTH EVERY DAY 90 tablet 1    Xarelto 20 MG tablet TAKE 1 TABLET BY MOUTH DAILY WITH BREAKFAST 90 tablet 3     No current facility-administered medications for this visit.     PRN Meds:.        Family History   Problem Relation Age of Onset    Hypertension Mother     Eczema Father     Heart attack Father     Diabetes Sister     Psoriasis Sister     Hypertension Sister     Diabetes Family     Hypertension Family     Breast cancer Daughter        Social History     Socioeconomic History    Marital status: /Civil Union     Spouse name: Not on file    Number of children: Not on file    Years of education: Not on file    Highest education level: Not on file   Occupational History    Occupation: retired   Tobacco Use    Smoking status: Former     Current  "packs/day: 0.00     Average packs/day: 0.5 packs/day for 20.0 years (10.0 ttl pk-yrs)     Types: Cigarettes     Start date:      Quit date:      Years since quittin.2    Smokeless tobacco: Never   Vaping Use    Vaping status: Never Used   Substance and Sexual Activity    Alcohol use: Not Currently    Drug use: Never    Sexual activity: Not Currently   Other Topics Concern    Not on file   Social History Narrative    Consumes 1 cup of coffee per day     Social Determinants of Health     Financial Resource Strain: Low Risk  (2023)    Overall Financial Resource Strain (CARDIA)     Difficulty of Paying Living Expenses: Not hard at all   Food Insecurity: Not on file   Transportation Needs: No Transportation Needs (2023)    PRAPARE - Transportation     Lack of Transportation (Medical): No     Lack of Transportation (Non-Medical): No   Physical Activity: Not on file   Stress: Not on file   Social Connections: Not on file   Intimate Partner Violence: Not on file   Housing Stability: Not on file         OBJECTIVE:    /66 (BP Location: Right arm, Patient Position: Sitting, Cuff Size: Large)   Pulse 70   Ht 5' 10\" (1.778 m)   Wt 112 kg (246 lb 8 oz)   BMI 35.37 kg/m²   Vitals:    24 1520   Weight: 112 kg (246 lb 8 oz)     /66 (BP Location: Right arm, Patient Position: Sitting, Cuff Size: Large)   Pulse 70   Ht 5' 10\" (1.778 m)   Wt 112 kg (246 lb 8 oz)   BMI 35.37 kg/m²   Vitals:    24 1520   Weight: 112 kg (246 lb 8 oz)     /66 (BP Location: Right arm, Patient Position: Sitting, Cuff Size: Large)   Pulse 70   Ht 5' 10\" (1.778 m)   Wt 112 kg (246 lb 8 oz)   BMI 35.37 kg/m²   Vitals:    24 1520   Weight: 112 kg (246 lb 8 oz)     GEN: No acute distress, Alert and oriented, well appearing  HEENT:External ears normal, wearing a mask.   EYES: Pupils equal, sclera anicteric, midline, normal conjuctiva  NECK: No JVD, supple, no obvious masses or thryomegaly or " "goiter  CARDIOVASCULAR:  RRR, No murmur, rub, gallops S1,S2  LUNGS: Clear To auscultation bilaterally, normal effort, no rales, rhonchi, crackles   ABDOMEN:  nondistended,  without obvious organomegaly or ascites  EXTREMITIES/VASCULAR:  No edema. warm an well perfused.  PSYCH: Normal Affect,  linear speech pattern without evidence of psychosis.   NEURO: Grossly intact, moving all extremiteis equal, face symmetric, alert and responsive, no obvious focal defecits   GAIT:  Ambulates normally without difficulty  HEME: No bleeding, bruising, petechia, purpura SKIN: No significant rashes on visibile skin, warm, no diaphoresis or pallor.         Lab Results:       LABS:      Chemistry        Component Value Date/Time     05/16/2016 0804    K 3.9 05/02/2023 0731    K 4.6 11/05/2020 1204     05/02/2023 0731     11/05/2020 1204    CO2 26 05/02/2023 0731    CO2 27 11/05/2020 1204    BUN 17 05/02/2023 0731    BUN 19 11/05/2020 1204    CREATININE 0.94 01/24/2024 1240    CREATININE 1.04 11/05/2020 1204        Component Value Date/Time    CALCIUM 9.6 05/02/2023 0731    CALCIUM 9.0 11/05/2020 1204    ALKPHOS 65 01/24/2024 1240    ALKPHOS 84 11/05/2020 1204    AST 19 01/24/2024 1240    AST 18 11/05/2020 1204    ALT 18 01/24/2024 1240    ALT 24 11/05/2020 1204    BILITOT 0.6 05/16/2016 0804            Lab Results   Component Value Date    CHOL 248 (H) 05/16/2016     Lab Results   Component Value Date    HDL 58 11/02/2022    HDL 53 06/03/2022    HDL 58 10/29/2021     Lab Results   Component Value Date    LDLCALC 83 11/02/2022    LDLCALC 151 (H) 06/03/2022    LDLCALC 146 (H) 10/29/2021     Lab Results   Component Value Date    TRIG 87 11/02/2022    TRIG 86 06/03/2022    TRIG 141 10/29/2021     No results found for: \"CHOLHDL\"    IMAGING: No results found.     Cardiac testing:   Results for orders placed during the hospital encounter of 01/22/21   Echo complete with contrast if indicated    Narrative [facility] " [address] [place] [address]  [phone]    Transthoracic Echocardiogram  2D, M-mode, Doppler, and Color Doppler    Study date:  2021    Patient: FRIDA FRANZ  MR number: [id number]  Account number: [id number]  : 1950  Age: 71 years  Gender: Male  Status: Inpatient  Location: Bedside  Height: 70 in  Weight: 269.5 lb  BP: 110/ 67 mmHg    Indications: Atrial flutter.    Diagnoses: I48.1 - Atrial flutter    Sonographer:  MAYUR David  Interpreting Physician:  Javier Ochoa D.O.  Referring Physician:  Slava Gomez Do  Group:  [facility]  Cardiology Fellow:  Joyce Arriaga MD    SUMMARY    LEFT VENTRICLE:  Systolic function was normal. Ejection fraction was estimated to be 60 %.  There were no regional wall motion abnormalities.    LEFT ATRIUM:  The atrium was mildly dilated.    TRICUSPID VALVE:  There was trace regurgitation.    SUMMARY MEASUREMENTS  2D measurements:  Unspecified Anatomy:   %FS was 37.8 %.  Ao Diam was 3.9 cm.  EDV(Teich) was 150.4 ml.  EF(Teich) was 67.3 %.  ESV(Teich) was 49.2 ml.  IVSd was 1.1 cm.  LA Diam was 4.4 cm.  LAAs A2C was 20 cm2.  LAAs A4C was 21.4 cm2.  LAESV A-L A2C was  60.7 ml.  LAESV A-L A4C was 71.3 ml.  LAESV Index (A-L) was 28.1 ml/m2.  LAESV MOD A2C was 57.6 ml.  LAESV MOD A4C was 66.3 ml.  LAESV(A-L) was 66.5 ml.  LAESV(MOD BP) was 62.3 ml.  LAESVInd MOD BP was 26.3 ml/m2.  LALs A2C was 5.6 cm.   LALs A4C was 5.5 cm.  LVEDV MOD A2C was 55.1 ml.  LVEDV MOD A4C was 122.8 ml.  LVEF MOD A4C was 54.5 %.  LVESV MOD A4C was 55.8 ml.  LVIDd was 5.5 cm.  LVIDs was 3.5 cm.  LVLd A2C was 5.9 cm.  LVLd A4C was 9.1 cm.  LVLs A4C was 7.7 cm.   LVPWd was 1.1 cm.  RVIDd was 3.6 cm.  SV MOD A4C was 67 ml.  SV(Teich) was 101.2 ml.  CW measurements:  Unspecified Anatomy:   AV Env.Ti was 323.5 ms.  AV VTI was 18.6 cm.  AV Vmax was 0.9 m/s.  AV Vmean was 0.6 m/s.  AV maxPG was 3.1 mmHg.  AV meanPG was 1.5 mmHg.  MM  measurements:  Unspecified Anatomy:   TAPSE was 2.3 cm.  PW measurements:  Unspecified Anatomy:   DVI was 1.1 .  E' Avg was 0.1 m/s.  E' Lat was 0.1 m/s.  E' Sept was 0.1 m/s.  E/E' Avg was 6.2 .  E/E' Lat was 5.9 .  E/E' Sept was 6.6 .  LVOT Env.Ti was 364.4 ms.  LVOT VTI was 20.7 cm.  LVOT Vmax was 0.9 m/s.   LVOT Vmean was 0.6 m/s.  LVOT maxPG was 3.4 mmHg.  LVOT meanPG was 1.5 mmHg.  MV A Pj was 0.5 m/s.  MV Dec [place] was 4.4 m/s2.  MV DecT was 158.7 ms.  MV E Pj was 0.7 m/s.  MV E/A Ratio was 1.3 .  RV S' was 0.2 m/s.    HISTORY: PRIOR HISTORY: Risk factors: hypertension and morbid obesity.    PROCEDURE: The procedure was performed at the bedside. This was a routine study. The transthoracic approach was used. The study included complete 2D imaging, M-mode, complete spectral Doppler, and color Doppler. The heart rate was 67 bpm,  at the start of the study. Intravenous contrast ( .4ml of Definity) was administered. This was a technically difficult study.    LEFT VENTRICLE: Size was normal. Systolic function was normal. Ejection fraction was estimated to be 60 %. There were no regional wall motion abnormalities. Wall thickness was normal. DOPPLER: Left ventricular diastolic function parameters  were normal.    RIGHT VENTRICLE: The size was normal. Systolic function was normal. Wall thickness was normal.    LEFT ATRIUM: The atrium was mildly dilated.    RIGHT ATRIUM: Size was normal.    MITRAL VALVE: Valve structure was normal. There was normal leaflet separation. DOPPLER: The transmitral velocity was within the normal range. There was no evidence for stenosis. There was no significant regurgitation.    AORTIC VALVE: The valve was trileaflet. Leaflets exhibited normal thickness and normal cuspal separation. DOPPLER: Transaortic velocity was within the normal range. There was no evidence for stenosis. There was no significant  regurgitation.    TRICUSPID VALVE: The valve structure was normal. There was normal  leaflet separation. DOPPLER: The transtricuspid velocity was within the normal range. There was no evidence for stenosis. There was trace regurgitation.    PULMONIC VALVE: Leaflets exhibited normal thickness, no calcification, and normal cuspal separation. DOPPLER: The transpulmonic velocity was within the normal range. There was no significant regurgitation.    PERICARDIUM: There was no pericardial effusion. A pericardial fat pad was present. The pericardium was normal in appearance.    AORTA: The root exhibited normal size.    SYSTEMIC VEINS: IVC: The inferior vena cava was normal in size and course. The inferior vena cava was normal in size. Respirophasic changes were normal.    MEASUREMENT TABLES    2D MEASUREMENTS  Left atrium   (Reference normals)  AP dim   44 mm   (--)  AP dim index   1.86 cm/mï¾²   (<2.2)  Area, es, A4C   21.4 cmï¾²   (8.8-23.4)    SYSTEM MEASUREMENT TABLES    2D  %FS: 37.8 %  Ao Diam: 3.9 cm  EDV(Teich): 150.4 ml  EF(Teich): 67.3 %  ESV(Teich): 49.2 ml  IVSd: 1.1 cm  LA Diam: 4.4 cm  LAAs A2C: 20 cm2  LAAs A4C: 21.4 cm2  LAESV A-L A2C: 60.7 ml  LAESV A-L A4C: 71.3 ml  LAESV Index (A-L): 28.1 ml/m2  LAESV MOD A2C: 57.6 ml  LAESV MOD A4C: 66.3 ml  LAESV(A-L): 66.5 ml  LAESV(MOD BP): 62.3 ml  LAESVInd MOD BP: 26.3 ml/m2  LALs A2C: 5.6 cm  LALs A4C: 5.5 cm  LVEDV MOD A2C: 55.1 ml  LVEDV MOD A4C: 122.8 ml  LVEF MOD A4C: 54.5 %  LVESV MOD A4C: 55.8 ml  LVIDd: 5.5 cm  LVIDs: 3.5 cm  LVLd A2C: 5.9 cm  LVLd A4C: 9.1 cm  LVLs A4C: 7.7 cm  LVPWd: 1.1 cm  RVIDd: 3.6 cm  SV MOD A4C: 67 ml  SV(Teich): 101.2 ml    CW  AV Env.Ti: 323.5 ms  AV VTI: 18.6 cm  AV Vmax: 0.9 m/s  AV Vmean: 0.6 m/s  AV maxPG: 3.1 mmHg  AV meanP.5 mmHg    MM  TAPSE: 2.3 cm    PW  DVI: 1.1  E' Av.1 m/s  E' Lat: 0.1 m/s  E' Sept: 0.1 m/s  E/E' Av.2  E/E' Lat: 5.9  E/E' Sept: 6.6  LVOT Env.Ti: 364.4 ms  LVOT VTI: 20.7 cm  LVOT Vmax: 0.9 m/s  LVOT Vmean: 0.6 m/s  LVOT maxPG: 3.4 mmHg  LVOT meanP.5 mmHg  MV A Pj: 0.5  m/s  MV Dec [place]: 4.4 m/s2  MV DecT: 158.7 ms  MV E Pj: 0.7 m/s  MV E/A Ratio: 1.3  RV S': 0.2 m/s    [employer] Accredited Echocardiography Laboratory    Prepared and electronically signed by    Javier Ochoa D.O.  Signed 24-Jan-2021 15:01:20       No results found for this or any previous visit.  No results found for this or any previous visit.  No results found for this or any previous visit.        I reviewed and interpreted the following LABS/EKG/TELE/IMAGING and below is summary of my interpretation (if data available):    LABS: BMP, CBC    Current EKG and Rhythm Strip: NSR 70bpm        Reviewe zio strips and intpreted: Agree w reading below:     Patient had a min HR of 41 bpm, max HR of 190 bpm, and avg HR of 62  bpm. Predominant underlying rhythm was Sinus Rhythm. 26  Supraventricular Tachycardia runs occurred, the run with the fastest  interval lasting 4 beats with a max rate of 190 bpm, the longest lasting  27.0 secs with an avg rate of 132 bpm. Isolated SVEs were rare (<1.0%),  SVE Couplets were rare (<1.0%), and SVE Triplets were rare (<1.0%).  Isolated VEs were rare (<1.0%, 992), VE Triplets were rare (<1.0%, 1), and  no VE Couplets were present.

## 2024-04-18 ENCOUNTER — TELEPHONE (OUTPATIENT)
Age: 74
End: 2024-04-18

## 2024-04-18 NOTE — TELEPHONE ENCOUNTER
Patient called to refill Metoprolol prescription has 3 refill left he will call pharmacy for refill

## 2024-07-23 ENCOUNTER — APPOINTMENT (OUTPATIENT)
Age: 74
End: 2024-07-23
Payer: COMMERCIAL

## 2024-07-23 DIAGNOSIS — C61 PROSTATE CANCER (HCC): ICD-10-CM

## 2024-07-23 DIAGNOSIS — M10.9 GOUT, UNSPECIFIED CAUSE, UNSPECIFIED CHRONICITY, UNSPECIFIED SITE: ICD-10-CM

## 2024-07-23 LAB
BASOPHILS # BLD AUTO: 0.06 THOUSANDS/ÂΜL (ref 0–0.1)
BASOPHILS NFR BLD AUTO: 1 % (ref 0–1)
CREAT SERPL-MCNC: 0.94 MG/DL (ref 0.6–1.3)
CRP SERPL QL: 2.4 MG/L
EOSINOPHIL # BLD AUTO: 0.54 THOUSAND/ÂΜL (ref 0–0.61)
EOSINOPHIL NFR BLD AUTO: 6 % (ref 0–6)
ERYTHROCYTE [DISTWIDTH] IN BLOOD BY AUTOMATED COUNT: 13.2 % (ref 11.6–15.1)
GFR SERPL CREATININE-BSD FRML MDRD: 80 ML/MIN/1.73SQ M
HCT VFR BLD AUTO: 46 % (ref 36.5–49.3)
HGB BLD-MCNC: 14.7 G/DL (ref 12–17)
IMM GRANULOCYTES # BLD AUTO: 0.02 THOUSAND/UL (ref 0–0.2)
IMM GRANULOCYTES NFR BLD AUTO: 0 % (ref 0–2)
LIPASE SERPL-CCNC: 16 U/L (ref 11–82)
LYMPHOCYTES # BLD AUTO: 2.95 THOUSANDS/ÂΜL (ref 0.6–4.47)
LYMPHOCYTES NFR BLD AUTO: 33 % (ref 14–44)
MCH RBC QN AUTO: 29.6 PG (ref 26.8–34.3)
MCHC RBC AUTO-ENTMCNC: 32 G/DL (ref 31.4–37.4)
MCV RBC AUTO: 93 FL (ref 82–98)
MONOCYTES # BLD AUTO: 0.88 THOUSAND/ÂΜL (ref 0.17–1.22)
MONOCYTES NFR BLD AUTO: 10 % (ref 4–12)
NEUTROPHILS # BLD AUTO: 4.44 THOUSANDS/ÂΜL (ref 1.85–7.62)
NEUTS SEG NFR BLD AUTO: 50 % (ref 43–75)
NRBC BLD AUTO-RTO: 0 /100 WBCS
PLATELET # BLD AUTO: 192 THOUSANDS/UL (ref 149–390)
PMV BLD AUTO: 10.9 FL (ref 8.9–12.7)
RBC # BLD AUTO: 4.97 MILLION/UL (ref 3.88–5.62)
URATE SERPL-MCNC: 4.3 MG/DL (ref 3.5–8.5)
WBC # BLD AUTO: 8.89 THOUSAND/UL (ref 4.31–10.16)

## 2024-07-23 PROCEDURE — 84550 ASSAY OF BLOOD/URIC ACID: CPT

## 2024-07-23 PROCEDURE — 85025 COMPLETE CBC W/AUTO DIFF WBC: CPT

## 2024-07-23 PROCEDURE — 83690 ASSAY OF LIPASE: CPT

## 2024-07-23 PROCEDURE — 86140 C-REACTIVE PROTEIN: CPT

## 2024-07-23 PROCEDURE — 82565 ASSAY OF CREATININE: CPT

## 2024-07-23 PROCEDURE — 36415 COLL VENOUS BLD VENIPUNCTURE: CPT

## 2024-09-10 ENCOUNTER — APPOINTMENT (OUTPATIENT)
Age: 74
End: 2024-09-10
Payer: COMMERCIAL

## 2024-09-10 LAB — PSA SERPL-MCNC: 0.01 NG/ML (ref 0–4)

## 2024-09-12 ENCOUNTER — TELEPHONE (OUTPATIENT)
Dept: FAMILY MEDICINE CLINIC | Facility: CLINIC | Age: 74
End: 2024-09-12

## 2024-09-12 NOTE — TELEPHONE ENCOUNTER
Spoke with  the patient 9/12 to inform of the cancellation of of appt this afternoon with aga someone will call back at a later date patient was ok with that

## 2024-09-14 DIAGNOSIS — I10 BENIGN ESSENTIAL HYPERTENSION: ICD-10-CM

## 2024-09-16 RX ORDER — VALSARTAN 160 MG/1
160 TABLET ORAL DAILY
Qty: 30 TABLET | Refills: 0 | Status: SHIPPED | OUTPATIENT
Start: 2024-09-16

## 2024-09-22 ENCOUNTER — OFFICE VISIT (OUTPATIENT)
Age: 74
End: 2024-09-22
Payer: COMMERCIAL

## 2024-09-22 ENCOUNTER — HOSPITAL ENCOUNTER (EMERGENCY)
Facility: HOSPITAL | Age: 74
Discharge: HOME | End: 2024-09-22
Attending: INTERNAL MEDICINE
Payer: COMMERCIAL

## 2024-09-22 ENCOUNTER — APPOINTMENT (EMERGENCY)
Dept: CT IMAGING | Facility: HOSPITAL | Age: 74
End: 2024-09-22
Payer: COMMERCIAL

## 2024-09-22 ENCOUNTER — APPOINTMENT (EMERGENCY)
Dept: RADIOLOGY | Facility: HOSPITAL | Age: 74
End: 2024-09-22
Payer: COMMERCIAL

## 2024-09-22 VITALS
HEART RATE: 73 BPM | DIASTOLIC BLOOD PRESSURE: 62 MMHG | OXYGEN SATURATION: 96 % | WEIGHT: 262.79 LBS | TEMPERATURE: 96.8 F | HEIGHT: 70 IN | BODY MASS INDEX: 37.62 KG/M2 | SYSTOLIC BLOOD PRESSURE: 118 MMHG | RESPIRATION RATE: 18 BRPM

## 2024-09-22 VITALS
OXYGEN SATURATION: 95 % | HEART RATE: 93 BPM | DIASTOLIC BLOOD PRESSURE: 90 MMHG | WEIGHT: 268.08 LBS | SYSTOLIC BLOOD PRESSURE: 137 MMHG | RESPIRATION RATE: 16 BRPM | TEMPERATURE: 98.1 F | BODY MASS INDEX: 38.47 KG/M2

## 2024-09-22 DIAGNOSIS — R51.9 NONINTRACTABLE EPISODIC HEADACHE, UNSPECIFIED HEADACHE TYPE: ICD-10-CM

## 2024-09-22 DIAGNOSIS — R55 NEAR SYNCOPE: Primary | ICD-10-CM

## 2024-09-22 DIAGNOSIS — R06.00 DYSPNEA, UNSPECIFIED TYPE: ICD-10-CM

## 2024-09-22 DIAGNOSIS — G43.909 MIGRAINE: ICD-10-CM

## 2024-09-22 DIAGNOSIS — R55 SYNCOPE AND COLLAPSE: ICD-10-CM

## 2024-09-22 DIAGNOSIS — I48.91 NEW ONSET ATRIAL FIBRILLATION (HCC): ICD-10-CM

## 2024-09-22 DIAGNOSIS — R00.2 PALPITATIONS: Primary | ICD-10-CM

## 2024-09-22 LAB
2HR DELTA HS TROPONIN: -6 NG/L
ALBUMIN SERPL BCG-MCNC: 4 G/DL (ref 3.5–5)
ALP SERPL-CCNC: 74 U/L (ref 34–104)
ALT SERPL W P-5'-P-CCNC: 19 U/L (ref 7–52)
ANION GAP SERPL CALCULATED.3IONS-SCNC: 6 MMOL/L (ref 4–13)
APTT PPP: 42 SECONDS (ref 23–34)
AST SERPL W P-5'-P-CCNC: 17 U/L (ref 13–39)
ATRIAL RATE: 108 BPM
ATRIAL RATE: 192 BPM
ATRIAL RATE: 300 BPM
BASOPHILS # BLD AUTO: 0.09 THOUSANDS/ΜL (ref 0–0.1)
BASOPHILS NFR BLD AUTO: 1 % (ref 0–1)
BILIRUB SERPL-MCNC: 1 MG/DL (ref 0.2–1)
BNP SERPL-MCNC: 253 PG/ML (ref 0–100)
BUN SERPL-MCNC: 14 MG/DL (ref 5–25)
CALCIUM SERPL-MCNC: 9.4 MG/DL (ref 8.4–10.2)
CARDIAC TROPONIN I PNL SERPL HS: 43 NG/L
CARDIAC TROPONIN I PNL SERPL HS: 49 NG/L
CHLORIDE SERPL-SCNC: 103 MMOL/L (ref 96–108)
CO2 SERPL-SCNC: 30 MMOL/L (ref 21–32)
CREAT SERPL-MCNC: 1.22 MG/DL (ref 0.6–1.3)
EOSINOPHIL # BLD AUTO: 0.37 THOUSAND/ΜL (ref 0–0.61)
EOSINOPHIL NFR BLD AUTO: 4 % (ref 0–6)
ERYTHROCYTE [DISTWIDTH] IN BLOOD BY AUTOMATED COUNT: 13.5 % (ref 11.6–15.1)
GFR SERPL CREATININE-BSD FRML MDRD: 58 ML/MIN/1.73SQ M
GLUCOSE SERPL-MCNC: 143 MG/DL (ref 65–140)
GLUCOSE SERPL-MCNC: 147 MG/DL (ref 65–140)
HCT VFR BLD AUTO: 46.4 % (ref 36.5–49.3)
HGB BLD-MCNC: 15.6 G/DL (ref 12–17)
IMM GRANULOCYTES # BLD AUTO: 0.03 THOUSAND/UL (ref 0–0.2)
IMM GRANULOCYTES NFR BLD AUTO: 0 % (ref 0–2)
INR PPP: 1.25 (ref 0.85–1.19)
LYMPHOCYTES # BLD AUTO: 2.53 THOUSANDS/ΜL (ref 0.6–4.47)
LYMPHOCYTES NFR BLD AUTO: 26 % (ref 14–44)
MAGNESIUM SERPL-MCNC: 2 MG/DL (ref 1.9–2.7)
MCH RBC QN AUTO: 29.6 PG (ref 26.8–34.3)
MCHC RBC AUTO-ENTMCNC: 33.6 G/DL (ref 31.4–37.4)
MCV RBC AUTO: 88 FL (ref 82–98)
MONOCYTES # BLD AUTO: 1.02 THOUSAND/ΜL (ref 0.17–1.22)
MONOCYTES NFR BLD AUTO: 10 % (ref 4–12)
NEUTROPHILS # BLD AUTO: 5.76 THOUSANDS/ΜL (ref 1.85–7.62)
NEUTS SEG NFR BLD AUTO: 59 % (ref 43–75)
NRBC BLD AUTO-RTO: 0 /100 WBCS
PHOSPHATE SERPL-MCNC: 2.9 MG/DL (ref 2.3–4.1)
PLATELET # BLD AUTO: 245 THOUSANDS/UL (ref 149–390)
PMV BLD AUTO: 9.8 FL (ref 8.9–12.7)
POTASSIUM SERPL-SCNC: 4 MMOL/L (ref 3.5–5.3)
PROT SERPL-MCNC: 6.9 G/DL (ref 6.4–8.4)
PROTHROMBIN TIME: 15.8 SECONDS (ref 12.3–15)
QRS AXIS: 15 DEGREES
QRS AXIS: 4 DEGREES
QRS AXIS: 5 DEGREES
QRSD INTERVAL: 76 MS
QRSD INTERVAL: 80 MS
QRSD INTERVAL: 88 MS
QT INTERVAL: 284 MS
QT INTERVAL: 348 MS
QT INTERVAL: 352 MS
QTC INTERVAL: 380 MS
QTC INTERVAL: 428 MS
QTC INTERVAL: 442 MS
RBC # BLD AUTO: 5.27 MILLION/UL (ref 3.88–5.62)
SODIUM SERPL-SCNC: 139 MMOL/L (ref 135–147)
T WAVE AXIS: 29 DEGREES
T WAVE AXIS: 45 DEGREES
T WAVE AXIS: 57 DEGREES
VENTRICULAR RATE: 108 BPM
VENTRICULAR RATE: 91 BPM
VENTRICULAR RATE: 95 BPM
WBC # BLD AUTO: 9.8 THOUSAND/UL (ref 4.31–10.16)

## 2024-09-22 PROCEDURE — 70450 CT HEAD/BRAIN W/O DYE: CPT

## 2024-09-22 PROCEDURE — 99291 CRITICAL CARE FIRST HOUR: CPT | Performed by: INTERNAL MEDICINE

## 2024-09-22 PROCEDURE — 84100 ASSAY OF PHOSPHORUS: CPT

## 2024-09-22 PROCEDURE — 93010 ELECTROCARDIOGRAM REPORT: CPT | Performed by: STUDENT IN AN ORGANIZED HEALTH CARE EDUCATION/TRAINING PROGRAM

## 2024-09-22 PROCEDURE — 82948 REAGENT STRIP/BLOOD GLUCOSE: CPT | Performed by: EMERGENCY MEDICINE

## 2024-09-22 PROCEDURE — 96365 THER/PROPH/DIAG IV INF INIT: CPT

## 2024-09-22 PROCEDURE — 85730 THROMBOPLASTIN TIME PARTIAL: CPT

## 2024-09-22 PROCEDURE — 83880 ASSAY OF NATRIURETIC PEPTIDE: CPT

## 2024-09-22 PROCEDURE — 93005 ELECTROCARDIOGRAM TRACING: CPT | Performed by: EMERGENCY MEDICINE

## 2024-09-22 PROCEDURE — 85610 PROTHROMBIN TIME: CPT

## 2024-09-22 PROCEDURE — 96375 TX/PRO/DX INJ NEW DRUG ADDON: CPT

## 2024-09-22 PROCEDURE — 85025 COMPLETE CBC W/AUTO DIFF WBC: CPT

## 2024-09-22 PROCEDURE — 71045 X-RAY EXAM CHEST 1 VIEW: CPT

## 2024-09-22 PROCEDURE — 96361 HYDRATE IV INFUSION ADD-ON: CPT

## 2024-09-22 PROCEDURE — 83735 ASSAY OF MAGNESIUM: CPT

## 2024-09-22 PROCEDURE — 93005 ELECTROCARDIOGRAM TRACING: CPT

## 2024-09-22 PROCEDURE — 36415 COLL VENOUS BLD VENIPUNCTURE: CPT

## 2024-09-22 PROCEDURE — 80053 COMPREHEN METABOLIC PANEL: CPT

## 2024-09-22 PROCEDURE — 99213 OFFICE O/P EST LOW 20 MIN: CPT | Performed by: EMERGENCY MEDICINE

## 2024-09-22 PROCEDURE — 84484 ASSAY OF TROPONIN QUANT: CPT

## 2024-09-22 PROCEDURE — 99285 EMERGENCY DEPT VISIT HI MDM: CPT

## 2024-09-22 PROCEDURE — S9083 URGENT CARE CENTER GLOBAL: HCPCS | Performed by: EMERGENCY MEDICINE

## 2024-09-22 RX ORDER — DIPHENHYDRAMINE HYDROCHLORIDE 50 MG/ML
25 INJECTION INTRAMUSCULAR; INTRAVENOUS ONCE
Status: COMPLETED | OUTPATIENT
Start: 2024-09-22 | End: 2024-09-22

## 2024-09-22 RX ORDER — METOCLOPRAMIDE HYDROCHLORIDE 5 MG/ML
10 INJECTION INTRAMUSCULAR; INTRAVENOUS ONCE
Status: COMPLETED | OUTPATIENT
Start: 2024-09-22 | End: 2024-09-22

## 2024-09-22 RX ORDER — MAGNESIUM SULFATE HEPTAHYDRATE 40 MG/ML
2 INJECTION, SOLUTION INTRAVENOUS ONCE
Status: COMPLETED | OUTPATIENT
Start: 2024-09-22 | End: 2024-09-22

## 2024-09-22 RX ORDER — ACETAMINOPHEN 325 MG/1
650 TABLET ORAL ONCE
Status: COMPLETED | OUTPATIENT
Start: 2024-09-22 | End: 2024-09-22

## 2024-09-22 RX ORDER — ONDANSETRON 2 MG/ML
4 INJECTION INTRAMUSCULAR; INTRAVENOUS ONCE
Status: COMPLETED | OUTPATIENT
Start: 2024-09-22 | End: 2024-09-22

## 2024-09-22 RX ADMIN — ACETAMINOPHEN 650 MG: 325 TABLET ORAL at 14:09

## 2024-09-22 RX ADMIN — DIPHENHYDRAMINE HYDROCHLORIDE 25 MG: 50 INJECTION, SOLUTION INTRAMUSCULAR; INTRAVENOUS at 14:05

## 2024-09-22 RX ADMIN — MAGNESIUM SULFATE HEPTAHYDRATE 2 G: 40 INJECTION, SOLUTION INTRAVENOUS at 14:18

## 2024-09-22 RX ADMIN — SODIUM CHLORIDE 1000 ML: 0.9 INJECTION, SOLUTION INTRAVENOUS at 13:29

## 2024-09-22 RX ADMIN — ONDANSETRON 4 MG: 2 INJECTION INTRAMUSCULAR; INTRAVENOUS at 13:29

## 2024-09-22 RX ADMIN — METOCLOPRAMIDE 10 MG: 5 INJECTION, SOLUTION INTRAMUSCULAR; INTRAVENOUS at 14:08

## 2024-09-22 RX ADMIN — SODIUM CHLORIDE 1000 ML: 0.9 INJECTION, SOLUTION INTRAVENOUS at 14:08

## 2024-09-22 NOTE — DISCHARGE INSTRUCTIONS
Please follow-up with cardiology for further evaluation and management of your symptoms and given your rhythm switch from atrial flutter to atrial fibrillation.

## 2024-09-22 NOTE — ED PROVIDER NOTES
1. Near syncope    2. New onset atrial fibrillation (HCC)    3. Migraine      ED Disposition       ED Disposition   Discharge    Condition   Stable    Date/Time   Sun Sep 22, 2024  4:04 PM    Comment   Avi Dempsey discharge to home/self care.                   Assessment & Plan       Medical Decision Making  Patient is a 74-year-old male presenting for concerns of new onset A-fib, migraine headache, near syncopal episode.  DDx: Migraine, A-fib, electrolyte abnormality, anemia, rule out subarachnoid hemorrhage/intracranial hemorrhage, vasovagal, dehydration ACS, dissection, pneumothorax, pneumonia  Based on patient presentation and physical exam findings, primary concern is for rule out of subarachnoid hemorrhage and evaluation of cardiac cause of near syncopal episode.  Plan for full cardiac workup and CT head.  CT head negative for any acute or maladies.  Plan to treat symptomatically with migraine cocktail.  Dispo pending lab workup.  -Discussed with Dr. Light risk benefits discharging patient given negative troponin, patient symptoms much improved for migraine cocktail, otherwise negative lab workup, patient is already rate controlled on metoprolol and anticoagulated on Xarelto.  Plan for discharge with strict return precautions and ambulatory follow-up to cardiology.  Patient understands and agrees with plan.  Ready for discharge.    Problems Addressed:  Migraine: acute illness or injury  Near syncope: acute illness or injury  New onset atrial fibrillation (HCC): acute illness or injury    Amount and/or Complexity of Data Reviewed  Labs: ordered. Decision-making details documented in ED Course.  Radiology: ordered.    Risk  OTC drugs.  Prescription drug management.                ED Course as of 09/22/24 1605   Sun Sep 22, 2024   1349 hs TnI 0hr: 49   1553 Delta 2hr hsTnI: -6   1602 Spoke with Dr. Light, given patient is already rate controlled, on anticoagulation, symptoms much improved and delta is  "negative; will plan for discharge with ambulatory referral to cardiology and strict return precautions.  Patient understands agrees with plan.  Ready for discharge.       Medications   sodium chloride 0.9 % bolus 1,000 mL (0 mL Intravenous Stopped 9/22/24 1512)   ondansetron (ZOFRAN) injection 4 mg (4 mg Intravenous Given 9/22/24 1329)   metoclopramide (REGLAN) injection 10 mg (10 mg Intravenous Given 9/22/24 1408)   diphenhydrAMINE (BENADRYL) injection 25 mg (25 mg Intravenous Given 9/22/24 1405)   magnesium sulfate 2 g/50 mL IVPB (premix) 2 g (0 g Intravenous Stopped 9/22/24 1510)   sodium chloride 0.9 % bolus 1,000 mL (1,000 mL Intravenous New Bag 9/22/24 1408)   acetaminophen (TYLENOL) tablet 650 mg (650 mg Oral Given 9/22/24 1409)       History of Present Illness       HPI  Patient is 74-year-old male presenting for concerns of new onset A-fib (in setting of previously being in a flutter on Xarelto), near syncopal episode while at urgent care today, feeling nauseous/\"off\" for the last several days, and migraine headache for the last week.  Past medical history significant for a flutter on Xarelto, hypertension, gout, hyperlipidemia, sleep apnea.  Patient states he has been feeling off and nauseous for the last several days, presented to urgent care to be evaluated where he began to feel lightheaded, clammy, diaphoretic, vision tunneled, patient nearly syncopized.  Patient states he maintained awareness during the entire event and did not fall or hit his head.  Urgent care staff sent to immediately to the ER to be evaluated.  Patient states feels back to baseline at present, no chest pain no shortness of breath no lightheadedness or dizziness.  No lateralizing weakness or sensory deficit.  Patient does endorse he has had a generalized migraine headache, by photophobia for the last week with mild next pain/stiffness.  Denies any fever/chills.  Review of Systems   Constitutional:  Positive for diaphoresis and " fatigue.   HENT: Negative.     Eyes:  Positive for photophobia and visual disturbance.   Respiratory: Negative.     Cardiovascular: Negative.    Gastrointestinal:  Positive for nausea. Negative for abdominal pain, constipation and vomiting.   Endocrine: Negative.    Genitourinary: Negative.    Musculoskeletal:  Positive for neck pain and neck stiffness.   Allergic/Immunologic: Negative.    Neurological:  Positive for light-headedness and headaches.        Near syncope   Hematological: Negative.    Psychiatric/Behavioral: Negative.             Objective     ED Triage Vitals [09/22/24 1245]   Temperature Pulse Blood Pressure Respirations SpO2 Patient Position - Orthostatic VS   98.1 °F (36.7 °C) (!) 121 122/82 16 96 % Lying      Temp Source Heart Rate Source BP Location FiO2 (%) Pain Score    Oral Monitor Right arm -- No Pain        Physical Exam  Vitals and nursing note reviewed.   Constitutional:       Appearance: Normal appearance. He is normal weight.   HENT:      Head: Normocephalic and atraumatic.      Right Ear: Tympanic membrane, ear canal and external ear normal.      Left Ear: Tympanic membrane, ear canal and external ear normal.      Nose: Nose normal.      Mouth/Throat:      Mouth: Mucous membranes are moist.      Pharynx: Oropharynx is clear.   Eyes:      Extraocular Movements: Extraocular movements intact.      Conjunctiva/sclera: Conjunctivae normal.      Pupils: Pupils are equal, round, and reactive to light.   Cardiovascular:      Rate and Rhythm: Tachycardia present. Rhythm irregular.      Pulses: Normal pulses.      Heart sounds: Normal heart sounds.   Pulmonary:      Effort: Pulmonary effort is normal.      Breath sounds: Normal breath sounds.   Abdominal:      General: Abdomen is flat. Bowel sounds are normal.      Palpations: Abdomen is soft.      Tenderness: There is no abdominal tenderness. There is no guarding or rebound.   Musculoskeletal:         General: Normal range of motion.       Cervical back: Normal range of motion and neck supple. No rigidity or tenderness.   Skin:     General: Skin is warm and dry.      Capillary Refill: Capillary refill takes less than 2 seconds.   Neurological:      General: No focal deficit present.      Mental Status: He is alert and oriented to person, place, and time.      Cranial Nerves: No cranial nerve deficit.      Sensory: No sensory deficit.      Motor: No weakness.      Coordination: Coordination normal.      Gait: Gait normal.      Deep Tendon Reflexes: Reflexes normal.   Psychiatric:         Mood and Affect: Mood normal.         Behavior: Behavior normal.         Thought Content: Thought content normal.         Judgment: Judgment normal.         Labs Reviewed   COMPREHENSIVE METABOLIC PANEL - Abnormal       Result Value    Sodium 139      Potassium 4.0      Chloride 103      CO2 30      ANION GAP 6      BUN 14      Creatinine 1.22      Glucose 143 (*)     Calcium 9.4      AST 17      ALT 19      Alkaline Phosphatase 74      Total Protein 6.9      Albumin 4.0      Total Bilirubin 1.00      eGFR 58      Narrative:     National Kid[employer] Foundation guidelines for Chronic Kidney Disease (CKD):     Stage 1 with normal or high GFR (GFR > 90 mL/min/1.73 square meters)    Stage 2 Mild CKD (GFR = 60-89 mL/min/1.73 square meters)    Stage 3A Moderate CKD (GFR = 45-59 mL/min/1.73 square meters)    Stage 3B Moderate CKD (GFR = 30-44 mL/min/1.73 square meters)    Stage 4 Severe CKD (GFR = 15-29 mL/min/1.73 square meters)    Stage 5 End Stage CKD (GFR <15 mL/min/1.73 square meters)  Note: GFR calculation is accurate only with a steady state creatinine   PROTIME-INR - Abnormal    Protime 15.8 (*)     INR 1.25 (*)     Narrative:     INR Therapeutic Range    Indication                                             INR Range      Atrial Fibrillation                                               2.0-3.0  Hypercoagulable State                                     2.0.2.3  Left Ventricular Asist Device                            2.0-3.0  Mechanical Heart Valve                                  -    Aortic(with afib, MI, embolism, HF, LA enlargement,    and/or coagulopathy)                                     2.0-3.0 (2.5-3.5)     Mitral                                                             2.5-3.5  Prosthetic/Bioprosthetic Heart Valve               2.0-3.0  Venous thromboembolism (VTE: VT, PE        2.0-3.0   APTT - Abnormal    PTT 42 (*)    B-TYPE NATRIURETIC PEPTIDE (BNP) - Abnormal     (*)    MAGNESIUM - Normal    Magnesium 2.0     PHOSPHORUS - Normal    Phosphorus 2.9     HS TROPONIN I 0HR - Normal    hs TnI 0hr 49     HS TROPONIN I 2HR - Normal    hs TnI 2hr 43      Delta 2hr hsTnI -6     CBC AND DIFFERENTIAL    WBC 9.80      RBC 5.27      Hemoglobin 15.6      Hematocrit 46.4      MCV 88      MCH 29.6      MCHC 33.6      RDW 13.5      MPV 9.8      Platelets 245      nRBC 0      Segmented % 59      Immature Grans % 0      Lymphocytes % 26      Monocytes % 10      Eosinophils Relative 4      Basophils Relative 1      Absolute Neutrophils 5.76      Absolute Immature Grans 0.03      Absolute Lymphocytes 2.53      Absolute Monocytes 1.02      Eosinophils Absolute 0.37      Basophils Absolute 0.09     HS TROPONIN I 4HR     CT head without contrast   Final Interpretation by Omi Gaston MD (09/22 1318)      No acute intracranial abnormality.                  Workstation performed: [id number]         XR chest 1 view portable    (Results Pending)       Procedures    ED Medication and Procedure Management   Prior to Admission Medications   Prescriptions Last Dose Informant Patient Reported? Taking?   Xarelto 20 MG tablet   No Yes   Sig: TAKE 1 TABLET BY MOUTH DAILY WITH BREAKFAST   allopurinol (ZYLOPRIM) 300 mg tablet 9/22/2024 Self No Yes   Sig: TAKE 1 TABLET BY MOUTH EVERY DAY   atorvastatin (LIPITOR) 20 mg tablet 9/22/2024 Self No Yes   Sig: TAKE 1 TABLET  BY MOUTH EVERY DAY   colchicine (COLCRYS) 0.6 mg tablet  Self Yes Yes   Sig: TAKE 1 TABLET BY MOUTH MONDAY, WEDNESDAY, AND FRIDAY FOR PREVENTION OF GOUT BREAKTHROUGH ATTACK   methylprednisolone (MEDROL) 4 mg tablet Not Taking  Yes No   Sig: TAKE 1 TABLET BY MOUTH ONCE A DAY WITH FOOD WHEN MEDROL DOSE PACK IS FINISHED   Patient not taking: Reported on 9/22/2024   metoprolol succinate (TOPROL-XL) 50 mg 24 hr tablet 9/22/2024  No Yes   Sig: TAKE 1 TABLET BY MOUTH EVERY DAY   traMADol (Ultram) 50 mg tablet 9/22/2024  No Yes   Sig: Take 1 tablet (50 mg total) by mouth every 8 (eight) hours as needed for moderate pain   valsartan (DIOVAN) 160 mg tablet 9/22/2024  No Yes   Sig: TAKE 1 TABLET BY MOUTH EVERY DAY      Facility-Administered Medications: None     Patient's Medications   Discharge Prescriptions    No medications on file          Omi Galdamez MD  09/22/24 2987

## 2024-09-22 NOTE — PROGRESS NOTES
[facility]        NAME: Avi Dempsey is a 74 y.o. male  : 1950    MRN: [id number]  DATE: 2024  TIME: 12:09 PM    Assessment and Plan   Palpitations [R00.2]  1. Palpitations  ECG 12 lead      2. Dyspnea, unspecified type        3. Nonintractable episodic headache, unspecified headache type        4. Syncope and collapse          bG = 147    EKG shows atrial fibrillation at 91 bpm, no Q waves, no ST-T wave abnormalities  Just after EKG was completed, shortly after arrival in our office, patient had syncopal event but was kept from falling from the litter onto the floor by the RN.  Patient was unresponsive for approximately 30 seconds and noted to be pale and diaphoretic, at which time 911 was called.  Patient Instructions     There are no Patient Instructions on file for this visit.    Follow up with PCP in 3-5 days.  Proceed to  ER if symptoms worsen.    Chief Complaint     Chief Complaint   Patient presents with   • Headache     Headache x2 weeks. BP high, heart rate high, feeling nauseous, fatigue, tightness across shoulders and around neck, breathing is labored especially this morning. Took covid test yesterday, negative. Taking tramadol for the headache, usually takes for gout.         History of Present Illness       Patient complains of throbbing, bitemporal headache on and off for the past 2 weeks.  Patient notes that during this time intermittently his BP and heart rate run high.  He also admits to intermittent nausea, fatigue, palpitations, tightness across shoulders and around neck, with labored breathing especially this morning. He took Covid test yesterday, negative.  History of hypertension, atrial flutter on Xarelto.  Just after EKG was completed, shortly after arrival in our office, patient had syncopal event but was kept from falling from the litter onto the floor by the RN.  Patient was unresponsive for approximately 30 seconds and noted to be pale and diaphoretic, at  which time 911 was called.    Headache      Review of Systems   Review of Systems   Constitutional:  Positive for fatigue. Negative for fever.   HENT: Negative.     Respiratory:  Positive for shortness of breath.    Cardiovascular:  Positive for palpitations.   Gastrointestinal:  Positive for nausea.   Endocrine: Negative.    Genitourinary: Negative.    Musculoskeletal: Negative.    Neurological:  Positive for headaches. Negative for dizziness, tremors, seizures, syncope, facial asymmetry, speech difficulty, weakness, light-headedness and numbness.         Current Medications       Current Outpatient Medications:   •  allopurinol (ZYLOPRIM) 300 mg tablet, TAKE 1 TABLET BY MOUTH EVERY DAY, Disp: 90 tablet, Rfl: 1  •  atorvastatin (LIPITOR) 20 mg tablet, TAKE 1 TABLET BY MOUTH EVERY DAY, Disp: 90 tablet, Rfl: 2  •  colchicine (COLCRYS) 0.6 mg tablet, TAKE 1 TABLET BY MOUTH MONDAY, WEDNESDAY, AND FRIDAY FOR PREVENTION OF GOUT BREAKTHROUGH ATTACK, Disp: , Rfl:   •  metoprolol succinate (TOPROL-XL) 50 mg 24 hr tablet, TAKE 1 TABLET BY MOUTH EVERY DAY, Disp: 90 tablet, Rfl: 3  •  traMADol (Ultram) 50 mg tablet, Take 1 tablet (50 mg total) by mouth every 8 (eight) hours as needed for moderate pain, Disp: 21 tablet, Rfl: 0  •  valsartan (DIOVAN) 160 mg tablet, TAKE 1 TABLET BY MOUTH EVERY DAY, Disp: 30 tablet, Rfl: 0  •  Xarelto 20 MG tablet, TAKE 1 TABLET BY MOUTH DAILY WITH BREAKFAST, Disp: 90 tablet, Rfl: 3  •  methylprednisolone (MEDROL) 4 mg tablet, TAKE 1 TABLET BY MOUTH ONCE A DAY WITH FOOD WHEN MEDROL DOSE PACK IS FINISHED (Patient not taking: Reported on 9/22/2024), Disp: , Rfl:     Current Allergies     Allergies as of 09/22/2024   • (No Known Allergies)            The following portions of the patient's history were reviewed and updated as appropriate: allergies, current medications, past family history, past medical history, past social history, past surgical history and problem list.     Past Medical History:    Diagnosis Date   • Acute gouty arthritis     last assessed 6/1/13    • Anxiety     last assessed 7/11/14    • Atrial flutter (HCC) 1/22/2021   • BPH (benign prostatic hyperplasia)    • Chronic thoracic spine pain     last assessed 5/13/16    • CPAP (continuous positive airway pressure) dependence    • Dysfunction of both eustachian tubes     last assessed 8/16/13   • Erectile dysfunction of non-organic origin     last assessed 10/8/13    • Family history reviewed with no changes     medical history    • Gout    • Hyperlipidemia    • Hypertension    • Prostate cancer (HCC) 10/25/2022   • Sebaceous cyst     last assessed 12/16/13    • Skin lesion     last assessed 1/2/14    • Sleep apnea    • Subclinical hypothyroidism 03/21/2019       Past Surgical History:   Procedure Laterality Date   • APPENDECTOMY      6 years old   • BOWEL RESECTION  2010    Sigmoid resection for diverticulitis   • CATARACT EXTRACTION     • CERVICAL FUSION N/A 08/27/2019    Procedure: Anterior cervical discectomy w fusion C5-6, with allograft and neuromonitoring;  Surgeon: Gricel Hall MD;  Location: [facility];  Service: Orthopedics   • COLONOSCOPY  02/2018    Dr. Mixon.  Tubular adenoma polyps removed from the mid ascending colon, hyperplastic rectal polyp removed, evidence of end to end anastomosis in sigmoid with healthy mucosa.   • COLONOSCOPY  2012    Dr. Mixon.  Tubular adenoma polyp removed from right colon.   • COLONOSCOPY  2004    Small rectal polyp   • COLONOSCOPY  2002    Polyp ablated   • COLONOSCOPY  2001    Polyps removed.  Dr. Mixon.   • MASTECTOMY Bilateral    • MOUTH SURGERY      tooth extraction with dental implant   • ND BX PROSTATE STRTCTC SATURATION SAMPLING IMG GID N/A 10/18/2022    Procedure: TRANSPERINEAL MRI FUSION  BIOPSY PROSTATE;  Surgeon: Otilio Bagley MD;  Location: [facility];  Service: Urology   • ND LAPS SURG [id number] RPBIC RAD W/NRV SPARING ROBOT N/A 01/25/2023    Procedure: PROSTATECTOMY RADICAL  "AND PELVIC LYMPH NODE DISSECTION LAPAROSCOPIC W/ ROBOTICS; LYSIS OF AHDESIONS;  Surgeon: Otilio Bagley MD;  Location: [facility];  Service: Urology   • SKIN BIOPSY      left cheek   • WRIST SURGERY         Family History   Problem Relation Age of Onset   • Hypertension Mother    • Eczema Father    • Heart attack Father    • Diabetes Sister    • Psoriasis Sister    • Hypertension Sister    • Breast cancer Daughter    • Diabetes Family    • Hypertension Family          Medications have been verified.        Objective   /62   Pulse 73   Temp (!) 96.8 °F (36 °C)   Resp 18   Ht 5' 10\" (1.778 m)   Wt 119 kg (262 lb 12.6 oz)   SpO2 96%   BMI 37.71 kg/m²        Physical Exam     Physical Exam  Vitals and nursing note reviewed.   Constitutional:       General: He is not in acute distress.     Appearance: Normal appearance. He is well-developed. He is ill-appearing and diaphoretic. He is not toxic-appearing.   HENT:      Head: Normocephalic and atraumatic.   Eyes:      Conjunctiva/sclera: Conjunctivae normal.      Pupils: Pupils are equal, round, and reactive to light.      Funduscopic exam:     Right eye: No hemorrhage or papilledema.         Left eye: No hemorrhage or papilledema.      Comments: Discs flat with sharp borders, no hemorrhages or exudates.   Cardiovascular:      Rate and Rhythm: Normal rate. Rhythm irregular.   Pulmonary:      Effort: Pulmonary effort is normal.      Breath sounds: Normal breath sounds.   Musculoskeletal:         General: No tenderness or deformity.      Cervical back: Normal range of motion and neck supple. No rigidity.   Skin:     General: Skin is warm.      Coloration: Skin is pale.      Findings: No erythema or rash.   Neurological:      Mental Status: He is alert and oriented to person, place, and time.      Deep Tendon Reflexes: Reflexes are normal and symmetric.   Psychiatric:         Mood and Affect: Mood normal.         Behavior: Behavior normal.         Thought " Content: Thought content normal.         Judgment: Judgment normal.

## 2024-09-22 NOTE — ED ATTENDING ATTESTATION
9/22/2024  I, Padma Key MD, saw and evaluated the patient. I have discussed the patient with the resident/non-physician practitioner and agree with the resident's/non-physician practitioner's findings, Plan of Care, and MDM as documented in the resident's/non-physician practitioner's note, except where noted. All available labs and Radiology studies were reviewed.  I was present for key portions of any procedure(s) performed by the resident/non-physician practitioner and I was immediately available to provide assistance.       At this point I agree with the current assessment done in the Emergency Department.  I have conducted an independent evaluation of this patient a history and physical is as follows:    ED Course  ED Course as of 09/22/24 2125   Sun Sep 22, 2024   1246 EKG: Atrial fibrillation with RVR, T wave abnormality   1348 CMP and CBC grossly unremarkable   1349 hs TnI 0hr: 49  No previous troponin   1349 BNP(!): 253  No previous BNP       74-year-old man with presents to ED via EMS from urgent care for evaluation of multiple complaints.  Patient reports headaches for the last 2 weeks.  He reports a near syncopal episode today.  He reports he feels short of breath.  He reports irregular rhythm of his heart.  He reports he has atrial fibrillation and takes metoprolol and Xarelto.  He also reports a bifrontal headache.  Is not the worst headache of his life and it was not sudden onset.  On exam the patient is awake, alert, and comfortable. Mucous membranes are moist. The patient's heart is irregular. No respiratory distress.  Abdomen non-distended. Moves all extremities. Patient neurologically nonfocal. No skin rashes. Back without deformities. MDM: EKG concerning for atrial fibrillation with RVR, rate does not appear to go faster than 120 on monitor.  Will rule out ACS with workup.  Workup to include CBC as marker of infectivity, hemoconcentration for hydration status, to evaluate for anemia or  platelet problem.CMP to check for electrolytes, renal function, liver function.  CT to rule out intracranial hemorrhage.  Will discuss patient with Dr. Nadege Montanez        Critical Care Time  CriticalCare Time    Date/Time: 9/22/2024 1:50 PM    Performed by: Padma Key MD  Authorized by: Padma Key MD    Critical care provider statement:     Critical care time (minutes):  33    Critical care time was exclusive of:  Separately billable procedures and treating other patients and teaching time    Critical care was necessary to treat or prevent imminent or life-threatening deterioration of the following conditions:  Cardiac failure and circulatory failure    Critical care was time spent personally by me on the following activities:  Blood draw for specimens, obtaining history from patient or surrogate, development of treatment plan with patient or surrogate, evaluation of patient's response to treatment, examination of patient, interpretation of cardiac output measurements, ordering and performing treatments and interventions, ordering and review of laboratory studies, ordering and review of radiographic studies, re-evaluation of patient's condition and review of old charts    I assumed direction of critical care for this patient from another provider in my specialty: no

## 2024-09-24 ENCOUNTER — TELEPHONE (OUTPATIENT)
Age: 74
End: 2024-09-24

## 2024-09-24 DIAGNOSIS — E03.8 SUBCLINICAL HYPOTHYROIDISM: ICD-10-CM

## 2024-09-24 DIAGNOSIS — E78.2 COMBINED HYPERLIPIDEMIA: Primary | ICD-10-CM

## 2024-09-24 DIAGNOSIS — I10 BENIGN ESSENTIAL HYPERTENSION: ICD-10-CM

## 2024-09-24 DIAGNOSIS — Z12.5 SCREENING FOR PROSTATE CANCER: ICD-10-CM

## 2024-09-24 DIAGNOSIS — R73.9 ELEVATED BLOOD SUGAR: ICD-10-CM

## 2024-09-24 DIAGNOSIS — E78.5 DYSLIPIDEMIA: ICD-10-CM

## 2024-09-24 DIAGNOSIS — Z87.39 HISTORY OF GOUT: ICD-10-CM

## 2024-09-24 RX ORDER — ATORVASTATIN CALCIUM 20 MG/1
TABLET, FILM COATED ORAL
Qty: 90 TABLET | Refills: 0 | Status: SHIPPED | OUTPATIENT
Start: 2024-09-24

## 2024-09-24 NOTE — TELEPHONE ENCOUNTER
Pt scheduled for 9/30, pt was told earlier when he called for a refill that he needed updated labs for further refills. Pt would like labs placed prior to appointment with Dr. Brooke so they can go over them if need be.    Pt uses [facility]

## 2024-09-24 NOTE — TELEPHONE ENCOUNTER
Patient called the RX Refill Line. Message is being forwarded to the office.     Patient is requesting a call back to schedule an appt, said that he ended up in the ED this past Sunday after going to an urgent care, pt stated that he's doing better but isn't 100% and is hoping to see the dr soon for a f/u     Please contact patient at

## 2024-09-25 ENCOUNTER — RA CDI HCC (OUTPATIENT)
Dept: OTHER | Facility: HOSPITAL | Age: 74
End: 2024-09-25

## 2024-09-25 NOTE — PROGRESS NOTES
HCC coding opportunities          Chart Reviewed number of suggestions sent to Provider: 1     Patients Insurance   E66.01  Medicare Insurance: [employer]vantage

## 2024-09-30 ENCOUNTER — APPOINTMENT (OUTPATIENT)
Age: 74
End: 2024-09-30
Payer: COMMERCIAL

## 2024-09-30 ENCOUNTER — OFFICE VISIT (OUTPATIENT)
Dept: FAMILY MEDICINE CLINIC | Facility: CLINIC | Age: 74
End: 2024-09-30
Payer: COMMERCIAL

## 2024-09-30 VITALS
BODY MASS INDEX: 38.51 KG/M2 | SYSTOLIC BLOOD PRESSURE: 140 MMHG | OXYGEN SATURATION: 96 % | HEART RATE: 65 BPM | WEIGHT: 269 LBS | DIASTOLIC BLOOD PRESSURE: 76 MMHG | TEMPERATURE: 98.2 F | HEIGHT: 70 IN

## 2024-09-30 DIAGNOSIS — I48.91 ATRIAL FIBRILLATION WITH RVR (HCC): Primary | ICD-10-CM

## 2024-09-30 DIAGNOSIS — R73.9 ELEVATED BLOOD SUGAR: ICD-10-CM

## 2024-09-30 DIAGNOSIS — E78.2 COMBINED HYPERLIPIDEMIA: ICD-10-CM

## 2024-09-30 DIAGNOSIS — E03.8 SUBCLINICAL HYPOTHYROIDISM: ICD-10-CM

## 2024-09-30 DIAGNOSIS — Z12.5 SCREENING FOR PROSTATE CANCER: ICD-10-CM

## 2024-09-30 LAB
ALBUMIN SERPL BCG-MCNC: 3.8 G/DL (ref 3.5–5)
ALP SERPL-CCNC: 67 U/L (ref 34–104)
ALT SERPL W P-5'-P-CCNC: 17 U/L (ref 7–52)
ANION GAP SERPL CALCULATED.3IONS-SCNC: 6 MMOL/L (ref 4–13)
AST SERPL W P-5'-P-CCNC: 16 U/L (ref 13–39)
BILIRUB SERPL-MCNC: 0.67 MG/DL (ref 0.2–1)
BUN SERPL-MCNC: 17 MG/DL (ref 5–25)
CALCIUM SERPL-MCNC: 8.9 MG/DL (ref 8.4–10.2)
CHLORIDE SERPL-SCNC: 106 MMOL/L (ref 96–108)
CHOLEST SERPL-MCNC: 140 MG/DL
CO2 SERPL-SCNC: 30 MMOL/L (ref 21–32)
CREAT SERPL-MCNC: 0.98 MG/DL (ref 0.6–1.3)
EST. AVERAGE GLUCOSE BLD GHB EST-MCNC: 131 MG/DL
GFR SERPL CREATININE-BSD FRML MDRD: 76 ML/MIN/1.73SQ M
GLUCOSE P FAST SERPL-MCNC: 114 MG/DL (ref 65–99)
HBA1C MFR BLD: 6.2 %
HDLC SERPL-MCNC: 52 MG/DL
LDLC SERPL CALC-MCNC: 69 MG/DL (ref 0–100)
POTASSIUM SERPL-SCNC: 4.1 MMOL/L (ref 3.5–5.3)
PROT SERPL-MCNC: 6.5 G/DL (ref 6.4–8.4)
PSA SERPL-MCNC: <0.008 NG/ML (ref 0–4)
SODIUM SERPL-SCNC: 142 MMOL/L (ref 135–147)
TRIGL SERPL-MCNC: 94 MG/DL
TSH SERPL DL<=0.05 MIU/L-ACNC: 2.99 UIU/ML (ref 0.45–4.5)

## 2024-09-30 PROCEDURE — 36415 COLL VENOUS BLD VENIPUNCTURE: CPT

## 2024-09-30 PROCEDURE — 80061 LIPID PANEL: CPT

## 2024-09-30 PROCEDURE — 83036 HEMOGLOBIN GLYCOSYLATED A1C: CPT

## 2024-09-30 PROCEDURE — 99214 OFFICE O/P EST MOD 30 MIN: CPT | Performed by: FAMILY MEDICINE

## 2024-09-30 PROCEDURE — 84443 ASSAY THYROID STIM HORMONE: CPT

## 2024-09-30 PROCEDURE — G0103 PSA SCREENING: HCPCS

## 2024-09-30 PROCEDURE — 80053 COMPREHEN METABOLIC PANEL: CPT

## 2024-09-30 PROCEDURE — G2211 COMPLEX E/M VISIT ADD ON: HCPCS | Performed by: FAMILY MEDICINE

## 2024-09-30 NOTE — PROGRESS NOTES
Assessment/Plan:   1. Atrial fibrillation with RVR (HCC)  Appears clinically stable today.  He denies any recent chest pain palpitations or syncopal episodes.  At this time, we will continue with his rate control with metoprolol as well as his Xarelto.  He was advised that he should schedule an evaluation with his cardiologist ASAP for further evaluation.  Follow-up with any symptoms should worsen.           Diagnoses and all orders for this visit:    Atrial fibrillation with RVR (HCC)          Subjective:       Chief Complaint   Patient presents with    Follow-up     ED follow up- a fib and syncope       Patient ID: Avi Dempsey is a 74 y.o. male presents today for an ED follow-up.  He was seen in the ED secondary to a syncopal episode.  He was seen in the ED on September 22.  He was taken to the ED due to a syncopal episode.  At that time, he was found to be in atrial fibrillation.  He did have a extensive evaluation.  He did have cardiology see him while he was inpatient as well.  His symptoms did improve while he was in the ED.  He did have an evaluation by cardiology while he was in.  He was advised to continue with his metoprolol as well as Xarelto.    HPI    Review of Systems   Constitutional:  Negative for activity change, chills, fatigue and fever.   HENT:  Negative for congestion, ear pain, sinus pressure and sore throat.    Eyes:  Negative for redness, itching and visual disturbance.   Respiratory:  Negative for cough and shortness of breath.    Cardiovascular:  Negative for chest pain and palpitations.   Gastrointestinal:  Negative for abdominal pain, diarrhea and nausea.   Endocrine: Negative for cold intolerance and heat intolerance.   Genitourinary:  Negative for dysuria, flank pain and frequency.   Musculoskeletal:  Negative for arthralgias, back pain, gait problem and myalgias.   Skin:  Negative for color change.   Allergic/Immunologic: Negative for environmental allergies.   Neurological:   "Negative for dizziness, numbness and headaches.   Psychiatric/Behavioral:  Negative for behavioral problems and sleep disturbance.        The following portions of the patient's history were reviewed and updated as appropriate : past family history, past medical history, past social history and past surgical history.    Current Outpatient Medications:     allopurinol (ZYLOPRIM) 300 mg tablet, TAKE 1 TABLET BY MOUTH EVERY DAY, Disp: 90 tablet, Rfl: 1    atorvastatin (LIPITOR) 20 mg tablet, TAKE 1 TABLET BY MOUTH EVERY DAY, Disp: 90 tablet, Rfl: 0    colchicine (COLCRYS) 0.6 mg tablet, TAKE 1 TABLET BY MOUTH MONDAY, WEDNESDAY, AND FRIDAY FOR PREVENTION OF GOUT BREAKTHROUGH ATTACK, Disp: , Rfl:     metoprolol succinate (TOPROL-XL) 50 mg 24 hr tablet, TAKE 1 TABLET BY MOUTH EVERY DAY, Disp: 90 tablet, Rfl: 3    traMADol (Ultram) 50 mg tablet, Take 1 tablet (50 mg total) by mouth every 8 (eight) hours as needed for moderate pain, Disp: 21 tablet, Rfl: 0    valsartan (DIOVAN) 160 mg tablet, TAKE 1 TABLET BY MOUTH EVERY DAY, Disp: 30 tablet, Rfl: 0    Xarelto 20 MG tablet, TAKE 1 TABLET BY MOUTH DAILY WITH BREAKFAST, Disp: 90 tablet, Rfl: 3    methylprednisolone (MEDROL) 4 mg tablet, TAKE 1 TABLET BY MOUTH ONCE A DAY WITH FOOD WHEN MEDROL DOSE PACK IS FINISHED (Patient not taking: Reported on 9/22/2024), Disp: , Rfl:          Objective:         Vitals:    09/30/24 1507   BP: 140/76   BP Location: Left arm   Patient Position: Sitting   Cuff Size: Large   Pulse: 65   Temp: 98.2 °F (36.8 °C)   TempSrc: Temporal   SpO2: 96%   Weight: 122 kg (269 lb)   Height: 5' 10\" (1.778 m)     Physical Exam  Vitals reviewed.   Constitutional:       General: He is not in acute distress.     Appearance: Normal appearance. He is well-developed.   HENT:      Head: Normocephalic and atraumatic.      Right Ear: Tympanic membrane, ear canal and external ear normal. There is no impacted cerumen.      Left Ear: Tympanic membrane, ear canal and " external ear normal. There is no impacted cerumen.      Nose: Nose normal. No congestion or rhinorrhea.      Mouth/Throat:      Mouth: Mucous membranes are moist.      Pharynx: No oropharyngeal exudate or posterior oropharyngeal erythema.   Eyes:      General: No scleral icterus.        Right eye: No discharge.         Left eye: No discharge.      Extraocular Movements: Extraocular movements intact.      Conjunctiva/sclera: Conjunctivae normal.      Pupils: Pupils are equal, round, and reactive to light.   Neck:      Trachea: No tracheal deviation.   Cardiovascular:      Rate and Rhythm: Normal rate and regular rhythm.      Pulses: Normal pulses.           Dorsalis pedis pulses are 2+ on the right side and 2+ on the left side.        Posterior tibial pulses are 2+ on the right side and 2+ on the left side.      Heart sounds: Normal heart sounds. No murmur heard.     No friction rub. No gallop.   Pulmonary:      Effort: Pulmonary effort is normal. No respiratory distress.      Breath sounds: Normal breath sounds. No wheezing, rhonchi or rales.   Abdominal:      General: Bowel sounds are normal. There is no distension.      Palpations: Abdomen is soft.      Tenderness: There is no abdominal tenderness. There is no guarding or rebound.   Musculoskeletal:         General: Normal range of motion.      Cervical back: Normal range of motion and neck supple.      Right lower leg: No edema.      Left lower leg: No edema.   Lymphadenopathy:      Head:      Right side of head: No submental or submandibular adenopathy.      Left side of head: No submental or submandibular adenopathy.      Cervical: No cervical adenopathy.      Right cervical: No superficial, deep or posterior cervical adenopathy.     Left cervical: No superficial, deep or posterior cervical adenopathy.   Skin:     General: Skin is warm and dry.      Findings: No erythema.   Neurological:      General: No focal deficit present.      Mental Status: He is alert  and oriented to person, place, and time.      Cranial Nerves: No cranial nerve deficit.      Sensory: Sensation is intact. No sensory deficit.      Motor: Motor function is intact.   Psychiatric:         Attention and Perception: Attention and perception normal.         Mood and Affect: Mood is not anxious or depressed.         Speech: Speech normal.         Behavior: Behavior normal.         Thought Content: Thought content normal.         Judgment: Judgment normal.

## 2024-10-16 DIAGNOSIS — I10 BENIGN ESSENTIAL HYPERTENSION: ICD-10-CM

## 2024-10-17 RX ORDER — VALSARTAN 160 MG/1
160 TABLET ORAL DAILY
Qty: 90 TABLET | Refills: 1 | Status: SHIPPED | OUTPATIENT
Start: 2024-10-17

## 2025-01-02 DIAGNOSIS — E78.5 DYSLIPIDEMIA: ICD-10-CM

## 2025-01-03 RX ORDER — ATORVASTATIN CALCIUM 20 MG/1
20 TABLET, FILM COATED ORAL DAILY
Qty: 90 TABLET | Refills: 1 | Status: SHIPPED | OUTPATIENT
Start: 2025-01-03

## 2025-01-27 ENCOUNTER — APPOINTMENT (OUTPATIENT)
Age: 75
End: 2025-01-27
Payer: COMMERCIAL

## 2025-01-27 DIAGNOSIS — M10.9 GOUT, UNSPECIFIED CAUSE, UNSPECIFIED CHRONICITY, UNSPECIFIED SITE: ICD-10-CM

## 2025-01-27 LAB
ALBUMIN SERPL BCG-MCNC: 4.1 G/DL (ref 3.5–5)
ALP SERPL-CCNC: 71 U/L (ref 34–104)
ALT SERPL W P-5'-P-CCNC: 15 U/L (ref 7–52)
AST SERPL W P-5'-P-CCNC: 17 U/L (ref 13–39)
BASOPHILS # BLD AUTO: 0.08 THOUSANDS/ΜL (ref 0–0.1)
BASOPHILS NFR BLD AUTO: 1 % (ref 0–1)
BILIRUB DIRECT SERPL-MCNC: 0.19 MG/DL (ref 0–0.2)
BILIRUB SERPL-MCNC: 0.87 MG/DL (ref 0.2–1)
CREAT SERPL-MCNC: 1.08 MG/DL (ref 0.6–1.3)
CRP SERPL QL: 1.8 MG/L
EOSINOPHIL # BLD AUTO: 0.51 THOUSAND/ΜL (ref 0–0.61)
EOSINOPHIL NFR BLD AUTO: 5 % (ref 0–6)
ERYTHROCYTE [DISTWIDTH] IN BLOOD BY AUTOMATED COUNT: 13.2 % (ref 11.6–15.1)
GFR SERPL CREATININE-BSD FRML MDRD: 67 ML/MIN/1.73SQ M
HCT VFR BLD AUTO: 48.1 % (ref 36.5–49.3)
HGB BLD-MCNC: 15.9 G/DL (ref 12–17)
IMM GRANULOCYTES # BLD AUTO: 0.03 THOUSAND/UL (ref 0–0.2)
IMM GRANULOCYTES NFR BLD AUTO: 0 % (ref 0–2)
LYMPHOCYTES # BLD AUTO: 2.99 THOUSANDS/ΜL (ref 0.6–4.47)
LYMPHOCYTES NFR BLD AUTO: 32 % (ref 14–44)
MCH RBC QN AUTO: 29.9 PG (ref 26.8–34.3)
MCHC RBC AUTO-ENTMCNC: 33.1 G/DL (ref 31.4–37.4)
MCV RBC AUTO: 91 FL (ref 82–98)
MONOCYTES # BLD AUTO: 0.89 THOUSAND/ΜL (ref 0.17–1.22)
MONOCYTES NFR BLD AUTO: 9 % (ref 4–12)
NEUTROPHILS # BLD AUTO: 4.98 THOUSANDS/ΜL (ref 1.85–7.62)
NEUTS SEG NFR BLD AUTO: 53 % (ref 43–75)
NRBC BLD AUTO-RTO: 0 /100 WBCS
PLATELET # BLD AUTO: 249 THOUSANDS/UL (ref 149–390)
PMV BLD AUTO: 10.3 FL (ref 8.9–12.7)
PROT SERPL-MCNC: 7.1 G/DL (ref 6.4–8.4)
RBC # BLD AUTO: 5.31 MILLION/UL (ref 3.88–5.62)
URATE SERPL-MCNC: 5.2 MG/DL (ref 3.5–8.5)
WBC # BLD AUTO: 9.48 THOUSAND/UL (ref 4.31–10.16)

## 2025-01-27 PROCEDURE — 80076 HEPATIC FUNCTION PANEL: CPT

## 2025-01-27 PROCEDURE — 82565 ASSAY OF CREATININE: CPT

## 2025-01-27 PROCEDURE — 36415 COLL VENOUS BLD VENIPUNCTURE: CPT

## 2025-01-27 PROCEDURE — 86140 C-REACTIVE PROTEIN: CPT

## 2025-01-27 PROCEDURE — 85025 COMPLETE CBC W/AUTO DIFF WBC: CPT

## 2025-01-27 PROCEDURE — 84550 ASSAY OF BLOOD/URIC ACID: CPT

## 2025-01-28 ENCOUNTER — OFFICE VISIT (OUTPATIENT)
Dept: SLEEP CENTER | Facility: CLINIC | Age: 75
End: 2025-01-28
Payer: COMMERCIAL

## 2025-01-28 VITALS
BODY MASS INDEX: 38.22 KG/M2 | OXYGEN SATURATION: 96 % | DIASTOLIC BLOOD PRESSURE: 89 MMHG | WEIGHT: 267 LBS | HEART RATE: 67 BPM | SYSTOLIC BLOOD PRESSURE: 140 MMHG | HEIGHT: 70 IN

## 2025-01-28 DIAGNOSIS — G47.33 OSA (OBSTRUCTIVE SLEEP APNEA): Primary | ICD-10-CM

## 2025-01-28 DIAGNOSIS — E66.812 CLASS 2 OBESITY: ICD-10-CM

## 2025-01-28 PROCEDURE — 99213 OFFICE O/P EST LOW 20 MIN: CPT

## 2025-01-28 NOTE — PATIENT INSTRUCTIONS
Continue PAP Therapy  - Continue APAP at 10-14 cmH2O.  - Script for mask refitting  Remember to clean your mask and equipment regularly, as directed.  You should be eligible for new supplies approximately every 3-6 months, depending on your insurance coverage. Contact your Durable Medical Equipment (DME) company for new supplies as needed.  Follow up in 6 months    Care and Maintenance  Headgear should be washed as needed. Daily inspection and weekly washings are recommended. Do not disassemble the straps. Machine wash in warm water, making sure to attach Velcro hooks and tabs before washing. Line dry or machine dry on a low setting.  Masks should be washed every day. Daily inspection is recommended. Leave the mask and tubing attached. Gently wash the mask with a soft cloth using warm water and mild detergent, concentrating on the mask cushion flaps. DO NOT use alcohol or bleach. Rinse thoroughly and air dry.  Tubing and headgear should be washed weekly. Daily inspection is recommended. Wash in warm water and mild detergent and rinse thoroughly. Hook the tubing to the machine and blow until dry.  Humidifier should be washed daily and filled with DISTILLED water before use. Wash with warm water and mild detergent. Disinfect weekly by soaking with a solution of 1 part white vinegar and 3 parts water for 30 minutes. Rinse thoroughly and air dry.  Disposable filters should be replaced once a month. Wash reusable foam filters with warm water and mild detergent at least once a month. Rinse thoroughly and dry with paper towels.  Avoid  that contain fragrance or conditioners, as these will leave a residue.  NEVER iron any soft goods.    Insurance Requirements  Your insurance requires a face-to-face follow up visit within a 31-90 day period after starting PAP therapy.  Your insurance requires compliance with your PAP device, which is at least 4 hours per night for 70% of the time. This must be done over a 30 day  period and must occur within the initial 31-90 day period after starting CPAP.  Your insurance also requires at least yearly follow ups to continue to pay for CPAP supplies.     PAP Supply Guidelines  Below are the guidelines for reordering your supplies. You will be responsible for your deductible, co payments, and out of pocket expenses.    Item Frequency   Nasal Mask (no headgear) 1 every 3 months   Nasal Mask Cushion 1 every 2 weeks   Full Face Mask (no headgear) 1 every 3 months   Full Face Mask Cushion 1 every month   Nasal Pillows 1 every 2 weeks   Headgear 1 every 6 months   hin Strap 1 every 6 months   helio 1 every 3 months   Filters: Reusable 1 every 6 months   Filters: Disposable 1 every 2 weeks   Humidifier Chamber(disposable) 1 every 6 months

## 2025-01-28 NOTE — PROGRESS NOTES
[facility]  Sleep Medicine Follow Up/Established Patient    PATIENT NAME: Avi Dempsey  DATE OF SERVICE: January 28, 2025  DATE OF LAST VISIT: 1/19/2024    ASSESSMENT/PLAN:  Avi Dempsey is a 75 y.o. male with PMHx of MANDY on CPAP, HTN, aflutter on eliquis, prostate cancer, gout, HLD and obesity who returns to the office for follow up.    MANDY (Obstructive Sleep Apnea)  Class 2 Obesity  - The patient has a history of severe MANDY diagnosed on HSAT in 2019 (DOMINIC 50.2, O2 henry 65%, TRT <90% 15% TRT) and is currently prescribed APAP 10-12 cmH2O based on the results of a PAP titration study.  Today he notes he has been having difficulty getting supplies and has had issues with his mask sliding frequently at night.  - Therapy and compliance data reviewed: residual AHI 5.6, compliance 76.7% and mask leak appears controlled.  -It is possible his elevated residual AHI is related to lack of supplies, but will slightly adjust settings to APAP 10-14 cmH2O with a mask refitting.  - Refill of supplies will be sent to the patient's DME.  - Explained the importance of keeping the machine clean, and that they should be eligible for refills of supplies every 3-6 months depending on insurance.  We also discussed the insurance compliance requirements.  - Encouraged healthy lifestyle with adequate sleep (7-9 hours per night), healthy balanced diet and routine exercise.  Explained the importance of avoiding driving while drowsy.  - Weight loss is recommended.  - Follow-up in 6 months  -     PAP DME Resupply/Reorder  -     Mask fitting only; Future  -     PAP DME Pressure Change    ________________________________________________________________________________________________    Interval History: Avi Dempsey is a 75 y.o. male with PMHx of MANDY on CPAP, HTN, aflutter on eliquis, prostate cancer, gout, HLD and obesity who returns to the office for follow up.  He reports that he has been having issues try to get  supply refills.  He states that he called [facility], but they said they were unable to get a new script for supplies (phone note from 1/22/2025 states new script was sent).  He has been out of supplies for a least a month at this point.  He also notes that his mask has been sliding often at night and he feels it may be too small in size.  He does state he has continued to keep the device clean despite not having supplies.  He denies any other issues with the device at this time and states that he does think he has more energy since using the CPAP.  He denies pressure intolerance or aerophagia.    PAP History  Sleep Apnea Type: MANDY  Most Recent AHI: 50.2 on 6/13/2019  Treatment: APAP     DME: [facility]    Uses APAP for 6.5-7 hours per night, 5-7 nights per week.  Current PAP Settings: 10-12 cmH2O  Compliance Data: 76.7%, see below    Mask Type: hybrid  PAP Issues: mask slides at night, dry mouth at times  Uses Chin Strap: No  Uses Ramp Function: Yes   Uses Humidity: Yes     There is a perceived benefit by the patient: slight benefit, maybe more energy    Prior History: The patient first started following with sleep medicine at [facility] in 2019 after he had an HSAT which showed severe MANDY, PAP titration that recommended 10 cmH2O. He initially had a recalled Romeo device, but has since received a replacement device. In the past he has had issues with dry mouth and dry nose, but has overall tolerated CPAP well.  He returns today for yearly compliance check.    ESS: Total score: 6/24  Greater or equal to 10 is positive for excessive daytime sleepiness  Pertinent Meds: None    Sleep-Wake Schedule:  Bedtime: 5157-8289  Wake Time: 0900  Difficulty Falling Asleep: No  Avg Number of Awakenings: 0  Cause of Awakenings: NA  Weekend Sleep Schedule: unchanged  Naps: denies    Avg TST per 24 hours: 7-9 hours    Past Treatments:  CPAP 10 cmH2O  APAP 10-12 cmH2O    Prior Sleep Studies:  PAP Titration -- 10/2/2019 (Wt 257lbs)  Tested  Range - 5-10 cmH2O  Recommended Pressure - 10 cmH2O     HSAT -- 6/13/2019  DOMINIC - 50.2  O2 [place] - 65%  TST < 90% - 15% of TRT    Other Relevant Labs and Studies:  Therapy and Compliance Data -- 12/27/2024 - 1/5/2025        Past Medical History:   Diagnosis Date    Acute gouty arthritis     last assessed 6/1/13     Anxiety     last assessed 7/11/14     Atrial flutter (HCC) 1/22/2021    BPH (benign prostatic hyperplasia)     Chronic thoracic spine pain     last assessed 5/13/16     CPAP (continuous positive airway pressure) dependence     Dysfunction of both eustachian tubes     last assessed 8/16/13    Erectile dysfunction of non-organic origin     last assessed 10/8/13     Family history reviewed with no changes     medical history     Gout     Hyperlipidemia     Hypertension     Prostate cancer (HCC) 10/25/2022    Sebaceous cyst     last assessed 12/16/13     Skin lesion     last assessed 1/2/14     Sleep apnea     Subclinical hypothyroidism 03/21/2019     Past Surgical History:   Procedure Laterality Date    APPENDECTOMY      6 years old    BOWEL RESECTION  2010    Sigmoid resection for diverticulitis    CATARACT EXTRACTION      CERVICAL FUSION N/A 08/27/2019    Procedure: Anterior cervical discectomy w fusion C5-6, with allograft and neuromonitoring;  Surgeon: Gricel Hall MD;  Location: [facility];  Service: Orthopedics    COLONOSCOPY  02/2018    Dr. Mixon.  Tubular adenoma polyps removed from the mid ascending colon, hyperplastic rectal polyp removed, evidence of end to end anastomosis in sigmoid with healthy mucosa.    COLONOSCOPY  2012    Dr. Mixon.  Tubular adenoma polyp removed from right colon.    COLONOSCOPY  2004    Small rectal polyp    COLONOSCOPY  2002    Polyp ablated    COLONOSCOPY  2001    Polyps removed.  Dr. Mixon.    MASTECTOMY Bilateral     MOUTH SURGERY      tooth extraction with dental implant    MN BX PROSTATE STRTCTC SATURATION SAMPLING IMG GID N/A 10/18/2022    Procedure:  TRANSPERINEAL MRI FUSION  BIOPSY PROSTATE;  Surgeon: Otilio Bagley MD;  Location: [facility];  Service: Urology    WI LAPS SURG [id number] RPBIC RAD W/NRV SPARING ROBOT N/A 01/25/2023    Procedure: PROSTATECTOMY RADICAL AND PELVIC LYMPH NODE DISSECTION LAPAROSCOPIC W/ ROBOTICS; LYSIS OF AHDESIONS;  Surgeon: Otilio Bagley MD;  Location: [facility];  Service: Urology    SKIN BIOPSY      left cheek    WRIST SURGERY       Patient Active Problem List   Diagnosis    Class 1 obesity due to excess calories with serious comorbidity and body mass index (BMI) of 34.0 to 34.9 in adult    Combined hyperlipidemia    Benign essential hypertension    History of gout    Chronic lumbar radiculopathy    Cervical radiculitis    Subclinical hypothyroidism    Elevated blood sugar    Eczema    MANDY (obstructive sleep apnea)    S/P cervical spinal fusion    Achilles rupture, left    Hand dermatitis    Dupuytren contracture    Seborrheic keratoses, inflamed    Paresthesia of upper and lower extremities of both sides    Atrial fibrillation with RVR (HCC)    Atrial flutter (HCC)    Habitual alcohol use    Dyslipidemia    Body mass index (BMI)40.0-44.9, adult    Cervical strain    Prostate cancer (HCC)    H/O abdominal surgery    Gout attack    Acute pain of right shoulder    Palpitations    Male urinary stress incontinence     Allergies as of 01/28/2025    (No Known Allergies)     REVIEW OF SYSTEMS:  Review of Systems  10-point system review completed, all of which are negative except as mentioned above.    CURRENT MEDICATIONS:  Current Outpatient Medications   Medication Instructions    allopurinol (ZYLOPRIM) 300 mg, Oral, Daily    atorvastatin (LIPITOR) 20 mg, Oral, Daily    colchicine (COLCRYS) 0.6 mg tablet TAKE 1 TABLET BY MOUTH MONDAY, WEDNESDAY, AND FRIDAY FOR PREVENTION OF GOUT BREAKTHROUGH ATTACK    methylprednisolone (MEDROL) 4 mg tablet     metoprolol succinate (TOPROL-XL) 50 mg, Oral, Daily    traMADol (ULTRAM) 50 mg,  "Oral, Every 8 hours PRN    valsartan (DIOVAN) 160 mg, Oral, Daily    Xarelto 20 mg, Oral, Daily with breakfast     SOCIAL HISTORY:  Social History     Tobacco Use    Smoking status: Former     Current packs/day: 0.00     Average packs/day: 0.5 packs/day for 20.0 years (10.0 ttl pk-yrs)     Types: Cigarettes     Start date:      Quit date:      Years since quittin.1    Smokeless tobacco: Never   Vaping Use    Vaping status: Never Used   Substance Use Topics    Alcohol use: Yes     Comment: rare    Drug use: Never     FAMILY HISTORY:  Family History   Problem Relation Age of Onset    Hypertension Mother     Eczema Father     Heart attack Father     Diabetes Sister     Psoriasis Sister     Hypertension Sister     Breast cancer Daughter     Diabetes Family     Hypertension Family      PHYSICAL EXAMINATION:  Vital Signs:  /89 (BP Location: Right arm, Patient Position: Sitting, Cuff Size: Large)   Pulse 67   Ht 5' 10\" (1.778 m)   Wt 121 kg (267 lb)   SpO2 96%   BMI 38.31 kg/m²   Body mass index is 38.31 kg/m².  Constitutional: NAD, well appearing, obese   Mental Status: AAOx3  Skin: Warm, dry, no rashes noted   Eyes: PERRL, normal conjunctiva  ENT: Nasal congestion absent, nasal valve incompetence absent.  Chest: RRR, +S1/S2, CTA B/L, no W/R/R, no M/R/G   Extremities: No digital clubbing or pedal edema      Christianne Garcia MD  Pulmonary-Critical Care and Sleep Medicine  25    Portions of the record may have been created with voice recognition software. Occasional wrong word or \"sound a like\" substitutions may have occurred due to the inherent limitations of voice recognition software. Please read the chart carefully and recognize, using context, where substitutions have occurred.   "

## 2025-01-29 ENCOUNTER — TELEPHONE (OUTPATIENT)
Dept: SLEEP CENTER | Facility: CLINIC | Age: 75
End: 2025-01-29

## 2025-01-31 LAB
DME PARACHUTE DELIVERY DATE REQUESTED: NORMAL
DME PARACHUTE ITEM DESCRIPTION: NORMAL
DME PARACHUTE ORDER STATUS: NORMAL
DME PARACHUTE SUPPLIER NAME: NORMAL
DME PARACHUTE SUPPLIER PHONE: NORMAL

## 2025-02-03 ENCOUNTER — TELEPHONE (OUTPATIENT)
Age: 75
End: 2025-02-03

## 2025-02-03 DIAGNOSIS — C61 PROSTATE CANCER (HCC): Primary | ICD-10-CM

## 2025-02-03 NOTE — TELEPHONE ENCOUNTER
Called and spoke with the patient and made him aware he does need a follow up visit for his prostate cancer.  Patient has a scheduled office visit on 3/7/2025 with Rivka LOVELACE at the  [facility] office.    Advised patient he needs to obtain a PSA prior to his visit.  PSA order placed.  Patient to obtain a week before his scheduled visit.

## 2025-02-03 NOTE — TELEPHONE ENCOUNTER
Patient was calling to schedule a yearly follow up. It did not state in the AVS from his appt 3/1/24, if the provider wanted to see him back or not. He scheduled a follow up for now. But would like it to be reviewed with the provider if she deems the yearly follow up necessary.    Can this please be reviewed with the provider?    Patient would appreciate a call back to cancel if not needed    Also, if the provider states the appt is fine as is. The patient would like to know if he needs a PSA done prior.     Can this also be reviewed with the provider?    If a PSA is needed the patient would appreciate a call back to let him know

## 2025-02-04 NOTE — TELEPHONE ENCOUNTER
Patient called in he is using [facility] in [place] as his dme supplier  Patient would like to know if we can send RX for resupply to them

## 2025-02-05 NOTE — TELEPHONE ENCOUNTER
Scripts for PAP mask fitting, pressure change, resupply and office visit note sent to [facility] per patient request via [employer].     Patient called voicemail message left with call back number [phone] to inform of the same.

## 2025-03-05 ENCOUNTER — TELEPHONE (OUTPATIENT)
Age: 75
End: 2025-03-05

## 2025-03-05 ENCOUNTER — APPOINTMENT (OUTPATIENT)
Age: 75
End: 2025-03-05
Payer: COMMERCIAL

## 2025-03-05 DIAGNOSIS — C61 PROSTATE CANCER (HCC): ICD-10-CM

## 2025-03-05 LAB — PSA SERPL-MCNC: 0.01 NG/ML (ref 0–4)

## 2025-03-05 PROCEDURE — 84153 ASSAY OF PSA TOTAL: CPT

## 2025-03-05 PROCEDURE — 36415 COLL VENOUS BLD VENIPUNCTURE: CPT

## 2025-03-05 NOTE — TELEPHONE ENCOUNTER
Patient made aware that there is no blood work required for his upcoming appointment with Dr. Godinez.

## 2025-03-05 NOTE — TELEPHONE ENCOUNTER
Caller: Avi Dempsey    Doctor: Dr. Godinez    Reason for call: Patient called requesting that Dr. Godinez put in an order for any blood work that he needs before his appointment with Dr. Godinez on 3/20/25.     Call back#: [phone]

## 2025-03-07 ENCOUNTER — OFFICE VISIT (OUTPATIENT)
Dept: UROLOGY | Facility: CLINIC | Age: 75
End: 2025-03-07
Payer: COMMERCIAL

## 2025-03-07 VITALS
DIASTOLIC BLOOD PRESSURE: 84 MMHG | BODY MASS INDEX: 37.51 KG/M2 | SYSTOLIC BLOOD PRESSURE: 160 MMHG | WEIGHT: 262 LBS | HEIGHT: 70 IN | OXYGEN SATURATION: 97 % | HEART RATE: 59 BPM

## 2025-03-07 DIAGNOSIS — N52.31 ERECTILE DYSFUNCTION AFTER RADICAL PROSTATECTOMY: ICD-10-CM

## 2025-03-07 DIAGNOSIS — N39.3 MALE URINARY STRESS INCONTINENCE: ICD-10-CM

## 2025-03-07 DIAGNOSIS — C61 PROSTATE CANCER (HCC): Primary | ICD-10-CM

## 2025-03-07 PROCEDURE — 99213 OFFICE O/P EST LOW 20 MIN: CPT | Performed by: PHYSICIAN ASSISTANT

## 2025-03-07 NOTE — ASSESSMENT & PLAN NOTE
Stress urinary incontinence after his prostatectomy.  Completed pelvic floor physical therapy.  He was diligent with his exercises.  He is also improvement when he lost weight last year but he gained the weight back.  He is motivated to lose 20 pounds again.  I again offered him local option for male sling or artificial sphincter, he defers.  He will stay wearing the pads for now.

## 2025-03-07 NOTE — PROGRESS NOTES
Name: Avi Dempsey      : 1950      MRN: [id number]  Encounter Provider: Rivka Vizcarra PA-C  Encounter Date: 3/7/2025   Encounter department: [facility] UROLOGY [place] [facility]  :  Assessment & Plan  Prostate cancer (HCC)  pT3a Prostate Cancer  - jennifer score 4+3=7 FOCAL CRIBIFORM PATTERN  - diagnosis 2022 MRI FUSION BX PIRADS 5 LESION RIGHT SIDE  - staging imaging MRI PROSTATE  - pretreatment psa 6.5  - treatment RALP-BPLND 2023 DR BAGLEY; FOCAL +MARGIN left side and EPE    Excellent treatment response PSAs still nice and low- Will continue to monitor his PSA on a every 6-month basis through year 5 then annually.  He understands.  Lab Results   Component Value Date    PSA 0.013 2025    PSA <0.008 2024    PSA 0.009 09/10/2024       Orders:    PSA Total, Diagnostic; Future    Male urinary stress incontinence  Stress urinary incontinence after his prostatectomy.  Completed pelvic floor physical therapy.  He was diligent with his exercises.  He is also improvement when he lost weight last year but he gained the weight back.  He is motivated to lose 20 pounds again.  I again offered him local option for male sling or artificial sphincter, he defers.  He will stay wearing the pads for now.           History of Present Illness   Avi Dempsey is a 75 y.o. male who presents annual follow-up [place] 4+ equal 7 prostate cancer focal cribriform pattern with a focal positive margin.  He was diagnosed in 2022 and underwent radical prostatectomy 2023 by Dr. Bagley.  PSAs have been <0.008 current PSA 0.013.  He still working on his stress urinary continence.  He completed pelvic floor physical therapy last summer.  Has had some slow improvement ongoing.  He was initially referred to reconstructive urologist at [place] for sling or sphincter consultation, at the time he was not interested and canceled the appointment.      AUA SYMPTOM SCORE      Flowsheet Row  "Most Recent Value   AUA SYMPTOM SCORE    How often have you had a sensation of not emptying your bladder completely after you finished urinating? 1 (P)     How often have you had to urinate again less than two hours after you finished urinating? 1 (P)     How often have you found you stopped and started again several times when you urinate? 0 (P)     How often have you found it difficult to postpone urination? 1 (P)     How often have you had a weak urinary stream? 0 (P)     How often have you had to push or strain to begin urination? 0 (P)     How many times did you most typically get up to urinate from the time you went to bed at night until the time you got up in the morning? 1 (P)     Quality of Life: If you were to spend the rest of your life with your urinary condition just the way it is now, how would you feel about that? 4 (P)     AUA SYMPTOM SCORE 4 (P)            Review of Systems   Constitutional: Negative.    Respiratory: Negative.     Cardiovascular: Negative.    Genitourinary:  Negative for decreased urine volume, difficulty urinating, dysuria, flank pain, frequency, hematuria and urgency.   Musculoskeletal: Negative.           Objective   /84 (BP Location: Left arm, Patient Position: Sitting, Cuff Size: Adult)   Pulse 59   Ht 5' 10\" (1.778 m)   Wt 119 kg (262 lb)   SpO2 97%   BMI 37.59 kg/m²     Physical Exam  Vitals and nursing note reviewed.   Constitutional:       General: He is not in acute distress.     Appearance: He is well-developed. He is not diaphoretic.   HENT:      Head: Normocephalic and atraumatic.   Pulmonary:      Effort: Pulmonary effort is normal.   Musculoskeletal:      Right lower leg: No edema.      Left lower leg: No edema.   Skin:     General: Skin is warm.   Neurological:      Mental Status: He is alert and oriented to person, place, and time.           Results   Lab Results   Component Value Date    PSA 0.013 03/05/2025    PSA <0.008 09/30/2024    PSA 0.009 " 09/10/2024     Lab Results   Component Value Date    CALCIUM 8.9 09/30/2024     05/16/2016    K 4.1 09/30/2024    CO2 30 09/30/2024     09/30/2024    BUN 17 09/30/2024    CREATININE 1.08 01/27/2025     Lab Results   Component Value Date    WBC 9.48 01/27/2025    HGB 15.9 01/27/2025    HCT 48.1 01/27/2025    MCV 91 01/27/2025     01/27/2025       Office Urine Dip  No results found for this or any previous visit (from the past hour).

## 2025-03-07 NOTE — ASSESSMENT & PLAN NOTE
pT3a Prostate Cancer  - jennifer score 4+3=7 FOCAL CRIBIFORM PATTERN  - diagnosis OCTOBER 2022 MRI FUSION BX PIRADS 5 LESION RIGHT SIDE  - staging imaging MRI PROSTATE  - pretreatment psa 6.5  - treatment RALP-BPLND JANUARY 2023 DR TREVIZO; FOCAL +MARGIN left side and EPE    Excellent treatment response PSAs still nice and low- Will continue to monitor his PSA on a every 6-month basis through year 5 then annually.  He understands.  Lab Results   Component Value Date    PSA 0.013 03/05/2025    PSA <0.008 09/30/2024    PSA 0.009 09/10/2024       Orders:    PSA Total, Diagnostic; Future

## 2025-03-20 ENCOUNTER — OFFICE VISIT (OUTPATIENT)
Dept: CARDIOLOGY CLINIC | Facility: CLINIC | Age: 75
End: 2025-03-20
Payer: COMMERCIAL

## 2025-03-20 VITALS
DIASTOLIC BLOOD PRESSURE: 92 MMHG | SYSTOLIC BLOOD PRESSURE: 138 MMHG | HEIGHT: 70 IN | BODY MASS INDEX: 37.51 KG/M2 | WEIGHT: 262 LBS | HEART RATE: 80 BPM

## 2025-03-20 DIAGNOSIS — I48.92 ATRIAL FLUTTER, UNSPECIFIED TYPE (HCC): Primary | ICD-10-CM

## 2025-03-20 PROCEDURE — 93000 ELECTROCARDIOGRAM COMPLETE: CPT | Performed by: INTERNAL MEDICINE

## 2025-03-20 PROCEDURE — 99215 OFFICE O/P EST HI 40 MIN: CPT | Performed by: INTERNAL MEDICINE

## 2025-03-20 NOTE — PROGRESS NOTES
HEART AND VASCULAR  CARDIAC ELECTROPHYSIOLOGY   HEART RHYTHM CENTER  [facility]    Outpatient  Follow-up  Today's Date: 03/20/25        Patient name: Avi Dempsey  YOB: 1950  Sex: male         Chief Complaint: f/u  afib       ASSESSMENT:  Problem List Items Addressed This Visit          Cardiovascular and Mediastinum    Atrial flutter (HCC) - Primary    Relevant Orders    POCT ECG     76 yo male  1) paroxymsal afib/flutter- recurrence in Sep 2024 went to urgent care was in afib very symptomatic, SOB, nausea, near syncope. THen converted to NSR on his own. Prior to that as far as we know  NSR since January 2021- [id number]=5, on xarelto/metoprolol 2) HTN well controlled today, slightly up today. On Diovan/hctz and metop  3) MANDY on CPAP  4) Dyslipidemia, well controlled on statin  5) Atypical CP , at rest, reproduced w palpation, no sweating/nausea/SOB was when he was in afib, neg stress test 2021  6)Obesity lost weight and gained back.   7) Diastolic dysfunction , having edema .     PLAN:  Recommend afib ablation    2. Continue diet/exercise, avoid excess ETOH, maximum 7 drinks a week  3. Cont xarelto, metoprolol    Recommend Atrial fibrillation and/or flutter ablation.  Additional arrhythmias may also be targeted. Transesophageal echocardiogram maybe used to rule out thrombus. Risks and benefits discussed with patient and family. Explained risk of rare but serious complications like heart perforation, stroke, heart attack, kidney injury. Energy source for ablation my vary including radiofrequency, pulsed field (PFA), or cryoablation. PFA ablation does not have known esophageal injury risk so general safer from this life threatening complication. Risks of phrenic nerve (diaphragm) and lung injury also is less with this energy source.    Explained ablation for atrial fibrillation is treatment not a cure, about 20% of patient will opt for second ablation. We went over usually  recovery and expectations of going through procedure.     He wants to think about it will call to schedule      Follow up in: 6 months    Orders Placed This Encounter   Procedures    POCT ECG     There are no discontinued medications.      .............................................................................................    HPI/Subjective:     74 yo male  1) paroxymsal afib/flutter- recurrence in Sep 2024 went to urgent care was in afib very symptomatic, SOB, nausea, near syncope. THen converted to NSR on his own. Prior to that as far as we know  NSR since January 2021- [id number]=3, on xarelto/metoprolol 2) HTN well controlled today, slightly up today. On Diovan/hctz and metop  3) MANDY on CPAP  4) Dyslipidemia, well controlled on statin  5) Atypical CP , at rest, reproduced w palpation, no sweating/nausea/SOB was when he was in afib, neg stress test 2021  6)Obesity lost weight and gained back.   7) Diastolic dysfunction , having edema .       Past Medical History:   Diagnosis Date    Acute gouty arthritis     last assessed 6/1/13     Anxiety     last assessed 7/11/14     Atrial flutter (HCC) 1/22/2021    BPH (benign prostatic hyperplasia)     Chronic thoracic spine pain     last assessed 5/13/16     CPAP (continuous positive airway pressure) dependence     Dysfunction of both eustachian tubes     last assessed 8/16/13    Erectile dysfunction of non-organic origin     last assessed 10/8/13     Family history reviewed with no changes     medical history     Gout     Hyperlipidemia     Hypertension     Prostate cancer (HCC) 10/25/2022    Sebaceous cyst     last assessed 12/16/13     Skin lesion     last assessed 1/2/14     Sleep apnea     Subclinical hypothyroidism 03/21/2019       No Known Allergies  I reviewed the Home Medication list and Allergies in the chart.   Scheduled Meds:  Current Outpatient Medications   Medication Sig Dispense Refill    allopurinol (ZYLOPRIM) 300 mg tablet TAKE 1 TABLET BY MOUTH  EVERY DAY 90 tablet 1    atorvastatin (LIPITOR) 20 mg tablet TAKE 1 TABLET BY MOUTH EVERY DAY 90 tablet 1    colchicine (COLCRYS) 0.6 mg tablet TAKE 1 TABLET BY MOUTH MONDAY, WEDNESDAY, AND FRIDAY FOR PREVENTION OF GOUT BREAKTHROUGH ATTACK (Patient taking differently: Take 0.6 mg by mouth if needed)      methylprednisolone (MEDROL) 4 mg tablet       metoprolol succinate (TOPROL-XL) 50 mg 24 hr tablet TAKE 1 TABLET BY MOUTH EVERY DAY 90 tablet 3    traMADol (Ultram) 50 mg tablet Take 1 tablet (50 mg total) by mouth every 8 (eight) hours as needed for moderate pain 21 tablet 0    valsartan (DIOVAN) 160 mg tablet TAKE 1 TABLET BY MOUTH EVERY DAY 90 tablet 1    Xarelto 20 MG tablet TAKE 1 TABLET BY MOUTH DAILY WITH BREAKFAST 90 tablet 3     No current facility-administered medications for this visit.     PRN Meds:.        Family History   Problem Relation Age of Onset    Hypertension Mother     Eczema Father     Heart attack Father     Diabetes Sister     Psoriasis Sister     Hypertension Sister     Breast cancer Daughter     Diabetes Family     Hypertension Family        Social History     Socioeconomic History    Marital status: /Civil Union     Spouse name: Not on file    Number of children: Not on file    Years of education: Not on file    Highest education level: Not on file   Occupational History    Occupation: retired   Tobacco Use    Smoking status: Former     Current packs/day: 0.00     Average packs/day: 0.5 packs/day for 20.0 years (10.0 ttl pk-yrs)     Types: Cigarettes     Start date:      Quit date:      Years since quittin.2    Smokeless tobacco: Never   Vaping Use    Vaping status: Never Used   Substance and Sexual Activity    Alcohol use: Yes     Comment: rare    Drug use: Never    Sexual activity: Not Currently   Other Topics Concern    Not on file   Social History Narrative    Consumes 1 cup of coffee per day     Social Drivers of Health     Financial Resource Strain: Low Risk   "(9/6/2023)    Overall Financial Resource Strain (CARDIA)     Difficulty of Paying Living Expenses: Not hard at all   Food Insecurity: Not on file   Transportation Needs: No Transportation Needs (9/6/2023)    PRAPARE - Transportation     Lack of Transportation (Medical): No     Lack of Transportation (Non-Medical): No   Physical Activity: Not on file   Stress: Not on file   Social Connections: Not on file   Intimate Partner Violence: Not on file   Housing Stability: Not on file         OBJECTIVE:    /92 (BP Location: Right arm, Patient Position: Sitting, Cuff Size: Large)   Pulse 80   Ht 5' 10\" (1.778 m)   Wt 119 kg (262 lb)   BMI 37.59 kg/m²   Vitals:    03/20/25 1130   Weight: 119 kg (262 lb)     /92 (BP Location: Right arm, Patient Position: Sitting, Cuff Size: Large)   Pulse 80   Ht 5' 10\" (1.778 m)   Wt 119 kg (262 lb)   BMI 37.59 kg/m²   Vitals:    03/20/25 1130   Weight: 119 kg (262 lb)     /92 (BP Location: Right arm, Patient Position: Sitting, Cuff Size: Large)   Pulse 80   Ht 5' 10\" (1.778 m)   Wt 119 kg (262 lb)   BMI 37.59 kg/m²   Vitals:    03/20/25 1130   Weight: 119 kg (262 lb)     GEN: No acute distress, Alert and oriented, well appearing  HEENT:External ears normal, wearing a mask.   EYES: Pupils equal, sclera anicteric, midline, normal conjuctiva  NECK: No JVD, supple, no obvious masses or thryomegaly or goiter  CARDIOVASCULAR:  RRR, No murmur, rub, gallops S1,S2  LUNGS: Clear To auscultation bilaterally, normal effort, no rales, rhonchi, crackles   ABDOMEN:  nondistended,  without obvious organomegaly or ascites  EXTREMITIES/VASCULAR:  1+ elisha edema. warm an well perfused.  PSYCH: Normal Affect,  linear speech pattern without evidence of psychosis.   NEURO: Grossly intact, moving all extremiteis equal, face symmetric, alert and responsive, no obvious focal defecits   GAIT:  Ambulates normally without difficulty  HEME: No bleeding, bruising, petechia, purpura SKIN: No " "significant rashes on visibile skin, warm, no diaphoresis or pallor.         Lab Results:       LABS:      Chemistry        Component Value Date/Time     2016 0804    K 4.1 2024 0729    K 4.6 2020 1204     2024 0729     2020 1204    CO2 30 2024 0729    CO2 27 2020 1204    BUN 17 2024 0729    BUN 19 2020 1204    CREATININE 1.08 2025 1031    CREATININE 1.04 2020 1204        Component Value Date/Time    CALCIUM 8.9 2024 0729    CALCIUM 9.0 2020 1204    ALKPHOS 71 2025 1031    ALKPHOS 84 2020 1204    AST 17 2025 1031    AST 18 2020 1204    ALT 15 2025 1031    ALT 24 2020 1204    BILITOT 0.6 2016 0804            Lab Results   Component Value Date    CHOL 248 (H) 2016     Lab Results   Component Value Date    HDL 52 2024    HDL 58 2022    HDL 53 2022     Lab Results   Component Value Date    LDLCALC 69 2024    LDLCALC 83 2022    LDLCALC 151 (H) 2022     Lab Results   Component Value Date    TRIG 94 2024    TRIG 87 2022    TRIG 86 2022     No results found for: \"CHOLHDL\"    IMAGING: No results found.     Cardiac testing:   Results for orders placed during the hospital encounter of 21   Echo complete with contrast if indicated    Narrative [facility]  [address] [place] [address] [phone]    Transthoracic Echocardiogram  2D, M-mode, Doppler, and Color Doppler    Study date:  2021    Patient: FRIDA FRANZ  MR number: [id number]  Account number: [id number]  : 1950  Age: 71 years  Gender: Male  Status: Inpatient  Location: Bedside  Height: 70 in  Weight: 269.5 lb  BP: 110/ 67 mmHg    Indications: Atrial flutter.    Diagnoses: I48.1 - Atrial flutter    Sonographer:  MAYUR David  Interpreting Physician:  Javier Ochoa D.O.  Referring Physician:  Slava" Do Patricia  Group:  [facility]  Cardiology Fellow:  Joyce Arriaga MD    SUMMARY    LEFT VENTRICLE:  Systolic function was normal. Ejection fraction was estimated to be 60 %.  There were no regional wall motion abnormalities.    LEFT ATRIUM:  The atrium was mildly dilated.    TRICUSPID VALVE:  There was trace regurgitation.    SUMMARY MEASUREMENTS  2D measurements:  Unspecified Anatomy:   %FS was 37.8 %.  Ao Diam was 3.9 cm.  EDV(Teich) was 150.4 ml.  EF(Teich) was 67.3 %.  ESV(Teich) was 49.2 ml.  IVSd was 1.1 cm.  LA Diam was 4.4 cm.  LAAs A2C was 20 cm2.  LAAs A4C was 21.4 cm2.  LAESV A-L A2C was  60.7 ml.  LAESV A-L A4C was 71.3 ml.  LAESV Index (A-L) was 28.1 ml/m2.  LAESV MOD A2C was 57.6 ml.  LAESV MOD A4C was 66.3 ml.  LAESV(A-L) was 66.5 ml.  LAESV(MOD BP) was 62.3 ml.  LAESVInd MOD BP was 26.3 ml/m2.  LALs A2C was 5.6 cm.   LALs A4C was 5.5 cm.  LVEDV MOD A2C was 55.1 ml.  LVEDV MOD A4C was 122.8 ml.  LVEF MOD A4C was 54.5 %.  LVESV MOD A4C was 55.8 ml.  LVIDd was 5.5 cm.  LVIDs was 3.5 cm.  LVLd A2C was 5.9 cm.  LVLd A4C was 9.1 cm.  LVLs A4C was 7.7 cm.   LVPWd was 1.1 cm.  RVIDd was 3.6 cm.  SV MOD A4C was 67 ml.  SV(Teich) was 101.2 ml.  CW measurements:  Unspecified Anatomy:   AV Env.Ti was 323.5 ms.  AV VTI was 18.6 cm.  AV Vmax was 0.9 m/s.  AV Vmean was 0.6 m/s.  AV maxPG was 3.1 mmHg.  AV meanPG was 1.5 mmHg.  MM measurements:  Unspecified Anatomy:   TAPSE was 2.3 cm.  PW measurements:  Unspecified Anatomy:   DVI was 1.1 .  E' Avg was 0.1 m/s.  E' Lat was 0.1 m/s.  E' Sept was 0.1 m/s.  E/E' Avg was 6.2 .  E/E' Lat was 5.9 .  E/E' Sept was 6.6 .  LVOT Env.Ti was 364.4 ms.  LVOT VTI was 20.7 cm.  LVOT Vmax was 0.9 m/s.   LVOT Vmean was 0.6 m/s.  LVOT maxPG was 3.4 mmHg.  LVOT meanPG was 1.5 mmHg.  MV A Pj was 0.5 m/s.  MV Dec [place] was 4.4 m/s2.  MV DecT was 158.7 ms.  MV E Pj was 0.7 m/s.  MV E/A Ratio was 1.3 .  RV S' was 0.2 m/s.    HISTORY: PRIOR HISTORY: Risk factors:  hypertension and morbid obesity.    PROCEDURE: The procedure was performed at the bedside. This was a routine study. The transthoracic approach was used. The study included complete 2D imaging, M-mode, complete spectral Doppler, and color Doppler. The heart rate was 67 bpm,  at the start of the study. Intravenous contrast ( .4ml of Definity) was administered. This was a technically difficult study.    LEFT VENTRICLE: Size was normal. Systolic function was normal. Ejection fraction was estimated to be 60 %. There were no regional wall motion abnormalities. Wall thickness was normal. DOPPLER: Left ventricular diastolic function parameters  were normal.    RIGHT VENTRICLE: The size was normal. Systolic function was normal. Wall thickness was normal.    LEFT ATRIUM: The atrium was mildly dilated.    RIGHT ATRIUM: Size was normal.    MITRAL VALVE: Valve structure was normal. There was normal leaflet separation. DOPPLER: The transmitral velocity was within the normal range. There was no evidence for stenosis. There was no significant regurgitation.    AORTIC VALVE: The valve was trileaflet. Leaflets exhibited normal thickness and normal cuspal separation. DOPPLER: Transaortic velocity was within the normal range. There was no evidence for stenosis. There was no significant  regurgitation.    TRICUSPID VALVE: The valve structure was normal. There was normal leaflet separation. DOPPLER: The transtricuspid velocity was within the normal range. There was no evidence for stenosis. There was trace regurgitation.    PULMONIC VALVE: Leaflets exhibited normal thickness, no calcification, and normal cuspal separation. DOPPLER: The transpulmonic velocity was within the normal range. There was no significant regurgitation.    PERICARDIUM: There was no pericardial effusion. A pericardial fat pad was present. The pericardium was normal in appearance.    AORTA: The root exhibited normal size.    SYSTEMIC VEINS: IVC: The inferior vena  cava was normal in size and course. The inferior vena cava was normal in size. Respirophasic changes were normal.    MEASUREMENT TABLES    2D MEASUREMENTS  Left atrium   (Reference normals)  AP dim   44 mm   (--)  AP dim index   1.86 cm/mï¾²   (<2.2)  Area, es, A4C   21.4 cmï¾²   (8.8-23.4)    SYSTEM MEASUREMENT TABLES    2D  %FS: 37.8 %  Ao Diam: 3.9 cm  EDV(Teich): 150.4 ml  EF(Teich): 67.3 %  ESV(Teich): 49.2 ml  IVSd: 1.1 cm  LA Diam: 4.4 cm  LAAs A2C: 20 cm2  LAAs A4C: 21.4 cm2  LAESV A-L A2C: 60.7 ml  LAESV A-L A4C: 71.3 ml  LAESV Index (A-L): 28.1 ml/m2  LAESV MOD A2C: 57.6 ml  LAESV MOD A4C: 66.3 ml  LAESV(A-L): 66.5 ml  LAESV(MOD BP): 62.3 ml  LAESVInd MOD BP: 26.3 ml/m2  LALs A2C: 5.6 cm  LALs A4C: 5.5 cm  LVEDV MOD A2C: 55.1 ml  LVEDV MOD A4C: 122.8 ml  LVEF MOD A4C: 54.5 %  LVESV MOD A4C: 55.8 ml  LVIDd: 5.5 cm  LVIDs: 3.5 cm  LVLd A2C: 5.9 cm  LVLd A4C: 9.1 cm  LVLs A4C: 7.7 cm  LVPWd: 1.1 cm  RVIDd: 3.6 cm  SV MOD A4C: 67 ml  SV(Teich): 101.2 ml    CW  AV Env.Ti: 323.5 ms  AV VTI: 18.6 cm  AV Vmax: 0.9 m/s  AV Vmean: 0.6 m/s  AV maxPG: 3.1 mmHg  AV meanP.5 mmHg    MM  TAPSE: 2.3 cm    PW  DVI: 1.1  E' Av.1 m/s  E' Lat: 0.1 m/s  E' Sept: 0.1 m/s  E/E' Av.2  E/E' Lat: 5.9  E/E' Sept: 6.6  LVOT Env.Ti: 364.4 ms  LVOT VTI: 20.7 cm  LVOT Vmax: 0.9 m/s  LVOT Vmean: 0.6 m/s  LVOT maxPG: 3.4 mmHg  LVOT meanP.5 mmHg  MV A Pj: 0.5 m/s  MV Dec [place]: 4.4 m/s2  MV DecT: 158.7 ms  MV E Pj: 0.7 m/s  MV E/A Ratio: 1.3  RV S': 0.2 m/s    [facility]    Prepared and electronically signed by    Javier Ochoa D.O.  Signed 2021 15:01:20       No results found for this or any previous visit.  No results found for this or any previous visit.  No results found for this or any previous visit.        I reviewed and interpreted the following LABS/EKG/TELE/IMAGING and below is summary of my interpretation (if data available):    LABS: BMP,  CBC    Current EKG and Rhythm Strip: NSR 70bpm        Reviewe zio strips and intpreted: Agree w reading below:     Patient had a min HR of 41 bpm, max HR of 190 bpm, and avg HR of 62  bpm. Predominant underlying rhythm was Sinus Rhythm. 26  Supraventricular Tachycardia runs occurred, the run with the fastest  interval lasting 4 beats with a max rate of 190 bpm, the longest lasting  27.0 secs with an avg rate of 132 bpm. Isolated SVEs were rare (<1.0%),  SVE Couplets were rare (<1.0%), and SVE Triplets were rare (<1.0%).  Isolated VEs were rare (<1.0%, 992), VE Triplets were rare (<1.0%, 1), and  no VE Couplets were present.

## 2025-04-02 DIAGNOSIS — I48.92 ATRIAL FLUTTER, UNSPECIFIED TYPE (HCC): ICD-10-CM

## 2025-04-03 RX ORDER — RIVAROXABAN 20 MG/1
20 TABLET, FILM COATED ORAL
Qty: 90 TABLET | Refills: 1 | Status: SHIPPED | OUTPATIENT
Start: 2025-04-03

## 2025-04-04 ENCOUNTER — TELEPHONE (OUTPATIENT)
Dept: CARDIOLOGY CLINIC | Facility: CLINIC | Age: 75
End: 2025-04-04

## 2025-04-04 ENCOUNTER — PREP FOR PROCEDURE (OUTPATIENT)
Dept: CARDIOLOGY CLINIC | Facility: CLINIC | Age: 75
End: 2025-04-04

## 2025-04-04 DIAGNOSIS — I48.92 ATRIAL FLUTTER, UNSPECIFIED TYPE (HCC): Primary | ICD-10-CM

## 2025-04-04 DIAGNOSIS — I48.91 ATRIAL FIBRILLATION, UNSPECIFIED TYPE (HCC): ICD-10-CM

## 2025-04-04 NOTE — TELEPHONE ENCOUNTER
"Alyssa,     Please mail instruction letter and labs (CMP/CBC) to patient's home address on file.     Thanks,  Enedina \"Lisa\" Myles    "

## 2025-04-04 NOTE — LETTER
2025               CARDIAC ABLATION INSTRUCTIONS     Avi Dempsey   : 1950  MRN: [id number]  [facility] [place] [id number]    Procedure: PATRICIA/AFIB PFA ABLATION     Procedure Date: 25    Location: [facility]  Address: [place] [place] [address]        Labs to be done on 25: CMP /  CBC (FASTING 8 HOURS)    BLOOD THINNER INSTRUCTIONS (Coumadin / Warfarin / Pradaxa / Eliquis / Xarelto):     Xarelto - Keep taking and do not stop.      Medication Hold:     Valsartan - Do not take day before and morning of procedure.     Arrival Time: The Hospital will contact you the day prior to your procedure, usually between 4PM - 6PM to instruct you on the time and place to report. If you do not hear from a [facility]  by 5PM the evening prior to your procedure, please contact the [place] [facility] at [phone].    DO NOT eat or drink ANYTHING after midnight the night prior to your procedure including gum & candy.     You may have a SIP of WATER with your morning medications, UNLESS ADVISE OTHERWISE.     Please notify us if you have been prescribed a NEW MEDICATION prior to your procedure or admitted to the hospital within 30 days.      If you develop a cold, sore throat, fever or any other illness prior to your procedure date, notify your surgeon immediately.    Arrange for a responsible adult to drive you to and from the hospital.    DO NOT stop taking Plavix or Aspirin unless advised otherwise.     If you are currently taking Fish Oil, Krill oil and/or Vitamin E please DO NOT TAKE FOR A WEEK PRIOR TO PROCEDURE.     If you are diabetic, DO NOT take any of your diabetic pills the morning of your procedure. Oral diabetic medications may include Glucophage, Prandin, Glyburide, Micronase, Avandia, Glucovance, Precose, Glynase, and Starlix.     Bring a list of daily medications, vitamins, minerals, herbals and nutritional supplements you take. Please include dosages  "and the times you take them each day.     If you are packing an overnight bag, pack minimal clothing, you will be given hospital sleepwear.   DO NOT bring money, valuables or jewelry. The wedding band is ok.     If you use CPAP machine, bring it to the hospital.      Bring your Photo ID and Insurance cards with you.       FAILURE TO FOLLOW ANY OF THESE INSTRUCTIONS COULD RESULT IN THE CANCELLATION OF YOUR PROCEDURE      Please call [phone] if you do not hear from the  by 5:00PM the night before your procedure.    All patients enter through ENTRANCE B.  Parking is available free of charge or park on Parking Deck B.        Thank you,   Enedina \"Lisa\" Myles    [facility]   [address] [place] [id number]  Teams: [phone]             "

## 2025-04-04 NOTE — TELEPHONE ENCOUNTER
Caller: Avi Dempsey     Doctor: Salvador Godinez MD     Reason for call: Pt stated he is ready to move forward with Recommended afib ablation.   Please Assist    Call back#: [phone]

## 2025-04-04 NOTE — TELEPHONE ENCOUNTER
": Patient wants to make you aware that he has urine incontinence and wears a pad due to his prostate surgery had two yrs ago.    Patient scheduled for  PATRICIA/AFIB PFA/AFLUTTER Ablation on 8/29/25 at [facility] with Dr. Godinez.      Instructions sent to patient through [employer] and mail.      Patient aware of all general instructions.    Medication holds:   Valsartan - Do not take day before and morning of procedure.       Blood Thinners:  Xarelto - Keep taking and do not stop.       Labs to be done on 8/14/25:  CMP / CBC (FASTING 8 HOURS)    PATRICIA ordered/completed.      Thank you,  Enedina \"Lisa\" Myles    "

## 2025-04-27 DIAGNOSIS — I48.92 UNSPECIFIED ATRIAL FLUTTER (HCC): ICD-10-CM

## 2025-04-27 DIAGNOSIS — I10 BENIGN ESSENTIAL HYPERTENSION: ICD-10-CM

## 2025-04-28 RX ORDER — VALSARTAN 160 MG/1
160 TABLET ORAL DAILY
Qty: 90 TABLET | Refills: 1 | Status: SHIPPED | OUTPATIENT
Start: 2025-04-28

## 2025-04-28 RX ORDER — METOPROLOL SUCCINATE 50 MG/1
50 TABLET, EXTENDED RELEASE ORAL DAILY
Qty: 90 TABLET | Refills: 1 | Status: SHIPPED | OUTPATIENT
Start: 2025-04-28

## 2025-07-02 DIAGNOSIS — E78.5 DYSLIPIDEMIA: ICD-10-CM

## 2025-07-02 RX ORDER — ATORVASTATIN CALCIUM 20 MG/1
20 TABLET, FILM COATED ORAL DAILY
Qty: 90 TABLET | Refills: 1 | Status: SHIPPED | OUTPATIENT
Start: 2025-07-02

## 2025-07-10 ENCOUNTER — TELEPHONE (OUTPATIENT)
Age: 75
End: 2025-07-10

## 2025-07-29 DIAGNOSIS — I48.92 ATRIAL FLUTTER, UNSPECIFIED TYPE (HCC): ICD-10-CM

## 2025-07-30 RX ORDER — RIVAROXABAN 20 MG/1
20 TABLET, FILM COATED ORAL
Qty: 30 TABLET | Refills: 0 | Status: SHIPPED | OUTPATIENT
Start: 2025-07-30

## 2025-08-01 ENCOUNTER — APPOINTMENT (OUTPATIENT)
Age: 75
End: 2025-08-01
Payer: COMMERCIAL

## 2025-08-01 DIAGNOSIS — Z87.39 HISTORY OF GOUT: ICD-10-CM

## 2025-08-01 DIAGNOSIS — R73.9 ELEVATED BLOOD SUGAR: ICD-10-CM

## 2025-08-01 DIAGNOSIS — I10 BENIGN ESSENTIAL HYPERTENSION: ICD-10-CM

## 2025-08-01 DIAGNOSIS — E03.8 SUBCLINICAL HYPOTHYROIDISM: ICD-10-CM

## 2025-08-01 DIAGNOSIS — E78.2 COMBINED HYPERLIPIDEMIA: ICD-10-CM

## 2025-08-01 LAB
ALBUMIN SERPL BCG-MCNC: 3.9 G/DL (ref 3.5–5)
ALP SERPL-CCNC: 75 U/L (ref 34–104)
ALT SERPL W P-5'-P-CCNC: 20 U/L (ref 7–52)
ANION GAP SERPL CALCULATED.3IONS-SCNC: 9 MMOL/L (ref 4–13)
AST SERPL W P-5'-P-CCNC: 20 U/L (ref 13–39)
BASOPHILS # BLD AUTO: 0.07 THOUSANDS/ÂΜL (ref 0–0.1)
BASOPHILS NFR BLD AUTO: 1 % (ref 0–1)
BILIRUB SERPL-MCNC: 0.93 MG/DL (ref 0.2–1)
BUN SERPL-MCNC: 18 MG/DL (ref 5–25)
CALCIUM SERPL-MCNC: 9.1 MG/DL (ref 8.4–10.2)
CHLORIDE SERPL-SCNC: 104 MMOL/L (ref 96–108)
CHOLEST SERPL-MCNC: 160 MG/DL (ref ?–200)
CO2 SERPL-SCNC: 27 MMOL/L (ref 21–32)
CREAT SERPL-MCNC: 1.1 MG/DL (ref 0.6–1.3)
EOSINOPHIL # BLD AUTO: 0.41 THOUSAND/ÂΜL (ref 0–0.61)
EOSINOPHIL NFR BLD AUTO: 4 % (ref 0–6)
ERYTHROCYTE [DISTWIDTH] IN BLOOD BY AUTOMATED COUNT: 13.5 % (ref 11.6–15.1)
EST. AVERAGE GLUCOSE BLD GHB EST-MCNC: 134 MG/DL
GFR SERPL CREATININE-BSD FRML MDRD: 65 ML/MIN/1.73SQ M
GLUCOSE P FAST SERPL-MCNC: 105 MG/DL (ref 65–99)
HBA1C MFR BLD: 6.3 %
HCT VFR BLD AUTO: 42.9 % (ref 36.5–49.3)
HDLC SERPL-MCNC: 62 MG/DL
HGB BLD-MCNC: 14.5 G/DL (ref 12–17)
IMM GRANULOCYTES # BLD AUTO: 0.01 THOUSAND/UL (ref 0–0.2)
IMM GRANULOCYTES NFR BLD AUTO: 0 % (ref 0–2)
LDLC SERPL CALC-MCNC: 79 MG/DL (ref 0–100)
LYMPHOCYTES # BLD AUTO: 3.6 THOUSANDS/ÂΜL (ref 0.6–4.47)
LYMPHOCYTES NFR BLD AUTO: 38 % (ref 14–44)
MCH RBC QN AUTO: 30 PG (ref 26.8–34.3)
MCHC RBC AUTO-ENTMCNC: 33.8 G/DL (ref 31.4–37.4)
MCV RBC AUTO: 89 FL (ref 82–98)
MONOCYTES # BLD AUTO: 0.98 THOUSAND/ÂΜL (ref 0.17–1.22)
MONOCYTES NFR BLD AUTO: 10 % (ref 4–12)
NEUTROPHILS # BLD AUTO: 4.44 THOUSANDS/ÂΜL (ref 1.85–7.62)
NEUTS SEG NFR BLD AUTO: 47 % (ref 43–75)
NRBC BLD AUTO-RTO: 0 /100 WBCS
PLATELET # BLD AUTO: 222 THOUSANDS/UL (ref 149–390)
PMV BLD AUTO: 10.2 FL (ref 8.9–12.7)
POTASSIUM SERPL-SCNC: 3.8 MMOL/L (ref 3.5–5.3)
PROT SERPL-MCNC: 6.7 G/DL (ref 6.4–8.4)
RBC # BLD AUTO: 4.84 MILLION/UL (ref 3.88–5.62)
SODIUM SERPL-SCNC: 140 MMOL/L (ref 135–147)
T4 FREE SERPL-MCNC: 0.91 NG/DL (ref 0.61–1.12)
TRIGL SERPL-MCNC: 93 MG/DL (ref ?–150)
TSH SERPL DL<=0.05 MIU/L-ACNC: 6.93 UIU/ML (ref 0.45–4.5)
URATE SERPL-MCNC: 4.9 MG/DL (ref 3.5–8.5)
WBC # BLD AUTO: 9.51 THOUSAND/UL (ref 4.31–10.16)

## 2025-08-01 PROCEDURE — 84550 ASSAY OF BLOOD/URIC ACID: CPT

## 2025-08-01 PROCEDURE — 84443 ASSAY THYROID STIM HORMONE: CPT

## 2025-08-01 PROCEDURE — 84439 ASSAY OF FREE THYROXINE: CPT

## 2025-08-01 PROCEDURE — 80053 COMPREHEN METABOLIC PANEL: CPT

## 2025-08-01 PROCEDURE — 83036 HEMOGLOBIN GLYCOSYLATED A1C: CPT

## 2025-08-01 PROCEDURE — 80061 LIPID PANEL: CPT

## 2025-08-01 PROCEDURE — 36415 COLL VENOUS BLD VENIPUNCTURE: CPT

## 2025-08-01 PROCEDURE — 85025 COMPLETE CBC W/AUTO DIFF WBC: CPT

## 2025-08-11 ENCOUNTER — OFFICE VISIT (OUTPATIENT)
Dept: FAMILY MEDICINE CLINIC | Facility: CLINIC | Age: 75
End: 2025-08-11
Payer: COMMERCIAL

## 2025-08-11 ENCOUNTER — APPOINTMENT (OUTPATIENT)
Dept: RADIOLOGY | Facility: CLINIC | Age: 75
End: 2025-08-11
Payer: COMMERCIAL

## 2025-08-15 ENCOUNTER — APPOINTMENT (OUTPATIENT)
Age: 75
End: 2025-08-15
Payer: COMMERCIAL

## 2025-08-15 LAB
ALBUMIN SERPL BCG-MCNC: 3.9 G/DL (ref 3.5–5)
ALP SERPL-CCNC: 73 U/L (ref 34–104)
ALT SERPL W P-5'-P-CCNC: 20 U/L (ref 7–52)
ANION GAP SERPL CALCULATED.3IONS-SCNC: 7 MMOL/L (ref 4–13)
AST SERPL W P-5'-P-CCNC: 23 U/L (ref 13–39)
BASOPHILS # BLD AUTO: 0.04 THOUSANDS/ÂΜL (ref 0–0.1)
BASOPHILS NFR BLD AUTO: 1 % (ref 0–1)
BILIRUB SERPL-MCNC: 0.63 MG/DL (ref 0.2–1)
BUN SERPL-MCNC: 20 MG/DL (ref 5–25)
CALCIUM SERPL-MCNC: 8.8 MG/DL (ref 8.4–10.2)
CHLORIDE SERPL-SCNC: 104 MMOL/L (ref 96–108)
CO2 SERPL-SCNC: 28 MMOL/L (ref 21–32)
CREAT SERPL-MCNC: 1.04 MG/DL (ref 0.6–1.3)
EOSINOPHIL # BLD AUTO: 0.32 THOUSAND/ÂΜL (ref 0–0.61)
EOSINOPHIL NFR BLD AUTO: 5 % (ref 0–6)
ERYTHROCYTE [DISTWIDTH] IN BLOOD BY AUTOMATED COUNT: 13.7 % (ref 11.6–15.1)
GFR SERPL CREATININE-BSD FRML MDRD: 70 ML/MIN/1.73SQ M
GLUCOSE P FAST SERPL-MCNC: 131 MG/DL (ref 65–99)
HCT VFR BLD AUTO: 43.8 % (ref 36.5–49.3)
HGB BLD-MCNC: 14.6 G/DL (ref 12–17)
IMM GRANULOCYTES # BLD AUTO: 0.01 THOUSAND/UL (ref 0–0.2)
IMM GRANULOCYTES NFR BLD AUTO: 0 % (ref 0–2)
LYMPHOCYTES # BLD AUTO: 2.45 THOUSANDS/ÂΜL (ref 0.6–4.47)
LYMPHOCYTES NFR BLD AUTO: 35 % (ref 14–44)
MCH RBC QN AUTO: 29.9 PG (ref 26.8–34.3)
MCHC RBC AUTO-ENTMCNC: 33.3 G/DL (ref 31.4–37.4)
MCV RBC AUTO: 90 FL (ref 82–98)
MONOCYTES # BLD AUTO: 0.85 THOUSAND/ÂΜL (ref 0.17–1.22)
MONOCYTES NFR BLD AUTO: 12 % (ref 4–12)
NEUTROPHILS # BLD AUTO: 3.25 THOUSANDS/ÂΜL (ref 1.85–7.62)
NEUTS SEG NFR BLD AUTO: 47 % (ref 43–75)
NRBC BLD AUTO-RTO: 0 /100 WBCS
PLATELET # BLD AUTO: 224 THOUSANDS/UL (ref 149–390)
PMV BLD AUTO: 10 FL (ref 8.9–12.7)
POTASSIUM SERPL-SCNC: 4 MMOL/L (ref 3.5–5.3)
PROT SERPL-MCNC: 6.9 G/DL (ref 6.4–8.4)
RBC # BLD AUTO: 4.89 MILLION/UL (ref 3.88–5.62)
SODIUM SERPL-SCNC: 139 MMOL/L (ref 135–147)
WBC # BLD AUTO: 6.92 THOUSAND/UL (ref 4.31–10.16)

## (undated) DEVICE — MONITORING SPINAL IMPULSE CASE FEE

## (undated) DEVICE — VESSEL SEALER EXTEND: Brand: ENDOWRIST

## (undated) DEVICE — ADHESIVE SKN CLSR HISTOACRYL FLEX 0.5ML LF

## (undated) DEVICE — DRESSING MEPILEX AG BORDER 4 X 4 IN

## (undated) DEVICE — NEEDLE SPINAL18G X 3.5 IN QUINCKE

## (undated) DEVICE — DRAPE SHEET THREE QUARTER

## (undated) DEVICE — SYRINGE 10ML LL

## (undated) DEVICE — INTENDED FOR TISSUE SEPARATION, AND OTHER PROCEDURES THAT REQUIRE A SHARP SURGICAL BLADE TO PUNCTURE OR CUT.: Brand: BARD-PARKER ® CARBON RIB-BACK BLADES

## (undated) DEVICE — SUT PDS II 0 CT-1 27 IN Z340H

## (undated) DEVICE — SUT VICRYL 2-0 CT-2 27 IN J269H

## (undated) DEVICE — 3M™ DURAPORE™ SURGICAL TAPE 1538-3, 3 INCH X 10 YARD (7,5CM X 9,1M), 4 ROLLS/BOX: Brand: 3M™ DURAPORE™

## (undated) DEVICE — ELECTRODE BLADE MOD E-Z CLEAN 2.5IN 6.4CM -0012M

## (undated) DEVICE — DRAPE C-ARMOUR

## (undated) DEVICE — SUPPLY FEE STD

## (undated) DEVICE — SUT VICRYL 0 CT-1 36 IN J946H

## (undated) DEVICE — AIRSEAL TUBE SMOKE EVAC LUMENX3 FILTERED

## (undated) DEVICE — HEM-O-LOK CLIP CARTRIDGE LARGE DA VINCI SI/XI

## (undated) DEVICE — PENCIL ELECTROSURG E-Z CLEAN -0035H

## (undated) DEVICE — GLOVE SRG BIOGEL 8.5

## (undated) DEVICE — CHLORAPREP HI-LITE 26ML ORANGE

## (undated) DEVICE — TISSUE RETRIEVAL SYSTEM: Brand: INZII RETRIEVAL SYSTEM

## (undated) DEVICE — ENDOPATH PNEUMONEEDLE INSUFFLATION NEEDLES WITH LUER LOCK CONNECTORS 120MM: Brand: ENDOPATH

## (undated) DEVICE — GLOVE SRG BIOGEL ECLIPSE 7

## (undated) DEVICE — PROGRASP FORCEPS: Brand: ENDOWRIST

## (undated) DEVICE — DRAPE C-ARM X-RAY

## (undated) DEVICE — ADHESIVE SKIN HIGH VISCOSITY EXOFIN 1ML

## (undated) DEVICE — COLUMN DRAPE

## (undated) DEVICE — MARYLAND BIPOLAR FORCEPS: Brand: ENDOWRIST

## (undated) DEVICE — MONOPOLAR CURVED SCISSORS: Brand: ENDOWRIST

## (undated) DEVICE — DISPOSABLE EQUIPMENT COVER: Brand: SMALL TOWEL DRAPE

## (undated) DEVICE — SPONGE PVP SCRUB WING STERILE

## (undated) DEVICE — Device: Brand: OMNICLOSE TROCAR SITE CLOSURE DEVICE

## (undated) DEVICE — GLOVE INDICATOR PI UNDERGLOVE SZ 7 BLUE

## (undated) DEVICE — NEEDLE 22 G X 1 1/2 SAFETY

## (undated) DEVICE — SWABSTCK, BENZOIN TINCTURE, 1/PK, STRL: Brand: APLICARE

## (undated) DEVICE — PROXIMATE PLUS MD MULTI-DIRECTIONAL RELEASE SKIN STAPLERS CONTAINS 35 STAINLESS STEEL STAPLES APPROXIMATE CLOSED DIMENSIONS: 6.9MM X 3.9MM WIDE: Brand: PROXIMATE

## (undated) DEVICE — INTENDED FOR TISSUE SEPARATION, AND OTHER PROCEDURES THAT REQUIRE A SHARP SURGICAL BLADE TO PUNCTURE OR CUT.: Brand: BARD-PARKER SAFETY BLADES SIZE 11, STERILE

## (undated) DEVICE — INTENDED FOR TISSUE SEPARATION, AND OTHER PROCEDURES THAT REQUIRE A SHARP SURGICAL BLADE TO PUNCTURE OR CUT.: Brand: BARD-PARKER SAFETY BLADES SIZE 15, STERILE

## (undated) DEVICE — URIMETER 2500ML

## (undated) DEVICE — TROCAR: Brand: KII FIOS FIRST ENTRY

## (undated) DEVICE — SUT VLOC 90 3-0 90 CV-23 L9 IN VLOCM1944

## (undated) DEVICE — GLOVE INDICATOR PI UNDERGLOVE SZ 8.5 BLUE

## (undated) DEVICE — ROSEBUD DISSECTORS: Brand: DEROYAL

## (undated) DEVICE — TOOL 14MH30 LEGEND 14CM 3MM: Brand: MIDAS REX ™

## (undated) DEVICE — TIP COVER ACCESSORY

## (undated) DEVICE — SUT VLOC 90 3-0 CV-23 9 IN VLOCM0844

## (undated) DEVICE — DRAPE SHEET X-LG

## (undated) DEVICE — ASTOUND STANDARD SURGICAL GOWN, XL: Brand: CONVERTORS

## (undated) DEVICE — CATH FOLEY 18FR 5ML 2 WAY SILICONE ELASTIMER

## (undated) DEVICE — KERLIX BANDAGE ROLL: Brand: KERLIX

## (undated) DEVICE — KIT, BETHLEHEM ROBOTIC PROST: Brand: CARDINAL HEALTH

## (undated) DEVICE — MINOR PROCEDURE DRAPE: Brand: CONVERTORS

## (undated) DEVICE — SUT MONOCRYL 4-0 PS-2 27 IN Y426H

## (undated) DEVICE — HEMOSTATIC MATRIX SURGIFLO 8ML W/THROMBIN

## (undated) DEVICE — VIAL DECANTER

## (undated) DEVICE — SUT VICRYL 3-0 SH 27 IN J416H

## (undated) DEVICE — BLADELESS OBTURATOR: Brand: WECK VISTA

## (undated) DEVICE — FLOSEAL HEMOSTATIC MATRIX, 10 ML: Brand: FLOSEAL

## (undated) DEVICE — 40595 XL TRENDELENBURG POSITIONING KIT: Brand: 40595 XL TRENDELENBURG POSITIONING KIT

## (undated) DEVICE — CANNULA SEAL

## (undated) DEVICE — SUT MONOCRYL 3-0 PS-2 18 IN Y497G

## (undated) DEVICE — SUT VICRYL 0 UR-6 27 IN J603H

## (undated) DEVICE — 3M™ STERI-STRIP™ REINFORCED ADHESIVE SKIN CLOSURES, R1541, 1/4 IN X 3 IN (6 MM X 75 MM), 3 STRIPS/ENVELOPE: Brand: 3M™ STERI-STRIP™

## (undated) DEVICE — ULNAR NERVE PROTECTOR FOAM POSITIONER: Brand: CARDINAL HEALTH

## (undated) DEVICE — LIGHT HANDLE COVER SLEEVE DISP BLUE STELLAR

## (undated) DEVICE — STERILE SURGICAL LUBRICANT,  TUBE: Brand: SURGILUBE

## (undated) DEVICE — BETHLEHEM UNIVERSAL SPINE, KIT: Brand: CARDINAL HEALTH

## (undated) DEVICE — TROCAR PORT ACCESS 12 X120MM W/BLDLS OPTICAL TIP AIRSEAL

## (undated) DEVICE — VISUALIZATION SYSTEM: Brand: CLEARIFY

## (undated) DEVICE — SYSTEM TRANSPERINEAL ACCESS PRECISIONPOINT

## (undated) DEVICE — ARM DRAPE

## (undated) DEVICE — CATH FOLEY COUNCIL 20FR 5ML 2 WAY LUBRICATH

## (undated) DEVICE — LARGE NEEDLE DRIVER: Brand: ENDOWRIST